# Patient Record
Sex: FEMALE | Race: WHITE | NOT HISPANIC OR LATINO | Employment: OTHER | RURAL
[De-identification: names, ages, dates, MRNs, and addresses within clinical notes are randomized per-mention and may not be internally consistent; named-entity substitution may affect disease eponyms.]

---

## 2017-10-17 ENCOUNTER — OFFICE VISIT (OUTPATIENT)
Dept: CARDIOLOGY | Facility: CLINIC | Age: 82
End: 2017-10-17

## 2017-10-17 VITALS
BODY MASS INDEX: 29.57 KG/M2 | HEIGHT: 66 IN | SYSTOLIC BLOOD PRESSURE: 135 MMHG | DIASTOLIC BLOOD PRESSURE: 55 MMHG | HEART RATE: 50 BPM | WEIGHT: 184 LBS

## 2017-10-17 DIAGNOSIS — I44.7 LEFT BUNDLE BRANCH BLOCK: ICD-10-CM

## 2017-10-17 DIAGNOSIS — E78.5 HYPERLIPIDEMIA LDL GOAL <70: ICD-10-CM

## 2017-10-17 DIAGNOSIS — I48.0 PAROXYSMAL ATRIAL FIBRILLATION (HCC): Primary | ICD-10-CM

## 2017-10-17 DIAGNOSIS — I25.10 CORONARY ARTERY DISEASE INVOLVING NATIVE CORONARY ARTERY OF NATIVE HEART WITHOUT ANGINA PECTORIS: ICD-10-CM

## 2017-10-17 DIAGNOSIS — I10 ESSENTIAL HYPERTENSION: ICD-10-CM

## 2017-10-17 PROCEDURE — 99214 OFFICE O/P EST MOD 30 MIN: CPT | Performed by: PHYSICIAN ASSISTANT

## 2017-10-17 PROCEDURE — 93000 ELECTROCARDIOGRAM COMPLETE: CPT | Performed by: PHYSICIAN ASSISTANT

## 2017-10-17 RX ORDER — HYDRALAZINE HYDROCHLORIDE 25 MG/1
25 TABLET, FILM COATED ORAL 2 TIMES DAILY
COMMUNITY
End: 2018-01-19 | Stop reason: HOSPADM

## 2017-10-17 RX ORDER — FERROUS SULFATE 325(65) MG
325 TABLET ORAL 2 TIMES DAILY
COMMUNITY
End: 2019-06-05

## 2017-10-17 NOTE — PROGRESS NOTES
Encounter Date:10/17/2017      Patient ID: Abbi Mendez is a 82 y.o. female.    Chief Complaint: Atrial Fibrillation    PROBLEM LIST:  1. Atrial fibrillation, resolved, 03/30/2007.  a. Atenolol therapy.  b. Questionable recurrence of atrial fibrillation, 03/31/2015 via EMS, data deficit:  No confirmation via EMS strips or EKG.  Event recorder 06/09/2015-07/09/2015:  No atrial fibrillation or arrhythmias appreciated.   c. Event recorder (06/09/2015 - 07/09/2015): No atrial fibrillation; occasional isolated PACs; no ventricular ectopy.  d. Event recorder, 04/01/2016:  No atrial fibrillation; 4 runs of repetitive PACs, the longest consisting of 6 beats at 156 BPM; dyspnea, tachypalpitations and lightheadedness were not associated with rhythm disturbance.  2. Coronary artery disease.  a. Cardiac catheterization, 03/30/2007:  With 30% to 50% mid/distal LAD in “worst views” within region of extreme tortuosity, left ventricular ejection fraction (70%) post PVC without segmental wall motion abnormality; trace mitral regurgitation associated with ectopy; no mitral valve prolapse.  b. Nuclear stress test, 02/04/2010:  Negative for ischemia/scar; left ventricular ejection fraction (87%).  c. Nuclear stress test negative for ischemia and scar; normal LVEF, 04/10/2015.  3. History of left bundle branch block.   4. Obstructive sleep apnea:   a. Diagnosed by sleep study.   b. Currently on CPAP.    5. Hypertension.  6. Dyslipidemia.  7. History of left lower extremity DVT x2 treated with Coumadin; Coumadin discontinued in 2002.  8. History of gastric ulcer.  9. Iron deficiency anemia.  10. Status post tubal ligation.    History of Present Illness  Patient returns a scheduled cardiology follow-up.  She has no current complaint of exertional chest pain dyspnea orthopnea no PND no claudication.  She has no awareness of tachycardia arrhythmias no dizziness or syncope.  She does report some lower extremity edema which  generally resolves overnight and is self-limiting.  She does not check her blood pressure regularly at home.  Her cholesterol is monitored by primary care physician reported to be favorable.  She is compliant with all medications reports no significant side effects.  She is asking for cardiac clearance prior to knee surgery next week.  Her activities mostly include housework which she completes without difficulty.  At present she reports her limitation is knee pain.    Allergies   Allergen Reactions   • Amlodipine Dizziness   • Darvon [Propoxyphene] Parasthesia   • Penicillins Rash         Current Outpatient Prescriptions:   •  amLODIPine (NORVASC) 10 MG tablet, Take 10 mg by mouth Daily., Disp: , Rfl:   •  aspirin 81 MG EC tablet, Take 81 mg by mouth Daily., Disp: , Rfl:   •  atorvastatin (LIPITOR) 40 MG tablet, Take 40 mg by mouth Daily., Disp: , Rfl:   •  busPIRone (BUSPAR) 10 MG tablet, Take 10 mg by mouth 2 (Two) Times a Day., Disp: , Rfl:   •  carvedilol (COREG) 12.5 MG tablet, Take 12.5 mg by mouth 2 (Two) Times a Day With Meals., Disp: , Rfl:   •  citalopram (CeleXA) 20 MG tablet, TAKE 1 TABLET EVERY DAY, Disp: 90 tablet, Rfl: 1  •  CloNIDine (CATAPRES) 0.1 MG tablet, Take 0.1 mg by mouth 2 (Two) Times a Day., Disp: , Rfl:   •  esomeprazole (nexIUM) 40 MG capsule, Take 40 mg by mouth Every Morning Before Breakfast., Disp: , Rfl:   •  Loratadine (CLARITIN) 10 MG capsule, Take 10 mg by mouth Daily., Disp: , Rfl:   •  losartan-hydrochlorothiazide (HYZAAR) 100-12.5 MG per tablet, Take 1 tablet by mouth Daily., Disp: , Rfl:   •  nabumetone (RELAFEN) 500 MG tablet, Take 500 mg by mouth 2 (Two) Times a Day As Needed for mild pain (1-3)., Disp: , Rfl:     The following portions of the patient's history were reviewed and updated as appropriate: allergies, current medications, past family history, past medical history, past social history, past surgical history and problem list.    Review of Systems   Constitution:  "Negative for diaphoresis, weakness, malaise/fatigue and weight gain.   Cardiovascular: Negative for chest pain, claudication, dyspnea on exertion, irregular heartbeat, leg swelling, orthopnea, palpitations, paroxysmal nocturnal dyspnea and syncope.   Respiratory: Negative for cough, shortness of breath, sleep disturbances due to breathing and wheezing.    Hematologic/Lymphatic: Negative for bleeding problem.   Musculoskeletal: Negative for muscle cramps, muscle weakness and myalgias.   Gastrointestinal: Negative for heartburn.         Objective:     Blood pressure 135/55, pulse 50, height 66\" (167.6 cm), weight 184 lb (83.5 kg).        Physical Exam   Constitutional: She is oriented to person, place, and time. She appears well-developed and well-nourished.   Cardiovascular: Normal rate, regular rhythm, normal heart sounds and intact distal pulses.  Exam reveals no gallop and no friction rub.    No murmur heard.  Pulmonary/Chest: Effort normal and breath sounds normal. No respiratory distress. She has no wheezes. She has no rales. She exhibits no tenderness.   Bases clear   Genitourinary:   Genitourinary Comments: Trace pitting edema bilateral lower extremities   Musculoskeletal: Normal range of motion. She exhibits edema.   Neurological: She is alert and oriented to person, place, and time.         Lab Review:     ECG 12 Lead  Date/Time: 10/17/2017 11:28 AM  Performed by: ASHLEY NATHAN  Authorized by: ASHLEY NATHAN   Rhythm: sinus bradycardia  Rate: bradycardic  Conduction: left bundle branch block  QRS axis: normal  Clinical impression: abnormal ECG               Assessment:      Diagnosis Plan   1. Paroxysmal atrial fibrillation  Currently maintaining sinus rhythm    2. Coronary artery disease involving native coronary artery of native heart without angina pectoris  No current anginal symptoms.  Continue current medical regimen    3. Left bundle branch block  No current heart failure symptoms    4. Essential " hypertension  Well controlled current medical regimen    5. Hyperlipidemia LDL goal <70  On statin therapy followed by primary care      Plan:   She is cleared for orthopedic surgery from cardiac standpoint.  Stable cardiac status.  Continue current medications.   FU in 6 MO, sooner as needed.  Thank you for allowing us to participate in the care of your patient.       FRANCO Mujica  10/17/2017  11:14 AM    I, Soraya Darling MD, personally performed the services described in this documentation as scribed by the above named individual in my presence, and it is both accurate and complete.  10/17/2017  11:50 AM      Please note that portions of this note may have been completed with a voice recognition program. Efforts were made to edit the dictations, but occasionally words are mistranscribed.

## 2018-01-17 ENCOUNTER — APPOINTMENT (OUTPATIENT)
Dept: GENERAL RADIOLOGY | Facility: HOSPITAL | Age: 83
End: 2018-01-17

## 2018-01-17 ENCOUNTER — HOSPITAL ENCOUNTER (INPATIENT)
Facility: HOSPITAL | Age: 83
LOS: 2 days | Discharge: HOME HEALTH | End: 2018-01-19
Attending: EMERGENCY MEDICINE | Admitting: INTERNAL MEDICINE

## 2018-01-17 DIAGNOSIS — M54.50 ACUTE MIDLINE LOW BACK PAIN WITHOUT SCIATICA: ICD-10-CM

## 2018-01-17 DIAGNOSIS — R00.1 SEVERE SINUS BRADYCARDIA: ICD-10-CM

## 2018-01-17 DIAGNOSIS — M43.9 COMPRESSION DEFORMITY OF VERTEBRA: ICD-10-CM

## 2018-01-17 DIAGNOSIS — Z74.09 IMPAIRED FUNCTIONAL MOBILITY, BALANCE, GAIT, AND ENDURANCE: ICD-10-CM

## 2018-01-17 DIAGNOSIS — R55 SYNCOPE AND COLLAPSE: Primary | ICD-10-CM

## 2018-01-17 DIAGNOSIS — R55 SYNCOPE, UNSPECIFIED SYNCOPE TYPE: ICD-10-CM

## 2018-01-17 DIAGNOSIS — E83.52 HYPERCALCEMIA: ICD-10-CM

## 2018-01-17 LAB
ALBUMIN SERPL-MCNC: 4.2 G/DL (ref 3.2–4.8)
ALBUMIN/GLOB SERPL: 1.2 G/DL (ref 1.5–2.5)
ALP SERPL-CCNC: 97 U/L (ref 25–100)
ALT SERPL W P-5'-P-CCNC: 17 U/L (ref 7–40)
ANION GAP SERPL CALCULATED.3IONS-SCNC: 10 MMOL/L (ref 3–11)
AST SERPL-CCNC: 19 U/L (ref 0–33)
BASOPHILS # BLD AUTO: 0.02 10*3/MM3 (ref 0–0.2)
BASOPHILS NFR BLD AUTO: 0.1 % (ref 0–1)
BILIRUB SERPL-MCNC: 0.3 MG/DL (ref 0.3–1.2)
BUN BLD-MCNC: 16 MG/DL (ref 9–23)
BUN/CREAT SERPL: 16 (ref 7–25)
CA-I SERPL ISE-MCNC: 1.62 MMOL/L (ref 1.12–1.32)
CALCIUM SPEC-SCNC: 11.9 MG/DL (ref 8.7–10.4)
CHLORIDE SERPL-SCNC: 97 MMOL/L (ref 99–109)
CO2 SERPL-SCNC: 27 MMOL/L (ref 20–31)
CREAT BLD-MCNC: 1 MG/DL (ref 0.6–1.3)
DEPRECATED RDW RBC AUTO: 42.5 FL (ref 37–54)
EOSINOPHIL # BLD AUTO: 0.03 10*3/MM3 (ref 0–0.3)
EOSINOPHIL NFR BLD AUTO: 0.2 % (ref 0–3)
ERYTHROCYTE [DISTWIDTH] IN BLOOD BY AUTOMATED COUNT: 12.9 % (ref 11.3–14.5)
GFR SERPL CREATININE-BSD FRML MDRD: 53 ML/MIN/1.73
GLOBULIN UR ELPH-MCNC: 3.5 GM/DL
GLUCOSE BLD-MCNC: 143 MG/DL (ref 70–100)
HCT VFR BLD AUTO: 39.5 % (ref 34.5–44)
HGB BLD-MCNC: 13.1 G/DL (ref 11.5–15.5)
HOLD SPECIMEN: NORMAL
HOLD SPECIMEN: NORMAL
IMM GRANULOCYTES # BLD: 0.17 10*3/MM3 (ref 0–0.03)
IMM GRANULOCYTES NFR BLD: 0.9 % (ref 0–0.6)
LYMPHOCYTES # BLD AUTO: 1.12 10*3/MM3 (ref 0.6–4.8)
LYMPHOCYTES NFR BLD AUTO: 5.9 % (ref 24–44)
MAGNESIUM SERPL-MCNC: 1.7 MG/DL (ref 1.3–2.7)
MCH RBC QN AUTO: 29.9 PG (ref 27–31)
MCHC RBC AUTO-ENTMCNC: 33.2 G/DL (ref 32–36)
MCV RBC AUTO: 90.2 FL (ref 80–99)
MONOCYTES # BLD AUTO: 1.1 10*3/MM3 (ref 0–1)
MONOCYTES NFR BLD AUTO: 5.8 % (ref 0–12)
NEUTROPHILS # BLD AUTO: 16.6 10*3/MM3 (ref 1.5–8.3)
NEUTROPHILS NFR BLD AUTO: 87.1 % (ref 41–71)
PLATELET # BLD AUTO: 358 10*3/MM3 (ref 150–450)
PMV BLD AUTO: 10.5 FL (ref 6–12)
POTASSIUM BLD-SCNC: 3.3 MMOL/L (ref 3.5–5.5)
PROT SERPL-MCNC: 7.7 G/DL (ref 5.7–8.2)
RBC # BLD AUTO: 4.38 10*6/MM3 (ref 3.89–5.14)
SODIUM BLD-SCNC: 134 MMOL/L (ref 132–146)
TROPONIN I SERPL-MCNC: 0.02 NG/ML (ref 0–0.07)
WBC NRBC COR # BLD: 19.04 10*3/MM3 (ref 3.5–10.8)
WHOLE BLOOD HOLD SPECIMEN: NORMAL
WHOLE BLOOD HOLD SPECIMEN: NORMAL

## 2018-01-17 PROCEDURE — 99024 POSTOP FOLLOW-UP VISIT: CPT | Performed by: INTERNAL MEDICINE

## 2018-01-17 PROCEDURE — 93005 ELECTROCARDIOGRAM TRACING: CPT | Performed by: EMERGENCY MEDICINE

## 2018-01-17 PROCEDURE — 99285 EMERGENCY DEPT VISIT HI MDM: CPT

## 2018-01-17 PROCEDURE — 82330 ASSAY OF CALCIUM: CPT | Performed by: EMERGENCY MEDICINE

## 2018-01-17 PROCEDURE — 85025 COMPLETE CBC W/AUTO DIFF WBC: CPT | Performed by: EMERGENCY MEDICINE

## 2018-01-17 PROCEDURE — 83735 ASSAY OF MAGNESIUM: CPT | Performed by: EMERGENCY MEDICINE

## 2018-01-17 PROCEDURE — 25010000002 ENOXAPARIN PER 10 MG: Performed by: PHYSICIAN ASSISTANT

## 2018-01-17 PROCEDURE — 84484 ASSAY OF TROPONIN QUANT: CPT

## 2018-01-17 PROCEDURE — 72100 X-RAY EXAM L-S SPINE 2/3 VWS: CPT

## 2018-01-17 PROCEDURE — G0378 HOSPITAL OBSERVATION PER HR: HCPCS

## 2018-01-17 PROCEDURE — 80053 COMPREHEN METABOLIC PANEL: CPT | Performed by: EMERGENCY MEDICINE

## 2018-01-17 RX ORDER — SODIUM CHLORIDE 9 MG/ML
125 INJECTION, SOLUTION INTRAVENOUS CONTINUOUS
Status: DISCONTINUED | OUTPATIENT
Start: 2018-01-17 | End: 2018-01-19 | Stop reason: HOSPADM

## 2018-01-17 RX ORDER — SODIUM CHLORIDE 0.9 % (FLUSH) 0.9 %
10 SYRINGE (ML) INJECTION AS NEEDED
Status: DISCONTINUED | OUTPATIENT
Start: 2018-01-17 | End: 2018-01-19 | Stop reason: HOSPADM

## 2018-01-17 RX ORDER — AMLODIPINE BESYLATE 10 MG/1
10 TABLET ORAL DAILY
Status: DISCONTINUED | OUTPATIENT
Start: 2018-01-18 | End: 2018-01-18

## 2018-01-17 RX ORDER — LOSARTAN POTASSIUM 50 MG/1
100 TABLET ORAL
Status: DISCONTINUED | OUTPATIENT
Start: 2018-01-18 | End: 2018-01-18

## 2018-01-17 RX ORDER — ONDANSETRON 4 MG/1
4 TABLET, FILM COATED ORAL EVERY 6 HOURS PRN
Status: DISCONTINUED | OUTPATIENT
Start: 2018-01-17 | End: 2018-01-19 | Stop reason: HOSPADM

## 2018-01-17 RX ORDER — ATORVASTATIN CALCIUM 40 MG/1
40 TABLET, FILM COATED ORAL DAILY
Status: DISCONTINUED | OUTPATIENT
Start: 2018-01-18 | End: 2018-01-19 | Stop reason: HOSPADM

## 2018-01-17 RX ORDER — ONDANSETRON 2 MG/ML
4 INJECTION INTRAMUSCULAR; INTRAVENOUS EVERY 6 HOURS PRN
Status: DISCONTINUED | OUTPATIENT
Start: 2018-01-17 | End: 2018-01-19 | Stop reason: HOSPADM

## 2018-01-17 RX ORDER — BUSPIRONE HYDROCHLORIDE 10 MG/1
10 TABLET ORAL EVERY 12 HOURS SCHEDULED
Status: DISCONTINUED | OUTPATIENT
Start: 2018-01-17 | End: 2018-01-19 | Stop reason: HOSPADM

## 2018-01-17 RX ORDER — CITALOPRAM 20 MG/1
20 TABLET ORAL DAILY
Status: DISCONTINUED | OUTPATIENT
Start: 2018-01-18 | End: 2018-01-19 | Stop reason: HOSPADM

## 2018-01-17 RX ORDER — SODIUM CHLORIDE 0.9 % (FLUSH) 0.9 %
1-10 SYRINGE (ML) INJECTION AS NEEDED
Status: DISCONTINUED | OUTPATIENT
Start: 2018-01-17 | End: 2018-01-19 | Stop reason: HOSPADM

## 2018-01-17 RX ORDER — HYDROCHLOROTHIAZIDE 12.5 MG/1
12.5 TABLET ORAL
Status: DISCONTINUED | OUTPATIENT
Start: 2018-01-18 | End: 2018-01-18

## 2018-01-17 RX ORDER — FERROUS SULFATE 325(65) MG
325 TABLET ORAL
Status: DISCONTINUED | OUTPATIENT
Start: 2018-01-18 | End: 2018-01-19 | Stop reason: HOSPADM

## 2018-01-17 RX ORDER — HYDROCODONE BITARTRATE AND ACETAMINOPHEN 5; 325 MG/1; MG/1
1 TABLET ORAL ONCE
Status: COMPLETED | OUTPATIENT
Start: 2018-01-17 | End: 2018-01-17

## 2018-01-17 RX ORDER — LOSARTAN POTASSIUM AND HYDROCHLOROTHIAZIDE 12.5; 1 MG/1; MG/1
1 TABLET ORAL DAILY
Status: DISCONTINUED | OUTPATIENT
Start: 2018-01-18 | End: 2018-01-17

## 2018-01-17 RX ORDER — PANTOPRAZOLE SODIUM 40 MG/1
40 TABLET, DELAYED RELEASE ORAL EVERY MORNING
Status: DISCONTINUED | OUTPATIENT
Start: 2018-01-18 | End: 2018-01-19 | Stop reason: HOSPADM

## 2018-01-17 RX ORDER — ASPIRIN 81 MG/1
81 TABLET ORAL DAILY
Status: DISCONTINUED | OUTPATIENT
Start: 2018-01-17 | End: 2018-01-19 | Stop reason: HOSPADM

## 2018-01-17 RX ORDER — ACETAMINOPHEN 325 MG/1
650 TABLET ORAL EVERY 4 HOURS PRN
Status: DISCONTINUED | OUTPATIENT
Start: 2018-01-17 | End: 2018-01-19 | Stop reason: HOSPADM

## 2018-01-17 RX ORDER — HYDRALAZINE HYDROCHLORIDE 25 MG/1
25 TABLET, FILM COATED ORAL EVERY 12 HOURS SCHEDULED
Status: DISCONTINUED | OUTPATIENT
Start: 2018-01-17 | End: 2018-01-18

## 2018-01-17 RX ORDER — CLONIDINE HYDROCHLORIDE 0.1 MG/1
0.1 TABLET ORAL EVERY 12 HOURS SCHEDULED
Status: DISCONTINUED | OUTPATIENT
Start: 2018-01-17 | End: 2018-01-18

## 2018-01-17 RX ORDER — ERGOCALCIFEROL 1.25 MG/1
50000 CAPSULE ORAL
COMMUNITY
End: 2018-04-20 | Stop reason: HOSPADM

## 2018-01-17 RX ADMIN — HYDROCODONE BITARTRATE AND ACETAMINOPHEN 1 TABLET: 5; 325 TABLET ORAL at 19:45

## 2018-01-17 RX ADMIN — ENOXAPARIN SODIUM 40 MG: 40 INJECTION SUBCUTANEOUS at 17:55

## 2018-01-17 RX ADMIN — ACETAMINOPHEN 650 MG: 325 TABLET, FILM COATED ORAL at 17:55

## 2018-01-17 RX ADMIN — ASPIRIN 81 MG: 81 TABLET, COATED ORAL at 19:26

## 2018-01-17 RX ADMIN — CLONIDINE HYDROCHLORIDE 0.1 MG: 0.1 TABLET ORAL at 21:46

## 2018-01-17 RX ADMIN — HYDRALAZINE HYDROCHLORIDE 25 MG: 25 TABLET ORAL at 21:44

## 2018-01-17 RX ADMIN — BUSPIRONE HYDROCHLORIDE 10 MG: 10 TABLET ORAL at 21:46

## 2018-01-17 NOTE — ED PROVIDER NOTES
Subjective   HPI Comments: Abbi Mendez is a 82 y.o.female who presents to the emergency department with c/o syncope. Her cardiologist is Dr. Darling.     The patient has had multiple near-syncopal episodes related to bradycardia (heart rate in the 20's) this month. She had not lost consciousness completely until last Saturday. The patient was seen in [facility] last Saturday after the syncopal event and was admitted to [facility], [place] on Wednesday after having a four second pause on telemetry. She was taken off Clonidine and Coreg and started on Atropine. The patient had an external pacemaker placed as well. They apparently considered placing a permanent pacemaker but the patient had no episodes of bradycardia during admission.    The patient had another syncopal episode this morning and believes that she lost consciousness for at least a minute. She was brought here for further evaluation.    The patient had a total knee replacement in October and has been ambulatory with assistance from a walker and a cane. She has been in physical therapy as well and has regained some mobility.     There are no other acute complaints at this time.    Patient is a 82 y.o. female presenting with syncope.   History provided by:  Patient  Syncope   Episode history:  Multiple  Most recent episode:  Today  Duration:  1 minute  Timing:  Constant  Progression:  Resolved  Chronicity:  Recurrent  Relieved by:  Nothing  Worsened by:  Nothing  Ineffective treatments:  None tried  Associated symptoms: dizziness and weakness    Associated symptoms: no chest pain, no fever, no headaches, no nausea, no shortness of breath and no vomiting        Review of Systems   Constitutional: Negative for chills and fever.   HENT: Negative for congestion, rhinorrhea, sore throat and trouble swallowing.    Respiratory: Negative for cough and shortness of breath.    Cardiovascular: Positive for syncope. Negative for chest pain.    Gastrointestinal: Negative for abdominal pain, diarrhea, nausea and vomiting.   Genitourinary: Negative for difficulty urinating, dysuria, frequency, hematuria and urgency.   Musculoskeletal: Positive for gait problem (after total knee replacement in October). Negative for back pain and neck pain.   Neurological: Positive for dizziness, syncope and weakness. Negative for headaches.   All other systems reviewed and are negative.      Past Medical History:   Diagnosis Date   • Atrial fibrillation 11/14/2016   • CAD (coronary artery disease) 11/14/2016   • Deep vein thrombosis (DVT) of left lower extremity    • Dyslipidemia 11/14/2016   • Gastric ulcer    • Hypertension 11/14/2016   • Iron deficiency anemia    • LBBB (left bundle branch block)    • RADHA (obstructive sleep apnea) 11/14/2016     Note by Hung Chavez from 10/17/2017:    PROBLEM LIST:  1. Atrial fibrillation, resolved, 03/30/2007.  a. Atenolol therapy.  b. Questionable recurrence of atrial fibrillation, 03/31/2015 via EMS, data deficit:  No confirmation via EMS strips or EKG.  Event recorder 06/09/2015-07/09/2015:  No atrial fibrillation or arrhythmias appreciated.   c. Event recorder (06/09/2015 - 07/09/2015): No atrial fibrillation; occasional isolated PACs; no ventricular ectopy.  d. Event recorder, 04/01/2016:  No atrial fibrillation; 4 runs of repetitive PACs, the longest consisting of 6 beats at 156 BPM; dyspnea, tachypalpitations and lightheadedness were not associated with rhythm disturbance.  2. Coronary artery disease.  a. Cardiac catheterization, 03/30/2007:  With 30% to 50% mid/distal LAD in “worst views” within region of extreme tortuosity, left ventricular ejection fraction (70%) post PVC without segmental wall motion abnormality; trace mitral regurgitation associated with ectopy; no mitral valve prolapse.  b. Nuclear stress test, 02/04/2010:  Negative for ischemia/scar; left ventricular ejection fraction (87%).  c. Nuclear stress test  negative for ischemia and scar; normal LVEF, 04/10/2015.  3. History of left bundle branch block.   4. Obstructive sleep apnea:   a. Diagnosed by sleep study.   b. Currently on CPAP.    5. Hypertension.  6. Dyslipidemia.  7. History of left lower extremity DVT x2 treated with Coumadin; Coumadin discontinued in 2002.  8. History of gastric ulcer.  9. Iron deficiency anemia.  10. Status post tubal ligation.     History of Present Illness  Patient returns a scheduled cardiology follow-up.  She has no current complaint of exertional chest pain dyspnea orthopnea no PND no claudication.  She has no awareness of tachycardia arrhythmias no dizziness or syncope.  She does report some lower extremity edema which generally resolves overnight and is self-limiting.  She does not check her blood pressure regularly at home.  Her cholesterol is monitored by primary care physician reported to be favorable.  She is compliant with all medications reports no significant side effects.  She is asking for cardiac clearance prior to knee surgery next week.  Her activities mostly include housework which she completes without difficulty.  At present she reports her limitation is knee pain.    Assessment:        Diagnosis Plan   1. Paroxysmal atrial fibrillation  Currently maintaining sinus rhythm    2. Coronary artery disease involving native coronary artery of native heart without angina pectoris  No current anginal symptoms.  Continue current medical regimen    3. Left bundle branch block  No current heart failure symptoms    4. Essential hypertension  Well controlled current medical regimen    5. Hyperlipidemia LDL goal <70  On statin therapy followed by primary care       Plan:   She is cleared for orthopedic surgery from cardiac standpoint.  Stable cardiac status.  Continue current medications.   FU in 6 MO, sooner as needed.  Thank you for allowing us to participate in the care of your patient.         Hung Chavez,  PA  10/17/2017  11:14 AM      Allergies   Allergen Reactions   • Darvon [Propoxyphene] Parasthesia   • Penicillins Rash       Past Surgical History:   Procedure Laterality Date   • TUBAL ABDOMINAL LIGATION         Family History   Problem Relation Age of Onset   • Heart attack Mother 69   • Heart disease Sister    • Heart attack Brother 60   • Heart attack Brother 45       Social History     Social History   • Marital status: Single     Spouse name: N/A   • Number of children: N/A   • Years of education: N/A     Social History Main Topics   • Smoking status: Never Smoker   • Smokeless tobacco: Never Used   • Alcohol use No   • Drug use: No   • Sexual activity: Defer     Other Topics Concern   • None     Social History Narrative         Objective   Physical Exam   Constitutional: She is oriented to person, place, and time. She appears well-developed and well-nourished. No distress.   Alert, oriented, and in no acute distress.   HENT:   Head: Normocephalic and atraumatic.   Nose: Nose normal.   Mouth/Throat: Oropharynx is clear and moist.   Eyes: Conjunctivae are normal. No scleral icterus.   Neck: Normal range of motion. Neck supple. No JVD present. No thyromegaly present.   Cardiovascular: Normal rate and regular rhythm.  Exam reveals gallop. Exam reveals no friction rub.    No murmur heard.  Regular with a little gallop.   Pulmonary/Chest: Effort normal and breath sounds normal. No respiratory distress. She has no wheezes. She has no rales.   Abdominal: Soft. Bowel sounds are normal. She exhibits no mass. There is no tenderness. There is no rebound and no guarding.   Mildly obese. Abdomen is soft and non-tender diffusely. Bowel sounds are normal in all four quadrants. No guarding or rebound.    Musculoskeletal: Normal range of motion. She exhibits tenderness. She exhibits no edema.   There is possibly slight tenderness in the mid lumbar spine. CT spine is non-tender. Right knee has a well-healed scar.    Lymphadenopathy:     She has no cervical adenopathy.   Neurological: She is alert and oriented to person, place, and time.   Mild generalized weakness.   Skin: Skin is warm and dry. No erythema.   Psychiatric: She has a normal mood and affect. Her behavior is normal.   Nursing note and vitals reviewed.      Procedures         ED Course  ED Course     Recent Results (from the past 24 hour(s))   Comprehensive Metabolic Panel    Collection Time: 01/17/18  2:07 PM   Result Value Ref Range    Glucose 143 (H) 70 - 100 mg/dL    BUN 16 9 - 23 mg/dL    Creatinine 1.00 0.60 - 1.30 mg/dL    Sodium 134 132 - 146 mmol/L    Potassium 3.3 (L) 3.5 - 5.5 mmol/L    Chloride 97 (L) 99 - 109 mmol/L    CO2 27.0 20.0 - 31.0 mmol/L    Calcium 11.9 (H) 8.7 - 10.4 mg/dL    Total Protein 7.7 5.7 - 8.2 g/dL    Albumin 4.20 3.20 - 4.80 g/dL    ALT (SGPT) 17 7 - 40 U/L    AST (SGOT) 19 0 - 33 U/L    Alkaline Phosphatase 97 25 - 100 U/L    Total Bilirubin 0.3 0.3 - 1.2 mg/dL    eGFR Non African Amer 53 (L) >60 mL/min/1.73    Globulin 3.5 gm/dL    A/G Ratio 1.2 (L) 1.5 - 2.5 g/dL    BUN/Creatinine Ratio 16.0 7.0 - 25.0    Anion Gap 10.0 3.0 - 11.0 mmol/L   Magnesium    Collection Time: 01/17/18  2:07 PM   Result Value Ref Range    Magnesium 1.7 1.3 - 2.7 mg/dL   Light Blue Top    Collection Time: 01/17/18  2:07 PM   Result Value Ref Range    Extra Tube hold for add-on    Green Top (Gel)    Collection Time: 01/17/18  2:07 PM   Result Value Ref Range    Extra Tube Hold for add-ons.    Lavender Top    Collection Time: 01/17/18  2:07 PM   Result Value Ref Range    Extra Tube hold for add-on    Gold Top - SST    Collection Time: 01/17/18  2:07 PM   Result Value Ref Range    Extra Tube Hold for add-ons.    CBC Auto Differential    Collection Time: 01/17/18  2:07 PM   Result Value Ref Range    WBC 19.04 (H) 3.50 - 10.80 10*3/mm3    RBC 4.38 3.89 - 5.14 10*6/mm3    Hemoglobin 13.1 11.5 - 15.5 g/dL    Hematocrit 39.5 34.5 - 44.0 %    MCV 90.2 80.0 -  99.0 fL    MCH 29.9 27.0 - 31.0 pg    MCHC 33.2 32.0 - 36.0 g/dL    RDW 12.9 11.3 - 14.5 %    RDW-SD 42.5 37.0 - 54.0 fl    MPV 10.5 6.0 - 12.0 fL    Platelets 358 150 - 450 10*3/mm3    Neutrophil % 87.1 (H) 41.0 - 71.0 %    Lymphocyte % 5.9 (L) 24.0 - 44.0 %    Monocyte % 5.8 0.0 - 12.0 %    Eosinophil % 0.2 0.0 - 3.0 %    Basophil % 0.1 0.0 - 1.0 %    Immature Grans % 0.9 (H) 0.0 - 0.6 %    Neutrophils, Absolute 16.60 (H) 1.50 - 8.30 10*3/mm3    Lymphocytes, Absolute 1.12 0.60 - 4.80 10*3/mm3    Monocytes, Absolute 1.10 (H) 0.00 - 1.00 10*3/mm3    Eosinophils, Absolute 0.03 0.00 - 0.30 10*3/mm3    Basophils, Absolute 0.02 0.00 - 0.20 10*3/mm3    Immature Grans, Absolute 0.17 (H) 0.00 - 0.03 10*3/mm3   POC Troponin, Rapid    Collection Time: 01/17/18  2:07 PM   Result Value Ref Range    Troponin I 0.02 0.00 - 0.07 ng/mL   Calcium, Ionized    Collection Time: 01/17/18  2:07 PM   Result Value Ref Range    Ionized Calcium 1.62 (C) 1.12 - 1.32 mmol/L   Comprehensive Metabolic Panel    Collection Time: 01/18/18 12:37 AM   Result Value Ref Range    Glucose 114 (H) 70 - 100 mg/dL    BUN 16 9 - 23 mg/dL    Creatinine 0.80 0.60 - 1.30 mg/dL    Sodium 135 132 - 146 mmol/L    Potassium 3.7 3.5 - 5.5 mmol/L    Chloride 101 99 - 109 mmol/L    CO2 29.0 20.0 - 31.0 mmol/L    Calcium 10.8 (H) 8.7 - 10.4 mg/dL    Total Protein 6.1 5.7 - 8.2 g/dL    Albumin 3.50 3.20 - 4.80 g/dL    ALT (SGPT) 14 7 - 40 U/L    AST (SGOT) 20 0 - 33 U/L    Alkaline Phosphatase 84 25 - 100 U/L    Total Bilirubin 0.4 0.3 - 1.2 mg/dL    eGFR Non African Amer 69 >60 mL/min/1.73    Globulin 2.6 gm/dL    A/G Ratio 1.3 (L) 1.5 - 2.5 g/dL    BUN/Creatinine Ratio 20.0 7.0 - 25.0    Anion Gap 5.0 3.0 - 11.0 mmol/L     Note: In addition to lab results from this visit, the labs listed above may include labs taken at another facility or during a different encounter within the last 24 hours. Please correlate lab times with ED admission and discharge times for  further clarification of the services performed during this visit.    XR Spine Lumbar 2 or 3 View   Preliminary Result   1. Transitional lumbosacral anatomy. This exam assumes a rudimentary   L5-S1 disc space and partial sacralization of L5.   2. 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 Please correlate with   level of patient's symptoms.       DICTATED:     01/17/2018   EDITED    :     01/17/2018             Vitals:    01/18/18 0020 01/18/18 0410 01/18/18 0724 01/18/18 1006   BP: 157/70 162/82 162/75 (!) 182/81   BP Location: Left arm Left arm Left arm    Patient Position: Lying Lying Lying    Pulse: 80 62 74 75   Resp: 18 18 16 16   Temp: 98.8 °F (37.1 °C) 97.8 °F (36.6 °C) 98.3 °F (36.8 °C)    TempSrc: Oral Oral Oral    SpO2: 96% 96%     Weight:       Height:         Medications   sodium chloride 0.9 % flush 10 mL (not administered)   sodium chloride 0.9 % infusion (125 mL/hr Intravenous Currently Infusing 1/18/18 0550)   CloNIDine (CATAPRES) tablet 0.1 mg (0.1 mg Oral Given 1/18/18 0903)   amLODIPine (NORVASC) tablet 10 mg (10 mg Oral Given 1/18/18 0903)   aspirin EC tablet 81 mg (81 mg Oral Given 1/18/18 0903)   atorvastatin (LIPITOR) tablet 40 mg (40 mg Oral Given 1/18/18 0904)   busPIRone (BUSPAR) tablet 10 mg (10 mg Oral Given 1/18/18 0903)   citalopram (CeleXA) tablet 20 mg (20 mg Oral Given 1/18/18 0904)   pantoprazole (PROTONIX) EC tablet 40 mg ( Oral Canceled Entry 1/18/18 0700)   ferrous sulfate tablet 325 mg (325 mg Oral Given 1/18/18 0903)   hydrALAZINE (APRESOLINE) tablet 25 mg (25 mg Oral Given 1/18/18 0904)   sodium chloride 0.9 % flush 1-10 mL ( Intravenous MAR Hold 1/18/18 1003)   acetaminophen (TYLENOL) tablet 650 mg ( Oral MAR Hold 1/18/18 1003)   ondansetron (ZOFRAN) tablet 4 mg ( Oral MAR Hold 1/18/18 1003)     Or   ondansetron (ZOFRAN) injection 4 mg ( Intravenous MAR Hold 1/18/18 1003)   losartan (COZAAR) tablet 100 mg ( Oral Dose Auto Held 1/26/18 0900)     And   hydrochlorothiazide  (HYDRODIURIL) tablet 12.5 mg ( Oral Dose Auto Held 1/26/18 0900)   enoxaparin (LOVENOX) syringe 40 mg (40 mg Subcutaneous Given 1/17/18 1755)   HYDROcodone-acetaminophen (NORCO) 5-325 MG per tablet 1 tablet (1 tablet Oral Given 1/17/18 1945)   vancomycin 1500 mg/500 mL 0.9% NS IVPB ([facility]) (1,500 mg Intravenous New Bag 1/18/18 1005)     ECG/EMG Results (last 24 hours)     Procedure Component Value Units Date/Time    ECG 12 Lead [[id number]] Collected:  01/17/18 1403     Updated:  01/17/18 1413                        MDM  Number of Diagnoses or Management Options  Acute midline low back pain without sciatica:   Hypercalcemia:   Syncope, unspecified syncope type:   Diagnosis management comments:       I reviewed all available studies at the bedside the patient  this is a lady who has had multiple syncopal episodes and by report sinus cause and perhaps ventricular tachycardia at other hospitals.  She had another single episode today and presents here for further evaluation.  Currently she is hemodynamically stable.  We did x-rays of lumbosacral spine to evaluate for possible back injury she has some mild back pain.  Her calcium was also elevated and she said she was told this in an outside institution as well given her on normal saline for this but will need further evaluation.    I spoke with Juan Luis Chavez from cardiology and he is down to see the patient anticipates admission.  Please see cardiology note.      Final diagnoses:   Syncope, unspecified syncope type   Hypercalcemia   Acute midline low back pain without sciatica       Documentation assistance provided by sarah Lion.  Information recorded by the sarah was done at my direction and has been verified and validated by me.     Thuy Lion  01/17/18 1437       Thuy Lion  01/17/18 1449       Thuy Lion  01/17/18 1640       Malick Dorantes MD  01/18/18 1020

## 2018-01-17 NOTE — H&P
[facility]        Date of Hospital Visit: 18      Place of Service: [facility]    Patient Name: Abbi Mendez  :1935    Referral Provider: ED  Primary Care Provider: Eric Driver MD    Chief complaint/Reason for Consultation:  syncope         Problem List:  Problem   Syncope and Collapse   Severe Sinus Bradycardia   Atrial Fibrillation      · Questionable recurrence of atrial fibrillation, 2015 via EMS, data deficit: No confirmation via EMS strips or EKG.   · Event recorder 2015-2015: No atrial fibrillation or arrhythmias appreciated.   · Event recorder (2015 - 2015): No atrial fibrillation; occasional isolated PACs; no ventricular ectopy.  · Event recorder, 2016: No atrial fibrillation; 4 runs of repetitive PACs, the longest consisting of 6 beats at 156 BPM.     Coronary Artery Disease Involving Native Coronary Artery of Native Heart Without Angina Pectoris    · Cardiac catheterization, (2007): 30% to 50% mid/distal LAD. LVEF 70%.   · Nuclear stress test, (2010): Negative for ischemia/scar.  LVEF 87%.  · Nuclear stress test (04/10/2015): Negative for ischemia and scar. Normal LVEF.         History of Present Illness:  This is an 82 year old hypertensive dyslipidemic female with known stable coronary artery disease and a history of paroxysmal atrial fibrillation.  Her last 3 event recorders by our service showed no significant arrhythmias.  The last was in .  She was seen by our service in 2017 for preoperative clearance prior to knee replacement surgery.  At that time she was doing well had no complaints of tachycardia arrhythmias no dizziness syncope or chest pain.  She was cleared for orthopedic surgery from cardiac standpoint.  Her knee replacement was uneventful.    She presented to [facility] a few weeks ago after an episode of kennedy syncope.  There she was recorded to  have multiple sinus pauses of at least 4 seconds or more.  Her daughter who is retired nurse says there may have been some ventricular tachycardia.  She was also reported to have had severe bradycardia with a rate as low as 20.  She was treated with at least one dose of atropine.  We had no beds in the hospital that week and she was transferred to [facility] for higher level of care.  There she was taken off of carvedilol and had no further episodes of bradycardia or sinus pauses.  She was discharged to home this past Saturday.  She did fine until this morning when she had another episode of kennedy syncope.  She was brought to the [facility] emergency department for further evaluation.  Here her EKG shows sinus rhythm with a ventricular rate of 96 and a left bundle branch block which has widened in comparison with October 2017.  She is also found to have hypercalcemia of unclear significance.  She reports having had multiple falls associated with syncope without significant injury.  These were witnessed on at least one occasion by her daughter who reports no tonic-clonic activity.  No loss of bowel control but she did have loss of bladder.  Her daughter reports that she awoke within 2-3 minutes and was fully oriented.          Past Medical History:   Diagnosis Date   • Atrial fibrillation 11/14/2016   • CAD (coronary artery disease) 11/14/2016   • Deep vein thrombosis (DVT) of left lower extremity    • Dyslipidemia 11/14/2016   • Gastric ulcer    • Hypertension 11/14/2016   • Iron deficiency anemia    • LBBB (left bundle branch block)    • RADHA (obstructive sleep apnea) 11/14/2016       Past Surgical History:   Procedure Laterality Date   • TUBAL ABDOMINAL LIGATION         Allergies   Allergen Reactions   • Darvon [Propoxyphene] Parasthesia   • Penicillins Rash       (Not in a hospital admission)    Current Facility-Administered Medications:   •  sodium chloride 0.9 % flush 10 mL, 10 mL, Intravenous, PRN, Malick I  MD Jose E  •  sodium chloride 0.9 % infusion, 125 mL/hr, Intravenous, Continuous, Vel Dykes MD    Current Outpatient Prescriptions:   •  amLODIPine (NORVASC) 10 MG tablet, Take 10 mg by mouth Daily., Disp: , Rfl:   •  aspirin 81 MG EC tablet, Take 81 mg by mouth Daily., Disp: , Rfl:   •  atorvastatin (LIPITOR) 40 MG tablet, Take 40 mg by mouth Daily., Disp: , Rfl:   •  busPIRone (BUSPAR) 10 MG tablet, Take 10 mg by mouth 2 (Two) Times a Day., Disp: , Rfl:   •  citalopram (CeleXA) 20 MG tablet, TAKE 1 TABLET EVERY DAY, Disp: 90 tablet, Rfl: 1  •  esomeprazole (nexIUM) 40 MG capsule, Take 40 mg by mouth Every Morning Before Breakfast., Disp: , Rfl:   •  ferrous sulfate 325 (65 FE) MG tablet, Take 325 mg by mouth 2 (Two) Times a Day., Disp: , Rfl:   •  hydrALAZINE (APRESOLINE) 25 MG tablet, Take 25 mg by mouth 2 (Two) Times a Day., Disp: , Rfl:   •  losartan-hydrochlorothiazide (HYZAAR) 100-12.5 MG per tablet, Take 1 tablet by mouth Daily., Disp: , Rfl:   •  vitamin D (ERGOCALCIFEROL) 29078 units capsule capsule, Take 50,000 Units by mouth 1 (One) Time Per Week., Disp: , Rfl:   •  CloNIDine (CATAPRES) 0.1 MG tablet, Take 0.1 mg by mouth 2 (Two) Times a Day., Disp: , Rfl:       Social History     Social History   • Marital status: Single     Spouse name: N/A   • Number of children: N/A   • Years of education: N/A     Occupational History   • Not on file.     Social History Main Topics   • Smoking status: Never Smoker   • Smokeless tobacco: Never Used   • Alcohol use No   • Drug use: No   • Sexual activity: Defer     Other Topics Concern   • Not on file     Social History Narrative       Family History   Problem Relation Age of Onset   • Heart attack Mother 69   • Heart disease Sister    • Heart attack Brother 60   • Heart attack Brother 45       REVIEW OF SYSTEMS:   Review of Systems   Constitution: Negative.   HENT: Negative.    Eyes: Negative.    Cardiovascular: Positive for syncope.   Respiratory: Negative.   "  Endocrine: Negative.    Hematologic/Lymphatic: Negative.    Skin: Negative.    Musculoskeletal: Negative.    Gastrointestinal: Negative.    Genitourinary: Negative.    Psychiatric/Behavioral: Negative.    Allergic/Immunologic: Negative.    All other systems reviewed and are negative.           Objective:  Vitals:    01/17/18 1430 01/17/18 1445 01/17/18 1500 01/17/18 1515   BP: 150/73 155/68 149/69 154/72   Patient Position:       Pulse: 89  89 88   Resp:       Temp:       TempSrc:       SpO2: 93%  90% 90%   Weight:       Height:         Body mass index is 29.18 kg/(m^2).  Flowsheet Rows         First Filed Value    Admission Height  162.6 cm (64\") Documented at 01/17/2018 1403    Admission Weight  77.1 kg (170 lb) Documented at 01/17/2018 1403        No intake or output data in the 24 hours ending 01/17/18 1526    Physical Exam   General: No acute distress, well-developed and well-nourished.    Skin: Skin is warm and dry. No obvious cyanosis, erythema or pallor.   HEENT: Atraumatic, normocephalic, no conjunctival pallor, no scleral icterus.   Neck: Supple, no JVD. Normal carotid upstrokes, no bruits.    Chest:No respiratory distres No chest wall tenderness. Breath sounds are normal. No wheezes,  rhonchi or rales.  Cardiovascular: Normal S1 and S2, no murmer, gallop or rub. PMI is not displaced.    Pulses:Radial and pedal pulses are 2+ and symmetric.    Abdomen: Soft, non tender, normal bowel sounds.   Musculoskeletal/Extremities:  No clubbing, cyanosis or edema. No gross deformity.   Neurological: Alert and oriented to person, place, and time, no gross focal deficits.   Psychiatric: Normal mood and affect.Speech and behavior is normal.        Lab Review:                  Results from last 7 days  Lab Units 01/17/18  1407   SODIUM mmol/L 134   POTASSIUM mmol/L 3.3*   CHLORIDE mmol/L 97*   CO2 mmol/L 27.0   BUN mg/dL 16   CREATININE mg/dL 1.00   GLUCOSE mg/dL 143*   CALCIUM mg/dL 11.9*           Results from last 7 " days  Lab Units 01/17/18  1407   WBC 10*3/mm3 19.04*   HEMOGLOBIN g/dL 13.1   HEMATOCRIT % 39.5   PLATELETS 10*3/mm3 358           Results from last 7 days  Lab Units 01/17/18  1407   MAGNESIUM mg/dL 1.7               EKG: Sinus rhythm with first-degree AV block/left bundle branch block consistent with trifascicular block.          Assessment:   · Syncope with collapse  · Advanced conduction disease noted on ECG, reported severe bradycardia with multiple sinus pauses (records pending). 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  · Hypokalemic   · Stable coronary artery disease  · Hypertension    Plan:   · Monitor on telemetry.  · Tele[facility] records from [facility]  · Plan permanent pacemaker implant in the morning  · The procedure was explained to the patient/family extensively. Indications, benefits, risks and alternatives were discussed. The patient understands well, and wishes to proceed.     Scribed for Soraya Darling MD by Hung Chavez PA-C. 1/17/2018  3:26 PM     ISoraya MD, personally performed the services described in this documentation as scribed by the above named individual in my presence, and it is both accurate and complete.  1/17/2018  5:20 PM

## 2018-01-17 NOTE — PROGRESS NOTES
Discharge Planning Assessment  [facility]     Patient Name: Abbi Mendez  MRN: [id number]  Today's Date: 1/17/2018    Admit Date: 1/17/2018          Discharge Needs Assessment       01/17/18 1636    Living Environment    Lives With alone   pt resides in [facility]    Living Arrangements house    Type of Financial/Environmental Concern none    Living Environment    Provides Primary Care For no one    Quality Of Family Relationships supportive;helpful;involved    Able to Return to Prior Living Arrangements yes    Discharge Needs Assessment    Concerns To Be Addressed no discharge needs identified;denies needs/concerns at this time    Readmission Within The Last 30 Days other (see comments)   admitted to OSH, same issues    Anticipated Changes Related to Illness none    Equipment Currently Used at Home cane, straight;walker, rolling;shower chair;oxygen   pt has home O2 for HS use through [employer], does not have portable tanks    Equipment Needed After Discharge none    Discharge Contact Information if Applicable [phone]            Discharge Plan       01/17/18 1639    Case Management/Social Work Plan    Plan home    Patient/Family In Agreement With Plan yes    Additional Comments CM spoke with pt and daughters Sherine and Shruthi at bedside. Pt plans to return home with assistance from family as needed. Pt's daughters live nearby and willing to assist. Pt is interested in Life Alert information prior to discharge as she feels she would benefit from this program. CM will contact [employer] tomorrow for assistance. CM will continue to follow.         Discharge Placement     No information found                Demographic Summary       01/17/18 1632    Referral Information    Referral Source admission list    Contact Information    Permission Granted to Share Information With     Primary Care Physician Information    Name Janet Driver            Functional Status       01/17/18 1633    Functional  Status Current    Current Functional Level Comment unable to assess    Functional Status Prior    Ambulation 1-->assistive equipment    Transferring 1-->assistive equipment    Toileting 0-->independent    Bathing 3-->assistive equipment and person    Dressing 0-->independent    Eating 0-->independent    Communication 0-->understands/communicates without difficulty    Swallowing 0-->swallows foods/liquids without difficulty    IADL    Medications independent   pt confirms she has prescription drug coverage and denies issues obtaining or affording current medications    Meal Preparation assistive person    Housekeeping assistive person    Laundry assistive person    Shopping assistive person    Oral Care independent    Activity Tolerance    Current Activity Limitations none    Usual Activity Tolerance fair    Current Activity Tolerance fair    Employment/Financial    Financial Concerns none   pt confirms she has [employer] Medicare and denies issues obtaining or affording current medications            Psychosocial     None            Abuse/Neglect     None            Legal     None            Substance Abuse     None            Patient Forms     None          Janna Ferguson

## 2018-01-18 ENCOUNTER — APPOINTMENT (OUTPATIENT)
Dept: CARDIOLOGY | Facility: HOSPITAL | Age: 83
End: 2018-01-18
Attending: INTERNAL MEDICINE

## 2018-01-18 PROBLEM — I44.2 THIRD DEGREE AV BLOCK (HCC): Status: ACTIVE | Noted: 2018-01-18

## 2018-01-18 PROBLEM — M54.10 RADICULOPATHY: Status: ACTIVE | Noted: 2018-01-18

## 2018-01-18 PROBLEM — M43.9 COMPRESSION DEFORMITY OF VERTEBRA: Status: ACTIVE | Noted: 2018-01-18

## 2018-01-18 PROBLEM — R55 SYNCOPE: Status: ACTIVE | Noted: 2018-01-18

## 2018-01-18 LAB
ALBUMIN SERPL-MCNC: 3.5 G/DL (ref 3.2–4.8)
ALBUMIN/GLOB SERPL: 1.3 G/DL (ref 1.5–2.5)
ALP SERPL-CCNC: 84 U/L (ref 25–100)
ALT SERPL W P-5'-P-CCNC: 14 U/L (ref 7–40)
ANION GAP SERPL CALCULATED.3IONS-SCNC: 5 MMOL/L (ref 3–11)
AST SERPL-CCNC: 20 U/L (ref 0–33)
BH CV ECHO MEAS - AO MAX PG (FULL): 6.4 MMHG
BH CV ECHO MEAS - AO MAX PG: 12 MMHG
BH CV ECHO MEAS - AO MEAN PG (FULL): 3.6 MMHG
BH CV ECHO MEAS - AO MEAN PG: 5.7 MMHG
BH CV ECHO MEAS - AO ROOT AREA (BSA CORRECTED): 1.5
BH CV ECHO MEAS - AO ROOT AREA: 6 CM^2
BH CV ECHO MEAS - AO ROOT DIAM: 2.8 CM
BH CV ECHO MEAS - AO V2 MAX: 174.8 CM/SEC
BH CV ECHO MEAS - AO V2 MEAN: 105.8 CM/SEC
BH CV ECHO MEAS - AO V2 VTI: 29.3 CM
BH CV ECHO MEAS - AVA(I,A): 1.7 CM^2
BH CV ECHO MEAS - AVA(I,D): 1.7 CM^2
BH CV ECHO MEAS - AVA(V,A): 1.7 CM^2
BH CV ECHO MEAS - AVA(V,D): 1.7 CM^2
BH CV ECHO MEAS - BSA(HAYCOCK): 1.9 M^2
BH CV ECHO MEAS - BSA: 1.8 M^2
BH CV ECHO MEAS - BZI_BMI: 28.8 KILOGRAMS/M^2
BH CV ECHO MEAS - BZI_METRIC_HEIGHT: 162.6 CM
BH CV ECHO MEAS - BZI_METRIC_WEIGHT: 76.2 KG
BH CV ECHO MEAS - CONTRAST EF (2CH): 29 ML/M^2
BH CV ECHO MEAS - CONTRAST EF 4CH: 37.3 ML/M^2
BH CV ECHO MEAS - EDV(CUBED): 27.7 ML
BH CV ECHO MEAS - EDV(MOD-SP2): 31 ML
BH CV ECHO MEAS - EDV(MOD-SP4): 59 ML
BH CV ECHO MEAS - EDV(TEICH): 35.8 ML
BH CV ECHO MEAS - EF(CUBED): 42.8 %
BH CV ECHO MEAS - EF(MOD-SP2): 29 %
BH CV ECHO MEAS - EF(TEICH): 36.8 %
BH CV ECHO MEAS - ESV(CUBED): 15.9 ML
BH CV ECHO MEAS - ESV(MOD-SP2): 22 ML
BH CV ECHO MEAS - ESV(MOD-SP4): 37 ML
BH CV ECHO MEAS - ESV(TEICH): 22.6 ML
BH CV ECHO MEAS - FS: 17 %
BH CV ECHO MEAS - IVS/LVPW: 1.6
BH CV ECHO MEAS - IVSD: 1.6 CM
BH CV ECHO MEAS - LA DIMENSION: 3.1 CM
BH CV ECHO MEAS - LA/AO: 1.1
BH CV ECHO MEAS - LAT PEAK E' VEL: 4.3 CM/SEC
BH CV ECHO MEAS - LV DIASTOLIC VOL/BSA (35-75): 32.5 ML/M^2
BH CV ECHO MEAS - LV MASS(C)D: 128.4 GRAMS
BH CV ECHO MEAS - LV MASS(C)DI: 70.7 GRAMS/M^2
BH CV ECHO MEAS - LV MAX PG: 5.6 MMHG
BH CV ECHO MEAS - LV MEAN PG: 2.1 MMHG
BH CV ECHO MEAS - LV SYSTOLIC VOL/BSA (12-30): 20.4 ML/M^2
BH CV ECHO MEAS - LV V1 MAX: 118 CM/SEC
BH CV ECHO MEAS - LV V1 MEAN: 63.6 CM/SEC
BH CV ECHO MEAS - LV V1 VTI: 20 CM
BH CV ECHO MEAS - LVIDD: 3 CM
BH CV ECHO MEAS - LVIDS: 2.5 CM
BH CV ECHO MEAS - LVLD AP2: 7 CM
BH CV ECHO MEAS - LVLD AP4: 7.6 CM
BH CV ECHO MEAS - LVLS AP2: 6.3 CM
BH CV ECHO MEAS - LVLS AP4: 6.7 CM
BH CV ECHO MEAS - LVOT AREA (M): 2.5 CM^2
BH CV ECHO MEAS - LVOT AREA: 2.6 CM^2
BH CV ECHO MEAS - LVOT DIAM: 1.8 CM
BH CV ECHO MEAS - LVPWD: 1 CM
BH CV ECHO MEAS - MED PEAK E' VEL: 4.97 CM/SEC
BH CV ECHO MEAS - MV A MAX VEL: 45.9 CM/SEC
BH CV ECHO MEAS - MV E MAX VEL: 57.8 CM/SEC
BH CV ECHO MEAS - MV E/A: 1.3
BH CV ECHO MEAS - PA ACC SLOPE: 805.1 CM/SEC^2
BH CV ECHO MEAS - PA ACC TIME: 0.14 SEC
BH CV ECHO MEAS - PA PR(ACCEL): 17.2 MMHG
BH CV ECHO MEAS - RAP SYSTOLE: 8 MMHG
BH CV ECHO MEAS - RVDD: 2.5 CM
BH CV ECHO MEAS - RVSP: 25 MMHG
BH CV ECHO MEAS - SI(AO): 97.2 ML/M^2
BH CV ECHO MEAS - SI(CUBED): 6.5 ML/M^2
BH CV ECHO MEAS - SI(LVOT): 28.2 ML/M^2
BH CV ECHO MEAS - SI(MOD-SP2): 5 ML/M^2
BH CV ECHO MEAS - SI(MOD-SP4): 12.1 ML/M^2
BH CV ECHO MEAS - SI(TEICH): 7.3 ML/M^2
BH CV ECHO MEAS - SV(AO): 176.5 ML
BH CV ECHO MEAS - SV(CUBED): 11.9 ML
BH CV ECHO MEAS - SV(LVOT): 51.2 ML
BH CV ECHO MEAS - SV(MOD-SP2): 9 ML
BH CV ECHO MEAS - SV(MOD-SP4): 22 ML
BH CV ECHO MEAS - SV(TEICH): 13.2 ML
BH CV ECHO MEAS - TAPSE (>1.6): 1.1 CM2
BH CV ECHO MEAS - TR MAX VEL: 204.5 CM/SEC
BH CV VAS BP RIGHT ARM: NORMAL MMHG
BH CV XLRA - RV BASE: 3.1 CM
BH CV XLRA - RV LENGTH: 5.1 CM
BH CV XLRA - RV MID: 2.6 CM
BH CV XLRA - TDI S': 9.45 CM/SEC
BILIRUB SERPL-MCNC: 0.4 MG/DL (ref 0.3–1.2)
BUN BLD-MCNC: 16 MG/DL (ref 9–23)
BUN/CREAT SERPL: 20 (ref 7–25)
CALCIUM SPEC-SCNC: 10.8 MG/DL (ref 8.7–10.4)
CHLORIDE SERPL-SCNC: 101 MMOL/L (ref 99–109)
CO2 SERPL-SCNC: 29 MMOL/L (ref 20–31)
CREAT BLD-MCNC: 0.8 MG/DL (ref 0.6–1.3)
E/E' RATIO: 12
GFR SERPL CREATININE-BSD FRML MDRD: 69 ML/MIN/1.73
GLOBULIN UR ELPH-MCNC: 2.6 GM/DL
GLUCOSE BLD-MCNC: 114 MG/DL (ref 70–100)
LEFT ATRIUM VOLUME INDEX: 33.5 ML/M2
LV EF 2D ECHO EST: 55 %
MAXIMAL PREDICTED HEART RATE: 138 BPM
POTASSIUM BLD-SCNC: 3.7 MMOL/L (ref 3.5–5.5)
PROT SERPL-MCNC: 6.1 G/DL (ref 5.7–8.2)
SODIUM BLD-SCNC: 135 MMOL/L (ref 132–146)
STRESS TARGET HR: 117 BPM

## 2018-01-18 PROCEDURE — 25010000002 MIDAZOLAM PER 1 MG: Performed by: INTERNAL MEDICINE

## 2018-01-18 PROCEDURE — 80053 COMPREHEN METABOLIC PANEL: CPT | Performed by: INTERNAL MEDICINE

## 2018-01-18 PROCEDURE — 33208 INSRT HEART PM ATRIAL & VENT: CPT | Performed by: INTERNAL MEDICINE

## 2018-01-18 PROCEDURE — 25010000002 SULFUR HEXAFLUORIDE MICROSPH 60.7-25 MG RECONSTITUTED SUSPENSION: Performed by: INTERNAL MEDICINE

## 2018-01-18 PROCEDURE — C1898 LEAD, PMKR, OTHER THAN TRANS: HCPCS

## 2018-01-18 PROCEDURE — 25010000002 ONDANSETRON PER 1 MG: Performed by: PHYSICIAN ASSISTANT

## 2018-01-18 PROCEDURE — 25010000002 FENTANYL CITRATE (PF) 100 MCG/2ML SOLUTION: Performed by: INTERNAL MEDICINE

## 2018-01-18 PROCEDURE — C1785 PMKR, DUAL, RATE-RESP: HCPCS

## 2018-01-18 PROCEDURE — 99221 1ST HOSP IP/OBS SF/LOW 40: CPT | Performed by: NURSE PRACTITIONER

## 2018-01-18 PROCEDURE — 93306 TTE W/DOPPLER COMPLETE: CPT | Performed by: INTERNAL MEDICINE

## 2018-01-18 PROCEDURE — 0JH606Z INSERTION OF PACEMAKER, DUAL CHAMBER INTO CHEST SUBCUTANEOUS TISSUE AND FASCIA, OPEN APPROACH: ICD-10-PCS | Performed by: INTERNAL MEDICINE

## 2018-01-18 PROCEDURE — 93306 TTE W/DOPPLER COMPLETE: CPT

## 2018-01-18 PROCEDURE — 02HK3JZ INSERTION OF PACEMAKER LEAD INTO RIGHT VENTRICLE, PERCUTANEOUS APPROACH: ICD-10-PCS | Performed by: INTERNAL MEDICINE

## 2018-01-18 PROCEDURE — C1892 INTRO/SHEATH,FIXED,PEEL-AWAY: HCPCS | Performed by: INTERNAL MEDICINE

## 2018-01-18 PROCEDURE — 99024 POSTOP FOLLOW-UP VISIT: CPT | Performed by: INTERNAL MEDICINE

## 2018-01-18 PROCEDURE — 25010000002 VANCOMYCIN: Performed by: INTERNAL MEDICINE

## 2018-01-18 PROCEDURE — 02H63JZ INSERTION OF PACEMAKER LEAD INTO RIGHT ATRIUM, PERCUTANEOUS APPROACH: ICD-10-PCS | Performed by: INTERNAL MEDICINE

## 2018-01-18 RX ORDER — MIDAZOLAM HYDROCHLORIDE 1 MG/ML
INJECTION INTRAMUSCULAR; INTRAVENOUS AS NEEDED
Status: DISCONTINUED | OUTPATIENT
Start: 2018-01-18 | End: 2018-01-18 | Stop reason: HOSPADM

## 2018-01-18 RX ORDER — GABAPENTIN 300 MG/1
300 CAPSULE ORAL DAILY
Status: DISCONTINUED | OUTPATIENT
Start: 2018-01-18 | End: 2018-01-19

## 2018-01-18 RX ORDER — LIDOCAINE HYDROCHLORIDE 10 MG/ML
INJECTION, SOLUTION EPIDURAL; INFILTRATION; INTRACAUDAL; PERINEURAL AS NEEDED
Status: DISCONTINUED | OUTPATIENT
Start: 2018-01-18 | End: 2018-01-18 | Stop reason: HOSPADM

## 2018-01-18 RX ORDER — FENTANYL CITRATE 50 UG/ML
INJECTION, SOLUTION INTRAMUSCULAR; INTRAVENOUS AS NEEDED
Status: DISCONTINUED | OUTPATIENT
Start: 2018-01-18 | End: 2018-01-18 | Stop reason: HOSPADM

## 2018-01-18 RX ORDER — SODIUM CHLORIDE 9 MG/ML
100 INJECTION, SOLUTION INTRAVENOUS CONTINUOUS
Status: DISCONTINUED | OUTPATIENT
Start: 2018-01-18 | End: 2018-01-19 | Stop reason: HOSPADM

## 2018-01-18 RX ORDER — SODIUM CHLORIDE 0.9 % (FLUSH) 0.9 %
1-10 SYRINGE (ML) INJECTION AS NEEDED
Status: DISCONTINUED | OUTPATIENT
Start: 2018-01-18 | End: 2018-01-19 | Stop reason: HOSPADM

## 2018-01-18 RX ORDER — CARVEDILOL 3.12 MG/1
3.12 TABLET ORAL EVERY 12 HOURS SCHEDULED
Status: DISCONTINUED | OUTPATIENT
Start: 2018-01-18 | End: 2018-01-18

## 2018-01-18 RX ADMIN — SULFUR HEXAFLUORIDE 3 ML: KIT at 16:00

## 2018-01-18 RX ADMIN — LOSARTAN POTASSIUM 100 MG: 50 TABLET ORAL at 09:07

## 2018-01-18 RX ADMIN — CLONIDINE HYDROCHLORIDE 0.1 MG: 0.1 TABLET ORAL at 09:03

## 2018-01-18 RX ADMIN — ONDANSETRON 4 MG: 2 INJECTION INTRAMUSCULAR; INTRAVENOUS at 13:27

## 2018-01-18 RX ADMIN — ATORVASTATIN CALCIUM 40 MG: 40 TABLET, FILM COATED ORAL at 09:04

## 2018-01-18 RX ADMIN — GABAPENTIN 300 MG: 300 CAPSULE ORAL at 21:25

## 2018-01-18 RX ADMIN — BUSPIRONE HYDROCHLORIDE 10 MG: 10 TABLET ORAL at 21:25

## 2018-01-18 RX ADMIN — SODIUM CHLORIDE 125 ML/HR: 9 INJECTION, SOLUTION INTRAVENOUS at 02:51

## 2018-01-18 RX ADMIN — HYDROCHLOROTHIAZIDE 12.5 MG: 12.5 TABLET ORAL at 09:03

## 2018-01-18 RX ADMIN — HYDRALAZINE HYDROCHLORIDE 25 MG: 25 TABLET ORAL at 09:04

## 2018-01-18 RX ADMIN — BUSPIRONE HYDROCHLORIDE 10 MG: 10 TABLET ORAL at 09:03

## 2018-01-18 RX ADMIN — CITALOPRAM HYDROBROMIDE 20 MG: 20 TABLET ORAL at 09:04

## 2018-01-18 RX ADMIN — VANCOMYCIN HYDROCHLORIDE 1500 MG: 10 INJECTION, POWDER, LYOPHILIZED, FOR SOLUTION INTRAVENOUS at 10:05

## 2018-01-18 RX ADMIN — VANCOMYCIN HYDROCHLORIDE 1250 MG: 10 INJECTION, POWDER, LYOPHILIZED, FOR SOLUTION INTRAVENOUS at 21:25

## 2018-01-18 RX ADMIN — ACETAMINOPHEN 650 MG: 325 TABLET, FILM COATED ORAL at 17:36

## 2018-01-18 RX ADMIN — PANTOPRAZOLE SODIUM 40 MG: 40 TABLET, DELAYED RELEASE ORAL at 05:49

## 2018-01-18 RX ADMIN — AMLODIPINE BESYLATE 10 MG: 10 TABLET ORAL at 09:03

## 2018-01-18 RX ADMIN — Medication 325 MG: at 09:03

## 2018-01-18 RX ADMIN — ASPIRIN 81 MG: 81 TABLET, COATED ORAL at 09:03

## 2018-01-18 RX ADMIN — SODIUM CHLORIDE 500 ML: 9 INJECTION, SOLUTION INTRAVENOUS at 14:44

## 2018-01-18 NOTE — PLAN OF CARE
Problem: Patient Care Overview (Adult)  Goal: Plan of Care Review  Outcome: Ongoing (interventions implemented as appropriate)    Goal: Adult Individualization and Mutuality  Outcome: Ongoing (interventions implemented as appropriate)    Goal: Discharge Needs Assessment  Outcome: Ongoing (interventions implemented as appropriate)      Problem: Activity Intolerance (Adult)  Goal: Identify Related Risk Factors and Signs and Symptoms  Outcome: Ongoing (interventions implemented as appropriate)    Goal: Activity Tolerance  Outcome: Ongoing (interventions implemented as appropriate)      Problem: Fall Risk (Adult)  Goal: Identify Related Risk Factors and Signs and Symptoms  Outcome: Ongoing (interventions implemented as appropriate)    Goal: Absence of Falls  Outcome: Ongoing (interventions implemented as appropriate)

## 2018-01-18 NOTE — PLAN OF CARE
Problem: Patient Care Overview (Adult)  Goal: Plan of Care Review  Outcome: Ongoing (interventions implemented as appropriate)   01/17/18 2341   Coping/Psychosocial Response Interventions   Plan Of Care Reviewed With patient;daughter   Patient Care Overview   Progress progress toward functional goals as expected     Goal: Adult Individualization and Mutuality  Outcome: Ongoing (interventions implemented as appropriate)    Goal: Discharge Needs Assessment  Outcome: Ongoing (interventions implemented as appropriate)      Problem: Activity Intolerance (Adult)  Goal: Identify Related Risk Factors and Signs and Symptoms  Outcome: Ongoing (interventions implemented as appropriate)   01/17/18 2341   Activity Intolerance   Activity Intolerance: Related Risk Factors generalized weakness;functional decline;pain   Signs and Symptoms (Activity Intolerance) dizziness/faintness     Goal: Activity Tolerance  Outcome: Ongoing (interventions implemented as appropriate)   01/17/18 2341   Activity Intolerance (Adult)   Activity Tolerance making progress toward outcome     Goal: Effective Energy Conservation Techniques  Outcome: Ongoing (interventions implemented as appropriate)   01/17/18 2341   Activity Intolerance (Adult)   Effective Energy Conservation Techniques making progress toward outcome       Problem: Fall Risk (Adult)  Goal: Identify Related Risk Factors and Signs and Symptoms  Outcome: Ongoing (interventions implemented as appropriate)   01/17/18 2341   Fall Risk   Fall Risk: Related Risk Factors gait/mobility problems;fear of falling;fatigue/slow reaction;history of falls;inadequate lighting;objects hard to reach;slipper/uneven surfaces;environment unfamiliar   Fall Risk: Signs and Symptoms presence of risk factors     Goal: Absence of Falls  Outcome: Ongoing (interventions implemented as appropriate)   01/17/18 2341   Fall Risk (Adult)   Absence of Falls making progress toward outcome

## 2018-01-18 NOTE — PROGRESS NOTES
"[facility] at [facility]  IP Progress Note   LOS: 0 days   Patient Care Team:  Eric Driver MD as PCP - General  Soraya Darling MD as PCP - Internal Medicine (Cardiology)  Josesito Danielson MD as Consulting Physician (Orthopedic Surgery)    Chief Complaint: Follow up for syncope, bradycardia, 3rd degree AVB    Subjective    Did well overnight, no chest pain or shortness of breath.    Problem   Third Degree Av Block     Tele: Sinus RythymWith left bundle-branch block.    Vitals:  Blood pressure 162/75, pulse 74, temperature 98.3 °F (36.8 °C), temperature source Oral, resp. rate 16, height 162.6 cm (64\"), weight 76.6 kg (168 lb 12.8 oz), SpO2 96 %.     Intake/Output Summary (Last 24 hours) at 01/18/18 1006  Last data filed at 01/18/18 0931   Gross per 24 hour   Intake             1125 ml   Output              700 ml   Net              425 ml       Physical Exam:  General: No apparent distress.  Neck: no JVD.  Chest:No respiratory distress, breath sounds are normal. No wheezes,  rhonchi or rales.  Cardiovascular: Normal S1 and S2, no murmer, gallop or rub.    Extremities: No edema.    Results Review:     I reviewed the patient's new clinical results.      Results from last 7 days  Lab Units 01/17/18  1407   WBC 10*3/mm3 19.04*   HEMOGLOBIN g/dL 13.1   HEMATOCRIT % 39.5   PLATELETS 10*3/mm3 358       Results from last 7 days  Lab Units 01/18/18  0037   SODIUM mmol/L 135   POTASSIUM mmol/L 3.7   CHLORIDE mmol/L 101   CO2 mmol/L 29.0   BUN mg/dL 16   CREATININE mg/dL 0.80   CALCIUM mg/dL 10.8*   BILIRUBIN mg/dL 0.4   ALK PHOS U/L 84   ALT (SGPT) U/L 14   AST (SGOT) U/L 20   GLUCOSE mg/dL 114*       Scheduled Meds:  amLODIPine 10 mg Oral Daily   aspirin 81 mg Oral Daily   atorvastatin 40 mg Oral Daily   busPIRone 10 mg Oral Q12H   citalopram 20 mg Oral Daily   CloNIDine 0.1 mg Oral Q12H   ferrous sulfate 325 mg Oral Daily With Breakfast   hydrALAZINE 25 mg Oral Q12H   losartan 100 mg Oral " Q24H   And      hydrochlorothiazide 12.5 mg Oral Q24H   pantoprazole 40 mg Oral QAM   vancomycin 20 mg/kg Intravenous Once       Continuous Infusions:    sodium chloride 125 mL/hr Last Rate: 125 mL/hr (01/18/18 0550)         Assessment:   1. Syncope with collapse, Recurrent episodes despite discontinuation of beta blockers.  2. Advanced conduction disease noted on ECG, severe bradycardia with multiple asystolic pauses up to 6.5 seconds. Sick sinus syndrome and high-grade AV/third degree AV block.   3. 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    4. Hypokalemia, corrected   5. Stable coronary artery disease  6. Hypertension    Plan:  1. Permanent pacemaker today   2. The procedure was explained to the patient/family extensively. Indications, benefits, risks and alternatives were discussed. The patient understands well, and wishes to proceed.       FRANCO Mujica  01/18/18  10:06 AM    I have seen and examined the patient, case was discussed with the physician extender, reviewed the above note, necessary changes were made and I agree with the final note.   Soraya Darling MD, FACC, [facility]

## 2018-01-18 NOTE — PROGRESS NOTES
The patient reported nausea and vomiting and was noted to be hypotensive following the procedure.  It was felt that this was due to effect of sedation, her blood pressure responded nicely to fluids and nausea has completely resolved after Zofran.  She is currently hemodynamically stable with blood pressure around 110.  There was a echocardiogram obtained which has shown small pericardial effusion.  It is not clear whether it was pre-existing are postprocedural as there is a risk of microperforation.  Her final parameters on pacemaker interrogation revealed normal function.  At this time we will continue to monitor on telemetry with supportive care including normal saline, I will hold off all antihypertensives for now as she had been on a lot of medications.  We will reassess with a repeat echocardiogram tomorrow.  The patient had fall related to syncope and has been complaining of back pain, we will consult hospitalist service to manage her medical issues.

## 2018-01-19 ENCOUNTER — APPOINTMENT (OUTPATIENT)
Dept: GENERAL RADIOLOGY | Facility: HOSPITAL | Age: 83
End: 2018-01-19

## 2018-01-19 ENCOUNTER — APPOINTMENT (OUTPATIENT)
Dept: CARDIOLOGY | Facility: HOSPITAL | Age: 83
End: 2018-01-19
Attending: INTERNAL MEDICINE

## 2018-01-19 VITALS
HEART RATE: 59 BPM | OXYGEN SATURATION: 94 % | SYSTOLIC BLOOD PRESSURE: 108 MMHG | BODY MASS INDEX: 28.68 KG/M2 | TEMPERATURE: 99.7 F | DIASTOLIC BLOOD PRESSURE: 51 MMHG | HEIGHT: 64 IN | WEIGHT: 168 LBS | RESPIRATION RATE: 16 BRPM

## 2018-01-19 LAB
BACTERIA UR QL AUTO: ABNORMAL /HPF
BH CV ECHO MEAS - BSA(HAYCOCK): 1.9 M^2
BH CV ECHO MEAS - BSA: 1.8 M^2
BH CV ECHO MEAS - BZI_BMI: 28.8 KILOGRAMS/M^2
BH CV ECHO MEAS - BZI_METRIC_HEIGHT: 162.6 CM
BH CV ECHO MEAS - BZI_METRIC_WEIGHT: 76.2 KG
BH CV VAS BP RIGHT ARM: NORMAL MMHG
BILIRUB UR QL STRIP: ABNORMAL
CLARITY UR: ABNORMAL
COLOR UR: YELLOW
DEPRECATED RDW RBC AUTO: 45.9 FL (ref 37–54)
ERYTHROCYTE [DISTWIDTH] IN BLOOD BY AUTOMATED COUNT: 13.4 % (ref 11.3–14.5)
GLUCOSE UR STRIP-MCNC: NEGATIVE MG/DL
HCT VFR BLD AUTO: 31.1 % (ref 34.5–44)
HGB BLD-MCNC: 9.9 G/DL (ref 11.5–15.5)
HGB UR QL STRIP.AUTO: NEGATIVE
HYALINE CASTS UR QL AUTO: ABNORMAL /LPF
KETONES UR QL STRIP: NEGATIVE
LEUKOCYTE ESTERASE UR QL STRIP.AUTO: ABNORMAL
MCH RBC QN AUTO: 29.6 PG (ref 27–31)
MCHC RBC AUTO-ENTMCNC: 31.8 G/DL (ref 32–36)
MCV RBC AUTO: 93.1 FL (ref 80–99)
NITRITE UR QL STRIP: NEGATIVE
PH UR STRIP.AUTO: <=5 [PH] (ref 5–8)
PLATELET # BLD AUTO: 247 10*3/MM3 (ref 150–450)
PMV BLD AUTO: 10.9 FL (ref 6–12)
PROT UR QL STRIP: NEGATIVE
RBC # BLD AUTO: 3.34 10*6/MM3 (ref 3.89–5.14)
RBC # UR: ABNORMAL /HPF
REF LAB TEST METHOD: ABNORMAL
RENAL EPI CELLS #/AREA URNS HPF: ABNORMAL /HPF
SP GR UR STRIP: 1.03 (ref 1–1.03)
SQUAMOUS #/AREA URNS HPF: ABNORMAL /HPF
UROBILINOGEN UR QL STRIP: ABNORMAL
WBC NRBC COR # BLD: 10.28 10*3/MM3 (ref 3.5–10.8)
WBC UR QL AUTO: ABNORMAL /HPF

## 2018-01-19 PROCEDURE — 93308 TTE F-UP OR LMTD: CPT

## 2018-01-19 PROCEDURE — 85027 COMPLETE CBC AUTOMATED: CPT | Performed by: INTERNAL MEDICINE

## 2018-01-19 PROCEDURE — 25010000002 VANCOMYCIN: Performed by: INTERNAL MEDICINE

## 2018-01-19 PROCEDURE — 93010 ELECTROCARDIOGRAM REPORT: CPT | Performed by: INTERNAL MEDICINE

## 2018-01-19 PROCEDURE — 97162 PT EVAL MOD COMPLEX 30 MIN: CPT

## 2018-01-19 PROCEDURE — 93308 TTE F-UP OR LMTD: CPT | Performed by: INTERNAL MEDICINE

## 2018-01-19 PROCEDURE — 99221 1ST HOSP IP/OBS SF/LOW 40: CPT | Performed by: PHYSICIAN ASSISTANT

## 2018-01-19 PROCEDURE — 99232 SBSQ HOSP IP/OBS MODERATE 35: CPT | Performed by: NURSE PRACTITIONER

## 2018-01-19 PROCEDURE — 93005 ELECTROCARDIOGRAM TRACING: CPT | Performed by: INTERNAL MEDICINE

## 2018-01-19 PROCEDURE — 99024 POSTOP FOLLOW-UP VISIT: CPT | Performed by: INTERNAL MEDICINE

## 2018-01-19 PROCEDURE — 99024 POSTOP FOLLOW-UP VISIT: CPT | Performed by: PHYSICIAN ASSISTANT

## 2018-01-19 PROCEDURE — 87086 URINE CULTURE/COLONY COUNT: CPT | Performed by: NURSE PRACTITIONER

## 2018-01-19 PROCEDURE — 81001 URINALYSIS AUTO W/SCOPE: CPT | Performed by: NURSE PRACTITIONER

## 2018-01-19 PROCEDURE — 71046 X-RAY EXAM CHEST 2 VIEWS: CPT

## 2018-01-19 RX ORDER — CARVEDILOL 3.12 MG/1
3.12 TABLET ORAL 2 TIMES DAILY WITH MEALS
Qty: 60 TABLET | Refills: 3 | Status: SHIPPED | OUTPATIENT
Start: 2018-01-19 | End: 2018-05-14 | Stop reason: SDUPTHER

## 2018-01-19 RX ORDER — CARVEDILOL 3.12 MG/1
3.12 TABLET ORAL 2 TIMES DAILY WITH MEALS
Status: DISCONTINUED | OUTPATIENT
Start: 2018-01-19 | End: 2018-01-19 | Stop reason: HOSPADM

## 2018-01-19 RX ADMIN — GABAPENTIN 300 MG: 300 CAPSULE ORAL at 10:03

## 2018-01-19 RX ADMIN — Medication 325 MG: at 10:04

## 2018-01-19 RX ADMIN — CARVEDILOL 3.12 MG: 3.12 TABLET, FILM COATED ORAL at 10:04

## 2018-01-19 RX ADMIN — ATORVASTATIN CALCIUM 40 MG: 40 TABLET, FILM COATED ORAL at 10:05

## 2018-01-19 RX ADMIN — ASPIRIN 81 MG: 81 TABLET, COATED ORAL at 10:03

## 2018-01-19 RX ADMIN — PANTOPRAZOLE SODIUM 40 MG: 40 TABLET, DELAYED RELEASE ORAL at 05:45

## 2018-01-19 RX ADMIN — ACETAMINOPHEN 650 MG: 325 TABLET, FILM COATED ORAL at 14:20

## 2018-01-19 RX ADMIN — BUSPIRONE HYDROCHLORIDE 10 MG: 10 TABLET ORAL at 10:04

## 2018-01-19 RX ADMIN — VANCOMYCIN HYDROCHLORIDE 1250 MG: 10 INJECTION, POWDER, LYOPHILIZED, FOR SOLUTION INTRAVENOUS at 10:52

## 2018-01-19 RX ADMIN — CITALOPRAM HYDROBROMIDE 20 MG: 20 TABLET ORAL at 10:05

## 2018-01-19 NOTE — THERAPY EVALUATION
Acute Care - Physical Therapy Initial Evaluation  [facility]     Patient Name: Abbi Mendez  : 1935  MRN: [id number]  Today's Date: 2018   Onset of Illness/Injury or Date of Surgery Date: 18  Date of Referral to PT: 18  Referring Physician: ANGELO Mao      Admit Date: 2018     Visit Dx:    ICD-10-CM ICD-9-CM   1. Syncope and collapse R55 780.2   2. Severe sinus bradycardia R00.1 427.81   3. Syncope, unspecified syncope type R55 780.2   4. Hypercalcemia E83.52 275.42   5. Acute midline low back pain without sciatica M54.5 724.2   6. Compression deformity of vertebra M43.9 738.5   7. Impaired functional mobility, balance, gait, and endurance Z74.09 V49.89     Patient Active Problem List   Diagnosis   • Atrial fibrillation   • Coronary artery disease involving native coronary artery of native heart without angina pectoris   • RADHA (obstructive sleep apnea)   • Essential hypertension   • Hyperlipidemia LDL goal <70   • Iron deficiency anemia   • Left bundle branch block   • Syncope and collapse   • Severe sinus bradycardia   • Third degree AV block   • Syncope   • Compression deformity of vertebra   • Radiculopathy     Past Medical History:   Diagnosis Date   • Atrial fibrillation 2016   • CAD (coronary artery disease) 2016   • Deep vein thrombosis (DVT) of left lower extremity    • Dyslipidemia 2016   • Gastric ulcer    • Hypertension 2016   • Iron deficiency anemia    • LBBB (left bundle branch block)    • RADHA (obstructive sleep apnea) 2016     Past Surgical History:   Procedure Laterality Date   • CARDIAC ELECTROPHYSIOLOGY PROCEDURE N/A 2018    Procedure: Pacemaker DC new;  Surgeon: Soraya Darling MD;  Location: [facility] INVASIVE LOCATION;  Service:    • TUBAL ABDOMINAL LIGATION            PT ASSESSMENT (last 72 hours)      PT Evaluation       18 1419 18 0952    Rehab Evaluation    Document Type evaluation  -MJ     Subjective  Information agree to therapy;no complaints  -MJ     Evaluation Not Performed  other (see comments)   awaiting neurosx recommendations.  Will check back as time permits.  -MJ    Patient Effort, Rehab Treatment good  -MJ     Symptoms Noted During/After Treatment none  -MJ     General Information    Patient Profile Review yes  -MJ     Onset of Illness/Injury or Date of Surgery Date 01/17/18  -     Referring Physician ANGELO Mao  -     General Observations pt. resting supine in bed.  LUE in sling, IV intact and on tele.  Daughters present in room.  -MJ     Pertinent History Of Current Problem Pt. admitted to [facility] after syncopal episode.  Pt. found to have syncope, bradycardia, and 3rd bundle branch block.  Pacemaker placed 1/18/18. X-ray shows deformity at T12. pt. with just recent d/c from OSH for similar episode.  Pt. had TKA 10/17.    -MJ     Equipment Currently Used at Home cane, quad;commode;oxygen;shower chair;walker, rolling;wheelchair  -     Plans/Goals Discussed With patient and family;agreed upon  -     Risks Reviewed patient and family:;LOB;dizziness;change in vital signs;lines disloged  -     Benefits Reviewed patient:;improve function;increase independence;increase strength;increase balance;increase knowledge  -     Barriers to Rehab none identified  -MJ     Living Environment    Lives With alone  -     Living Arrangements house  -     Home Accessibility tub/shower is not walk in  -MJ     Clinical Impression    Date of Referral to PT 01/18/18  -MJ     PT Diagnosis Decreased functional mobility, decreaed strength and balance  -MJ     Patient/Family Goals Statement to return home  -     Criteria for Skilled Therapeutic Interventions Met yes;treatment indicated  -     Rehab Potential good, to achieve stated therapy goals  -MJ     Vital Signs    Pre Systolic BP Rehab 131  -MJ     Pre Treatment Diastolic BP 66  -MJ     Post Systolic BP Rehab 165  -MJ     Post Treatment Diastolic BP 81  -MJ      Pre SpO2 (%) 92  -MJ     O2 Delivery Pre Treatment supplemental O2  -MJ     Pre Patient Position Supine  -MJ     Intra Patient Position Standing  -MJ     Post Patient Position Sitting  -MJ     Pain Assessment    Pain Assessment Donato-Rodriguez FACES  -     Donato-Rodriguez FACES Pain Rating 2  -     Pain Type Chronic pain  -     Pain Location Back  -MJ     Pain Orientation Lower  -MJ     Pain Intervention(s) Repositioned;Ambulation/increased activity  -     Cognitive Assessment/Intervention    Current Cognitive/Communication Assessment functional  -     Orientation Status oriented x 4;required verbal cueing (specifiy in comments)   for year  -MJ     Follows Commands/Answers Questions able to follow single-step instructions;100% of the time  -     Personal Safety mild impairment;decreased awareness, need for assist;decreased awareness, need for safety  -     Personal Safety Interventions fall prevention program maintained;gait belt;nonskid shoes/slippers when out of bed  -     ROM (Range of Motion)    General ROM no range of motion deficits identified   LUE in sling and not assessed due to pacemaker placement  -     General ROM Detail WFL BLE and RUE  -MJ     MMT (Manual Muscle Testing)    General MMT Assessment lower extremity strength deficits identified  -     General MMT Assessment Detail demonstrates functional yet decreased BLE strength; at least 3+/5  -MJ     Bed Mobility, Assessment/Treatment    Bed Mob, Supine to Sit, [place] moderate assist (50% patient effort);verbal cues required  -MJ     Bed Mob, Sit to Supine, [place] not tested  -     Bed Mobility, Safety Issues decreased use of arms for pushing/pulling;decreased use of legs for bridging/pushing  -     Bed Mobility, Impairments strength decreased  -     Bed Mobility, Comment sling donned LUE throughout; VCs throughout for sequencing to bring BLE out of bed and use of RUE  -     Transfer Assessment/Treatment    Transfers,  Bed-Chair [place] minimum assist (75% patient effort);verbal cues required  -MJ     Transfers, Sit-Stand [place] minimum assist (75% patient effort);verbal cues required  -MJ     Transfers, Stand-Sit [place] minimum assist (75% patient effort);verbal cues required  -MJ     Transfers, Sit-Stand-Sit, Assist Device other (see comments)   RUE support  -MJ     Transfer, Comment LUE in sling throughout; cues to push up from bed with RUE and to reach back for chair surface prior to lowering to seated position.  -     Gait Assessment/Treatment    Gait, [place] Level minimum assist (75% patient effort)  -     Gait, Assistive Device other (see comments)   RUE support  -     Gait, Distance (Feet) 25  -     Gait, Gait Pattern Analysis swing-through gait  -     Gait, Gait Deviations bilateral:;marjan decreased;decreased heel strike;step length decreased  -     Gait, Safety Issues balance decreased during turns;step length decreased  -     Gait, Impairments strength decreased;impaired balance  -     Gait, Comment VC's throughout; daughter managing IV pole.  No LOB throughout.  PT provided support through RUE and LUE supported in sling  -     Motor Skills/Interventions    Additional Documentation Balance Skills Training (Group)  -     Balance Skills Training    Sitting-Level of Assistance Close supervision  -     Sitting-Balance Support Feet supported;Right upper extremity supported  -     Standing-Level of Assistance Minimum assistance  -     Static Standing Balance Support Right upper extremity supported  -     Standing-Balance Activities Weight Shift A-P;Weight Shift R-L  -MJ     Gait Balance-Level of Assistance Minimum assistance  -     Gait Balance Support Right upper extremity supported  -     Gait Balance Activities scanning environment R/L  -     Positioning and Restraints    Pre-Treatment Position in bed  -MJ     Post Treatment Position chair  -     In Chair  notified nsg;sitting;call light within reach;encouraged to call for assist;exit alarm on;with family/caregiver;waffle cushion;on mechanical lift sling;legs elevated  -       01/17/18 2122 01/17/18 1636    General Information    Equipment Currently Used at Home cane, quad  -AS cane, straight;walker, rolling;shower chair;oxygen   pt has home O2 for HS use through [employer], does not have portable tanks  -CHE    Living Environment    Lives With  alone   pt resides in [facility]  -    Living Arrangements  house  -    Type of Financial/Environmental Concern  none  -CHE      User Key  (r) = Recorded By, (t) = Taken By, (c) = Cosigned By    Initials Name Provider Type    CHE Janna Ferguson     AS Sandy Avalos, RN Registered Nurse    JEMMA Augustin, PT Physical Therapist          Physical Therapy Education     Title: PT OT SLP Therapies (Active)     Topic: Physical Therapy (Active)     Point: Mobility training (Done)    Learning Progress Summary    Learner Readiness Method Response Comment Documented by Status   Patient Acceptance E VU,NR   01/19/18 1505 Done               Point: Body mechanics (Done)    Learning Progress Summary    Learner Readiness Method Response Comment Documented by Status   Patient Acceptance E VU,NR   01/19/18 1505 Done               Point: Precautions (Done)    Learning Progress Summary    Learner Readiness Method Response Comment Documented by Status   Patient Acceptance E VU,NR   01/19/18 1505 Done                      User Key     Initials Effective Dates Name Provider Type Discipline     10/30/17 -  Emily Augustin, PT Physical Therapist PT                PT Recommendation and Plan  Anticipated Discharge Disposition: home with home health, home with assist  PT Frequency: daily  Plan of Care Review  Plan Of Care Reviewed With: patient, daughter  Outcome Summary/Follow up Plan: PT. mobility evaluation completed.  Pt. demonstrates decreased strength, balance  and gait mechanics yazmin alfaro signs and symptoms warrenting need for skilled PT.  Pt. required mod A for supine->sit, min A for sit<>stand and ambulation 25' wiht RUE support.  LUE sling donned throughout evaluation.  Recommending home with HHPT and family assist at time of discharge.          IP PT Goals       01/19/18 1506          Bed Mobility PT LTG    Bed Mobility PT LTG, Date Established 01/19/18  -MJ      Bed Mobility PT LTG, Time to Achieve 2 wks  -MJ      Bed Mobility PT LTG, Activity Type supine to sit/sit to supine  -MJ      Bed Mobility PT LTG, [place] Level supervision required  -MJ      Transfer Training PT LTG    Transfer Training PT LTG, Date Established 01/19/18  -MJ      Transfer Training PT LTG, Activity Type bed to chair /chair to bed  -MJ      Transfer Training PT LTG, [place] Level minimum assist (75% patient effort)  -MJ      Transfer Training PT LTG, Assist Device cane, quad  -MJ      Gait Training PT LTG    Gait Training Goal PT LTG, Date Established 01/19/18  -MJ      Gait Training Goal PT LTG, Time to Achieve 2 wks  -MJ      Gait Training Goal PT LTG, [place] Level minimum assist (75% patient effort)  -MJ      Gait Training Goal PT LTG, Assist Device cane, quad  -MJ      Gait Training Goal PT LTG, Distance to Achieve 150'  -MJ        User Key  (r) = Recorded By, (t) = Taken By, (c) = Cosigned By    Initials Name Provider Type    JEMMA Augustin, PT Physical Therapist                Outcome Measures       01/19/18 1419          How much help from another person do you currently need...    Turning from your back to your side while in flat bed without using bedrails? 2  -MJ      Moving from lying on back to sitting on the side of a flat bed without bedrails? 2  -MJ      Moving to and from a bed to a chair (including a wheelchair)? 3  -MJ      Standing up from a chair using your arms (e.g., wheelchair, bedside chair)? 3  -MJ      Climbing 3-5 steps with a railing? 2  -MJ       To walk in hospital room? 3  -MJ      AM-PAC 6 Clicks Score 15  -MJ      Functional Assessment    Outcome Measure Options AM-PAC 6 Clicks Basic Mobility (PT)  -MJ        User Key  (r) = Recorded By, (t) = Taken By, (c) = Cosigned By    Initials Name Provider Type    JEMMA Augustin, PT Physical Therapist           Time Calculation:         PT Charges       01/19/18 1510          Time Calculation    Start Time 1419  -MJ      PT Received On 01/19/18  -JEMMA      PT Goal Re-Cert Due Date 01/29/18  -JEMMA        User Key  (r) = Recorded By, (t) = Taken By, (c) = Cosigned By    Initials Name Provider Type    JEMMA Augustin, PT Physical Therapist          Therapy Charges for Today     Code Description Service Date Service Provider Modifiers Qty    [id number]  PT EVAL MOD COMPLEXITY 4 1/19/2018 Emily Augustin, PT GP 1          PT G-Codes  Outcome Measure Options: AM-PAC 6 Clicks Basic Mobility (PT)      Emily Augustin, PT  1/19/2018

## 2018-01-19 NOTE — PLAN OF CARE
Problem: Patient Care Overview (Adult)  Goal: Plan of Care Review  Outcome: Ongoing (interventions implemented as appropriate)   01/19/18 1506   Coping/Psychosocial Response Interventions   Plan Of Care Reviewed With patient;daughter   Outcome Evaluation   Outcome Summary/Follow up Plan PT. mobility evaluation completed. Pt. demonstrates decreased strength, balance and gait mechanics wiht evovling signs and symptoms warrenting need for skilled PT. Pt. required mod A for supine->sit, min A for sit<>stand and ambulation 25' wiht RUE support. LUE sling donned throughout evaluation. Recommending home with HHPT and family assist at time of discharge.       Problem: Inpatient Physical Therapy  Goal: Bed Mobility Goal LTG- PT  Outcome: Ongoing (interventions implemented as appropriate)   01/19/18 1506   Bed Mobility PT LTG   Bed Mobility PT LTG, Date Established 01/19/18   Bed Mobility PT LTG, Time to Achieve 2 wks   Bed Mobility PT LTG, Activity Type supine to sit/sit to supine   Bed Mobility PT LTG, [place] Level supervision required     Goal: Transfer Training Goal 1 LTG- PT  Outcome: Ongoing (interventions implemented as appropriate)   01/19/18 1506   Transfer Training PT LTG   Transfer Training PT LTG, Date Established 01/19/18   Transfer Training PT LTG, Activity Type bed to chair /chair to bed   Transfer Training PT LTG, [place] Level minimum assist (75% patient effort)   Transfer Training PT LTG, Assist Device cane, quad     Goal: Gait Training Goal LTG- PT  Outcome: Ongoing (interventions implemented as appropriate)   01/19/18 1506   Gait Training PT LTG   Gait Training Goal PT LTG, Date Established 01/19/18   Gait Training Goal PT LTG, Time to Achieve 2 wks   Gait Training Goal PT LTG, [place] Level minimum assist (75% patient effort)   Gait Training Goal PT LTG, Assist Device cane, quad   Gait Training Goal PT LTG, Distance to Achieve 150'

## 2018-01-19 NOTE — DISCHARGE PLACEMENT REQUEST
"MIKAEL Fletcher, RN  Case Management  [phone]    Abbi Mendez (82 y.o. Female)     Date of Birth Social Security Number Address Home Phone MRN    1935  [address] [place] [address]  [place] [address] [phone] [id number]    [employer] Marital Status          None Single       Admission Date Admission Type Admitting Provider Attending Provider Department, Room/Bed    18 Emergency Soraya Darling MD Aslam, Azhar, MD [facility] 6B, N631/1    Discharge Date Discharge Disposition Discharge Destination         Home or Self Care             Attending Provider: Soraya Darling MD     Allergies:  Darvon [Propoxyphene], Penicillins    Isolation:  None   Infection:  None   Code Status:  FULL    Ht:  163 cm (64.17\")   Wt:  76.2 kg (168 lb)    Admission Cmt:  None   Principal Problem:  None                Active Insurance as of 2018     Primary Coverage     Payor Plan Insurance Group Employer/Plan Group    HUMANA MEDICARE REPLACEMENT HUMANA MEDICARE REPL [id number]     Payor Plan Address Payor Plan Phone Number Effective From Effective To    [address] [address] [phone] 2018     [place] [address]       Subscriber Name Subscriber Birth Date Member ID       ABBI MENDEZ 1935 [id number]                 Emergency Contacts      (Rel.) Home Phone Work Phone Mobile Phone    Sherine Lyle (Daughter) [phone] -- --    Shruthi Gama (Daughter) [phone] -- --            Insurance Information                HUMANA MEDICARE REPLACEMENT/HUMANA MEDICARE REPL Phone: [phone]    Subscriber: Abbi Mendez Subscriber#: [id number]    Group#: [id number] Precert#:              History & Physical      Soraya Darling MD at 2018  2:59 PM          [facility]        Date of Hospital Visit: 18      Place of Service: [facility]    Patient Name: Abbi Mendez  :1935    Referral Provider: ED  Primary Care Provider: " Eric Driver MD    Chief complaint/Reason for Consultation:  syncope         Problem List:  Problem   Syncope and Collapse   Severe Sinus Bradycardia   Atrial Fibrillation      · Questionable recurrence of atrial fibrillation, 03/31/2015 via EMS, data deficit: No confirmation via EMS strips or EKG.   · Event recorder 06/09/2015-07/09/2015: No atrial fibrillation or arrhythmias appreciated.   · Event recorder (06/09/2015 - 07/09/2015): No atrial fibrillation; occasional isolated PACs; no ventricular ectopy.  · Event recorder, 04/01/2016: No atrial fibrillation; 4 runs of repetitive PACs, the longest consisting of 6 beats at 156 BPM.     Coronary Artery Disease Involving Native Coronary Artery of Native Heart Without Angina Pectoris    · Cardiac catheterization, (03/30/2007): 30% to 50% mid/distal LAD. LVEF 70%.   · Nuclear stress test, (02/04/2010): Negative for ischemia/scar.  LVEF 87%.  · Nuclear stress test (04/10/2015): Negative for ischemia and scar. Normal LVEF.         History of Present Illness:  This is an 82 year old hypertensive dyslipidemic female with known stable coronary artery disease and a history of paroxysmal atrial fibrillation.  Her last 3 event recorders by our service showed no significant arrhythmias.  The last was in 2016.  She was seen by our service in October 2017 for preoperative clearance prior to knee replacement surgery.  At that time she was doing well had no complaints of tachycardia arrhythmias no dizziness syncope or chest pain.  She was cleared for orthopedic surgery from cardiac standpoint.  Her knee replacement was uneventful.    She presented to [facility] a few weeks ago after an episode of kennedy syncope.  There she was recorded to have multiple sinus pauses of at least 4 seconds or more.  Her daughter who is retired nurse says there may have been some ventricular tachycardia.  She was also reported to have had severe bradycardia with a rate as low as 20.  She was  treated with at least one dose of atropine.  We had no beds in the hospital that week and she was transferred to [facility] for higher level of care.  There she was taken off of carvedilol and had no further episodes of bradycardia or sinus pauses.  She was discharged to home this past Saturday.  She did fine until this morning when she had another episode of kennedy syncope.  She was brought to the [facility] emergency department for further evaluation.  Here her EKG shows sinus rhythm with a ventricular rate of 96 and a left bundle branch block which has widened in comparison with October 2017.  She is also found to have hypercalcemia of unclear significance.  She reports having had multiple falls associated with syncope without significant injury.  These were witnessed on at least one occasion by her daughter who reports no tonic-clonic activity.  No loss of bowel control but she did have loss of bladder.  Her daughter reports that she awoke within 2-3 minutes and was fully oriented.          Past Medical History:   Diagnosis Date   • Atrial fibrillation 11/14/2016   • CAD (coronary artery disease) 11/14/2016   • Deep vein thrombosis (DVT) of left lower extremity    • Dyslipidemia 11/14/2016   • Gastric ulcer    • Hypertension 11/14/2016   • Iron deficiency anemia    • LBBB (left bundle branch block)    • RADHA (obstructive sleep apnea) 11/14/2016       Past Surgical History:   Procedure Laterality Date   • TUBAL ABDOMINAL LIGATION         Allergies   Allergen Reactions   • Darvon [Propoxyphene] Parasthesia   • Penicillins Rash       (Not in a hospital admission)    Current Facility-Administered Medications:   •  sodium chloride 0.9 % flush 10 mL, 10 mL, Intravenous, PRN, Malick Dorantes MD  •  sodium chloride 0.9 % infusion, 125 mL/hr, Intravenous, Continuous, Vel Dykes MD    Current Outpatient Prescriptions:   •  amLODIPine (NORVASC) 10 MG tablet, Take 10 mg by mouth Daily., Disp: , Rfl:   •  aspirin 81  MG EC tablet, Take 81 mg by mouth Daily., Disp: , Rfl:   •  atorvastatin (LIPITOR) 40 MG tablet, Take 40 mg by mouth Daily., Disp: , Rfl:   •  busPIRone (BUSPAR) 10 MG tablet, Take 10 mg by mouth 2 (Two) Times a Day., Disp: , Rfl:   •  citalopram (CeleXA) 20 MG tablet, TAKE 1 TABLET EVERY DAY, Disp: 90 tablet, Rfl: 1  •  esomeprazole (nexIUM) 40 MG capsule, Take 40 mg by mouth Every Morning Before Breakfast., Disp: , Rfl:   •  ferrous sulfate 325 (65 FE) MG tablet, Take 325 mg by mouth 2 (Two) Times a Day., Disp: , Rfl:   •  hydrALAZINE (APRESOLINE) 25 MG tablet, Take 25 mg by mouth 2 (Two) Times a Day., Disp: , Rfl:   •  losartan-hydrochlorothiazide (HYZAAR) 100-12.5 MG per tablet, Take 1 tablet by mouth Daily., Disp: , Rfl:   •  vitamin D (ERGOCALCIFEROL) 44345 units capsule capsule, Take 50,000 Units by mouth 1 (One) Time Per Week., Disp: , Rfl:   •  CloNIDine (CATAPRES) 0.1 MG tablet, Take 0.1 mg by mouth 2 (Two) Times a Day., Disp: , Rfl:       Social History     Social History   • Marital status: Single     Spouse name: N/A   • Number of children: N/A   • Years of education: N/A     Occupational History   • Not on file.     Social History Main Topics   • Smoking status: Never Smoker   • Smokeless tobacco: Never Used   • Alcohol use No   • Drug use: No   • Sexual activity: Defer     Other Topics Concern   • Not on file     Social History Narrative       Family History   Problem Relation Age of Onset   • Heart attack Mother 69   • Heart disease Sister    • Heart attack Brother 60   • Heart attack Brother 45       REVIEW OF SYSTEMS:   Review of Systems   Constitution: Negative.   HENT: Negative.    Eyes: Negative.    Cardiovascular: Positive for syncope.   Respiratory: Negative.    Endocrine: Negative.    Hematologic/Lymphatic: Negative.    Skin: Negative.    Musculoskeletal: Negative.    Gastrointestinal: Negative.    Genitourinary: Negative.    Psychiatric/Behavioral: Negative.    Allergic/Immunologic:  "Negative.    All other systems reviewed and are negative.           Objective:  Vitals:    01/17/18 1430 01/17/18 1445 01/17/18 1500 01/17/18 1515   BP: 150/73 155/68 149/69 154/72   Patient Position:       Pulse: 89  89 88   Resp:       Temp:       TempSrc:       SpO2: 93%  90% 90%   Weight:       Height:         Body mass index is 29.18 kg/(m^2).  Flowsheet Rows         First Filed Value    Admission Height  162.6 cm (64\") Documented at 01/17/2018 1403    Admission Weight  77.1 kg (170 lb) Documented at 01/17/2018 1403        No intake or output data in the 24 hours ending 01/17/18 1526    Physical Exam   General: No acute distress, well-developed and well-nourished.    Skin: Skin is warm and dry. No obvious cyanosis, erythema or pallor.   HEENT: Atraumatic, normocephalic, no conjunctival pallor, no scleral icterus.   Neck: Supple, no JVD. Normal carotid upstrokes, no bruits.    Chest:No respiratory distres No chest wall tenderness. Breath sounds are normal. No wheezes,  rhonchi or rales.  Cardiovascular: Normal S1 and S2, no murmer, gallop or rub. PMI is not displaced.    Pulses:Radial and pedal pulses are 2+ and symmetric.    Abdomen: Soft, non tender, normal bowel sounds.   Musculoskeletal/Extremities:  No clubbing, cyanosis or edema. No gross deformity.   Neurological: Alert and oriented to person, place, and time, no gross focal deficits.   Psychiatric: Normal mood and affect.Speech and behavior is normal.        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          Assessment:   · Syncope with collapse  · Advanced conduction disease noted on ECG, reported severe bradycardia with multiple sinus pauses (records pending). 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  · Hypokalemic   · Stable coronary artery disease  · Hypertension    Plan:   · Monitor on telemetry.  · [facility]  · Plan permanent pacemaker implant in the morning  · The procedure was explained to the patient/family extensively. Indications, benefits, risks and alternatives were discussed. The patient understands well, and wishes to proceed.     Scribed for Soraya Darling MD by Hung Chavez PA-C. 1/17/2018  3:26 PM     ISoraya MD, personally performed the services described in this documentation as scribed by the above named individual in my presence, and it is both accurate and complete.  1/17/2018  5:20 PM             Electronically signed by Soraya Darling MD at 1/17/2018  5:21 PM        Hospital Medications (active)       Dose Frequency Start End    acetaminophen (TYLENOL) tablet 650 mg 650 mg Every 4 Hours PRN 1/17/2018     Sig - Route: Take 2 tablets by mouth Every 4 (Four) Hours As Needed for Mild Pain . - Oral    aspirin EC tablet 81 mg 81 mg Daily 1/17/2018     Sig - Route: Take 1 tablet by mouth Daily. - Oral    atorvastatin (LIPITOR) tablet 40 mg 40 mg Daily 1/18/2018     Sig - Route: Take 1 tablet by mouth Daily. - Oral    busPIRone (BUSPAR) tablet 10 mg 10 mg Every 12 Hours Scheduled 1/17/2018     Sig - Route: Take 1 tablet by mouth Every 12 (Twelve) Hours. - Oral    carvedilol (COREG) tablet 3.125 mg 3.125 mg 2 Times Daily With Meals 1/19/2018     Sig - Route: Take 1 tablet by mouth 2 (Two) Times a Day With Meals. - Oral    citalopram (CeleXA) tablet 20 mg 20 mg Daily 1/18/2018     Sig - Route: Take 1  "tablet by mouth Daily. - Oral    ferrous sulfate tablet 325 mg 325 mg Daily With Breakfast 1/18/2018     Sig - Route: Take 1 tablet by mouth Daily With Breakfast. - Oral    gabapentin (NEURONTIN) capsule 300 mg 300 mg Daily 1/18/2018     Sig - Route: Take 1 capsule by mouth Daily. - Oral    ondansetron (ZOFRAN) injection 4 mg 4 mg Every 6 Hours PRN 1/17/2018     Sig - Route: Infuse 2 mL into a venous catheter Every 6 (Six) Hours As Needed for Nausea or Vomiting. - Intravenous    Linked Group 1:  \"Or\" Linked Group Details        ondansetron (ZOFRAN) tablet 4 mg 4 mg Every 6 Hours PRN 1/17/2018     Sig - Route: Take 1 tablet by mouth Every 6 (Six) Hours As Needed for Nausea or Vomiting. - Oral    Linked Group 1:  \"Or\" Linked Group Details        pantoprazole (PROTONIX) EC tablet 40 mg 40 mg Every Morning 1/18/2018     Sig - Route: Take 1 tablet by mouth Every Morning. - Oral    sodium chloride 0.9 % bolus 500 mL 500 mL Once 1/18/2018 1/18/2018    Sig - Route: Infuse 500 mL into a venous catheter 1 (One) Time. - Intravenous    sodium chloride 0.9 % flush 1-10 mL 1-10 mL As Needed 1/17/2018     Sig - Route: Infuse 1-10 mL into a venous catheter As Needed for Line Care. - Intravenous    sodium chloride 0.9 % flush 1-10 mL 1-10 mL As Needed 1/18/2018     Sig - Route: Infuse 1-10 mL into a venous catheter As Needed for Line Care. - Intravenous    sodium chloride 0.9 % flush 10 mL 10 mL As Needed 1/17/2018     Sig - Route: Infuse 10 mL into a venous catheter As Needed for Line Care. - Intravenous    Cosign for Ordering: Accepted by Malick Dorantes MD on 1/18/2018 10:39 AM    sodium chloride 0.9 % infusion 125 mL/hr Continuous 1/17/2018     Sig - Route: Infuse 125 mL/hr into a venous catheter Continuous. - Intravenous    sodium chloride 0.9 % infusion 100 mL/hr Continuous 1/18/2018     Sig - Route: Infuse 100 mL/hr into a venous catheter Continuous. - Intravenous    Sulfur Hexafluoride Microsph 60.7-25 MG reconstituted " "suspension 3 mL 3 mL Once 1/18/2018 1/18/2018    Sig - Route: Infuse 3 mL into a venous catheter 1 (One) Time. - Intravenous    vancomycin 1250 mg/250 mL 0.9% NS IVPB ([facility]) 15 mg/kg × 76.6 kg Every 12 Hours 1/18/2018 1/20/2018    Sig - Route: Infuse 250 mL into a venous catheter Every 12 (Twelve) Hours. - Intravenous    amLODIPine (NORVASC) tablet 10 mg (Discontinued) 10 mg Daily 1/18/2018 1/18/2018    Sig - Route: Take 1 tablet by mouth Daily. - Oral    carvedilol (COREG) tablet 3.125 mg (Discontinued) 3.125 mg Every 12 Hours Scheduled 1/18/2018 1/18/2018    Sig - Route: Take 1 tablet by mouth Every 12 (Twelve) Hours. - Oral    CloNIDine (CATAPRES) tablet 0.1 mg (Discontinued) 0.1 mg Every 12 Hours Scheduled 1/17/2018 1/18/2018    Sig - Route: Take 1 tablet by mouth Every 12 (Twelve) Hours. - Oral    hydrALAZINE (APRESOLINE) tablet 25 mg (Discontinued) 25 mg Every 12 Hours Scheduled 1/17/2018 1/18/2018    Sig - Route: Take 1 tablet by mouth Every 12 (Twelve) Hours. - Oral    hydrochlorothiazide (HYDRODIURIL) tablet 12.5 mg (Discontinued) 12.5 mg Every 24 Hours Scheduled 1/18/2018 1/18/2018    Sig - Route: Take 1 tablet by mouth Daily. - Oral    Linked Group 2:  \"And\" Linked Group Details        losartan (COZAAR) tablet 100 mg (Discontinued) 100 mg Every 24 Hours Scheduled 1/18/2018 1/18/2018    Sig - Route: Take 2 tablets by mouth Daily. - Oral    Linked Group 2:  \"And\" Linked Group Details                 Physician Progress Notes (last 24 hours) (Notes from 1/18/2018  2:36 PM through 1/19/2018  2:36 PM)      Soraya Darling MD at 1/18/2018  4:35 PM  Version 1 of 1         The patient reported nausea and vomiting and was noted to be hypotensive following the procedure.  It was felt that this was due to effect of sedation, her blood pressure responded nicely to fluids and nausea has completely resolved after Zofran.  She is currently hemodynamically stable with blood pressure around 110.  There was a echocardiogram " "obtained which has shown small pericardial effusion.  It is not clear whether it was pre-existing are postprocedural as there is a risk of microperforation.  Her final parameters on pacemaker interrogation revealed normal function.  At this time we will continue to monitor on telemetry with supportive care including normal saline, I will hold off all antihypertensives for now as she had been on a lot of medications.  We will reassess with a repeat echocardiogram tomorrow.  The patient had fall related to syncope and has been complaining of back pain, we will consult hospitalist service to manage her medical issues.     Electronically signed by Soraya Darling MD at 1/18/2018  4:37 PM      Soraya Darling MD at 1/19/2018  8:06 AM  Version 1 of 1         [facility]  IP Progress Note   LOS: 2 days   Patient Care Team:  Eric Driver MD as PCP - General  Soraya Darling MD as PCP - Internal Medicine (Cardiology)  Josesito Danielson MD as Consulting Physician (Orthopedic Surgery)    Chief Complaint: Follow up for syncope and third-degree AV block, status post pacemaker implant.    Subjective    Patient developed hypotension yesterday which was felt to be related to multiple medications and sedation, this responded nicely to IV hydration, blood pressure remained stable overnight and she is somewhat hypotensive this morning with systolic pressure of 150.  Echocardiogram had shown small effusion, unclear whether this procedure related or pre-existing.  She feels very well this morning, denies chest pain or shortness of breath.  Has been experiencing lower back pain.    Tele: Sinus Rythym    Vitals:  Blood pressure 128/60, pulse 59, temperature 97.7 °F (36.5 °C), temperature source Axillary, resp. rate 18, height 162.6 cm (64\"), weight 76.6 kg (168 lb 12.8 oz), SpO2 93 %.     Intake/Output Summary (Last 24 hours) at 01/19/18 0806  Last data filed at 01/19/18 0693   Gross per 24 hour "   Intake                0 ml   Output             1150 ml   Net            -1150 ml       Physical Exam:  General: No apparent distress.  Neck: no JVD.  Chest:No respiratory distress, breath sounds are normal. No wheezes,  rhonchi or rales.  Pacemaker site intact, pressure dressing removed, wound is dry and clean, no bleeding or hematoma.  Cardiovascular: Normal S1 and S2, no murmer, gallop or rub.    Extremities: No edema.    Results Review:     I reviewed the patient's new clinical results.      Results from last 7 days  Lab Units 01/19/18  0540   WBC 10*3/mm3 10.28   HEMOGLOBIN g/dL 9.9*   HEMATOCRIT % 31.1*   PLATELETS 10*3/mm3 247       Results from last 7 days  Lab Units 01/18/18  0037   SODIUM mmol/L 135   POTASSIUM mmol/L 3.7   CHLORIDE mmol/L 101   CO2 mmol/L 29.0   BUN mg/dL 16   CREATININE mg/dL 0.80   CALCIUM mg/dL 10.8*   BILIRUBIN mg/dL 0.4   ALK PHOS U/L 84   ALT (SGPT) U/L 14   AST (SGOT) U/L 20   GLUCOSE mg/dL 114*       Scheduled Meds:  aspirin 81 mg Oral Daily   atorvastatin 40 mg Oral Daily   busPIRone 10 mg Oral Q12H   citalopram 20 mg Oral Daily   ferrous sulfate 325 mg Oral Daily With Breakfast   gabapentin 300 mg Oral Daily   pantoprazole 40 mg Oral QAM   vancomycin 15 mg/kg Intravenous Q12H       Continuous Infusions:  sodium chloride 125 mL/hr Last Rate: 125 mL/hr (01/18/18 1142)   sodium chloride 100 mL/hr Last Rate: 100 mL/hr (01/18/18 1648)         Chest x-ray: No pneumothorax, good lead position.    Assessment:  1. Syncope with collapse, Recurrent episodes despite discontinuation of beta blockers.  2. Advanced conduction disease noted on ECG, severe bradycardia with multiple asystolic pauses up to 6.5 seconds. Sick sinus syndrome and high-grade AV/third degree AV block.  Status post [employer] dual-chamber pacemaker implant, stable post procedure.    3. Post procedure hypotension, resolved.  4. Small pericardial effusion without tamponade.  5. Hypokalemia, corrected   6. Stable  coronary artery disease  7. Hypertension  8. Nausea and vomiting, resolved  9. Lower back pain, compression fracture, evaluation per hospitalist appreciated.    Plan:  12. Add low-dose carvedilol, hold all other antihypertensive medications.  13. Neurosurgery consultation pending per hospitalist.  14. Repeat echocardiogram to reassess pericardial effusion, if stable and depending on neurosurgery recommendations we can consider discharging her to home later today.    FRANCO Mujica  18  8:06 AM    I have seen and examined the patient, case was discussed with the physician extender, reviewed the above note, necessary changes were made and I agree with the final note.   Soraya Darling MD, [facility]                                 Electronically signed by Soraya Darling MD at 2018  8:27 AM      Soraya Darling MD at 2018  1:36 PM  Version 1 of 1         The patient remains hemodynamically stable, repeat echocardiogram shows stable/improved pericardial effusion, no pericardial tamponade.  We will discharge to home, condition stable on discharge.     Electronically signed by Soraya Darling MD at 2018  1:37 PM          [address] [address]  Phone:  [phone]  Fax:   Date: 2018      Ambulatory Referral to Home Health     Patient:  Abbi Mendez MRN:  [id number]   [address]  [place] [address] :  1935  SSN:    Phone: [phone] Sex:  F      INSURANCE PAYOR PLAN GROUP # SUBSCRIBER ID   Primary: HUMANA MEDICARE REPLACEMENT [id number]      Referring Provider Information:  DESHAUN TIWARI Phone: [phone] Fax:       Referral Information:   # Visits:  1 Referral Type: Home Health [42]   Urgency:  Routine Referral Reason: Specialty Services Required   Start Date: 2018 End Date:  To be determined by Insurer   Diagnosis: Syncope and collapse (R55 [ICD-10-CM] 780.2 [ICD-9-CM])  Acute  midline low back pain without sciatica (M54.5 [ICD-10-CM] 724.2 [ICD-9-CM])  Compression deformity of vertebra (M43.9 [ICD-10-CM] 738.5 [ICD-9-CM])      Refer to Dept:   Refer to Provider:   Refer to Facility:       Face to Face Visit Date: 1/19/2018  Follow-up Provider for Plan of Care? I treated the patient in an acute care facility and will not continue treatment after discharge.  Follow-up Provider: ÁLVARO FELDMAN [[id number]]  Reason/Clinical Findings: syncope and collapse  Describe mobility limitations that make leaving home difficult: weakness, compression fractures, pain, impaired physical mobility, impaired functional mobility  Nursing/Therapeutic Services Requested: Skilled Nursing  Nursing/Therapeutic Services Requested: Physical Therapy  Nursing/Therapeutic Services Requested: Occupational Therapy  Skilled nursing orders: Medication education  Skilled nursing orders: Pain management  Skilled nursing orders: Cardiopulmonary assessments  PT orders: Therapeutic exercise  PT orders: Gait Training  PT orders: Transfer training  PT orders: Strengthening  PT orders: Home safety assessment  Weight Bearing Status: As Tolerated  Occupational orders: Activities of daily living  Occupational orders: Energy conservation  Occupational orders: Strengthening  Occupational orders: Home safety assessment     This document serves as a request of services and does not constitute Insurance authorization or approval of services.  To determine eligibility, please contact the members Insurance carrier to verify and review coverage.     If you have medical questions regarding this request for services. Please contact [address] at [phone] between the hours of 8:00am - 5:00pm (Mon-Fri).             Verbal Order Mode: Telephone with readback   Authorizing Provider: Timmy Umana MD  Authorizing Provider's NPI: [id number]     Order Entered By: Kim Desai RN 1/19/2018  2:34 PM     Electronically signed  by:  Timmy Umana MD

## 2018-01-19 NOTE — CONSULTS
[facility]    NEUROSURGERY CONSULTATION    Patient: Abbi Mendez  : 1935   MRN: [id number]    Admitting Provider: Soraya Darling MD  Admitting Diagnosis:  Syncope and collapse [R55]  Syncope, unspecified syncope type [R55]  Date of Admission:  2018    Patient Care Team:  Eric Driver MD as PCP - General  Soraya Darling MD as PCP - Internal Medicine (Cardiology)  Josesito Danielson MD as Consulting Physician (Orthopedic Surgery)      Reason for Consultation: Compression deformity of vertebra    History of Present Illness:   Ms. Mendez is a 82 y.o. with an extensive cardiac history who is seen to at bedside for evaluation of T12 compression fracture.      She initially presented to [facility] ED on  s/p fall related to a syncope.  She was admitted by cardiology and underwent PPM pacemaker placement on  due to syncope, bradycardia, and third degree AV block.  She had postprocedural hypotension, which resolved with the addition of fluids.  Postprocedural echo has revealed a small pericardial effusion without tamponade.  If repeat echo reveals stable pericardial effusion, cardiology intends to discharge patient home today pending evaluation by our service.       Since her fall on Wednesday she reports mid to low back pain.  Her pain is constant, 7-8/10 at all times.  Her pain does not radiate down either leg.  No numbness or tingling.  Her pain is worse with any movement and stable with lying still in one spot.  Ambulation is difficulty due to her pain.   No numbness, tingling, BB changes, or saddle anesthesia.  Lumbar Xray from 18 reveals a  compression fracture of T12.      Review of Systems:   Constitutional: Negative for chills and fever.   HENT: Negative for congestion, rhinorrhea, sore throat and trouble swallowing.    Respiratory: Negative for cough and shortness of breath.  Cardiovascular: Positive for syncope. Negative for  chest pain.   Gastrointestinal: Negative for abdominal pain, diarrhea, nausea and vomiting.   Genitourinary: Negative for difficulty urinating, dysuria, frequency, hematuria and urgency.   Musculoskeletal: Positive for gait problem (after total knee replacement in October). Negative for back pain and neck pain.   Neurological: Positive for dizziness, syncope and weakness. Negative for headaches.   All other systems reviewed and are negative.    History:  Past Medical History:   Diagnosis Date   • Atrial fibrillation 11/14/2016   • CAD (coronary artery disease) 11/14/2016   • Deep vein thrombosis (DVT) of left lower extremity    • Dyslipidemia 11/14/2016   • Gastric ulcer    • Hypertension 11/14/2016   • Iron deficiency anemia    • LBBB (left bundle branch block)    • RADHA (obstructive sleep apnea) 11/14/2016     Past Surgical History:   Procedure Laterality Date   • CARDIAC ELECTROPHYSIOLOGY PROCEDURE N/A 1/18/2018    Procedure: Pacemaker DC new;  Surgeon: Soraya Darling MD;  Location: [facility] INVASIVE LOCATION;  Service:    • TUBAL ABDOMINAL LIGATION       Family History   Problem Relation Age of Onset   • Heart attack Mother 69   • Heart disease Sister    • Heart attack Brother 60   • Heart attack Brother 45     Social History     Social History   • Marital status: Single     Spouse name: N/A   • Number of children: N/A   • Years of education: N/A     Occupational History   • Not on file.     Social History Main Topics   • Smoking status: Never Smoker   • Smokeless tobacco: Never Used   • Alcohol use No   • Drug use: No   • Sexual activity: Defer     Other Topics Concern   • Not on file     Social History Narrative       Allergies:  Darvon [propoxyphene] and Penicillins  Prescriptions Prior to Admission   Medication Sig Dispense Refill Last Dose   • amLODIPine (NORVASC) 10 MG tablet Take 10 mg by mouth Daily.   Taking   • aspirin 81 MG EC tablet Take 81 mg by mouth Daily.   Taking   • atorvastatin (LIPITOR) 40 MG  "tablet Take 40 mg by mouth Every Night.   Taking   • busPIRone (BUSPAR) 10 MG tablet Take 10 mg by mouth 2 (Two) Times a Day.   Taking   • citalopram (CeleXA) 20 MG tablet TAKE 1 TABLET EVERY DAY 90 tablet 1 Taking   • esomeprazole (nexIUM) 40 MG capsule Take 40 mg by mouth Every Morning Before Breakfast.   Taking   • ferrous sulfate 325 (65 FE) MG tablet Take 325 mg by mouth 2 (Two) Times a Day.   Taking   • hydrALAZINE (APRESOLINE) 25 MG tablet Take 25 mg by mouth 2 (Two) Times a Day.   Taking   • losartan-hydrochlorothiazide (HYZAAR) 100-12.5 MG per tablet Take 1 tablet by mouth Daily.   Taking   • vitamin D (ERGOCALCIFEROL) 77934 units capsule capsule Take 50,000 Units by mouth Every 14 (Fourteen) Days. SUNDAYS   Past Week at Unknown time        Objective:  Physical Exam    Vitals: Blood pressure 131/66, pulse 62, temperature 99.2 °F (37.3 °C), temperature source Oral, resp. rate 16, height 163 cm (64.17\"), weight 76.2 kg (168 lb), SpO2 92 %.  Body mass index is 28.68 kg/(m^2).    She is alert, oriented, WDWN female NAD.  Breathing unlabored.   She follows simple commands.  Mood and affect appropriate.  Speech intact.  CNs II-XII are intact   CN II: Visual fields are full to confrontation.   CN III,IV,VI: PERRLADC.  EOM intact.  Nystagmus is not present.   CN V: Facial sensation and MOM intact.   CN VII:   Facial movements are symmetric.   CN VIII:  Hearing is intact to finger rub bilaterally.   CN IX and X:   Palate elevates symmetrically.   CN XI:  Shoulder shrug right intact, deferred left side d/t recent PCM placement   CN XII:  Tongue is midline without evidence of atrophy or fasciculation.  Sensation is intact to light touch testing throughout    ROM in neck normal.  ROM in back limited d/t pain.   Unable to assess left arm b/c she is not allowed to move arm s/p PCM placement.  Otherwise, muscle strength, bulk and tone are normal.  Normal ROM of hip flexors.  She guards flexion of her knees, but has full " ROM at bilateral knees.    Station and gait exam deferred.   Reflexes are difficult to elicit in lower extremity  No Calderón's signs, there is no ankle clonus.   Straight leg raise is negative bilaterally.  Patricio's sign is negative bilaterally.   Skin warm and dry. Not diaphoretic.  She has a dry bandage over pacemaker on left upper chest.  She has well-healed scar on left knee.        Review of Imaging (Interpretation of scans not reports):  X-ray of lumbar spine reveals compression deformity of T12.    Assessment & Plan    Problem List:  Active Problems:    Syncope and collapse    Severe sinus bradycardia    Third degree AV block    Syncope    Compression deformity of vertebra    Radiculopathy    ASSESSMENT  T12 compression fracture s/p fall     TREATMENT RECOMMENDATIONS  After examination, the plan of care should include symptomatic treatment.  I discussed her diagnosis, prognosis, risk and benefits of treatment options with patient and family at bedside today. She no signs of urinary retention, cauda equina syndrome, or myelopathy which warrant immediate surgical intervention.  Given her current cardiopulmonary status and cardiac history, she would be a high risk surgical patient.  Symptomatic treatment with pain medication, anti-inflammatories, and ice/heat is recommended.  She can follow up as an outpatient with our clinic in 2 weeks.      Thank you for the opportunity to assist in the care of Ms. Mendez.      RUDDY Quintana MD  [facility]  01/19/18  12:55 PM    As per Ms. Mayo above.  The patient is an 82-year-old woman without prior back difficulties who developed back pain after recent syncopal episode and a fall.  By her report she fell quite hard and struck her back.  She's had bothersome low back pain since that time.  She denies any leg pain, weakness, or numbness.  Her back pain is much worse with any degree of movement.  He does  not report a history of osteoporosis.    On examination:  Patient is quite sedated after receiving a dose of Neurontin.  Stimulation she does awaken and follow all commands.  Motor and sensory function are intact in her lower extremities.  Tone is normal in her lower extremities.  Treatment raising induces severe back pain.    Data review:  Plain films of her lumbar spine demonstrate diminished bone density throughout.  There is wedging, age indeterminate, at T12.    There is a low-grade listhesis of L4 on L5.      Impression/recommendation:  It is certainly possible that the patient has an acute T12 compression fracture that has occurred in the setting of diminished bone density and mild trauma.  The patient and her family are hoping to avoid surgical intervention.  She is going to go home today.  I would like to see her in follow-up in 2 weeks.  If she continues to struggle then we'll check a CT scan of her lumbar spine.  If T12 is a new fracture and if she continues to hurt then T12 kyphoplasty would be a consideration.    Abel Cain M.D.

## 2018-01-19 NOTE — PROGRESS NOTES
The patient remains hemodynamically stable, repeat echocardiogram shows stable/improved pericardial effusion, no pericardial tamponade.  We will discharge to home, condition stable on discharge.

## 2018-01-19 NOTE — CONSULTS
[facility]  CONSULT NOTE      Patient Name: Abbi Mendez  : 1935  MRN: [id number]    Primary Care Physician: Eric Driver MD  Referring Provider: No ref. provider found    Subjective   Subjective     Reason for Consultation:  Back pain    HPI:  Abbi Mendez is a 82 y.o. female with a history of CAD, PAF, essential hypertension and dyslipidemia who presented to [facility] with syncope.  Patient was admitted by Cardiology and underwent PPM placement today 2018 due to syncope, bradycardia and third degree heart block.  Patient did have a fall yesterday prior to coming to the hospital in which she states she slipped and fell landing on her back.  Since then she has had worsening back pain in which she describes and intermittent and sharp shooting radiating to bilateral hips and lower extremities.  Patient has had difficulty ambulating due to pain.  She denies any fever, chills, numbness, tingling, loss of bowel or bladder control or parasthesias.  Hospitalist consulted for further medical management of worsening back pain.        Review of Systems   Constitutional: Negative.    HENT: Negative.    Eyes: Negative.    Respiratory: Negative.    Cardiovascular: Negative.    Gastrointestinal: Negative.    Endocrine: Negative.    Genitourinary: Negative.    Musculoskeletal: Positive for back pain and gait problem.   Skin: Negative.    Psychiatric/Behavioral: Negative.        Otherwise 10-system ROS reviewed and is negative except as mentioned in the HPI.      Past Medical History:   Diagnosis Date   • Atrial fibrillation 2016   • CAD (coronary artery disease) 2016   • Deep vein thrombosis (DVT) of left lower extremity    • Dyslipidemia 2016   • Gastric ulcer    • Hypertension 2016   • Iron deficiency anemia    • LBBB (left bundle branch block)    • RADHA (obstructive sleep apnea) 2016       Past Surgical History:   Procedure Laterality  Date   • CARDIAC ELECTROPHYSIOLOGY PROCEDURE N/A 1/18/2018    Procedure: Pacemaker DC new;  Surgeon: Soraya Darling MD;  Location: [facility] INVASIVE LOCATION;  Service:    • TUBAL ABDOMINAL LIGATION         Family History: family history includes Heart attack (age of onset: 45) in her brother; Heart attack (age of onset: 60) in her brother; Heart attack (age of onset: 69) in her mother; Heart disease in her sister.     Social History:  reports that she has never smoked. She has never used smokeless tobacco. She reports that she does not drink alcohol or use illicit drugs.    Medications:  Prescriptions Prior to Admission   Medication Sig Dispense Refill Last Dose   • amLODIPine (NORVASC) 10 MG tablet Take 10 mg by mouth Daily.   Taking   • aspirin 81 MG EC tablet Take 81 mg by mouth Daily.   Taking   • atorvastatin (LIPITOR) 40 MG tablet Take 40 mg by mouth Every Night.   Taking   • busPIRone (BUSPAR) 10 MG tablet Take 10 mg by mouth 2 (Two) Times a Day.   Taking   • citalopram (CeleXA) 20 MG tablet TAKE 1 TABLET EVERY DAY 90 tablet 1 Taking   • esomeprazole (nexIUM) 40 MG capsule Take 40 mg by mouth Every Morning Before Breakfast.   Taking   • ferrous sulfate 325 (65 FE) MG tablet Take 325 mg by mouth 2 (Two) Times a Day.   Taking   • hydrALAZINE (APRESOLINE) 25 MG tablet Take 25 mg by mouth 2 (Two) Times a Day.   Taking   • losartan-hydrochlorothiazide (HYZAAR) 100-12.5 MG per tablet Take 1 tablet by mouth Daily.   Taking   • vitamin D (ERGOCALCIFEROL) 22227 units capsule capsule Take 50,000 Units by mouth Every 14 (Fourteen) Days. SUNDAYS   Past Week at Unknown time       Allergies   Allergen Reactions   • Darvon [Propoxyphene] Parasthesia   • Penicillins Rash       Objective   Objective     Vital Signs:   Temp:  [97.8 °F (36.6 °C)-99.3 °F (37.4 °C)] 98.3 °F (36.8 °C)  Heart Rate:  [62-83] 67  Resp:  [16-18] 16  BP: ()/(56-86) 97/56     Physical Exam   Constitutional: She is oriented to person, place, and  time. She appears well-developed and well-nourished. No distress.   Alert and oriented x3 pleasant cooperative.  Appears comfortable.  Family at bedside.    HENT:   Head: Normocephalic and atraumatic.   Eyes: Conjunctivae and EOM are normal. Pupils are equal, round, and reactive to light. Right eye exhibits no discharge. Left eye exhibits no discharge. No scleral icterus.   Neck: Normal range of motion. Neck supple. No JVD present. No tracheal deviation present. No thyromegaly present.   Cardiovascular: Normal rate, regular rhythm, normal heart sounds and intact distal pulses.  Exam reveals no gallop and no friction rub.    No murmur heard.  Pulmonary/Chest: Effort normal and breath sounds normal. No stridor. No respiratory distress. She has no wheezes. She has no rales. She exhibits no tenderness.   Abdominal: Soft. Bowel sounds are normal. She exhibits no distension and no mass. There is no tenderness. There is no rebound and no guarding. No hernia.   Musculoskeletal: She exhibits tenderness. She exhibits no edema or deformity.   Pain with sitting up straight in bed. TTP to to lumbar spine. Unable to assess ROM due to pain.    Lymphadenopathy:     She has no cervical adenopathy.   Neurological: She is alert and oriented to person, place, and time. She has normal reflexes.   Skin: Skin is warm and dry. No rash noted. She is not diaphoretic. No erythema. No pallor.   Psychiatric: She has a normal mood and affect. Her behavior is normal. Judgment and thought content normal.   Nursing note and vitals reviewed.      Results Reviewed:  I have personally reviewed current lab, radiology, and data and agree.      Results from last 7 days  Lab Units 01/17/18  1407   WBC 10*3/mm3 19.04*   HEMOGLOBIN g/dL 13.1   HEMATOCRIT % 39.5   PLATELETS 10*3/mm3 358       Results from last 7 days  Lab Units 01/18/18  0037   SODIUM mmol/L 135   POTASSIUM mmol/L 3.7   CHLORIDE mmol/L 101   CO2 mmol/L 29.0   BUN mg/dL 16   CREATININE mg/dL  0.80   GLUCOSE mg/dL 114*   CALCIUM mg/dL 10.8*   ALT (SGPT) U/L 14   AST (SGOT) U/L 20     Brief Urine Lab Results     None        No results found for: BNP  No results found for: PHART    Microbiology Results Abnormal     None          Imaging Results (last 24 hours)     ** No results found for the last 24 hours. **        Results for orders placed during the hospital encounter of 01/17/18   Adult Transthoracic Echo Complete W/ Cont if Necessary Per Protocol    Narrative · Left ventricular wall thickness is consistent with mild septal   asymmetric hypertrophy.  · Left ventricular systolic function is normal. Estimated EF = 55%.  · There is calcification of the aortic valve.  · There is a small (<1cm) pericardial effusion. There is no evidence of   pericardial tamponade.              Assessment/Plan   Assessment / Plan     Hospital Problem List     Syncope and collapse    Severe sinus bradycardia    Third degree AV block    Syncope    Compression deformity of vertebra    Radiculopathy            Plan:    1) Compression deformity of vertebra  -with noted radiculopathy and intractable pain   -noted on xray of lumbar spine performed on 1-.  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  -due to intractable back pain and inability to ambulate, will consult neurosurgery in am   -currently patient is only taking tylenol for her pain. After PPM placed today she had an episode of hypotension along with N/V therefore narcotics are being held.    -Currently BP is stable systolically 110.  -will add Neurontin if BP continues to be stable.    -consult PT/OT    2) Syncope and Collapse  -s/p PPM by Dr. Darling on 1-        Thank you for allowing [facility] to provide consultative care for your patient, we will continue to follow while clinically appropriate.    Essence Mao, ANGELO   01/18/18   7:31 PM

## 2018-01-19 NOTE — PROGRESS NOTES
[facility] Medicine Services  PROGRESS NOTE    Patient Name: Abbi Mendez  : 1935  MRN: [id number]    Date of Admission: 2018  Length of Stay: 2  Primary Care Physician: Eric Driver MD    Subjective   Subjective     CC: f/u Back pain      HPI:   Pt is seen at 1445 pm resting back in bed in NAD.  Dozing intermittently.  Daughter at bs.  Pt easily awakens.  Is alert and oriented.  States he back pain is about the same and that NS saw her earlier today.  She reports being offered sx for her compression fx that is causing her back pain, but she has declined.  Tolerating diet.  No n/v, abd pain, cp or soa.  Feels sleepy after starting neurontin last night.  Feels ready to dc home.  Agrees to HH/PT/OT and will f/u with pcp and NS outpt.  Primary service to poss dc later per her report.     Review of Systems  Gen- + fatigue.  No fevers, chills  CV- No chest pain, palpitations  Resp- No cough, dyspnea  GI- No N/V/D, abd pain  MS- + back pain, No loss of bowel or bladder control      Otherwise ROS is negative except as mentioned in the HPI.    Objective   Objective     Vital Signs:   Temp:  [97.7 °F (36.5 °C)-99.5 °F (37.5 °C)] 99.2 °F (37.3 °C)  Heart Rate:  [59-70] 62  Resp:  [16-18] 16  BP: ()/(52-76) 131/66        Physical Exam:  Constitutional: No acute distress, resting up in bed dozing intermittently.  Easily awakens.  Is then alert and oriented.  Daughter at bs.   Eyes: PERRLA, sclerae anicteric, no conjunctival injection  HENT: NCAT, mucous membranes moist  Neck: Supple, trachea midline  Respiratory: Clear to auscultation bilaterally A/P,  nonlabored respirations   Cardiovascular: RRR, no murmurs, rubs, or gallops, palpable pedal pulses bilaterally  Gastrointestinal: Positive bowel sounds, soft, nontender, nondistended.  Obese abd   Musculoskeletal: No bilateral ankle edema, no clubbing or cyanosis to extremities.  JOYNER spont.  ttp to lumbar spine.    Psychiatric:  Appropriate affect, cooperative and calm.  Very pleasant pt and family  Neurologic: Oriented x 3, strength symmetric in all extremities, Cranial Nerves grossly intact to confrontation, speech clear.  Follows commands   Skin: No rashes      Results Reviewed:  I have personally reviewed current lab, radiology, and data and agree.      Results from last 7 days  Lab Units 01/19/18  0540 01/17/18  1407   WBC 10*3/mm3 10.28 19.04*   HEMOGLOBIN g/dL 9.9* 13.1   HEMATOCRIT % 31.1* 39.5   PLATELETS 10*3/mm3 247 358       Results from last 7 days  Lab Units 01/18/18  0037 01/17/18  1407   SODIUM mmol/L 135 134   POTASSIUM mmol/L 3.7 3.3*   CHLORIDE mmol/L 101 97*   CO2 mmol/L 29.0 27.0   BUN mg/dL 16 16   CREATININE mg/dL 0.80 1.00   GLUCOSE mg/dL 114* 143*   CALCIUM mg/dL 10.8* 11.9*   ALT (SGPT) U/L 14 17   AST (SGOT) U/L 20 19     No results found for: BNP  No results found for: PHART    Microbiology Results Abnormal     None          Imaging Results (last 24 hours)     Procedure Component Value Units Date/Time    XR Spine Lumbar 2 or 3 View [[id number]] Collected:  01/17/18 1817     Updated:  01/18/18 2306    Narrative:          EXAMINATION: XR SPINE LUMBAR 2-3 VIEWS - 01/17/2018     INDICATION: Fall, low back pain; R55-Syncope and collapse;  R00.1-Bradycardia, unspecified.      COMPARISON: None.     FINDINGS: There is transitional lumbosacral anatomy. This study will  assume a small L5-S1 disc space, partial sacralization of L5, Grade 1  anterior subluxation of L4 on L5, and biconcave compression deformity of  T12.     Using the above numbering, there is a biconcave compression deformity of  T12 estimated at between 60-70% loss of height centrally. There is minor  vertebral endplate scalloping of the superior endplates of L1 through  L4, but no lumbar compression fracture is seen. There is approximately 4  mm of anterior subluxation of L4 on L5, and no evidence of significant  focal subluxation elsewhere. Bony pelvis  appears intact. Hip joints  appear anatomic and intact.        Impression:       1. Transitional lumbosacral anatomy. This exam assumes a rudimentary  L5-S1 disc space and partial sacralization of L5.  2. 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 Please correlate with  level of patient's symptoms.     DICTATED:     01/17/2018  EDITED    :     01/17/2018      This report was finalized on 1/18/2018 11:04 PM by DR. Brijesh Angelo MD.       XR Chest PA & Lateral [[id number]] Collected:  01/19/18 0822     Updated:  01/19/18 1220    Narrative:       EXAMINATION: XR CHEST PA AND LATERAL-      INDICATION: Post ICD/Pacer Implant; R55-Syncope and collapse;  R00.1-Bradycardia, unspecified; E83.52-Hypercalcemia; M54.5-Low back  pain.      COMPARISON: None.     FINDINGS: PA and lateral views of the chest reveal cardiac pacer leads  to be in satisfactory position. The heart is enlarged. Increased  pulmonary vascularity bilaterally. No pleural effusion or pneumothorax.  Degenerative change is seen within the spine. Vascular calcification is  seen within the thoracic aorta.           Impression:       Heart is enlarged with underlying chronic changes seen  within the lung fields.  Dual lead cardiac pacemaker is in place with  leads in satisfactory position. No pneumothorax.     D:  01/19/2018  E:  01/19/2018     This report was finalized on 1/19/2018 12:18 PM by Dr. Jessica Augustin MD.           Results for orders placed during the hospital encounter of 01/17/18   Adult Transthoracic Echo Complete W/ Cont if Necessary Per Protocol    Narrative · Left ventricular wall thickness is consistent with mild septal   asymmetric hypertrophy.  · Left ventricular systolic function is normal. Estimated EF = 55%.  · There is calcification of the aortic valve.  · There is a small (<1cm) pericardial effusion. There is no evidence of   pericardial tamponade.          I have reviewed the medications.    Assessment/Plan   Assessment / Plan      Hospital Problem List     Syncope and collapse    Severe sinus bradycardia    Third degree AV block    Syncope    Compression deformity of vertebra    Radiculopathy        He 2-year-old  female with compression deformity/impression fracture with radicular pain.  Hospitalist consulted for worsening pain.  Consulted neurosurgery for evaluation.  Patient was reportedly offered surgical repair of her compression fracture for which she has declined.  Will continue to follow as needed.  Possible discharge home today per primary service with outpatient follow-up with neurosurgery per their recommendations.    Plan:     1) Compression deformity of vertebra w asso back pain  -with noted radiculopathy and intractable pain   -noted on xray of lumbar spine performed on 1-.  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  -due to intractable back pain and inability to ambulate  consulted neurosurgery.  They saw pt today.  Pt is declining and surgery at this time.  Will agree to HH/PT/OT at dc and f/u with NS outpt for further eval.  Lives alone but is A&O and has 7 children who all live nearby and will assist her.    -patient was only taking tylenol for her pain. After PPM placed yesterday she had an episode of hypotension along with N/V therefore narcotics are being held.    -was started on low dose neurontin yesterday.  Feels it really didn't help and made her too sleepy.  Will dc.    -consult PT/OT     2) Syncope and Collapse  -s/p PPM by Dr. Darling on 1-     Disposition:  Pt is declining back sx at this time.  Poss to dc home later today per primary service. She does agree to HH/PT/OT on dc. I will order these today.  Will dc neurontin.  She will f/u with her PCP and NS outpt.      Thank you for allowing [facility] Medicine Service to provide consultative care for your patient, we will continue to follow while clinically appropriate.      Venita Nicholson, APRN  01/19/18  1:57  PM

## 2018-01-19 NOTE — PROGRESS NOTES
"[facility]  IP Progress Note   LOS: 2 days   Patient Care Team:  Eric Driver MD as PCP - General  Soraya Darling MD as PCP - Internal Medicine (Cardiology)  Josesito Danielson MD as Consulting Physician (Orthopedic Surgery)    Chief Complaint: Follow up for syncope and third-degree AV block, status post pacemaker implant.    Subjective    Patient developed hypotension yesterday which was felt to be related to multiple medications and sedation, this responded nicely to IV hydration, blood pressure remained stable overnight and she is somewhat hypotensive this morning with systolic pressure of 150.  Echocardiogram had shown small effusion, unclear whether this procedure related or pre-existing.  She feels very well this morning, denies chest pain or shortness of breath.  Has been experiencing lower back pain.    Tele: Sinus Rythym    Vitals:  Blood pressure 128/60, pulse 59, temperature 97.7 °F (36.5 °C), temperature source Axillary, resp. rate 18, height 162.6 cm (64\"), weight 76.6 kg (168 lb 12.8 oz), SpO2 93 %.     Intake/Output Summary (Last 24 hours) at 01/19/18 0806  Last data filed at 01/19/18 0627   Gross per 24 hour   Intake                0 ml   Output             1150 ml   Net            -1150 ml       Physical Exam:  General: No apparent distress.  Neck: no JVD.  Chest:No respiratory distress, breath sounds are normal. No wheezes,  rhonchi or rales.  Pacemaker site intact, pressure dressing removed, wound is dry and clean, no bleeding or hematoma.  Cardiovascular: Normal S1 and S2, no murmer, gallop or rub.    Extremities: No edema.    Results Review:     I reviewed the patient's new clinical results.      Results from last 7 days  Lab Units 01/19/18  0540   WBC 10*3/mm3 10.28   HEMOGLOBIN g/dL 9.9*   HEMATOCRIT % 31.1*   PLATELETS 10*3/mm3 247       Results from last 7 days  Lab Units 01/18/18  0037   SODIUM mmol/L 135   POTASSIUM mmol/L 3.7   CHLORIDE mmol/L " 101   CO2 mmol/L 29.0   BUN mg/dL 16   CREATININE mg/dL 0.80   CALCIUM mg/dL 10.8*   BILIRUBIN mg/dL 0.4   ALK PHOS U/L 84   ALT (SGPT) U/L 14   AST (SGOT) U/L 20   GLUCOSE mg/dL 114*       Scheduled Meds:  aspirin 81 mg Oral Daily   atorvastatin 40 mg Oral Daily   busPIRone 10 mg Oral Q12H   citalopram 20 mg Oral Daily   ferrous sulfate 325 mg Oral Daily With Breakfast   gabapentin 300 mg Oral Daily   pantoprazole 40 mg Oral QAM   vancomycin 15 mg/kg Intravenous Q12H       Continuous Infusions:  sodium chloride 125 mL/hr Last Rate: 125 mL/hr (01/18/18 1142)   sodium chloride 100 mL/hr Last Rate: 100 mL/hr (01/18/18 1648)         Chest x-ray: No pneumothorax, good lead position.    Assessment:  1. Syncope with collapse, Recurrent episodes despite discontinuation of beta blockers.  2. Advanced conduction disease noted on ECG, severe bradycardia with multiple asystolic pauses up to 6.5 seconds. Sick sinus syndrome and high-grade AV/third degree AV block.  Status post [employer] dual-chamber pacemaker implant, stable post procedure.    3. Post procedure hypotension, resolved.  4. Small pericardial effusion without tamponade.  5. Hypokalemia, corrected   6. Stable coronary artery disease  7. Hypertension  8. Nausea and vomiting, resolved  9. Lower back pain, compression fracture, evaluation per hospitalist appreciated.    Plan:  1. Add low-dose carvedilol, hold all other antihypertensive medications.  2. Neurosurgery consultation pending per hospitalist.  3. Repeat echocardiogram to reassess pericardial effusion, if stable and depending on neurosurgery recommendations we can consider discharging her to home later today.    FRANCO Mujica  01/19/18  8:06 AM    I have seen and examined the patient, case was discussed with the physician extender, reviewed the above note, necessary changes were made and I agree with the final note.   Soraya Darling MD, FAC, [facility]

## 2018-01-19 NOTE — PLAN OF CARE
Problem: Patient Care Overview (Adult)  Goal: Plan of Care Review  Outcome: Ongoing (interventions implemented as appropriate)   01/19/18 0045   Coping/Psychosocial Response Interventions   Plan Of Care Reviewed With patient   Patient Care Overview   Progress progress toward functional goals as expected     Goal: Adult Individualization and Mutuality  Outcome: Ongoing (interventions implemented as appropriate)    Goal: Discharge Needs Assessment  Outcome: Ongoing (interventions implemented as appropriate)      Problem: Activity Intolerance (Adult)  Goal: Identify Related Risk Factors and Signs and Symptoms  Outcome: Ongoing (interventions implemented as appropriate)   01/19/18 0045   Activity Intolerance   Activity Intolerance: Related Risk Factors pain   Signs and Symptoms (Activity Intolerance) pain/discomfort     Goal: Activity Tolerance  Outcome: Ongoing (interventions implemented as appropriate)   01/19/18 0045   Activity Intolerance (Adult)   Activity Tolerance making progress toward outcome     Goal: Effective Energy Conservation Techniques  Outcome: Ongoing (interventions implemented as appropriate)   01/19/18 0045   Activity Intolerance (Adult)   Effective Energy Conservation Techniques making progress toward outcome       Problem: Fall Risk (Adult)  Goal: Identify Related Risk Factors and Signs and Symptoms  Outcome: Ongoing (interventions implemented as appropriate)   01/19/18 0045   Fall Risk   Fall Risk: Related Risk Factors history of falls   Fall Risk: Signs and Symptoms presence of risk factors     Goal: Absence of Falls  Outcome: Ongoing (interventions implemented as appropriate)   01/19/18 0045   Fall Risk (Adult)   Absence of Falls making progress toward outcome

## 2018-01-19 NOTE — PROGRESS NOTES
Continued Stay Note  [facility]     Patient Name: Abbi Mendez  MRN: [id number]  Today's Date: 1/19/2018    Admit Date: 1/17/2018          Discharge Plan       01/19/18 1440    Case Management/Social Work Plan    Plan update    Patient/Family In Agreement With Plan yes    Additional Comments Per APRN spoke with patient at bedside regaridng preferences for Home Health.  Patient has used [facility] in the past and would like ot use them again.  Called [facility] and spoke with Kanwal [phone] who accepted the referral.  Faxed orders to [phone]..                Discharge Codes       01/19/18 1350    Discharge Codes    Discharge Codes 01  Discharge to home        Expected Discharge Date and Time     Expected Discharge Date Expected Discharge Time    Jan 19, 2018             Kim Desai RN

## 2018-01-19 NOTE — PROGRESS NOTES
Continued Stay Note  [facility]     Patient Name: Abbi Mendez  MRN: [id number]  Today's Date: 1/19/2018    Admit Date: 1/17/2018          Discharge Plan       01/19/18 1140    Case Management/Social Work Plan    Plan update    Patient/Family In Agreement With Plan yes    Additional Comments Spoke with patient and daughter at bedside regarding discharge plan.  Patient indicates that she intends to go home at discharge and she has plenty of family to assist her.  Daughter in agreement.  Called [facility] and spoke with the liasion who will come to the bedside and speak with the patient.  CM following for discharge needs.  Patient plan is to discharge home via car with family to transport.                Discharge Codes     None        Expected Discharge Date and Time     Expected Discharge Date Expected Discharge Time    Jan 23, 2018             Kim Desai RN

## 2018-01-20 NOTE — PLAN OF CARE
Problem: Patient Care Overview (Adult)  Goal: Plan of Care Review  Outcome: Outcome(s) achieved Date Met: 01/19/18 01/19/18 1506 01/19/18 1956   Coping/Psychosocial Response Interventions   Plan Of Care Reviewed With patient;daughter --    Patient Care Overview   Progress --  improving     Goal: Adult Individualization and Mutuality  Outcome: Outcome(s) achieved Date Met: 01/19/18    Goal: Discharge Needs Assessment  Outcome: Outcome(s) achieved Date Met: 01/19/18      Problem: Activity Intolerance (Adult)  Goal: Identify Related Risk Factors and Signs and Symptoms  Outcome: Outcome(s) achieved Date Met: 01/19/18    Goal: Activity Tolerance  Outcome: Outcome(s) achieved Date Met: 01/19/18    Goal: Effective Energy Conservation Techniques  Outcome: Outcome(s) achieved Date Met: 01/19/18      Problem: Fall Risk (Adult)  Goal: Identify Related Risk Factors and Signs and Symptoms  Outcome: Outcome(s) achieved Date Met: 01/19/18    Goal: Absence of Falls  Outcome: Outcome(s) achieved Date Met: 01/19/18      Problem: Pressure Ulcer Risk (James Scale) (Adult,Obstetrics,Pediatric)  Goal: Identify Related Risk Factors and Signs and Symptoms  Outcome: Outcome(s) achieved Date Met: 01/19/18    Goal: Skin Integrity  Outcome: Outcome(s) achieved Date Met: 01/19/18

## 2018-01-21 LAB — BACTERIA SPEC AEROBE CULT: NORMAL

## 2018-01-22 PROBLEM — I31.39 PERICARDIAL EFFUSION WITHOUT CARDIAC TAMPONADE: Status: ACTIVE | Noted: 2018-01-22

## 2018-01-22 NOTE — DISCHARGE SUMMARY
Discharge Summary  Abbi Mendez  [id number]  1935    Date of Admission: 1/17/2018  Date of Discharge:  1/22/2018    PCP: Eric Driver MD  ATTENDING: Soraya Darling MD      Consults:   Consults     Date and Time Order Name Status Description    1/18/2018 1950 Inpatient Consult to Neurosurgery Completed       1-18-18  and patient consult with neurology    Discharge Diagnosis:   Patient Active Problem List    Diagnosis Date Noted   • Third degree AV block 01/18/2018     Status post permanent pacemaker implantation    • Syncope and collapse 01/17/2018        • Severe sinus bradycardia 01/17/2018        • Atrial fibrillation 11/14/2016          Note Last Updated: 1/17/2018       · Questionable recurrence of atrial fibrillation, 03/31/2015 via EMS, data deficit: No confirmation via EMS strips or EKG.   · Event recorder 06/09/2015-07/09/2015: No atrial fibrillation or arrhythmias appreciated.   · Event recorder (06/09/2015 - 07/09/2015): No atrial fibrillation; occasional isolated PACs; no ventricular ectopy.  · Event recorder, 04/01/2016: No atrial fibrillation; 4 runs of repetitive PACs, the longest consisting of 6 beats at 156 BPM.     • Coronary artery disease involving native coronary artery of native heart without angina pectoris 11/14/2016               · Cardiac catheterization, (03/30/2007): 30% to 50% mid/distal LAD. LVEF 70%.   · Nuclear stress test, (02/04/2010): Negative for ischemia/scar.  LVEF 87%.  · Nuclear stress test (04/10/2015): Negative for ischemia and scar. Normal LVEF.     • Essential hypertension 11/14/2016        • Hyperlipidemia LDL goal <70 11/14/2016        • Left bundle branch block 11/22/2016        • Syncope 01/18/2018   • Compression deformity of vertebra 01/18/2018   • Radiculopathy 01/18/2018   • RADHA (obstructive sleep apnea) 11/14/2016     Note Last Updated: 11/22/2016     · Diagnosed by sleep study.  Uses CPAP     • Iron deficiency anemia 11/14/2016       Presenting  Problem/History of Present Illness  Syncope and collapse [R55]  Syncope, unspecified syncope type [R55]    Brief History:  This is an 82 year old hypertensive dyslipidemic female with known stable coronary artery disease and a history of paroxysmal atrial fibrillation.  Her last 3 event recorders by our service showed no significant arrhythmias.  The last was in 2016.  She was seen by our service in October 2017 for preoperative clearance prior to knee replacement surgery.  At that time she was doing well had no complaints of tachycardia arrhythmias no dizziness syncope or chest pain.  She was cleared for orthopedic surgery from cardiac standpoint.  Her knee replacement was uneventful.     She presented to [facility] a few weeks ago after an episode of kennedy syncope.  There she was recorded to have multiple sinus pauses of at least 4 seconds or more.  Her daughter who is retired nurse says there may have been some ventricular tachycardia.  She was also reported to have had severe bradycardia with a rate as low as 20.  She was treated with at least one dose of atropine.  We had no beds in the hospital that week and she was transferred to [facility] for higher level of care.  There she was taken off of carvedilol and had no further episodes of bradycardia or sinus pauses.  She was discharged to home this past Saturday.  She did fine until this morning when she had another episode of kennedy syncope.  She was brought to the [facility] emergency department for further evaluation.  Here her EKG shows sinus rhythm with a ventricular rate of 96 and a left bundle branch block which has widened in comparison with October 2017.  She is also found to have hypercalcemia of unclear significance.  She reports having had multiple falls associated with syncope without significant injury.  These were witnessed on at least one occasion by her daughter who reports no tonic-clonic activity.  No loss of bowel control but she  did have loss of bladder.  Her daughter reports that she awoke within 2-3 minutes and was fully oriented.  A copy of her EKG from [facility] was obtained which shows complete AV block      Hospital Course:   She was monitored overnight on the telemetry floor without reoccurrence of AV block.  The following morning were able to obtain a copy of her EKG from [facility] which showed complete AV block.  She was taken to the cardiac catheter lab where prominent pacemaker implantation was completed.  During the procedure she became hypotensive likely due to sedation.  This treated with IV bolus.  An echocardiogram was obtained which showed a small pericardial effusion without tamponade.  A repeat echocardiogram was obtained the following day showing a less than 1 cm persistent pericardial effusion without tamponade.  After her pacemaker she is evaluated by the neurology service for back pain after syncope and collapse.  This is further evaluated by the neurosurgery service who recommended conservative therapy.  The remainder of her hospital course is unremarkable and she is appropriate for discharge to home.      Procedures Performed  Procedure(s):  Pacemaker DC new             Pertinent Test Results:     Results from last 7 days  Lab Units 01/18/18  0037   SODIUM mmol/L 135   POTASSIUM mmol/L 3.7   CHLORIDE mmol/L 101   CO2 mmol/L 29.0   BUN mg/dL 16   CREATININE mg/dL 0.80   GLUCOSE mg/dL 114*   CALCIUM mg/dL 10.8*           Results from last 7 days  Lab Units 01/19/18  0540   WBC 10*3/mm3 10.28   HEMOGLOBIN g/dL 9.9*   HEMATOCRIT % 31.1*   PLATELETS 10*3/mm3 247           Results from last 7 days  Lab Units 01/17/18  1407   MAGNESIUM mg/dL 1.7               Results for orders placed during the hospital encounter of 01/17/18   Adult Transthoracic Echo Limited W/ Cont if Necessary Per Protocol    Narrative · Limited follow-up echocardiogram shows small, less than 1 cm   circumferential pericardial effusion.  ·  There is no evidence of pericardial tamponade.  · On comparison with the study of January 18, 2018, no significant change   in the size of effusion is noted.            Discharge Disposition  Home or Self Care    Discharge Medications   Abbi Mendez   Home Medication Instructions [id number]    Printed on:01/22/18 1200   Medication Information                      aspirin 81 MG EC tablet  Take 81 mg by mouth Daily.             atorvastatin (LIPITOR) 40 MG tablet  Take 40 mg by mouth Every Night.             busPIRone (BUSPAR) 10 MG tablet  Take 10 mg by mouth 2 (Two) Times a Day.             carvedilol (COREG) 3.125 MG tablet  Take 1 tablet by mouth 2 (Two) Times a Day With Meals.             citalopram (CeleXA) 20 MG tablet  TAKE 1 TABLET EVERY DAY             esomeprazole (nexIUM) 40 MG capsule  Take 40 mg by mouth Every Morning Before Breakfast.             ferrous sulfate 325 (65 FE) MG tablet  Take 325 mg by mouth 2 (Two) Times a Day.             vitamin D (ERGOCALCIFEROL) 87073 units capsule capsule  Take 50,000 Units by mouth Every 14 (Fourteen) Days. SUNDAYS                 Discharge Diet:     Activity at Discharge:     Follow-up Appointments  Future Appointments  Date Time Provider Department Center   1/30/2018 11:45 AM Soraya Darling MD [facility] None   2/20/2018 10:30 AM Soraya Darling MD [facility] None     Additional Instructions for the Follow-ups that You Need to Schedule     Ambulatory Referral to Home Health    As directed    Face to Face Visit Date:  1/19/2018    Follow-up Provider for Plan of Care?:  I treated the patient in an acute care facility and will not continue treatment after discharge.    Follow-up Provider:  ÁLVARO FELDMAN [[id number]]    Reason/Clinical Findings:  syncope and collapse    Describe mobility limitations that make leaving home difficult:  weakness, compression fractures, pain, impaired physical mobility, impaired functional mobility    Nursing/Therapeutic Services  Requested:  Skilled Nursing Physical Therapy Occupational Therapy    Skilled nursing orders:  Medication education Pain management Cardiopulmonary assessments    PT orders:  Therapeutic exercise Gait Training Transfer training Strengthening Home safety assessment    Weight Bearing Status:  As Tolerated    Occupational orders:  Activities of daily living Energy conservation Strengthening Home safety assessment           Discharge Follow-up with Specialty: Dr. Darling    As directed    Specialty:  Dr. Darling    Follow Up Details:  Follow-up at [place] office in 7-10 days for wound check and in one month for cardiology follow-up.                 She'll also follow-up with Dr. Abel Cain in 2 weeks after discharge         FRANCO Mujica  01/22/18  12:02 PM

## 2018-01-30 ENCOUNTER — OFFICE VISIT (OUTPATIENT)
Dept: CARDIOLOGY | Facility: CLINIC | Age: 83
End: 2018-01-30

## 2018-01-30 ENCOUNTER — TELEPHONE (OUTPATIENT)
Dept: CARDIOLOGY | Facility: CLINIC | Age: 83
End: 2018-01-30

## 2018-01-30 DIAGNOSIS — I48.0 PAROXYSMAL ATRIAL FIBRILLATION (HCC): Primary | ICD-10-CM

## 2018-01-30 PROCEDURE — 99024 POSTOP FOLLOW-UP VISIT: CPT | Performed by: INTERNAL MEDICINE

## 2018-01-30 NOTE — PROGRESS NOTES
WOUND CHECK    2018    Abbi Mendez, : 1935    Soraya Darling MD    B/P: 207 /70 (Sitting)  (Standing)     Pulse: 60    Patient has fever: [] Temperature if indicated:     Wound Location: left infraclavicular    Dressing Removed [x]        Old Dressing Appearance:  Clean, dry [x]                 Old, bloody drainage []                            Moist, serous drainage []                Moist, thick yellow/green drainage []       Wound Appearance: Redness []                  Drainage []                  Culture obtained []        Color: N/A     Consistency: none     Amount: none         Gloves used, wound cleansed with sterile 4x4 and peroxide [x]       MD notified [] MD orders:   Antibiotic started []  If checked, type   Other: restart Amlodipine 10mg daily per Dr. Darling, blood pressure medicines were held during hospitalization due to hypotension at that time but now her blood pressure is elevated.    Appointment for follow-up scheduled for 3 months post procedure []    Future Appointments  Date Time Provider Department Center   2018 11:45 AM MD AXEL Anderson [facility] None   2018 1:00 PM Abel Cain MD [facility] None   2018 10:30 AM MD AXEL Anderson [facility] None           Liliam Marshall MA, 18      MD Signature:Soraya Darling MD, [facility], [facility]  Completed By/Date:

## 2018-01-30 NOTE — PROGRESS NOTES
Encounter Date:01/30/2018      Patient ID: Abbi Mendez is a 82 y.o. female.    Chief Complaint: No chief complaint on file.      PROBLEM LIST:  1. Atrial fibrillation, resolved, 03/30/2007.  a. Atenolol therapy.  b. Questionable recurrence of atrial fibrillation, 03/31/2015 via EMS, data deficit:  No confirmation via EMS strips or EKG.  Event recorder 06/09/2015-07/09/2015:  No atrial fibrillation or arrhythmias appreciated.   c. Event recorder (06/09/2015 - 07/09/2015): No atrial fibrillation; occasional isolated PACs; no ventricular ectopy.  d. Event recorder, 04/01/2016:  No atrial fibrillation; 4 runs of repetitive PACs, the longest consisting of 6 beats at 156 BPM; dyspnea, tachypalpitations and lightheadedness were not associated with rhythm disturbance.  2. Coronary artery disease.  a. Cardiac catheterization, 03/30/2007:  With 30% to 50% mid/distal LAD in “worst views” within region of extreme tortuosity, left ventricular ejection fraction (70%) post PVC without segmental wall motion abnormality; trace mitral regurgitation associated with ectopy; no mitral valve prolapse.  b. Nuclear stress test, 02/04/2010:  Negative for ischemia/scar; left ventricular ejection fraction (87%).  c. Nuclear stress test negative for ischemia and scar; normal LVEF, 04/10/2015.  d. Echo 1/18/2018: Left ventricular wall thickness is consistent with mild septal asymmetric hypertrophy. Left ventricular systolic function is normal. Estimated EF = 55%. There is calcification of the aortic valve.  e. There is a small (<1cm) pericardial effusion. There is no evidence of pericardial tamponade.  f. Echo 1/19/2018: Limited follow-up echocardiogram shows small, less than 1 cm circumferential pericardial effusion. There is no evidence of pericardial tamponade. On comparison with the study of January 18, 2018, no significant change in the size of effusion is noted.  3. History of left bundle branch block.   4. Obstructive sleep  apnea:   a. Diagnosed by sleep study.   b. Currently on CPAP.    5. Hypertension.  6. Dyslipidemia.  7. History of left lower extremity DVT x2 treated with Coumadin; Coumadin discontinued in 2002.  8. History of gastric ulcer.  9. Iron deficiency anemia.  10. Status post tubal ligation.    History of Present Illness  Patient presents today for follow-up with a history of atrial fibrillation, CAD, left bundle branch block, and cardiac risk factors. ***. Denies chest pain, shortness of breath, leg swelling, palpitations, and syncope. Remains busy and active with ***.    Allergies   Allergen Reactions   • Darvon [Propoxyphene] Parasthesia   • Penicillins Rash         Current Outpatient Prescriptions:   •  aspirin 81 MG EC tablet, Take 81 mg by mouth Daily., Disp: , Rfl:   •  atorvastatin (LIPITOR) 40 MG tablet, Take 40 mg by mouth Every Night., Disp: , Rfl:   •  busPIRone (BUSPAR) 10 MG tablet, Take 10 mg by mouth 2 (Two) Times a Day., Disp: , Rfl:   •  carvedilol (COREG) 3.125 MG tablet, Take 1 tablet by mouth 2 (Two) Times a Day With Meals., Disp: 60 tablet, Rfl: 3  •  citalopram (CeleXA) 20 MG tablet, TAKE 1 TABLET EVERY DAY, Disp: 90 tablet, Rfl: 1  •  esomeprazole (nexIUM) 40 MG capsule, Take 40 mg by mouth Every Morning Before Breakfast., Disp: , Rfl:   •  ferrous sulfate 325 (65 FE) MG tablet, Take 325 mg by mouth 2 (Two) Times a Day., Disp: , Rfl:   •  vitamin D (ERGOCALCIFEROL) 34045 units capsule capsule, Take 50,000 Units by mouth Every 14 (Fourteen) Days. SUNDAYS, Disp: , Rfl:     The following portions of the patient's history were reviewed and updated as appropriate: allergies, current medications, past family history, past medical history, past social history, past surgical history and problem list.    ROS  Review of Systems   Constitution: Negative for chills, fever, weight gain and weight loss.   Cardiovascular: Negative for chest pain, claudication, dyspnea on exertion, leg swelling, orthopnea,  palpitations, paroxysmal nocturnal dyspnea and syncope.        No dizziness   Gastrointestinal: Negative for abdominal pain, constipation, diarrhea, nausea and vomiting.   Genitourinary:        No urinary symptoms   Neurological:        No symptoms of stroke.   All other systems reviewed and are negative.    Objective:     There were no vitals taken for this visit.  Repeat blood pressure measurement by, Soraya Darling MD, at ***      Physical Exam  Constitutional: She appears well-developed and well-nourished.   HENT:   HEENT exam unremarkable.   Neck: Neck supple. No JVD present.   No carotid bruits.   Cardiovascular: Normal rate, regular rhythm and normal heart sounds.    No murmur heard.  2 plus symmetric pulses.   Pulmonary/Chest: Breath sounds normal. Does not exhibit tenderness.   Abdominal:   Abdomen benign.   Musculoskeletal: Does not exhibit edema.   Neurological:   Neurological exam unremarkable.   Vitals reviewed.    Lab Review:   Procedures       Assessment:   No diagnosis found.  Plan:   ***  Stable cardiac status.  Continue current medications.   FU in 6 MO, sooner as needed.  Thank you for allowing us to participate in the care of your patient.     ***    Please note that portions of this note may have been completed with a voice recognition program. Efforts were made to edit the dictations, but occasionally words are mistranscribed.

## 2018-01-31 NOTE — TELEPHONE ENCOUNTER
Family states BP elevated at home.  Patient was seen at [facility] today for wound check and BP was 207/70.  The patient restarted amlodipine 10mg daily per Dr. Darling.      The patient took the medication up arriving home and states one hour later BP is 215/98.  It has come down a little to 208/91 now.    Informed the patient she could take her evening dose of carvedilol with the next hour.  If systolic BP remains over 200 she needs to be seen.  Patient verbalized understanding.

## 2018-02-02 ENCOUNTER — OFFICE VISIT (OUTPATIENT)
Dept: NEUROSURGERY | Facility: CLINIC | Age: 83
End: 2018-02-02

## 2018-02-02 VITALS — TEMPERATURE: 98.8 F | WEIGHT: 168 LBS | HEIGHT: 64 IN | BODY MASS INDEX: 28.68 KG/M2

## 2018-02-02 DIAGNOSIS — S22.080A T12 COMPRESSION FRACTURE (HCC): Primary | ICD-10-CM

## 2018-02-02 PROCEDURE — 99212 OFFICE O/P EST SF 10 MIN: CPT | Performed by: NEUROLOGICAL SURGERY

## 2018-02-02 RX ORDER — AMLODIPINE BESYLATE 10 MG/1
10 TABLET ORAL DAILY
COMMUNITY
End: 2018-02-20 | Stop reason: SINTOL

## 2018-02-02 NOTE — PROGRESS NOTES
Patient: Abbi Mendez  : 1935    Primary Care Provider: Eric Driver MD    Requesting Provider: As above      History    Chief Complaint: Low back pain.    History of Present Illness: Ms. Mendez is an 82-year-old woman who was seen as an inpatient consultation on 17.  She had suffered a syncopal episode and a fall and had some back pain.  Studies demonstrated some diminished bone density.  She does have a pacemaker in place and other studies were not obtained.  The plain films raised the specter of the T12 compression fracture, age indeterminate.  She has minimal back pain at this point.  She is ambulating around her home without difficulty.    Review of Systems   Constitutional: Negative for activity change, appetite change, chills, diaphoresis, fatigue, fever and unexpected weight change.   HENT: Negative for congestion, dental problem, drooling, ear discharge, ear pain, facial swelling, hearing loss, mouth sores, nosebleeds, postnasal drip, rhinorrhea, sinus pressure, sneezing, sore throat, tinnitus, trouble swallowing and voice change.    Eyes: Negative for photophobia, pain, discharge, redness, itching and visual disturbance.   Respiratory: Positive for apnea. Negative for cough, choking, chest tightness, shortness of breath, wheezing and stridor.    Cardiovascular: Positive for leg swelling. Negative for chest pain and palpitations.   Gastrointestinal: Negative for abdominal distention, abdominal pain, anal bleeding, blood in stool, constipation, diarrhea, nausea, rectal pain and vomiting.   Endocrine: Negative for cold intolerance, heat intolerance, polydipsia, polyphagia and polyuria.   Genitourinary: Negative for decreased urine volume, difficulty urinating, dysuria, enuresis, flank pain, frequency, genital sores, hematuria and urgency.   Musculoskeletal: Positive for arthralgias, back pain, gait problem and joint swelling. Negative for myalgias, neck pain and neck stiffness.   Skin:  "Negative for color change, pallor, rash and wound.   Allergic/Immunologic: Negative for environmental allergies, food allergies and immunocompromised state.   Neurological: Negative for dizziness, tremors, seizures, syncope, facial asymmetry, speech difficulty, weakness, light-headedness, numbness and headaches.   Hematological: Negative for adenopathy. Does not bruise/bleed easily.   Psychiatric/Behavioral: Positive for confusion. Negative for agitation, behavioral problems, decreased concentration, dysphoric mood, hallucinations, self-injury, sleep disturbance and suicidal ideas. The patient is not nervous/anxious and is not hyperactive.        The patient's past medical history, past surgical history, family history, and social history have been reviewed at length in the electronic medical record.    Physical Exam:   Ht 163 cm (64.17\")  MUSCULOSKELETAL:  Straight leg raising is negative.  Patricio's Sign is negative.  ROM in back is normal.  Tenderness in the back to palpation is not observed.  NEUROLOGICAL:  Strength is intact in the lower extremities to direct testing.  Muscle tone is normal throughout.  Station and gait are normal.  Sensation is intact to light touch testing throughout.      Medical Decision Making    Diagnosis:   It's possible that the patient has a new T12 fracture but at this point her symptoms are minimal.    Treatment Options:   If the patient develops progressive back difficulties then she will follow up with me and I will obtain some additional studies.      No diagnosis found.    Scribed for bAel Cain MD by Kathy Haney CMA on 02/02/2018 at 12:53 PM    I, Dr. Cain, personally performed the services described in the documentation, as scribed in my presence, and it is both accurate and complete.  "

## 2018-02-20 ENCOUNTER — OFFICE VISIT (OUTPATIENT)
Dept: CARDIOLOGY | Facility: CLINIC | Age: 83
End: 2018-02-20

## 2018-02-20 VITALS
SYSTOLIC BLOOD PRESSURE: 158 MMHG | HEART RATE: 76 BPM | BODY MASS INDEX: 26.68 KG/M2 | WEIGHT: 166 LBS | HEIGHT: 66 IN | DIASTOLIC BLOOD PRESSURE: 80 MMHG

## 2018-02-20 DIAGNOSIS — E78.5 HYPERLIPIDEMIA LDL GOAL <70: ICD-10-CM

## 2018-02-20 DIAGNOSIS — Z95.0 PRESENCE OF PERMANENT CARDIAC PACEMAKER: Primary | ICD-10-CM

## 2018-02-20 DIAGNOSIS — I10 ESSENTIAL HYPERTENSION: ICD-10-CM

## 2018-02-20 DIAGNOSIS — I25.10 CORONARY ARTERY DISEASE INVOLVING NATIVE CORONARY ARTERY OF NATIVE HEART WITHOUT ANGINA PECTORIS: ICD-10-CM

## 2018-02-20 DIAGNOSIS — I48.0 PAROXYSMAL ATRIAL FIBRILLATION (HCC): ICD-10-CM

## 2018-02-20 PROCEDURE — 99214 OFFICE O/P EST MOD 30 MIN: CPT | Performed by: INTERNAL MEDICINE

## 2018-02-20 RX ORDER — LOSARTAN POTASSIUM AND HYDROCHLOROTHIAZIDE 25; 100 MG/1; MG/1
1 TABLET ORAL DAILY
Qty: 30 TABLET | Refills: 6 | Status: SHIPPED | OUTPATIENT
Start: 2018-02-20 | End: 2018-04-20 | Stop reason: HOSPADM

## 2018-02-20 NOTE — PROGRESS NOTES
Encounter Date:02/20/2018      Patient ID: Abbi Mendez is a 82 y.o. female.    Chief Complaint: Atrial Fibrillation      PROBLEM LIST:  1. Syncope and collapse with evidence of advanced conduction system disease, high-grade AV block and multiple sinus pauses.  a. [employer] pacemaker implant January 18, 2018   2. Paroxysmal Atrial fibrillation, resolved, 03/30/2007.  a. Atenolol therapy.  b. Questionable recurrence of atrial fibrillation, 03/31/2015 via EMS, data deficit:  No confirmation via EMS strips or EKG.  Event recorder 06/09/2015-07/09/2015:  No atrial fibrillation or arrhythmias appreciated.   c. Event recorder (06/09/2015 - 07/09/2015): No atrial fibrillation; occasional isolated PACs; no ventricular ectopy.  d. Event recorder, 04/01/2016:  No atrial fibrillation; 4 runs of repetitive PACs, the longest consisting of 6 beats at 156 BPM; dyspnea, tachypalpitations and lightheadedness were not associated with rhythm disturbance.  3. Coronary artery disease.  a. Cardiac catheterization, 03/30/2007:  With 30% to 50% mid/distal LAD in “worst views” within region of extreme tortuosity, left ventricular ejection fraction (70%) post PVC without segmental wall motion abnormality; trace mitral regurgitation associated with ectopy; no mitral valve prolapse.  b. Nuclear stress test, 02/04/2010:  Negative for ischemia/scar; left ventricular ejection fraction (87%).  c. Nuclear stress test negative for ischemia and scar; normal LVEF, 04/10/2015.  4. History of left bundle branch block.   5. Obstructive sleep apnea:   a. Diagnosed by sleep study.   b. Currently on CPAP.    6. Hypertension.  7. Dyslipidemia.  8. History of left lower extremity DVT x2 treated with Coumadin; Coumadin discontinued in 2002.  9. History of gastric ulcer.  10. Iron deficiency anemia.  11. Status post tubal ligation.    History of Present Illness  Patient presents today for follow-up after recent hospitalization for syncope,  status post pacemaker implant.  On the wound check appointment she was noted to be hypertensive and was restarted on amlodipine .since then she has started to noted significant swelling of ankles.  From cardiac standpoint he feels well.  She has been working with physical therapy and has gained significant strength.  Walking around without problems.  Has no current chest pain shortness of breath dizziness lightheadedness or any recurrence of syncope.  Feels much better overall.     Allergies   Allergen Reactions   • Darvon [Propoxyphene] Parasthesia   • Penicillins Rash         Current Outpatient Prescriptions:   •  aspirin 81 MG EC tablet, Take 81 mg by mouth Daily., Disp: , Rfl:   •  atorvastatin (LIPITOR) 40 MG tablet, Take 40 mg by mouth Every Night., Disp: , Rfl:   •  busPIRone (BUSPAR) 10 MG tablet, Take 10 mg by mouth 2 (Two) Times a Day., Disp: , Rfl:   •  carvedilol (COREG) 3.125 MG tablet, Take 1 tablet by mouth 2 (Two) Times a Day With Meals., Disp: 60 tablet, Rfl: 3  •  citalopram (CeleXA) 20 MG tablet, TAKE 1 TABLET EVERY DAY, Disp: 90 tablet, Rfl: 1  •  esomeprazole (nexIUM) 40 MG capsule, Take 40 mg by mouth Every Morning Before Breakfast., Disp: , Rfl:   •  ferrous sulfate 325 (65 FE) MG tablet, Take 325 mg by mouth 2 (Two) Times a Day., Disp: , Rfl:   •  vitamin D (ERGOCALCIFEROL) 15972 units capsule capsule, Take 50,000 Units by mouth Every 14 (Fourteen) Days. SUNDAYS, Disp: , Rfl:   •  losartan-hydrochlorothiazide (HYZAAR) 100-25 MG per tablet, Take 1 tablet by mouth Daily., Disp: 30 tablet, Rfl: 6    The following portions of the patient's history were reviewed and updated as appropriate: allergies, current medications, past family history, past medical history, past social history, past surgical history and problem list.    ROS  Review of Systems   Constitution: Negative for chills, fever, weight gain and weight loss.   Cardiovascular: Negative for chest pain, claudication, dyspnea on exertion,  "orthopnea, palpitations, paroxysmal nocturnal dyspnea and syncope. 1+ edema in lower extremities         No dizziness   Gastrointestinal: Negative for abdominal pain, constipation, diarrhea, nausea and vomiting.   Genitourinary:        No urinary symptoms   Neurological:        No symptoms of stroke.   All other systems reviewed and are negative.    Objective:     Blood pressure 158/80, pulse 76, height 167.6 cm (66\"), weight 75.3 kg (166 lb).      Physical Exam  Constitutional: She appears well-developed and well-nourished.   HENT:   HEENT exam unremarkable.   Neck: Neck supple. No JVD present.   No carotid bruits.   Cardiovascular: Normal rate, regular rhythm and normal heart sounds.    No murmur heard.  2 plus symmetric pulses.   Pulmonary/Chest: Breath sounds normal. Does not exhibit tenderness.  pacemaker wound well healed  Abdominal:   Abdomen benign.   Musculoskeletal: One plus edema.   Neurological:   Neurological exam unremarkable.   Vitals reviewed.    Lab Review:   Procedures   Pacemaker interrogation showed normal dual-chamber pacemaker function, no significant atrial fibrillation burden.  Assessment:      Diagnosis Plan   1. Presence of permanent cardiac pacemaker/paced rhythm.     2. Coronary artery disease involving native coronary artery of native heart without angina pectoris     3. Paroxysmal atrial fibrillation Maintaining sinus rhythm     4. Essential hypertension     5. Hyperlipidemia LDL goal <70 , Continue statin therapy.        Plan:   Due to edema we will discontinue amlodipine and start her on losartan HCTZ 100/25 mg daily.  Continue rest of the current medications.   FU in 6 MO with device check, sooner as needed.  Thank you for allowing us to participate in the care of your patient.     Scribed for Soraya Darling MD by Kareem Mccann. 2/20/2018  11:09 AM    ISoraya MD, personally performed the services described in this documentation as scribed by the above named individual in my " presence, and it is both accurate and complete.  2/20/2018  11:10 AM        Please note that portions of this note may have been completed with a voice recognition program. Efforts were made to edit the dictations, but occasionally words are mistranscribed.

## 2018-04-18 ENCOUNTER — HOSPITAL ENCOUNTER (OUTPATIENT)
Facility: HOSPITAL | Age: 83
Setting detail: OBSERVATION
Discharge: HOME | End: 2018-04-20
Attending: INTERNAL MEDICINE | Admitting: INTERNAL MEDICINE

## 2018-04-18 DIAGNOSIS — E83.52 HYPERCALCEMIA: Primary | ICD-10-CM

## 2018-04-18 PROCEDURE — 83735 ASSAY OF MAGNESIUM: CPT | Performed by: FAMILY MEDICINE

## 2018-04-18 PROCEDURE — 80053 COMPREHEN METABOLIC PANEL: CPT | Performed by: FAMILY MEDICINE

## 2018-04-18 PROCEDURE — 82330 ASSAY OF CALCIUM: CPT | Performed by: FAMILY MEDICINE

## 2018-04-18 PROCEDURE — 93005 ELECTROCARDIOGRAM TRACING: CPT | Performed by: FAMILY MEDICINE

## 2018-04-18 PROCEDURE — 84165 PROTEIN E-PHORESIS SERUM: CPT | Performed by: FAMILY MEDICINE

## 2018-04-18 PROCEDURE — 85025 COMPLETE CBC W/AUTO DIFF WBC: CPT | Performed by: FAMILY MEDICINE

## 2018-04-18 PROCEDURE — 82728 ASSAY OF FERRITIN: CPT | Performed by: FAMILY MEDICINE

## 2018-04-18 PROCEDURE — 85730 THROMBOPLASTIN TIME PARTIAL: CPT | Performed by: FAMILY MEDICINE

## 2018-04-18 PROCEDURE — 83540 ASSAY OF IRON: CPT | Performed by: FAMILY MEDICINE

## 2018-04-18 PROCEDURE — 83970 ASSAY OF PARATHORMONE: CPT | Performed by: FAMILY MEDICINE

## 2018-04-18 PROCEDURE — 99219 PR INITIAL OBSERVATION CARE/DAY 50 MINUTES: CPT | Performed by: FAMILY MEDICINE

## 2018-04-18 PROCEDURE — 83550 IRON BINDING TEST: CPT | Performed by: FAMILY MEDICINE

## 2018-04-18 PROCEDURE — 84100 ASSAY OF PHOSPHORUS: CPT | Performed by: FAMILY MEDICINE

## 2018-04-18 PROCEDURE — 82306 VITAMIN D 25 HYDROXY: CPT | Performed by: FAMILY MEDICINE

## 2018-04-18 PROCEDURE — 85610 PROTHROMBIN TIME: CPT | Performed by: FAMILY MEDICINE

## 2018-04-18 RX ORDER — LOSARTAN POTASSIUM 50 MG/1
100 TABLET ORAL DAILY
COMMUNITY
End: 2018-04-20

## 2018-04-18 NOTE — NURSING NOTE
ACC REVIEW REPORT: [facility]        PATIENT NAME: Abbi Mendez    PATIENT ID: [id number]    BED: S 449    BED TYPE: Telemetry    BED GIVEN TO: Tracy Moreira    NANCY BED GIVEN: 1530    YOB: 1935    AGE: 82    GENDER: F    PREVIOUS ADMIT TO [facility]: yes    PREVIOUS ADMISSION DATE: Jan, 2017    PATIENT CLASS:     TODAY'S DATE: 4/18/2018    TRANSFER DATE: 4-18-18    ETA: after 1800    TRANSFERRING FACILITY: [facility]    TRANSFERRING FACILITY PHONE # : [phone]    TRANSFERRING MD: Neisha    DATE/TIME REQUEST RECEIVED: 4-18-18@2241    [facility] RN: Oralia Barraza    REPORT FROM: Tracy CRUZ REPORT TAKEN: 1530    DIAGNOSIS: hypercalcemia, M spike    REASON FOR TRANSFER TO [facility]: higher level of care    TRANSPORTATION: EMS    CLINICAL REASON FOR TRANSFER TO [facility]: pt had some labs done by per PC & was told to seek ER evaluation due to having an elevated calcium - 24 hour urine results for electrophoresis is pending      CLINICAL INFORMATION    HEIGHT:     WEIGHT:     ALLERGIES: [place], CORY    [place]: no    INFECTIOUS DISEASE: no    ISOLATION: no    LAST VITAL SIGNS:  TIME: 12:30  TEMP: 98.2  PULSE: 60  B/P: 139/56  RESP: 20    BP elevates when pt gets anxious/nervous (she takes buspar & celexa) - BP was 195/96 this morning - she was given hydralazine 25mg - order changed to bid  12 noon - 139/56      LAB INFORMATION: Ca++ on admission was 11.8---->today it is 11.2                                        24 hour urine for electrophoresis is pending                                        Glucose 145    CULTURE INFORMATION:     MEDS/IV FLUIDS: #22 left forearm - Ns@125                                      CARDIAC SYSTEM:    CHEST PAIN: no    RHYTHM: paced    Is patient taking or has patient been given any drugs that could increase bleeding? yes  (Plavix, Brilinta, Effient, Eliquis, Xarelto, Warfarin, Integrilin, Angiomax)    DRUG: lovenox     DOSE/FREQUENCY: 40mg daily - given at 2240 on  4/17/18    CARDIAC NOTES: pt had some dizziness this a.m. When her BP was elevated; no reported palpitations      RESPIRATORY SYSTEM:    OXYGEN: no    O2 SAT: 96% on RA    RESPIRATORY STATUS: hx RADHA; CXR results pending      CNS/MUSCULOSKELETAL    ALERT AND ORIENTED: yes but gets slightly forgetful at times    CNS/MUSCULOSKELETAL NOTES: ambulatory; pt has a hx of L-1 compression fx ; recent fall in the past month; Rt. TKR in the past six months      GI//GY    ABDOMINAL PAIN: no    VOMITING: no    NAUSEA: no    Pt had BM today    PAST MEDICAL HISTORY: Rt TKR, parox afib, pacemaker, T12 compression fx, CAD, RADHA, HTN, DVT, gastric ulcer, occ GB issues    ADDITIONAL NOTES: skeletal survey - L-1 compression fx, large hiatal hernia, multifocal degenerative changes          Carmela Barraza RN  4/18/2018  3:48 PM

## 2018-04-19 PROBLEM — R77.8 ABNORMAL SERUM PROTEIN ELECTROPHORESIS: Status: ACTIVE | Noted: 2018-04-19

## 2018-04-19 PROBLEM — R63.4 WEIGHT LOSS, UNINTENTIONAL: Status: ACTIVE | Noted: 2018-04-19

## 2018-04-19 LAB
25(OH)D3 SERPL-MCNC: 60 NG/ML
ALBUMIN SERPL-MCNC: 3.3 G/DL (ref 3.2–4.8)
ALBUMIN SERPL-MCNC: 3.4 G/DL (ref 3.2–4.8)
ALBUMIN/GLOB SERPL: 1.3 G/DL (ref 1.5–2.5)
ALBUMIN/GLOB SERPL: 1.4 G/DL (ref 1.5–2.5)
ALP SERPL-CCNC: 73 U/L (ref 25–100)
ALP SERPL-CCNC: 74 U/L (ref 25–100)
ALT SERPL W P-5'-P-CCNC: 11 U/L (ref 7–40)
ALT SERPL W P-5'-P-CCNC: 11 U/L (ref 7–40)
ANION GAP SERPL CALCULATED.3IONS-SCNC: 3 MMOL/L (ref 3–11)
ANION GAP SERPL CALCULATED.3IONS-SCNC: 4 MMOL/L (ref 3–11)
APTT PPP: 25.7 SECONDS (ref 24–31)
AST SERPL-CCNC: 16 U/L (ref 0–33)
AST SERPL-CCNC: 18 U/L (ref 0–33)
BACTERIA UR QL AUTO: NORMAL /HPF
BASOPHILS # BLD AUTO: 0.02 10*3/MM3 (ref 0–0.2)
BASOPHILS # BLD AUTO: 0.06 10*3/MM3 (ref 0–0.2)
BASOPHILS NFR BLD AUTO: 0.3 % (ref 0–1)
BASOPHILS NFR BLD AUTO: 0.9 % (ref 0–1)
BILIRUB SERPL-MCNC: 0.2 MG/DL (ref 0.3–1.2)
BILIRUB SERPL-MCNC: 0.2 MG/DL (ref 0.3–1.2)
BILIRUB UR QL STRIP: NEGATIVE
BUN BLD-MCNC: 14 MG/DL (ref 9–23)
BUN BLD-MCNC: 16 MG/DL (ref 9–23)
BUN/CREAT SERPL: 17.5 (ref 7–25)
BUN/CREAT SERPL: 17.8 (ref 7–25)
CA-I SERPL ISE-MCNC: 1.57 MMOL/L (ref 1.12–1.32)
CA-I SERPL ISE-MCNC: 1.61 MMOL/L (ref 1.12–1.32)
CALCIUM SPEC-SCNC: 10.4 MG/DL (ref 8.7–10.4)
CALCIUM SPEC-SCNC: 10.6 MG/DL (ref 8.7–10.4)
CHLORIDE SERPL-SCNC: 107 MMOL/L (ref 99–109)
CHLORIDE SERPL-SCNC: 107 MMOL/L (ref 99–109)
CLARITY UR: CLEAR
CO2 SERPL-SCNC: 26 MMOL/L (ref 20–31)
CO2 SERPL-SCNC: 27 MMOL/L (ref 20–31)
COLOR UR: YELLOW
CREAT BLD-MCNC: 0.8 MG/DL (ref 0.6–1.3)
CREAT BLD-MCNC: 0.9 MG/DL (ref 0.6–1.3)
DEPRECATED RDW RBC AUTO: 41 FL (ref 37–54)
DEPRECATED RDW RBC AUTO: 42 FL (ref 37–54)
EOSINOPHIL # BLD AUTO: 0.19 10*3/MM3 (ref 0–0.3)
EOSINOPHIL # BLD AUTO: 0.26 10*3/MM3 (ref 0–0.3)
EOSINOPHIL NFR BLD AUTO: 2.6 % (ref 0–3)
EOSINOPHIL NFR BLD AUTO: 3.8 % (ref 0–3)
ERYTHROCYTE [DISTWIDTH] IN BLOOD BY AUTOMATED COUNT: 12.4 % (ref 11.3–14.5)
ERYTHROCYTE [DISTWIDTH] IN BLOOD BY AUTOMATED COUNT: 12.7 % (ref 11.3–14.5)
FERRITIN SERPL-MCNC: 143 NG/ML (ref 10–291)
GFR SERPL CREATININE-BSD FRML MDRD: 60 ML/MIN/1.73
GFR SERPL CREATININE-BSD FRML MDRD: 69 ML/MIN/1.73
GLOBULIN UR ELPH-MCNC: 2.4 GM/DL
GLOBULIN UR ELPH-MCNC: 2.7 GM/DL
GLUCOSE BLD-MCNC: 105 MG/DL (ref 70–100)
GLUCOSE BLD-MCNC: 97 MG/DL (ref 70–100)
GLUCOSE UR STRIP-MCNC: NEGATIVE MG/DL
HCT VFR BLD AUTO: 35.5 % (ref 34.5–44)
HCT VFR BLD AUTO: 35.6 % (ref 34.5–44)
HGB BLD-MCNC: 11.6 G/DL (ref 11.5–15.5)
HGB BLD-MCNC: 11.7 G/DL (ref 11.5–15.5)
HGB UR QL STRIP.AUTO: NEGATIVE
HYALINE CASTS UR QL AUTO: NORMAL /LPF
IMM GRANULOCYTES # BLD: 0.01 10*3/MM3 (ref 0–0.03)
IMM GRANULOCYTES # BLD: 0.02 10*3/MM3 (ref 0–0.03)
IMM GRANULOCYTES NFR BLD: 0.1 % (ref 0–0.6)
IMM GRANULOCYTES NFR BLD: 0.3 % (ref 0–0.6)
INR PPP: 1.06 (ref 0.91–1.09)
IRON 24H UR-MRATE: 68 MCG/DL (ref 50–175)
IRON SATN MFR SERPL: 27 % (ref 15–50)
KETONES UR QL STRIP: NEGATIVE
LEUKOCYTE ESTERASE UR QL STRIP.AUTO: ABNORMAL
LYMPHOCYTES # BLD AUTO: 1.96 10*3/MM3 (ref 0.6–4.8)
LYMPHOCYTES # BLD AUTO: 2 10*3/MM3 (ref 0.6–4.8)
LYMPHOCYTES NFR BLD AUTO: 26.6 % (ref 24–44)
LYMPHOCYTES NFR BLD AUTO: 28.9 % (ref 24–44)
MAGNESIUM SERPL-MCNC: 1.5 MG/DL (ref 1.3–2.7)
MCH RBC QN AUTO: 29.6 PG (ref 27–31)
MCH RBC QN AUTO: 29.7 PG (ref 27–31)
MCHC RBC AUTO-ENTMCNC: 32.7 G/DL (ref 32–36)
MCHC RBC AUTO-ENTMCNC: 32.9 G/DL (ref 32–36)
MCV RBC AUTO: 90.1 FL (ref 80–99)
MCV RBC AUTO: 91 FL (ref 80–99)
MONOCYTES # BLD AUTO: 0.47 10*3/MM3 (ref 0–1)
MONOCYTES # BLD AUTO: 0.48 10*3/MM3 (ref 0–1)
MONOCYTES NFR BLD AUTO: 6.5 % (ref 0–12)
MONOCYTES NFR BLD AUTO: 6.8 % (ref 0–12)
NEUTROPHILS # BLD AUTO: 4.12 10*3/MM3 (ref 1.5–8.3)
NEUTROPHILS # BLD AUTO: 4.71 10*3/MM3 (ref 1.5–8.3)
NEUTROPHILS NFR BLD AUTO: 59.6 % (ref 41–71)
NEUTROPHILS NFR BLD AUTO: 64 % (ref 41–71)
NITRITE UR QL STRIP: NEGATIVE
PH UR STRIP.AUTO: <=5 [PH] (ref 5–8)
PHOSPHATE SERPL-MCNC: 2.5 MG/DL (ref 2.4–5.1)
PLATELET # BLD AUTO: 264 10*3/MM3 (ref 150–450)
PLATELET # BLD AUTO: 275 10*3/MM3 (ref 150–450)
PMV BLD AUTO: 10.8 FL (ref 6–12)
PMV BLD AUTO: 11.1 FL (ref 6–12)
POTASSIUM BLD-SCNC: 3.8 MMOL/L (ref 3.5–5.5)
POTASSIUM BLD-SCNC: 3.9 MMOL/L (ref 3.5–5.5)
PROT SERPL-MCNC: 5.7 G/DL (ref 5.7–8.2)
PROT SERPL-MCNC: 6.1 G/DL (ref 5.7–8.2)
PROT UR QL STRIP: NEGATIVE
PROTHROMBIN TIME: 11.1 SECONDS (ref 9.6–11.5)
PTH-INTACT SERPL-MCNC: 107.3 PG/ML (ref 11–80)
RBC # BLD AUTO: 3.9 10*6/MM3 (ref 3.89–5.14)
RBC # BLD AUTO: 3.95 10*6/MM3 (ref 3.89–5.14)
RBC # UR: NORMAL /HPF
REF LAB TEST METHOD: NORMAL
SODIUM BLD-SCNC: 137 MMOL/L (ref 132–146)
SODIUM BLD-SCNC: 137 MMOL/L (ref 132–146)
SP GR UR STRIP: 1.01 (ref 1–1.03)
SQUAMOUS #/AREA URNS HPF: NORMAL /HPF
TIBC SERPL-MCNC: 251 MCG/DL (ref 250–450)
UROBILINOGEN UR QL STRIP: ABNORMAL
WBC NRBC COR # BLD: 6.91 10*3/MM3 (ref 3.5–10.8)
WBC NRBC COR # BLD: 7.36 10*3/MM3 (ref 3.5–10.8)
WBC UR QL AUTO: NORMAL /HPF

## 2018-04-19 PROCEDURE — A9270 NON-COVERED ITEM OR SERVICE: HCPCS | Performed by: FAMILY MEDICINE

## 2018-04-19 PROCEDURE — 88305 TISSUE EXAM BY PATHOLOGIST: CPT | Performed by: INTERNAL MEDICINE

## 2018-04-19 PROCEDURE — 88275 CYTOGENETICS 100-300: CPT

## 2018-04-19 PROCEDURE — 93010 ELECTROCARDIOGRAM REPORT: CPT | Performed by: INTERNAL MEDICINE

## 2018-04-19 PROCEDURE — 85097 BONE MARROW INTERPRETATION: CPT | Performed by: INTERNAL MEDICINE

## 2018-04-19 PROCEDURE — 96361 HYDRATE IV INFUSION ADD-ON: CPT

## 2018-04-19 PROCEDURE — 88313 SPECIAL STAINS GROUP 2: CPT | Performed by: INTERNAL MEDICINE

## 2018-04-19 PROCEDURE — 80053 COMPREHEN METABOLIC PANEL: CPT | Performed by: FAMILY MEDICINE

## 2018-04-19 PROCEDURE — 88237 TISSUE CULTURE BONE MARROW: CPT

## 2018-04-19 PROCEDURE — 96360 HYDRATION IV INFUSION INIT: CPT

## 2018-04-19 PROCEDURE — 99226 PR SBSQ OBSERVATION CARE/DAY 35 MINUTES: CPT | Performed by: INTERNAL MEDICINE

## 2018-04-19 PROCEDURE — 88311 DECALCIFY TISSUE: CPT | Performed by: INTERNAL MEDICINE

## 2018-04-19 PROCEDURE — 25010000002 HEPARIN (PORCINE) PER 1000 UNITS: Performed by: FAMILY MEDICINE

## 2018-04-19 PROCEDURE — 88271 CYTOGENETICS DNA PROBE: CPT

## 2018-04-19 PROCEDURE — 63710000001 BUSPIRONE 10 MG TABLET: Performed by: FAMILY MEDICINE

## 2018-04-19 PROCEDURE — 82330 ASSAY OF CALCIUM: CPT | Performed by: FAMILY MEDICINE

## 2018-04-19 PROCEDURE — 63710000001 ATORVASTATIN 40 MG TABLET: Performed by: FAMILY MEDICINE

## 2018-04-19 PROCEDURE — G0378 HOSPITAL OBSERVATION PER HR: HCPCS

## 2018-04-19 PROCEDURE — 88185 FLOWCYTOMETRY/TC ADD-ON: CPT

## 2018-04-19 PROCEDURE — 63710000001 CARVEDILOL 3.125 MG TABLET: Performed by: FAMILY MEDICINE

## 2018-04-19 PROCEDURE — 99205 OFFICE O/P NEW HI 60 MIN: CPT | Performed by: INTERNAL MEDICINE

## 2018-04-19 PROCEDURE — 81001 URINALYSIS AUTO W/SCOPE: CPT | Performed by: FAMILY MEDICINE

## 2018-04-19 PROCEDURE — 88184 FLOWCYTOMETRY/ TC 1 MARKER: CPT

## 2018-04-19 PROCEDURE — 85025 COMPLETE CBC W/AUTO DIFF WBC: CPT | Performed by: FAMILY MEDICINE

## 2018-04-19 PROCEDURE — 96372 THER/PROPH/DIAG INJ SC/IM: CPT

## 2018-04-19 PROCEDURE — 63710000001 FERROUS SULFATE 325 (65 FE) MG TABLET: Performed by: FAMILY MEDICINE

## 2018-04-19 PROCEDURE — 88264 CHROMOSOME ANALYSIS 20-25: CPT

## 2018-04-19 RX ORDER — ATORVASTATIN CALCIUM 40 MG/1
40 TABLET, FILM COATED ORAL NIGHTLY
Status: DISCONTINUED | OUTPATIENT
Start: 2018-04-19 | End: 2018-04-19

## 2018-04-19 RX ORDER — LOSARTAN POTASSIUM 50 MG/1
100 TABLET ORAL DAILY
Status: DISCONTINUED | OUTPATIENT
Start: 2018-04-19 | End: 2018-04-19

## 2018-04-19 RX ORDER — HEPARIN SODIUM 5000 [USP'U]/ML
5000 INJECTION, SOLUTION INTRAVENOUS; SUBCUTANEOUS EVERY 12 HOURS SCHEDULED
Status: DISCONTINUED | OUTPATIENT
Start: 2018-04-19 | End: 2018-04-20 | Stop reason: HOSPADM

## 2018-04-19 RX ORDER — BUSPIRONE HYDROCHLORIDE 10 MG/1
10 TABLET ORAL 2 TIMES DAILY WITH MEALS
Status: DISCONTINUED | OUTPATIENT
Start: 2018-04-19 | End: 2018-04-20 | Stop reason: HOSPADM

## 2018-04-19 RX ORDER — CARVEDILOL 3.12 MG/1
3.12 TABLET ORAL 2 TIMES DAILY WITH MEALS
Status: DISCONTINUED | OUTPATIENT
Start: 2018-04-19 | End: 2018-04-19

## 2018-04-19 RX ORDER — PANTOPRAZOLE SODIUM 40 MG/1
40 TABLET, DELAYED RELEASE ORAL
Status: DISCONTINUED | OUTPATIENT
Start: 2018-04-19 | End: 2018-04-20 | Stop reason: HOSPADM

## 2018-04-19 RX ORDER — LOSARTAN POTASSIUM 50 MG/1
50 TABLET ORAL DAILY
Status: DISCONTINUED | OUTPATIENT
Start: 2018-04-19 | End: 2018-04-20

## 2018-04-19 RX ORDER — SODIUM CHLORIDE 9 MG/ML
75 INJECTION, SOLUTION INTRAVENOUS CONTINUOUS
Status: DISCONTINUED | OUTPATIENT
Start: 2018-04-19 | End: 2018-04-20 | Stop reason: HOSPADM

## 2018-04-19 RX ORDER — SODIUM CHLORIDE 0.9 % (FLUSH) 0.9 %
1-10 SYRINGE (ML) INJECTION AS NEEDED
Status: DISCONTINUED | OUTPATIENT
Start: 2018-04-19 | End: 2018-04-20 | Stop reason: HOSPADM

## 2018-04-19 RX ORDER — BUSPIRONE HYDROCHLORIDE 10 MG/1
10 TABLET ORAL 2 TIMES DAILY WITH MEALS
Status: DISCONTINUED | OUTPATIENT
Start: 2018-04-19 | End: 2018-04-19

## 2018-04-19 RX ORDER — ATORVASTATIN CALCIUM 40 MG/1
40 TABLET, FILM COATED ORAL NIGHTLY
Status: DISCONTINUED | OUTPATIENT
Start: 2018-04-19 | End: 2018-04-20 | Stop reason: HOSPADM

## 2018-04-19 RX ORDER — FERROUS SULFATE 325(65) MG
325 TABLET ORAL 2 TIMES DAILY WITH MEALS
Status: DISCONTINUED | OUTPATIENT
Start: 2018-04-19 | End: 2018-04-19

## 2018-04-19 RX ORDER — FERROUS SULFATE 325(65) MG
325 TABLET ORAL 2 TIMES DAILY WITH MEALS
Status: DISCONTINUED | OUTPATIENT
Start: 2018-04-19 | End: 2018-04-20 | Stop reason: HOSPADM

## 2018-04-19 RX ORDER — CARVEDILOL 3.12 MG/1
3.12 TABLET ORAL 2 TIMES DAILY WITH MEALS
Status: DISCONTINUED | OUTPATIENT
Start: 2018-04-19 | End: 2018-04-20 | Stop reason: HOSPADM

## 2018-04-19 RX ORDER — CITALOPRAM 20 MG/1
20 TABLET ORAL DAILY
Status: DISCONTINUED | OUTPATIENT
Start: 2018-04-19 | End: 2018-04-20 | Stop reason: HOSPADM

## 2018-04-19 RX ORDER — ASPIRIN 81 MG/1
81 TABLET ORAL DAILY
Status: DISCONTINUED | OUTPATIENT
Start: 2018-04-19 | End: 2018-04-20 | Stop reason: HOSPADM

## 2018-04-19 RX ADMIN — HEPARIN SODIUM 5000 UNITS: 5000 INJECTION, SOLUTION INTRAVENOUS; SUBCUTANEOUS at 20:35

## 2018-04-19 RX ADMIN — HEPARIN SODIUM 5000 UNITS: 5000 INJECTION, SOLUTION INTRAVENOUS; SUBCUTANEOUS at 05:49

## 2018-04-19 RX ADMIN — CARVEDILOL 3.12 MG: 3.12 TABLET, FILM COATED ORAL at 17:08

## 2018-04-19 RX ADMIN — ASPIRIN 81 MG: 81 TABLET, COATED ORAL at 08:42

## 2018-04-19 RX ADMIN — ATORVASTATIN CALCIUM 40 MG: 40 TABLET, FILM COATED ORAL at 20:36

## 2018-04-19 RX ADMIN — FERROUS SULFATE TAB 325 MG (65 MG ELEMENTAL FE) 325 MG: 325 (65 FE) TAB at 01:05

## 2018-04-19 RX ADMIN — LOSARTAN POTASSIUM 50 MG: 50 TABLET ORAL at 08:42

## 2018-04-19 RX ADMIN — BUSPIRONE HYDROCHLORIDE 10 MG: 10 TABLET ORAL at 17:08

## 2018-04-19 RX ADMIN — BUSPIRONE HYDROCHLORIDE 10 MG: 10 TABLET ORAL at 01:05

## 2018-04-19 RX ADMIN — PANTOPRAZOLE SODIUM 40 MG: 40 TABLET, DELAYED RELEASE ORAL at 05:49

## 2018-04-19 RX ADMIN — ATORVASTATIN CALCIUM 40 MG: 40 TABLET, FILM COATED ORAL at 01:05

## 2018-04-19 RX ADMIN — CARVEDILOL 3.12 MG: 3.12 TABLET, FILM COATED ORAL at 01:05

## 2018-04-19 RX ADMIN — FERROUS SULFATE TAB 325 MG (65 MG ELEMENTAL FE) 325 MG: 325 (65 FE) TAB at 17:08

## 2018-04-19 RX ADMIN — CITALOPRAM HYDROBROMIDE 20 MG: 20 TABLET ORAL at 08:42

## 2018-04-19 RX ADMIN — SODIUM CHLORIDE 75 ML/HR: 9 INJECTION, SOLUTION INTRAVENOUS at 02:23

## 2018-04-19 NOTE — CONSULTS
HEMATOLOGY/ONCOLOGY INPATIENT CONSULT    REASON FOR CONSULT: Hypercalcemia with monoclonal gammopathy    Subjective   HISTORY OF PRESENT ILLNESS; I am asked to see this 82 y.o.  female, referred for monoclonal gammopathy with hypercalcemia found at the time of workup for cholecystitis with poorly functioning gallbladder.  Apparently has monoclonal gammopathy but the results and the data from outside M spike are not available to me at time of dictation.  She does have hypercalcemia with elevated ionized calcium but not a significant elevation of her total calcium.  No renal dysfunction of significance.  Has a history of iron deficiency but presently counts are acceptable.  Her total protein and ratio of total protein to albumin are normal.      Past Medical History:   Diagnosis Date   • Anxiety    • Arthritis    • Atrial fibrillation 11/14/2016   • CAD (coronary artery disease) 11/14/2016   • Deep vein thrombosis (DVT) of left lower extremity    • Dyslipidemia 11/14/2016   • Gastric ulcer    • Heart disease    • Hiatal hernia    • Hyperlipidemia    • Hypertension 11/14/2016   • Iron deficiency anemia    • LBBB (left bundle branch block)    • RADHA (obstructive sleep apnea) 11/14/2016     Past Surgical History:   Procedure Laterality Date   • CARDIAC ELECTROPHYSIOLOGY PROCEDURE N/A 1/18/2018    Procedure: Pacemaker DC new;  Surgeon: Soraya Darling MD;  Location: [facility] INVASIVE LOCATION;  Service:    • TOTAL KNEE ARTHROPLASTY Right    • TUBAL ABDOMINAL LIGATION         No current facility-administered medications on file prior to encounter.      Current Outpatient Prescriptions on File Prior to Encounter   Medication Sig Dispense Refill   • aspirin 81 MG EC tablet Take 81 mg by mouth Daily.     • atorvastatin (LIPITOR) 40 MG tablet Take 40 mg by mouth Every Night.     • busPIRone (BUSPAR) 10 MG tablet Take 10 mg by mouth 2 (Two) Times a Day.     • carvedilol (COREG) 3.125 MG tablet Take 1 tablet by mouth 2 (Two)  "Times a Day With Meals. 60 tablet 3   • citalopram (CeleXA) 20 MG tablet TAKE 1 TABLET EVERY DAY 90 tablet 1   • esomeprazole (nexIUM) 40 MG capsule Take 40 mg by mouth Every Morning Before Breakfast.     • ferrous sulfate 325 (65 FE) MG tablet Take 325 mg by mouth 2 (Two) Times a Day.     • losartan-hydrochlorothiazide (HYZAAR) 100-25 MG per tablet Take 1 tablet by mouth Daily. 30 tablet 6   • vitamin D (ERGOCALCIFEROL) 75150 units capsule capsule Take 50,000 Units by mouth Every 14 (Fourteen) Days. SUNDAYS         Allergies   Allergen Reactions   • Darvon [Propoxyphene] Paresthesia   • Penicillins Rash       Social History     Social History   • Marital status: Single     Social History Main Topics   • Smoking status: Never Smoker   • Smokeless tobacco: Never Used   • Alcohol use No   • Drug use: No   • Sexual activity: Not Currently     Partners: Male     Other Topics Concern   • Not on file     Social History Narrative         seven children        Family History   Problem Relation Age of Onset   • Heart attack Mother 69   • Heart disease Mother    • Heart disease Sister    • Heart attack Brother 60   • Heart attack Brother 45   • Lymphoma Brother    • Heart disease Daughter    • Diabetes Son    • Heart disease Daughter    • Heart disease Daughter    • Diabetes Daughter          REVIEW OF SYSTEMS:  A 14 point review of systems was performed and is negative except as noted above.    Objective   PHYSICAL EXAM:    BP (!) 183/86   Pulse 79   Temp 98.3 °F (36.8 °C)   Resp 16   Ht 167.6 cm (66\")   Wt 75.1 kg (165 lb 9.6 oz)   SpO2 97%   BMI 26.73 kg/m²     ECOG: (0) Fully active, able to carry on all predisease performance without restriction  General: well appearing female in no acute distress  HEENT: sclera anicteric, oropharynx clear  Lymphatics: no cervical, supraclavicular, inguinal, or axillary adenopathy  Neck: Supple. No thyromegaly.  Cardiovascular: regular rate and rhythm, no murmurs  Lungs: " clear to auscultation bilaterally. No respiratory distress  Abdomen: soft, nontender, nondistended.  No palpable organomegaly  Extremities: no lower extremity edema, cyanosis, or clubbing  Skin: no rashes, lesions, bruising, or petechiae  Neuro: Alert and oriented x3. Moves all extremities.    Results:      Results from last 7 days  Lab Units 04/19/18  0447 04/18/18  2355   WBC 10*3/mm3 6.91 7.36   HEMOGLOBIN g/dL 11.6 11.7   PLATELETS 10*3/mm3 264 275       Results from last 7 days  Lab Units 04/19/18  0624 04/18/18  2355   SODIUM mmol/L 137 137   POTASSIUM mmol/L 3.9 3.8   CO2 mmol/L 26.0 27.0   BUN mg/dL 14 16   CREATININE mg/dL 0.80 0.90   GLUCOSE mg/dL 97 105*       Results from last 7 days  Lab Units 04/19/18  0624 04/18/18  2355   AST (SGOT) U/L 18 16   ALT (SGPT) U/L 11 11   BILIRUBIN mg/dL 0.2* 0.2*   ALK PHOS U/L 73 74         No radiology results for the last 7 days    Assessment    ASSESSMENT & PLAN:    1. Monoclonal gammopathy  2. Hypercalcemia    Discussion: I suspect the monoclonal gammopathy is an inflammatory reactive process but we shall see if it's repeatable and that has been ordered.  In the meantime I have ordered a bone marrow biopsy and we will get a total body bone survey though she does have osteoporosis and compression fractures that may hide lytic bone disease.  I will see her back in my office as an outpatient unless she is still here in 4 days as an inpatient where, by that junction, I should have some answers from her bone marrow biopsy hopefully.  Discussed with patient and family regarding the complexity of this decision tree for over an hour with greater than 50% counseling and more than that spent trying to sort out her outside records.      Kyler Jamil MD    4/19/2018

## 2018-04-19 NOTE — PROGRESS NOTES
Clinical Nutrition   Reason For Visit: Identified at risk by screening criteria, MST score 2+, Reduced oral intake    Patient Name: Abbi Mendez  YOB: 1935  MRN: [id number]  Date of Encounter: 04/19/18 12:54 PM  Admission date: 4/18/2018      Nutrition Assessment     Hospital Problem List  Principal Problem:    Hypercalcemia  Active Problems:    RADHA (obstructive sleep apnea)    Essential hypertension    Abnormal serum protein electrophoresis    Weight loss, unintentional        PMH: She  has a past medical history of Anxiety; Arthritis; Atrial fibrillation (11/14/2016); CAD (coronary artery disease) (11/14/2016); Deep vein thrombosis (DVT) of left lower extremity; Dyslipidemia (11/14/2016); Gastric ulcer; Heart disease; Hiatal hernia; Hyperlipidemia; Hypertension (11/14/2016); Iron deficiency anemia; LBBB (left bundle branch block); and RADHA (obstructive sleep apnea) (11/14/2016).   PSxH: She  has a past surgical history that includes Tubal ligation; Cardiac electrophysiology procedure (N/A, 1/18/2018); and Total knee arthroplasty (Right).     Other Applicable Diagnosis:  Recent gallbladder dyskinesia      Reported/Observed/Food/Nutrition Related History   Patient and family present at time of visit. Patient reports appetite/PO intake have been fluctuating between good and poor since Oct 2017 when she had knee surgery. Aware of resulting unintentional weight loss but not sure of exact amount. Would like to receive strawberry/vanilla Boost Plus supplements twice daily at breakfast and lunch.      Anthropometrics   Height: 66 in  Weight: 165 lbs (standing weight 4/18 per nsg documentation)  BMI: 26.7  BMI classification: Overweight: 25.0-29.9kg/m2    UBW: 184 lbs (weight at MD office visit 10/17/2017 per EMR; patient verbally verified)   Weight change: unintentional wt loss of 19 lbs (10.3%) within the last 6 months      Labs reviewed   Labs reviewed: Yes      Medications reviewed   Medications  reviewed: Yes      Current Nutrition Prescription   PO: Diet Regular; Cardiac      Evaluation of Received Nutrient/Fluid Intake: 75% / 1 meal      Nutrition Diagnosis     Problem Malnutrition (moderate, chronic)   Etiology Poor appetite on and off   Signs/Symptoms Report of </= 75% of her usual PO intake for the last 6 months; unintentional weight loss 19 lbs (10.3%) within the last 6 months       Intervention     Goal:   General: Nutrition support treatment  PO: Establish PO    Intervention: Follow treatment progress, Care plan reviewed, Encourage intake    Ordered Boost Plus (SB/van) twice daily (breakfast + lunch)    Monitoring/Evaluation:     Monitoring/Evaluation: Per protocol, PO intake, Supplement intake, Weight  Will Continue to follow per protocol  Cony Aquino, ROX  Time Spent: 20 min

## 2018-04-19 NOTE — PROGRESS NOTES
[facility] Medicine Services  PROGRESS NOTE    Patient Name: Abbi Mendez  : 1935  MRN: [id number]    Date of Admission: 2018  Length of Stay: 0  Primary Care Physician: Eric Driver MD    Subjective   Subjective     CC:  F/u hypercalcemia    HPI:  Pt doing well this am, no complaints, slept well overnight.      Review of Systems  Gen- No fevers, chills  CV- No chest pain, palpitations  Resp- No cough, dyspnea  GI- No N/V/D, abd pain    Otherwise ROS is negative except as mentioned in the HPI.    Objective   Objective     Vital Signs:   Temp:  [98.3 °F (36.8 °C)-98.7 °F (37.1 °C)] 98.3 °F (36.8 °C)  Heart Rate:  [66-79] 79  Resp:  [16] 16  BP: (143-183)/(70-86) 183/86        Physical Exam:  Constitutional: No acute distress, awake, alert  HENT: NCAT, mucous membranes moist  Respiratory: Clear to auscultation bilaterally, respiratory effort normal   Cardiovascular: RRR, no murmurs, rubs, or gallops, palpable pedal pulses bilaterally  Gastrointestinal: Positive bowel sounds, soft, nontender, nondistended  Musculoskeletal: No bilateral ankle edema  Psychiatric: Appropriate affect, cooperative  Neurologic: Oriented x 3, strength symmetric in all extremities, Cranial Nerves grossly intact to confrontation, speech clear  Skin: No rashes    Results Reviewed:  I have personally reviewed current lab, radiology, and data and agree.      Results from last 7 days  Lab Units 18  0447 18  2355   WBC 10*3/mm3 6.91 7.36   HEMOGLOBIN g/dL 11.6 11.7   HEMATOCRIT % 35.5 35.6   PLATELETS 10*3/mm3 264 275   INR   --  1.06       Results from last 7 days  Lab Units 18  0624 18  2355   SODIUM mmol/L 137 137   POTASSIUM mmol/L 3.9 3.8   CHLORIDE mmol/L 107 107   CO2 mmol/L 26.0 27.0   BUN mg/dL 14 16   CREATININE mg/dL 0.80 0.90   GLUCOSE mg/dL 97 105*   CALCIUM mg/dL 10.6* 10.4   ALT (SGPT) U/L 11 11   AST (SGOT) U/L 18 16     Estimated Creatinine Clearance: 56.1 mL/min  (by C-G formula based on SCr of 0.8 mg/dL).  No results found for: BNP  No results found for: PHART    Microbiology Results Abnormal     None          Imaging Results (last 24 hours)     ** No results found for the last 24 hours. **        Results for orders placed during the hospital encounter of 01/17/18   Adult Transthoracic Echo Limited W/ Cont if Necessary Per Protocol    Narrative · Limited follow-up echocardiogram shows small, less than 1 cm   circumferential pericardial effusion.  · There is no evidence of pericardial tamponade.  · On comparison with the study of January 18, 2018, no significant change   in the size of effusion is noted.          I have reviewed the medications.    Assessment/Plan   Assessment / Plan     Hospital Problem List     * (Principal)Hypercalcemia    RADHA (obstructive sleep apnea)    Overview Signed 11/22/2016 12:30 PM by ANGELO Dobson     · Diagnosed by sleep study.  Uses CPAP         Essential hypertension    Abnormal serum protein electrophoresis    Weight loss, unintentional             Brief Hospital Course to date:  Abbi Mendez is a 82 y.o. female with PMH of CAD, Afib, AVB s/p PPM placement, HTN, HLD, remote h/o DVT, GERD and RADHA who presented from [facility] with hypercalcemia and concerns for multiple myeloma.      Plan:  --continue IVFs for hypercalcemia, recheck iCa2+ in am.  D/w Dr. Jamil who will see the patient later today.  Will go ahead and order bone marrow biopsy today  --continue Losartan  --continue beta blocker, asa, statin. Pt follows with Dr. Darling with Cards.  --Afib- rate controlled, solely on ASA (no full dose anticoagulation)      DVT Prophylaxis:  JOANN    CODE STATUS: Full Code    Disposition: I expect the patient to be discharged home TBD      Electronically signed by Karina Sylvester MD, 04/19/18, 1:05 PM.

## 2018-04-19 NOTE — PROGRESS NOTES
Malnutrition Severity Assessment    Patient Name:  Abbi Mendez  YOB: 1935  MRN: [id number]  Admit Date:  4/18/2018    Patient meets criteria for : Moderate malnutrition (Patient currently meets criteria for moderate, chronic malnutrition based on reported intake hx and documented weight hx.)      Malnutrition Type: Chronic Illness Malnutrition     Malnutrition Type (last 8 hours)      Malnutrition Severity Assessment     Row Name 04/19/18 1302       Malnutrition Severity Assessment    Malnutrition Type Chronic Illness Malnutrition    Row Name 04/19/18 1302       Weight Status (Chronic)    BMI --   BMI = 26.7    %IBW --   127% IBW    %UBW Mild (86-90%)   90% UBW    Weight Loss Severe (>10% / 6 mo)   unintentional weight loss of 19 lbs (10.3%) within the last 6 months based on documented UBW and current admission standing weight    Row Name 04/19/18 1302       Energy Intake Status (Chronic)    Energy Intake Mod (<75% / > or equal to 1 mo)   </= 75% of usual PO intake within last 6 months per patient/family    Row Name 04/19/18 1302       Criteria Met (Must meet criteria for severity in at least 2 of these categories: M Wasting, Fat Loss, Fluid, Secondary Signs, Wt. Status, Intake)    Patient meets criteria for  Moderate malnutrition   Patient currently meets criteria for moderate, chronic malnutrition based on reported intake hx and documented weight hx.          Electronically signed by:  Cony Aquino RD  04/19/18 1:05 PM

## 2018-04-19 NOTE — PLAN OF CARE
Problem: Patient Care Overview  Goal: Plan of Care Review  Outcome: Ongoing (interventions implemented as appropriate)   04/19/18 0661   Coping/Psychosocial   Plan of Care Reviewed With patient   Plan of Care Review   Progress no change   OTHER   Outcome Summary Pt. bone marrow biopsy completed. No complaints of pain or discomfort. Patient is resting

## 2018-04-19 NOTE — H&P
[facility] Medicine Services  HISTORY AND PHYSICAL    Patient Name: Abbi Mendez  : 1935  MRN: [id number]  Primary Care Physician: Eric Driver MD    Subjective   Subjective     Chief Complaint:  Weakness, hypercalcemia with M spike    HPI:  Abbi Mendez is a 82 y.o. female with h/o HTN, A fib, CAD, HLD, previous DVT, GERD, and RADHA transferred from [facility]. Pt primary cardiologist is Dr Darling. Pt was admitted to [facility] due to hypercalcemia. labs at OSH noted Calcium of 11.2 with M spike and concerns for Multiple Myeloma. Pt requested transfer to [facility]. Skeletal survey from OSH noted moderate to severe disc space narrowing from C3-C7. Hiatal hernia, moderate right Glenohumeral and AC joint OA. Compression deformity at L1. However according impression xray noted no agressive lesions, and reported lytic or blastic foci. Prior to this admission pt was admitted 3 weeks ago with chest pain and abd pain. Work up revealed gall bladder dyskinesia.  Hypercalcemia with noted on pt pre-op labs. Pt is otherwise asymptomatic except for fatigue and weight loss.     Pt has been taking vit d 50,000, took last dose . Has been taking Vit D 50,000 units every 2 weeks. Change losartan with hctz to losartan alone yesterday.     Transferred for higher level of care.     Review of Systems   Constitutional: Positive for appetite change, fatigue (some better since PMK) and unexpected weight change (15 lbs in 3 months ). Negative for chills and fever.   HENT: Positive for congestion. Negative for ear pain, rhinorrhea and sore throat.    Respiratory: Positive for shortness of breath (mild with a lot of exertion ). Negative for cough and wheezing.    Cardiovascular: Positive for chest pain (no CP now but had pain 18). Negative for palpitations and leg swelling.   Gastrointestinal: Negative for abdominal pain, blood in stool, constipation, diarrhea, nausea and vomiting.   Genitourinary:  Negative for dysuria and hematuria.   Musculoskeletal: Positive for arthralgias (left knee pain and left shoulder ) and back pain (lower back, chonic).   Skin: Negative.  Negative for rash.   Neurological: Positive for dizziness (in morning ). Negative for headaches.   Psychiatric/Behavioral: Positive for dysphoric mood. Negative for sleep disturbance. The patient is nervous/anxious (very anxious, panic attack at hospital today ).           Otherwise 10-system ROS reviewed and is negative except as mentioned in the HPI.    Personal History     Past Medical History:   Diagnosis Date   • Arthritis    • Atrial fibrillation 11/14/2016   • CAD (coronary artery disease) 11/14/2016   • Deep vein thrombosis (DVT) of left lower extremity    • Dyslipidemia 11/14/2016   • Gastric ulcer    • Heart disease    • Hiatal hernia    • Hyperlipidemia    • Hypertension 11/14/2016   • Iron deficiency anemia    • LBBB (left bundle branch block)    • RADHA (obstructive sleep apnea) 11/14/2016       Past Surgical History:   Procedure Laterality Date   • CARDIAC ELECTROPHYSIOLOGY PROCEDURE N/A 1/18/2018    Procedure: Pacemaker DC new;  Surgeon: Soraya Darling MD;  Location: [facility] INVASIVE LOCATION;  Service:    • TOTAL KNEE ARTHROPLASTY Right    • TUBAL ABDOMINAL LIGATION         Family History: family history includes Diabetes in her daughter and son; Heart attack (age of onset: 45) in her brother; Heart attack (age of onset: 60) in her brother; Heart attack (age of onset: 69) in her mother; Heart disease in her daughter, daughter, daughter, mother, and sister; Lymphoma in her brother.     Social History:  reports that she has never smoked. She has never used smokeless tobacco. She reports that she does not drink alcohol or use drugs.  Social History     Social History Narrative         seven children        Medications:  Prescriptions Prior to Admission   Medication Sig Dispense Refill Last Dose   • losartan (COZAAR) 50 MG tablet  Take 100 mg by mouth Daily.      • aspirin 81 MG EC tablet Take 81 mg by mouth Daily.   Taking   • atorvastatin (LIPITOR) 40 MG tablet Take 40 mg by mouth Every Night.   Taking   • busPIRone (BUSPAR) 10 MG tablet Take 10 mg by mouth 2 (Two) Times a Day.   Taking   • carvedilol (COREG) 3.125 MG tablet Take 1 tablet by mouth 2 (Two) Times a Day With Meals. 60 tablet 3 Taking   • citalopram (CeleXA) 20 MG tablet TAKE 1 TABLET EVERY DAY 90 tablet 1 Taking   • esomeprazole (nexIUM) 40 MG capsule Take 40 mg by mouth Every Morning Before Breakfast.   Taking   • ferrous sulfate 325 (65 FE) MG tablet Take 325 mg by mouth 2 (Two) Times a Day.   Taking   • losartan-hydrochlorothiazide (HYZAAR) 100-25 MG per tablet Take 1 tablet by mouth Daily. 30 tablet 6    • vitamin D (ERGOCALCIFEROL) 04670 units capsule capsule Take 50,000 Units by mouth Every 14 (Fourteen) Days. SUNDAYS   Taking       Allergies   Allergen Reactions   • Darvon [Propoxyphene] Paresthesia   • Penicillins Rash       Objective   Objective     Vital Signs:   Temp:  [98.5 °F (36.9 °C)] 98.5 °F (36.9 °C)  Heart Rate:  [66] 66  Resp:  [16] 16  BP: (169)/(79) 169/79        Physical Exam   Constitutional: She is oriented to person, place, and time. She appears well-nourished. No distress.   HENT:   Head: Atraumatic.   Right Ear: External ear normal.   Left Ear: External ear normal.   Eyes: Conjunctivae are normal. Right eye exhibits no discharge. Left eye exhibits no discharge.   Neck: Normal range of motion. Neck supple.   Cardiovascular: Normal rate and regular rhythm.    Murmur heard.  Pulmonary/Chest: Effort normal and breath sounds normal. She has no wheezes. She has no rales.   Abdominal: Soft. Bowel sounds are normal. She exhibits no distension. There is no tenderness.   Neurological: She is alert and oriented to person, place, and time.   Skin: No rash noted. She is not diaphoretic.   Psychiatric: She has a normal mood and affect.   Nursing note and vitals  reviewed.         Results Reviewed:  I have personally reviewed current lab, radiology report, and data from OSH.              Invalid input(s):  ALKPHOS, TROPONININT  CrCl cannot be calculated (Patient's most recent lab result is older than the maximum 30 days allowed.).  Brief Urine Lab Results  (Last result in the past 365 days)      Color   Clarity   Blood   Leuk Est   Nitrite   Protein   CREAT   Urine HCG        01/19/18 0645 Yellow Cloudy(A) Negative Small (1+)(A) Negative Negative             No results found for: BNP  No results found for: PHART  Imaging Results (last 24 hours)     ** No results found for the last 24 hours. **        Results for orders placed during the hospital encounter of 01/17/18   Adult Transthoracic Echo Limited W/ Cont if Necessary Per Protocol    Narrative · Limited follow-up echocardiogram shows small, less than 1 cm   circumferential pericardial effusion.  · There is no evidence of pericardial tamponade.  · On comparison with the study of January 18, 2018, no significant change   in the size of effusion is noted.            Assessment / Plan      Principal Problem:    Hypercalcemia  Active Problems:    Abnormal serum protein electrophoresis    Essential hypertension    RADHA (obstructive sleep apnea)    Weight loss, unintentional    PLAN:  -Admit to telemetry   -get STAT labs. Check PTH, phosphorus, TSH, repeat serum electrophoresis and request a    copy of labs pt had as outpt prior to admission.   -HCTZ d/simone in the past few days and has been on Vit 50,000 units every 14 days.     Pt took last vit d dose of vitamin on Sunday.   -continue Losartan without HCTZ.   -Consult Heme/Onc     DVT prophylaxis: SubQ heparin and TEDs and SCDs     CODE STATUS:  Full Code    Admission Status:  I believe this patient meets INPATIENT status due to the need for care which can only be reasonably provided in an hospital setting such as aggressive/expedited ancillary services and/or consultation  services, the necessity for IV medications, close physician monitoring and/or the possible need for procedures.  In such, I feel patient’s risk for adverse outcomes and need for care warrant INPATIENT evaluation and predict the patient’s care encounter to likely last beyond 2 midnights.      Electronically signed by Kristin Zelaya DO, 04/18/18, 10:56 PM.

## 2018-04-19 NOTE — PLAN OF CARE
Problem: Patient Care Overview  Goal: Plan of Care Review  Outcome: Ongoing (interventions implemented as appropriate)   04/19/18 0145   Coping/Psychosocial   Plan of Care Reviewed With patient   Plan of Care Review   Progress no change   OTHER   Outcome Summary Labs ordered for review. VSS, patient has no complaints       Problem: Hospitalized Acutely Ill Older (Adult)  Goal: Signs and Symptoms of Listed Potential Problems Will be Absent, Minimized or Managed (Hospitalized Acutely Ill Older)  Outcome: Ongoing (interventions implemented as appropriate)

## 2018-04-20 VITALS
HEIGHT: 66 IN | RESPIRATION RATE: 18 BRPM | DIASTOLIC BLOOD PRESSURE: 86 MMHG | SYSTOLIC BLOOD PRESSURE: 168 MMHG | BODY MASS INDEX: 26.13 KG/M2 | OXYGEN SATURATION: 97 % | WEIGHT: 162.6 LBS | HEART RATE: 66 BPM | TEMPERATURE: 98.7 F

## 2018-04-20 PROBLEM — E21.0 PRIMARY HYPERPARATHYROIDISM (HCC): Status: ACTIVE | Noted: 2018-04-20

## 2018-04-20 LAB
ALBUMIN SERPL-MCNC: 3 G/DL (ref 2.9–4.4)
ALBUMIN/GLOB SERPL: 1.1 {RATIO} (ref 0.7–1.7)
ALPHA1 GLOB FLD ELPH-MCNC: 0.2 G/DL (ref 0–0.4)
ALPHA2 GLOB SERPL ELPH-MCNC: 0.7 G/DL (ref 0.4–1)
ANION GAP SERPL CALCULATED.3IONS-SCNC: 5 MMOL/L (ref 3–11)
B-GLOBULIN SERPL ELPH-MCNC: 0.8 G/DL (ref 0.7–1.3)
BUN BLD-MCNC: 11 MG/DL (ref 9–23)
BUN/CREAT SERPL: 12.2 (ref 7–25)
CA-I SERPL ISE-MCNC: 1.62 MMOL/L (ref 1.12–1.32)
CALCIUM SPEC-SCNC: 10.4 MG/DL (ref 8.7–10.4)
CHLORIDE SERPL-SCNC: 105 MMOL/L (ref 99–109)
CO2 SERPL-SCNC: 26 MMOL/L (ref 20–31)
CREAT BLD-MCNC: 0.9 MG/DL (ref 0.6–1.3)
DEPRECATED RDW RBC AUTO: 42.8 FL (ref 37–54)
ERYTHROCYTE [DISTWIDTH] IN BLOOD BY AUTOMATED COUNT: 12.7 % (ref 11.3–14.5)
GAMMA GLOB SERPL ELPH-MCNC: 1 G/DL (ref 0.4–1.8)
GFR SERPL CREATININE-BSD FRML MDRD: 60 ML/MIN/1.73
GLOBULIN SER CALC-MCNC: 2.8 G/DL (ref 2.2–3.9)
GLUCOSE BLD-MCNC: 95 MG/DL (ref 70–100)
HCT VFR BLD AUTO: 37 % (ref 34.5–44)
HGB BLD-MCNC: 12 G/DL (ref 11.5–15.5)
Lab: ABNORMAL
M-SPIKE: 0.3 G/DL
MCH RBC QN AUTO: 29.8 PG (ref 27–31)
MCHC RBC AUTO-ENTMCNC: 32.4 G/DL (ref 32–36)
MCV RBC AUTO: 91.8 FL (ref 80–99)
PLATELET # BLD AUTO: 263 10*3/MM3 (ref 150–450)
PMV BLD AUTO: 11.3 FL (ref 6–12)
POTASSIUM BLD-SCNC: 3.9 MMOL/L (ref 3.5–5.5)
PROT PATTERN SERPL ELPH-IMP: ABNORMAL
PROT SERPL-MCNC: 5.8 G/DL (ref 6–8.5)
RBC # BLD AUTO: 4.03 10*6/MM3 (ref 3.89–5.14)
SODIUM BLD-SCNC: 136 MMOL/L (ref 132–146)
WBC NRBC COR # BLD: 6.74 10*3/MM3 (ref 3.5–10.8)

## 2018-04-20 PROCEDURE — 99217 PR OBSERVATION CARE DISCHARGE MANAGEMENT: CPT | Performed by: INTERNAL MEDICINE

## 2018-04-20 PROCEDURE — 85027 COMPLETE CBC AUTOMATED: CPT | Performed by: INTERNAL MEDICINE

## 2018-04-20 PROCEDURE — G0378 HOSPITAL OBSERVATION PER HR: HCPCS

## 2018-04-20 PROCEDURE — 82330 ASSAY OF CALCIUM: CPT | Performed by: INTERNAL MEDICINE

## 2018-04-20 PROCEDURE — 99213 OFFICE O/P EST LOW 20 MIN: CPT | Performed by: INTERNAL MEDICINE

## 2018-04-20 PROCEDURE — 80048 BASIC METABOLIC PNL TOTAL CA: CPT | Performed by: INTERNAL MEDICINE

## 2018-04-20 PROCEDURE — 96372 THER/PROPH/DIAG INJ SC/IM: CPT

## 2018-04-20 PROCEDURE — 96361 HYDRATE IV INFUSION ADD-ON: CPT

## 2018-04-20 PROCEDURE — 25010000002 HEPARIN (PORCINE) PER 1000 UNITS: Performed by: FAMILY MEDICINE

## 2018-04-20 RX ORDER — LOSARTAN POTASSIUM 50 MG/1
100 TABLET ORAL DAILY
Qty: 60 TABLET | Refills: 0 | Status: SHIPPED | OUTPATIENT
Start: 2018-04-20 | End: 2018-05-14 | Stop reason: SDUPTHER

## 2018-04-20 RX ORDER — LOSARTAN POTASSIUM 50 MG/1
100 TABLET ORAL DAILY
Status: DISCONTINUED | OUTPATIENT
Start: 2018-04-21 | End: 2018-04-20 | Stop reason: HOSPADM

## 2018-04-20 RX ORDER — LOSARTAN POTASSIUM 50 MG/1
50 TABLET ORAL ONCE
Status: COMPLETED | OUTPATIENT
Start: 2018-04-20 | End: 2018-04-20

## 2018-04-20 RX ADMIN — BUSPIRONE HYDROCHLORIDE 10 MG: 10 TABLET ORAL at 08:35

## 2018-04-20 RX ADMIN — SODIUM CHLORIDE 75 ML/HR: 9 INJECTION, SOLUTION INTRAVENOUS at 06:18

## 2018-04-20 RX ADMIN — PANTOPRAZOLE SODIUM 40 MG: 40 TABLET, DELAYED RELEASE ORAL at 06:18

## 2018-04-20 RX ADMIN — CARVEDILOL 3.12 MG: 3.12 TABLET, FILM COATED ORAL at 08:35

## 2018-04-20 RX ADMIN — LOSARTAN POTASSIUM 50 MG: 50 TABLET ORAL at 11:29

## 2018-04-20 RX ADMIN — FERROUS SULFATE TAB 325 MG (65 MG ELEMENTAL FE) 325 MG: 325 (65 FE) TAB at 08:35

## 2018-04-20 RX ADMIN — HEPARIN SODIUM 5000 UNITS: 5000 INJECTION, SOLUTION INTRAVENOUS; SUBCUTANEOUS at 08:35

## 2018-04-20 RX ADMIN — ASPIRIN 81 MG: 81 TABLET, COATED ORAL at 08:35

## 2018-04-20 RX ADMIN — LOSARTAN POTASSIUM 50 MG: 50 TABLET ORAL at 08:35

## 2018-04-20 RX ADMIN — CITALOPRAM HYDROBROMIDE 20 MG: 20 TABLET ORAL at 08:35

## 2018-04-20 NOTE — DISCHARGE SUMMARY
[facility] Medicine Services  DISCHARGE SUMMARY    Patient Name: Abbi Mendez  : 1935  MRN: [id number]    Date of Admission: 2018  Date of Discharge:  2018  Primary Care Physician: Eric Driver MD    Consults     Date and Time Order Name Status Description    2018 0109 Hematology & Oncology Inpatient Consult Completed         Hospital Course     Presenting Problem:   Hypercalcemia [E83.52]    Active Hospital Problems (** Indicates Principal Problem)    Diagnosis Date Noted   • **Hypercalcemia [E83.52] 2018   • Primary hyperparathyroidism [E21.0] 2018     Priority: High   • Abnormal serum protein electrophoresis [R77.8] 2018     Priority: Medium   • Weight loss, unintentional [R63.4] 2018   • Essential hypertension [I10] 2016   • RADHA (obstructive sleep apnea) [G47.33] 2016   • Atrial fibrillation [I48.91] 2016      Resolved Hospital Problems    Diagnosis Date Noted Date Resolved   No resolved problems to display.          Hospital Course:  Abbi Mendez is a 82 y.o. female with PMH of CAD, Afib, AVB s/p PPM placement, HTN, HLD, remote h/o DVT, GERD and RADHA who presented from [facility] with hypercalcemia and concerns for multiple myeloma due to abnormal labs.    Dr. Jamil of hematology was consulted and saw the patient on 18. He felt patient's monoclonal gammopathy was most likely due to an inflammatory reactive process and patient underwent bone marrow biopsy on 18. He wishes to see patient back in his officed as an outaptient to review findings. In the meantime, patient's intact pth was elevated at 107.3, favoring some degree of primary hyperparathyroidism. As such patient's hctz and vitamin d were held and patient was hydrated w/ normal saline. ON day of discharge, patient's total calcium was 10.4 (within our normal range) and asymptomatic. I counselled family on importance of stopping  thiazide diuretic, stopping vitamin d supplementation, encouraging mobilization, and adhering to a less than 1000mg/day calcium diet. They voice understanding. Currently patient mentating normally, tolerating diet and eager to go home.  Have recommended follow up with pcp 1 week, dr. Jamil 1-2 weeks to review bone marrow biopsy results and monocloncal gammopathy workup and they are appreciative and agreeable to this plan. Patient encouraged to go to nearest ER or return if develops confusion or increasing abdominal complaints.           Day of Discharge     HPI:   Feeling well, toleratilng diet, no n/v/d, no abd pain. Thinking clearly, wishes to go home as soon as possible    Review of Systems  No f/c, no headache, no chest pain    Otherwise ROS is negative except as mentioned in the HPI.    Vital Signs:   Temp:  [98.2 °F (36.8 °C)-99 °F (37.2 °C)] 98.7 °F (37.1 °C)  Heart Rate:  [61-81] 75  Resp:  [16-20] 18  BP: (154-183)/(74-93) 174/91     Physical Exam:  Alert, oriented x 3  Ncat, oroph clear  rrr  ctab  abd soft, nontender  No cce  No extremity rash    Pertinent  and/or Most Recent Results       Results from last 7 days  Lab Units 04/20/18  0604 04/19/18  0624 04/19/18  0447 04/18/18  2355   WBC 10*3/mm3 6.74  --  6.91 7.36   HEMOGLOBIN g/dL 12.0  --  11.6 11.7   HEMATOCRIT % 37.0  --  35.5 35.6   PLATELETS 10*3/mm3 263  --  264 275   SODIUM mmol/L 136 137  --  137   POTASSIUM mmol/L 3.9 3.9  --  3.8   CHLORIDE mmol/L 105 107  --  107   CO2 mmol/L 26.0 26.0  --  27.0   BUN mg/dL 11 14  --  16   CREATININE mg/dL 0.90 0.80  --  0.90   GLUCOSE mg/dL 95 97  --  105*   CALCIUM mg/dL 10.4 10.6*  --  10.4       Results from last 7 days  Lab Units 04/19/18  0624 04/18/18  2355   BILIRUBIN mg/dL 0.2* 0.2*   ALK PHOS U/L 73 74   ALT (SGPT) U/L 11 11   AST (SGOT) U/L 18 16   PROTIME Seconds  --  11.1   INR   --  1.06   APTT seconds  --  25.7           Invalid input(s): TG, LDLCALC, LDLREALC      Brief Urine Lab Results   (Last result in the past 365 days)      Color   Clarity   Blood   Leuk Est   Nitrite   Protein   CREAT   Urine HCG        04/19/18 0631 Yellow Clear Negative Trace(A) Negative Negative               Microbiology Results Abnormal     None          Imaging Results (all)     None                    Results for orders placed during the hospital encounter of 01/17/18   Adult Transthoracic Echo Limited W/ Cont if Necessary Per Protocol    Narrative · Limited follow-up echocardiogram shows small, less than 1 cm   circumferential pericardial effusion.  · There is no evidence of pericardial tamponade.  · On comparison with the study of January 18, 2018, no significant change   in the size of effusion is noted.           Order Current Status    Bone Marrow Exam In process    Bone Marrow Exam In process    Protein Elec + Interp, Serum In process        Discharge Details      Abbi Mendez   Home Medication Instructions [id number]    Printed on:04/20/18 1044   Medication Information                      aspirin 81 MG EC tablet  Take 81 mg by mouth Daily.             atorvastatin (LIPITOR) 40 MG tablet  Take 40 mg by mouth Every Night.             busPIRone (BUSPAR) 10 MG tablet  Take 10 mg by mouth 2 (Two) Times a Day.             carvedilol (COREG) 3.125 MG tablet  Take 1 tablet by mouth 2 (Two) Times a Day With Meals.             citalopram (CeleXA) 20 MG tablet  TAKE 1 TABLET EVERY DAY             esomeprazole (nexIUM) 40 MG capsule  Take 40 mg by mouth Every Morning Before Breakfast.             ferrous sulfate 325 (65 FE) MG tablet  Take 325 mg by mouth 2 (Two) Times a Day.             losartan (COZAAR) 50 MG tablet  Take 100 mg by mouth Daily.                   Discharge Disposition:  Home or Self Care    Discharge Diet:  limit calcium intake to 1000mg po daily    Discharge Activity:   ad eduardo    Special Instructions:  -Follow up pcp 1 week  -Follow up dr. Jamil 1-2 weeks (follow up bone marrow biopsy  results)    Future Appointments  Date Time Provider Department Center   4/30/2018 3:00 PM Kyler Saravia MD [facility]   8/21/2018 11:30 AM Soraya Darling MD [facility] None       Additional Instructions for the Follow-ups that You Need to Schedule     Bone Marrow Exam     Apr 19, 2018 (Approximate)      R/O Multiple Myeloma.  Send for Flow, Cytogenetics, and Myeloma FISH panel. Thanks    Please call the Pathology Department to schedule a Bone Marrow Exam.    Order Comments:  R/O Multiple Myeloma.  Send for Flow, Cytogenetics, and Myeloma FISH panel. Thanks          Discharge Follow-up with PCP    As directed      Follow Up Details:  dr. burnett 5 weeks         Discharge Follow-up with Specialty: DR. SARAVIA (HEME ONC) 1-2 WEEKS    As directed      Specialty:  DR. SARAVIA (HEME ONC) 1-2 WEEKS               Time Spent on Discharge:  40 min spent on discharge    Electronically signed by Jeffrey Frazier MD, 04/20/18, 10:44 AM.

## 2018-04-20 NOTE — CONSULTS
Clinical Nutrition   Reason For Visit: Physician consult, Need for education    Patient Name: Abbi Mendez  YOB: 1935  MRN: [id number]  Date of Encounter: 04/20/18 11:54 AM  Admission date: 4/18/2018      RD provided education and written materials to patient/family regarding calcium content of foods and aim for <1000 mg calcium daily per MD consult (4/20)      Nutrition Assessment     Hospital Problem List  Principal Problem:    Hypercalcemia  Active Problems:    Atrial fibrillation    RADHA (obstructive sleep apnea)    Essential hypertension    Abnormal serum protein electrophoresis    Weight loss, unintentional    Primary hyperparathyroidism      PMH: She  has a past medical history of Anxiety; Arthritis; Atrial fibrillation (11/14/2016); CAD (coronary artery disease) (11/14/2016); Deep vein thrombosis (DVT) of left lower extremity; Dyslipidemia (11/14/2016); Gastric ulcer; Heart disease; Hiatal hernia; Hyperlipidemia; Hypertension (11/14/2016); Iron deficiency anemia; LBBB (left bundle branch block); and RADHA (obstructive sleep apnea) (11/14/2016).   PSxH: She  has a past surgical history that includes Tubal ligation; Cardiac electrophysiology procedure (N/A, 1/18/2018); and Total knee arthroplasty (Right).       Labs reviewed   Labs reviewed: Yes      Medications reviewed   Medications reviewed: Yes      Current Nutrition Prescription   PO: Diet Regular; Cardiac      Nutrition Diagnosis     Problem Food and nutrition knowledge deficit   Etiology hypercalcemia   Signs/Symptoms Reported knowledge deficit, MD consult for diet education r/t order for <1000 mg calcium daily       Intervention     Goal:   General: Provide information regarding MNT therapy    Intervention: Follow treatment progress, Care plan reviewed, Education provided     Education: RD provided both verbal education and written materials regarding calcium content of foods. Encouraged patient to consume more of low-moderate calcium  content foods and small/fewer portions of high calcium foods. Patient verbalized understanding. Advised to ask QS as they arise prior to discharge today.    Monitoring/Evaluation:       Monitoring/Evaluation: Per protocol  Will Continue to follow per protocol  Cony Aquino RD  Time Spent: 20 min

## 2018-04-20 NOTE — PLAN OF CARE
Problem: Patient Care Overview  Goal: Plan of Care Review  Outcome: Ongoing (interventions implemented as appropriate)   04/20/18 0304   Coping/Psychosocial   Plan of Care Reviewed With patient;daughter   Plan of Care Review   Progress no change   OTHER   Outcome Summary Bone marrow biopsy done yesterday; results pending. Pt to be discharged home and f/u outpt with results. No complaints. Slept well.        Problem: Hospitalized Acutely Ill Older (Adult)  Goal: Signs and Symptoms of Listed Potential Problems Will be Absent, Minimized or Managed (Hospitalized Acutely Ill Older)  Outcome: Ongoing (interventions implemented as appropriate)

## 2018-04-20 NOTE — PLAN OF CARE
Problem: Patient Care Overview  Goal: Plan of Care Review  Outcome: Ongoing (interventions implemented as appropriate)   04/20/18 1000 04/20/18 1057   Coping/Psychosocial   Plan of Care Reviewed With patient;daughter --    Plan of Care Review   Progress --  improving   OTHER   Outcome Summary --  patient to be discharged today, nutrition consulted for low calcium diet teaching. patient to follow up with PCP and dr. Jamil. no current complaints, vitals are stable.        Problem: Hospitalized Acutely Ill Older (Adult)  Goal: Signs and Symptoms of Listed Potential Problems Will be Absent, Minimized or Managed (Hospitalized Acutely Ill Older)  Outcome: Ongoing (interventions implemented as appropriate)

## 2018-04-20 NOTE — PROGRESS NOTES
HEMATOLOGY/ONCOLOGY PROGRESS NOTE    Subjective      CC: Hypercalcemia    SUBJECTIVE: She is eating and drinking fairly well and mentating fine.  No muscle cramps.        Past Medical History, Past Surgical History, Social History, Family History have been reviewed and are without significant changes except as mentioned.      Medications:  The current medication list was reviewed in the EMR    ALLERGIES:   Allergies   Allergen Reactions   • Darvon [Propoxyphene] Paresthesia   • Penicillins Rash       ROS:  A comprehensive 14 point review of systems was performed and was negative except as mentioned.      Objective      Vitals:    04/20/18 0000 04/20/18 0400 04/20/18 0709 04/20/18 0834   BP: 154/89 167/92 171/93 174/91   BP Location: Right arm Right arm Left arm    Patient Position: Lying Lying Lying    Pulse: 72 71  75   Resp: 18 16 18    Temp: 98.5 °F (36.9 °C) 98.2 °F (36.8 °C) 98.7 °F (37.1 °C)    TempSrc: Oral Oral Oral    SpO2: 95% 97%     Weight:  73.8 kg (162 lb 9.6 oz)     Height:            ECOG: (1) Restricted in physically strenuous activity, ambulatory and able to do work of light nature    General: well appearing, in no acute distress  HEENT: sclera anicteric, oropharynx clear  Lymphatics: no cervical, supraclavicular, inguinal, or axillary adenopathy  Cardiovascular: regular rate and rhythm, no murmurs  Lungs: clear to auscultation bilaterally  Abdomen: soft, nontender, nondistended.  No palpable organomegaly  ExtremIties: no lower extremity edema  Skin: no rashes, lesions, bruising, or petechiae  Neuro: Alert and oriented x 3. Moves all extremities.    RECENT LABS:      Results from last 7 days  Lab Units 04/20/18  0604 04/19/18  0447 04/18/18  2355   WBC 10*3/mm3 6.74 6.91 7.36   HEMOGLOBIN g/dL 12.0 11.6 11.7   PLATELETS 10*3/mm3 263 264 275       Results from last 7 days  Lab Units 04/20/18  0604 04/19/18  0624 04/18/18  2355   SODIUM mmol/L 136 137 137   POTASSIUM mmol/L 3.9 3.9 3.8   CO2 mmol/L  26.0 26.0 27.0   BUN mg/dL 11 14 16   CREATININE mg/dL 0.90 0.80 0.90   GLUCOSE mg/dL 95 97 105*       Results from last 7 days  Lab Units 04/19/18  0624 04/18/18  2355   AST (SGOT) U/L 18 16   ALT (SGPT) U/L 11 11   BILIRUBIN mg/dL 0.2* 0.2*   ALK PHOS U/L 73 74         No radiology results for the last 7 days            Assessment   ASSESSMENT & PLAN:    1. Hypercalcemia with normal intact parathyroid hormone level    Discussion: I discussed the fact that her total calcium is only slightly elevated while he ionized calcium is higher and that's likely due to decreased total protein levels and free calcium being higher relatively.  Her bone marrow was performed yesterday without event and will follow up on that.  Have not done the bone survey yet but if her bone marrow is negative then I think there is relatively little need for such.  She will go home and has follow-up with me set for the 30th.              Visit time was 15 minutes, 15 minutes spent in counseling    Kyler Jamil MD    4/20/2018

## 2018-04-30 ENCOUNTER — LAB (OUTPATIENT)
Dept: LAB | Facility: HOSPITAL | Age: 83
End: 2018-04-30

## 2018-04-30 ENCOUNTER — OFFICE VISIT (OUTPATIENT)
Dept: ONCOLOGY | Facility: CLINIC | Age: 83
End: 2018-04-30

## 2018-04-30 VITALS
BODY MASS INDEX: 26.03 KG/M2 | HEIGHT: 66 IN | TEMPERATURE: 97.4 F | SYSTOLIC BLOOD PRESSURE: 160 MMHG | RESPIRATION RATE: 18 BRPM | HEART RATE: 90 BPM | DIASTOLIC BLOOD PRESSURE: 85 MMHG | WEIGHT: 162 LBS

## 2018-04-30 DIAGNOSIS — E83.52 HYPERCALCEMIA: Chronic | ICD-10-CM

## 2018-04-30 DIAGNOSIS — E83.52 HYPERCALCEMIA: Primary | Chronic | ICD-10-CM

## 2018-04-30 PROBLEM — D47.2 MONOCLONAL GAMMOPATHY: Chronic | Status: ACTIVE | Noted: 2018-04-30

## 2018-04-30 PROBLEM — D47.2 MONOCLONAL GAMMOPATHY: Status: ACTIVE | Noted: 2018-04-30

## 2018-04-30 LAB
ALBUMIN SERPL-MCNC: 4.2 G/DL (ref 3.2–4.8)
ALBUMIN/GLOB SERPL: 1.4 G/DL (ref 1.5–2.5)
ALP SERPL-CCNC: 93 U/L (ref 25–100)
ALT SERPL W P-5'-P-CCNC: 16 U/L (ref 7–40)
ANION GAP SERPL CALCULATED.3IONS-SCNC: 6 MMOL/L (ref 3–11)
AST SERPL-CCNC: 17 U/L (ref 0–33)
BILIRUB SERPL-MCNC: 0.6 MG/DL (ref 0.3–1.2)
BUN BLD-MCNC: 15 MG/DL (ref 9–23)
BUN/CREAT SERPL: 15 (ref 7–25)
CA-I SERPL ISE-MCNC: 1.68 MMOL/L (ref 1.12–1.32)
CALCIUM SPEC-SCNC: 11.6 MG/DL (ref 8.7–10.4)
CHLORIDE SERPL-SCNC: 102 MMOL/L (ref 99–109)
CO2 SERPL-SCNC: 29 MMOL/L (ref 20–31)
CREAT BLD-MCNC: 1 MG/DL (ref 0.6–1.3)
GFR SERPL CREATININE-BSD FRML MDRD: 53 ML/MIN/1.73
GLOBULIN UR ELPH-MCNC: 3 GM/DL
GLUCOSE BLD-MCNC: 98 MG/DL (ref 70–100)
LAB AP ASPIRATE SMEAR: NORMAL
LAB AP CASE REPORT: NORMAL
LAB AP CBC AND DIFFERENTIAL: NORMAL
LAB AP CLINICAL INFORMATION: NORMAL
LAB AP CLOT SECTION: NORMAL
LAB AP CORE BIOPSY: NORMAL
LAB AP CYTOGENETICS REPORT,ADDENDUM: NORMAL
LAB AP FISH ANALYSIS REPORT,ADDENDUM: NORMAL
LAB AP FLOW CYTOMETRY SUMMARY: NORMAL
Lab: NORMAL
PATH REPORT.FINAL DX SPEC: NORMAL
PATH REPORT.GROSS SPEC: NORMAL
POTASSIUM BLD-SCNC: 4.8 MMOL/L (ref 3.5–5.5)
PROT SERPL-MCNC: 7.2 G/DL (ref 5.7–8.2)
SODIUM BLD-SCNC: 137 MMOL/L (ref 132–146)

## 2018-04-30 PROCEDURE — 82397 CHEMILUMINESCENT ASSAY: CPT

## 2018-04-30 PROCEDURE — 82330 ASSAY OF CALCIUM: CPT

## 2018-04-30 PROCEDURE — 80053 COMPREHEN METABOLIC PANEL: CPT

## 2018-04-30 PROCEDURE — 36415 COLL VENOUS BLD VENIPUNCTURE: CPT

## 2018-04-30 PROCEDURE — 99213 OFFICE O/P EST LOW 20 MIN: CPT | Performed by: INTERNAL MEDICINE

## 2018-04-30 RX ORDER — LOSARTAN POTASSIUM AND HYDROCHLOROTHIAZIDE 12.5; 1 MG/1; MG/1
TABLET ORAL
COMMUNITY
Start: 2018-03-31 | End: 2018-05-14 | Stop reason: ALTCHOICE

## 2018-04-30 RX ORDER — NITROGLYCERIN 0.3 MG/1
TABLET SUBLINGUAL
COMMUNITY
Start: 2018-04-05 | End: 2019-06-05

## 2018-04-30 NOTE — PROGRESS NOTES
CHIEF COMPLAINT: Follow-up hypercalcemia    Problem List:  Oncology/Hematology History    1.)Monoclonal gammopathy  2.)  Hypercalcemia with normal intact parathyroid hormone level:  Monoclonal gammopathy with hypercalcemia found at the time of workup for cholecystitis with poorly functioning gallbladder.  Apparently has monoclonal gammopathy but the results and the data from outside [facility] are not available to me at time of dictation.  She does have hypercalcemia with elevated ionized calcium but not a significant elevation of her total calcium.  No renal dysfunction of significance.  Has a history of iron deficiency but presently counts are acceptable.  Her total protein and ratio of total protein to albumin are normal.She has a history of coronary disease with permanent pacing and paroxysmal atrial fibrillation.  History of syncope and T12 compression fracture apparently.  -4/20/18 Ionized calcium 1.62 with normal total calcium of 10.4 and GFR of 60 with creatinine 0.9 and normal CBC.  Bone marrow was 40% cellular with normal hematopoiesis and no increase in plasma cells.  There was no clonal B or T-cell population on flow cytometry and no increase in plasma cells on flow cytometry.  Normal cytogenetics.  No tumor or granuloma.  Stainable iron present.          Monoclonal gammopathy    4/30/2018 Initial Diagnosis     Monoclonal gammopathy          HISTORY OF PRESENT ILLNESS:  The patient is a 82 y.o. female, here for follow up on management of Her ionized calcium is elevated with a total calcium at the upper limit of normal.  No monoclonal gammopathy.      Past Medical History:   Diagnosis Date   • Anxiety    • Arthritis    • Atrial fibrillation 11/14/2016   • CAD (coronary artery disease) 11/14/2016   • Deep vein thrombosis (DVT) of left lower extremity    • Dyslipidemia 11/14/2016   • Gastric ulcer    • Heart disease    • Hiatal hernia    • Hyperlipidemia    • Hypertension 11/14/2016   • Iron deficiency anemia   "  • LBBB (left bundle branch block)    • RADHA (obstructive sleep apnea) 11/14/2016     Past Surgical History:   Procedure Laterality Date   • CARDIAC ELECTROPHYSIOLOGY PROCEDURE N/A 1/18/2018    Procedure: Pacemaker DC new;  Surgeon: Soraya Darling MD;  Location: [facility] INVASIVE LOCATION;  Service:    • TOTAL KNEE ARTHROPLASTY Right    • TUBAL ABDOMINAL LIGATION         Allergies   Allergen Reactions   • Darvon [Propoxyphene] Paresthesia   • Penicillins Rash       Family History and Social History reviewed and changed as necessary      REVIEW OF SYSTEM:   Review of Systems   Constitutional: Negative for appetite change, chills, diaphoresis, fatigue, fever and unexpected weight change.   HENT:   Negative for mouth sores, sore throat and trouble swallowing.    Eyes: Negative for icterus.   Respiratory: Negative for cough, hemoptysis and shortness of breath.    Cardiovascular: Negative for chest pain, leg swelling and palpitations.   Gastrointestinal: Negative for abdominal distention, abdominal pain, blood in stool, constipation, diarrhea, nausea and vomiting.   Endocrine: Negative for hot flashes.   Genitourinary: Negative for bladder incontinence, difficulty urinating, dysuria, frequency and hematuria.    Musculoskeletal: Negative for gait problem, neck pain and neck stiffness.   Skin: Negative for rash.   Neurological: Negative for dizziness, gait problem, headaches, light-headedness and numbness.   Hematological: Negative for adenopathy. Does not bruise/bleed easily.   Psychiatric/Behavioral: Negative for depression. The patient is not nervous/anxious.    All other systems reviewed and are negative.       PHYSICAL EXAM    Vitals:    04/30/18 1421   BP: 160/85   Pulse: 90   Resp: 18   Temp: 97.4 °F (36.3 °C)   Weight: 73.5 kg (162 lb)   Height: 167.6 cm (66\")     Constitutional: Appears well-developed and well-nourished. No distress.   ECOG: (0) Fully active, able to carry on all predisease performance without " restriction  HENT:   Head: Normocephalic.   Mouth/Throat: Oropharynx is clear and moist.   Eyes: Conjunctivae are normal. Pupils are equal, round, and reactive to light. No scleral icterus.   Neck: Neck supple. No JVD present. No thyromegaly present.   Cardiovascular: Normal rate, regular rhythm and normal heart sounds.    Pulmonary/Chest: Breath sounds normal. No respiratory distress.   Abdominal: Soft. Exhibits no distension and no mass. There is no hepatosplenomegaly. There is no tenderness. There is no rebound and no guarding.   Musculoskeletal:Exhibits no edema, tenderness or deformity.   Neurological: Alert and oriented to person, place, and time. Exhibits normal muscle tone.   Skin: No ecchymosis, no petechiae and no rash noted. Not diaphoretic. No cyanosis. Nails show no clubbing.   Psychiatric: Normal mood and affect.   Vitals reviewed.      No radiology results for the last 7 days          ASSESSMENT & PLAN:    1.  Elevated ionized calcium with total calcium upper limit of normal    Discussion: she has no anemia, significant renal dysfunction, nor monoclonal gammopathy.  Bone marrow was essentially normal.  Suspect increased ionized calcium due to decreased protein binding with normal total calcium.  I will check a parathyroid related protein.  Chest x-ray negative for a hint of sarcoid.  No granuloma on bone marrow biopsy.  We'll see her back in a few weeks to go over this.      Kyler Jamil MD    04/30/2018

## 2018-05-06 LAB — PTH RELATED PROT SERPL-SCNC: <2 PMOL/L

## 2018-05-14 RX ORDER — LOSARTAN POTASSIUM 100 MG/1
100 TABLET ORAL DAILY
Qty: 90 TABLET | Refills: 1 | Status: SHIPPED | OUTPATIENT
Start: 2018-05-14 | End: 2018-08-21 | Stop reason: SDUPTHER

## 2018-05-14 RX ORDER — CARVEDILOL 3.12 MG/1
3.12 TABLET ORAL 2 TIMES DAILY WITH MEALS
Qty: 180 TABLET | Refills: 1 | Status: SHIPPED | OUTPATIENT
Start: 2018-05-14 | End: 2019-01-25 | Stop reason: SDUPTHER

## 2018-05-21 ENCOUNTER — OFFICE VISIT (OUTPATIENT)
Dept: ONCOLOGY | Facility: CLINIC | Age: 83
End: 2018-05-21

## 2018-05-21 ENCOUNTER — HOSPITAL ENCOUNTER (OUTPATIENT)
Dept: GENERAL RADIOLOGY | Facility: HOSPITAL | Age: 83
Discharge: HOME | End: 2018-05-21
Attending: INTERNAL MEDICINE

## 2018-05-21 ENCOUNTER — HOSPITAL ENCOUNTER (OUTPATIENT)
Dept: CT IMAGING | Facility: HOSPITAL | Age: 83
Discharge: HOME | End: 2018-05-21
Attending: INTERNAL MEDICINE | Admitting: INTERNAL MEDICINE

## 2018-05-21 VITALS
RESPIRATION RATE: 13 BRPM | HEIGHT: 66 IN | SYSTOLIC BLOOD PRESSURE: 144 MMHG | TEMPERATURE: 97.8 F | BODY MASS INDEX: 25.88 KG/M2 | HEART RATE: 73 BPM | DIASTOLIC BLOOD PRESSURE: 72 MMHG | WEIGHT: 161 LBS

## 2018-05-21 DIAGNOSIS — E83.52 HYPERCALCEMIA: Chronic | ICD-10-CM

## 2018-05-21 DIAGNOSIS — E83.52 HYPERCALCEMIA: Primary | Chronic | ICD-10-CM

## 2018-05-21 PROCEDURE — 71250 CT THORAX DX C-: CPT

## 2018-05-21 PROCEDURE — 74176 CT ABD & PELVIS W/O CONTRAST: CPT

## 2018-05-21 PROCEDURE — 77075 RADEX OSSEOUS SURVEY COMPL: CPT

## 2018-05-21 PROCEDURE — 99214 OFFICE O/P EST MOD 30 MIN: CPT | Performed by: INTERNAL MEDICINE

## 2018-05-21 NOTE — PROGRESS NOTES
CHIEF COMPLAINT: Hypercalcemia cryptogenic with monoclonal gammopathy    Problem List:  Oncology/Hematology History    1.)Hypercalcemia with normal intact parathyroid hormone level:  Monoclonal gammopathy with hypercalcemia found at the time of workup for cholecystitis with poorly functioning gallbladder.  Apparently has monoclonal gammopathy but the results and the data from outside [facility] are not available to me at time of dictation.  She does have hypercalcemia with elevated ionized calcium but not a significant elevation of her total calcium.  No renal dysfunction of significance.  Has a history of iron deficiency but presently counts are acceptable.  Her total protein and ratio of total protein to albumin are normal.She has a history of coronary disease with permanent pacing and paroxysmal atrial fibrillation.  History of syncope and T12 compression fracture apparently.  -4/20/18 Ionized calcium 1.62 with normal total calcium of 10.4 and GFR of 60 with creatinine 0.9 and normal CBC.  Bone marrow was 40% cellular with normal hematopoiesis and no increase in plasma cells.  There was no clonal B or T-cell population on flow cytometry and no increase in plasma cells on flow cytometry.  Normal cytogenetics.  No tumor or granuloma.  Stainable iron present.  Parathyroid related protein was less than 2.  Ionized calcium was up to 1.68 with calcium of 11.6 and a normal albumin.  Repeat serum immunoelectrophoresis showed 200 mg/dL monoclonal protein.          Hypercalcemia    4/30/2018 Initial Diagnosis     Monoclonal gammopathy          HISTORY OF PRESENT ILLNESS:  The patient is a 82 y.o. female, here for follow up on management of Cryptogenic hypercalcemia.  With monoclonal gammopathy thus far no evidence of myeloma.  Overall feeling fairly fit despite hypercalcemia      Past Medical History:   Diagnosis Date   • Anxiety    • Arthritis    • Atrial fibrillation 11/14/2016   • CAD (coronary artery disease) 11/14/2016    • Deep vein thrombosis (DVT) of left lower extremity    • Dyslipidemia 11/14/2016   • Gastric ulcer    • Heart disease    • Hiatal hernia    • Hyperlipidemia    • Hypertension 11/14/2016   • Iron deficiency anemia    • LBBB (left bundle branch block)    • RADHA (obstructive sleep apnea) 11/14/2016     Past Surgical History:   Procedure Laterality Date   • CARDIAC ELECTROPHYSIOLOGY PROCEDURE N/A 1/18/2018    Procedure: Pacemaker DC new;  Surgeon: Soraya Darling MD;  Location: [facility] INVASIVE LOCATION;  Service:    • TOTAL KNEE ARTHROPLASTY Right    • TUBAL ABDOMINAL LIGATION         Allergies   Allergen Reactions   • Darvon [Propoxyphene] Paresthesia   • Penicillins Rash       Family History and Social History reviewed and changed as necessary      REVIEW OF SYSTEM:   Review of Systems   Constitutional: Negative for appetite change, chills, diaphoresis, fatigue, fever and unexpected weight change.   HENT:   Negative for mouth sores, sore throat and trouble swallowing.    Eyes: Negative for icterus.   Respiratory: Negative for cough, hemoptysis and shortness of breath.    Cardiovascular: Negative for chest pain, leg swelling and palpitations.   Gastrointestinal: Negative for abdominal distention, abdominal pain, blood in stool, constipation, diarrhea, nausea and vomiting.   Endocrine: Negative for hot flashes.   Genitourinary: Negative for bladder incontinence, difficulty urinating, dysuria, frequency and hematuria.    Musculoskeletal: Negative for gait problem, neck pain and neck stiffness.   Skin: Negative for rash.   Neurological: Negative for dizziness, gait problem, headaches, light-headedness and numbness.   Hematological: Negative for adenopathy. Does not bruise/bleed easily.   Psychiatric/Behavioral: Negative for depression. The patient is not nervous/anxious.    All other systems reviewed and are negative.       PHYSICAL EXAM    Vitals:    05/21/18 1310   BP: 144/72   Pulse: 73   Resp: 13   Temp: 97.8 °F  "(36.6 °C)   Weight: 73 kg (161 lb)   Height: 167.6 cm (66\")     Constitutional: Appears well-developed and well-nourished. No distress.   ECOG: (1) Restricted in physically strenuous activity, ambulatory and able to do work of light nature  HENT:   Head: Normocephalic.   Mouth/Throat: Oropharynx is clear and moist.   Eyes: Conjunctivae are normal. Pupils are equal, round, and reactive to light. No scleral icterus.   Neck: Neck supple. No JVD present. No thyromegaly present.   Cardiovascular: Normal rate, regular rhythm and normal heart sounds.    Pulmonary/Chest: Breath sounds normal. No respiratory distress.   Abdominal: Soft. Exhibits no distension and no mass. There is no hepatosplenomegaly. There is no tenderness. There is no rebound and no guarding.   Musculoskeletal:Exhibits no edema, tenderness or deformity.   Neurological: Alert and oriented to person, place, and time. Exhibits normal muscle tone.   Skin: No ecchymosis, no petechiae and no rash noted. Not diaphoretic. No cyanosis. Nails show no clubbing.   Psychiatric: Normal mood and affect.   Vitals reviewed.      No radiology results for the last 7 days          ASSESSMENT & PLAN:    1. Hypercalcemia  2. Monoclonal gammopathy    Discussion: I will get CTs for completeness sake for possible lymphoma-induced hypercalcemia.  I will check her intact parathyroid hormone level again and repeat her myeloma panel but she had no increased percentage of plasma cells on bone marrow biopsy.   If these are negative then I would check a PET scan for occult malignancy or isolated plasmacytoma not found on bone survey.  Discussed with patient 25 minutes greater than 50% counseling.  Kyler Jamil MD    05/21/2018      "

## 2018-06-07 ENCOUNTER — CLINICAL SUPPORT NO REQUIREMENTS (OUTPATIENT)
Dept: CARDIOLOGY | Facility: CLINIC | Age: 83
End: 2018-06-07

## 2018-06-07 DIAGNOSIS — I44.2 THIRD DEGREE AV BLOCK (HCC): ICD-10-CM

## 2018-06-07 DIAGNOSIS — R55 SYNCOPE, UNSPECIFIED SYNCOPE TYPE: ICD-10-CM

## 2018-06-07 PROCEDURE — 93296 REM INTERROG EVL PM/IDS: CPT | Performed by: INTERNAL MEDICINE

## 2018-06-07 PROCEDURE — 93294 REM INTERROG EVL PM/LDLS PM: CPT | Performed by: INTERNAL MEDICINE

## 2018-06-21 ENCOUNTER — TELEPHONE (OUTPATIENT)
Dept: ONCOLOGY | Facility: CLINIC | Age: 83
End: 2018-06-21

## 2018-06-21 ENCOUNTER — OFFICE VISIT (OUTPATIENT)
Dept: ONCOLOGY | Facility: CLINIC | Age: 83
End: 2018-06-21

## 2018-06-21 ENCOUNTER — LAB (OUTPATIENT)
Dept: LAB | Facility: HOSPITAL | Age: 83
End: 2018-06-21

## 2018-06-21 VITALS
DIASTOLIC BLOOD PRESSURE: 77 MMHG | HEART RATE: 73 BPM | TEMPERATURE: 98 F | RESPIRATION RATE: 16 BRPM | HEIGHT: 66 IN | BODY MASS INDEX: 25.88 KG/M2 | WEIGHT: 161 LBS | SYSTOLIC BLOOD PRESSURE: 153 MMHG

## 2018-06-21 DIAGNOSIS — E83.52 HYPERCALCEMIA: Chronic | ICD-10-CM

## 2018-06-21 DIAGNOSIS — R77.8 ABNORMAL SERUM PROTEIN ELECTROPHORESIS: ICD-10-CM

## 2018-06-21 DIAGNOSIS — R77.8 ABNORMAL SERUM PROTEIN ELECTROPHORESIS: Primary | ICD-10-CM

## 2018-06-21 LAB
ALBUMIN SERPL-MCNC: 4.24 G/DL (ref 3.2–4.8)
ALBUMIN/GLOB SERPL: 1.4 G/DL (ref 1.5–2.5)
ALP SERPL-CCNC: 96 U/L (ref 25–100)
ALT SERPL W P-5'-P-CCNC: 16 U/L (ref 7–40)
ANION GAP SERPL CALCULATED.3IONS-SCNC: 6 MMOL/L (ref 3–11)
AST SERPL-CCNC: 20 U/L (ref 0–33)
BILIRUB SERPL-MCNC: 0.7 MG/DL (ref 0.3–1.2)
BUN BLD-MCNC: 17 MG/DL (ref 9–23)
BUN/CREAT SERPL: 16.7 (ref 7–25)
CA-I SERPL ISE-MCNC: 1.63 MMOL/L (ref 1.12–1.32)
CALCIUM SPEC-SCNC: 11.3 MG/DL (ref 8.7–10.4)
CHLORIDE SERPL-SCNC: 100 MMOL/L (ref 99–109)
CO2 SERPL-SCNC: 31 MMOL/L (ref 20–31)
CREAT BLD-MCNC: 1.02 MG/DL (ref 0.6–1.3)
GFR SERPL CREATININE-BSD FRML MDRD: 52 ML/MIN/1.73
GLOBULIN UR ELPH-MCNC: 3 GM/DL
GLUCOSE BLD-MCNC: 96 MG/DL (ref 70–100)
POTASSIUM BLD-SCNC: 4.4 MMOL/L (ref 3.5–5.5)
PROT SERPL-MCNC: 7.2 G/DL (ref 5.7–8.2)
SODIUM BLD-SCNC: 137 MMOL/L (ref 132–146)

## 2018-06-21 PROCEDURE — 36415 COLL VENOUS BLD VENIPUNCTURE: CPT

## 2018-06-21 PROCEDURE — 82330 ASSAY OF CALCIUM: CPT

## 2018-06-21 PROCEDURE — 83883 ASSAY NEPHELOMETRY NOT SPEC: CPT

## 2018-06-21 PROCEDURE — 80053 COMPREHEN METABOLIC PANEL: CPT

## 2018-06-21 PROCEDURE — 99213 OFFICE O/P EST LOW 20 MIN: CPT | Performed by: INTERNAL MEDICINE

## 2018-06-21 PROCEDURE — 83970 ASSAY OF PARATHORMONE: CPT

## 2018-06-21 RX ORDER — DOXYCYCLINE HYCLATE 100 MG/1
100 CAPSULE ORAL 2 TIMES DAILY
Refills: 0 | COMMUNITY
Start: 2018-05-31 | End: 2018-07-23 | Stop reason: HOSPADM

## 2018-06-21 NOTE — TELEPHONE ENCOUNTER
----- Message from Bettye Rodriguez RN sent at 6/21/2018  3:12 PM EDT -----      Critical Test Results      MD: Omero     Date: 6/21/18     Critical test result: ionized calcium 1.63    Time results received: 15:10

## 2018-06-21 NOTE — PROGRESS NOTES
CHIEF COMPLAINT: Evaluation of hypercalcemia in the face of elevated intact parathyroid hormone level and monoclonal gammopathy Problem List:  Oncology/Hematology History    1.)Hypercalcemia with elevated intact parathyroid hormone level of 107 upper limit of normal A on 4/18/18:  Monoclonal gammopathy with hypercalcemia found at the time of workup for cholecystitis with poorly functioning gallbladder.  Apparently has monoclonal gammopathy but the results and the data from outside [facility] are not available to me at time of dictation.  She does have hypercalcemia with elevated ionized calcium but not a significant elevation of her total calcium.  No renal dysfunction of significance.  Has a history of iron deficiency but presently counts are acceptable.  Her total protein and ratio of total protein to albumin are normal.She has a history of coronary disease with permanent pacing and paroxysmal atrial fibrillation.  History of syncope and T12 compression fracture apparently.  -4/20/18 Ionized calcium 1.62 with normal total calcium of 10.4 and GFR of 60 with creatinine 0.9 and normal CBC.  Bone marrow was 40% cellular with normal hematopoiesis and no increase in plasma cells.  There was no clonal B or T-cell population on flow cytometry and no increase in plasma cells on flow cytometry.  Normal cytogenetics.  No tumor or granuloma.  Stainable iron present.  Parathyroid related protein was less than 2.  Ionized calcium was up to 1.68 with calcium of 11.6 and a normal albumin.  Repeat serum immunoelectrophoresis showed 200 mg/dL monoclonal protein.  Bone survey done in [place] during her initial admission there in April 18 showed no lytic bone disease according to the hospital record.  -5/21/18: CT chest abdomen and pelvis negative.          Hypercalcemia    4/30/2018 Initial Diagnosis     Monoclonal gammopathy          HISTORY OF PRESENT ILLNESS:  The patient is a 82 y.o. female, here for follow up on management of  Hypercalcemia in the face of elevated intact parathyroid hormone and monoclonal gammopathy.  I reviewed her CAT scans and images thereof from 5/21/18 and she had no lytic disease on the bone windows nor adenopathy to suggest lymphoma.  Her calcium of 11.6 has been in that range for the better part of 3 years now.  She did in fact have an elevated parathyroid hormone on 418 18/1/07 upper limit of normal at 80.      Past Medical History:   Diagnosis Date   • Anxiety    • Arthritis    • Atrial fibrillation 11/14/2016   • CAD (coronary artery disease) 11/14/2016   • Deep vein thrombosis (DVT) of left lower extremity    • Dyslipidemia 11/14/2016   • Gastric ulcer    • Heart disease    • Hiatal hernia    • Hyperlipidemia    • Hypertension 11/14/2016   • Iron deficiency anemia    • LBBB (left bundle branch block)    • RADHA (obstructive sleep apnea) 11/14/2016     Past Surgical History:   Procedure Laterality Date   • CARDIAC ELECTROPHYSIOLOGY PROCEDURE N/A 1/18/2018    Procedure: Pacemaker DC new;  Surgeon: Soraya Darling MD;  Location: [facility] INVASIVE LOCATION;  Service:    • TOTAL KNEE ARTHROPLASTY Right    • TUBAL ABDOMINAL LIGATION         Allergies   Allergen Reactions   • Darvon [Propoxyphene] Paresthesia   • Penicillins Rash       Family History and Social History reviewed and changed as necessary      REVIEW OF SYSTEM:   Review of Systems   Constitutional: Negative for appetite change, chills, diaphoresis, fatigue, fever and unexpected weight change.   HENT:   Negative for mouth sores, sore throat and trouble swallowing.    Eyes: Negative for icterus.   Respiratory: Negative for cough, hemoptysis and shortness of breath.    Cardiovascular: Negative for chest pain, leg swelling and palpitations.   Gastrointestinal: Negative for abdominal distention, abdominal pain, blood in stool, constipation, diarrhea, nausea and vomiting.   Endocrine: Negative for hot flashes.   Genitourinary: Negative for bladder incontinence,  "difficulty urinating, dysuria, frequency and hematuria.    Musculoskeletal: Negative for gait problem, neck pain and neck stiffness.   Skin: Negative for rash.   Neurological: Negative for dizziness, gait problem, headaches, light-headedness and numbness.   Hematological: Negative for adenopathy. Does not bruise/bleed easily.   Psychiatric/Behavioral: Negative for depression. The patient is not nervous/anxious.    All other systems reviewed and are negative.       PHYSICAL EXAM    Vitals:    06/21/18 1351   BP: 153/77   Pulse: 73   Resp: 16   Temp: 98 °F (36.7 °C)   Weight: 73 kg (161 lb)   Height: 167.6 cm (66\")     Constitutional: Appears well-developed and well-nourished. No distress.   ECOG: (1) Restricted in physically strenuous activity, ambulatory and able to do work of light nature  HENT:   Head: Normocephalic.   Mouth/Throat: Oropharynx is clear and moist.   Eyes: Conjunctivae are normal. Pupils are equal, round, and reactive to light. No scleral icterus.   Neck: Neck supple. No JVD present. No thyromegaly present.   Cardiovascular: Normal rate, regular rhythm and normal heart sounds.    Pulmonary/Chest: Breath sounds normal. No respiratory distress.   Abdominal: Soft. Exhibits no distension and no mass. There is no hepatosplenomegaly. There is no tenderness. There is no rebound and no guarding.   Musculoskeletal:Exhibits no edema, tenderness or deformity.   Neurological: Alert and oriented to person, place, and time. Exhibits normal muscle tone.   Skin: No ecchymosis, no petechiae and no rash noted. Not diaphoretic. No cyanosis. Nails show no clubbing.   Psychiatric: Normal mood and affect.   Vitals reviewed.      No radiology results for the last 7 days          ASSESSMENT & PLAN:    1.  Monoclonal gammopathy  2. Hypercalcemia  3. Hyperparathyroidism    Discussion: I will check her 24-hour urine immunoelectrophoresis along with her serum kappa and lambda light chains as well as a repeat of her ionized " calcium and CMP and intact parathyroid hormone level.  If she has no significant monoclonal gammopathy in the serum or urine, she has already had a bone marrow biopsy and bone survey that did not suggest this to be a plasma cell dyscrasia and I suspect her hypercalcemia is almost assuredly due to hyperparathyroidism.      Kyler Jamil MD    06/21/2018

## 2018-06-21 NOTE — TELEPHONE ENCOUNTER
Name of Physician notified: Kyler Jamil MD    Time Physician Notified: 15:30      []  Orders received    []  Protocol/Standing orders followed    [x]  No new orders

## 2018-06-22 ENCOUNTER — TELEPHONE (OUTPATIENT)
Dept: ONCOLOGY | Facility: CLINIC | Age: 83
End: 2018-06-22

## 2018-06-22 LAB
CALCIUM SERPL-MCNC: 11.8 MG/DL (ref 8.7–10.3)
INTACT PTH: ABNORMAL
KAPPA LC SERPL-MCNC: 35.1 MG/L (ref 3.3–19.4)
KAPPA LC/LAMBDA SER: 0.87 {RATIO} (ref 0.26–1.65)
LAMBDA LC FREE SERPL-MCNC: 40.5 MG/L (ref 5.7–26.3)
PTH-INTACT SERPL-MCNC: 51 PG/ML (ref 15–65)

## 2018-06-22 NOTE — TELEPHONE ENCOUNTER
Called to let them know that the jug and instructions are sitting at the . I left this on a voicemail because the phone number listed did not answer.

## 2018-06-22 NOTE — TELEPHONE ENCOUNTER
----- Message from Brittani Bartlett Rep sent at 6/22/2018 10:48 AM EDT -----  Regarding: Jamil-PhoneTell  Contact: [phone]  Pt.'s daughter Sherine called and said that they didn't get a jug for the urine.

## 2018-06-22 NOTE — TELEPHONE ENCOUNTER
Patients daughter called back and states they live 2 hours away. Order was sent to [facility] for 24 hour urine. I faxed these today and called patients daughter back and let her know they can go to that lab and pick it up. She v/u

## 2018-06-29 ENCOUNTER — LAB (OUTPATIENT)
Dept: LAB | Facility: HOSPITAL | Age: 83
End: 2018-06-29

## 2018-06-29 DIAGNOSIS — R77.8 ABNORMAL SERUM PROTEIN ELECTROPHORESIS: ICD-10-CM

## 2018-06-29 DIAGNOSIS — E83.52 HYPERCALCEMIA: Chronic | ICD-10-CM

## 2018-06-29 PROCEDURE — 84156 ASSAY OF PROTEIN URINE: CPT

## 2018-06-29 PROCEDURE — 84166 PROTEIN E-PHORESIS/URINE/CSF: CPT

## 2018-06-29 PROCEDURE — 81050 URINALYSIS VOLUME MEASURE: CPT

## 2018-06-29 PROCEDURE — 86335 IMMUNFIX E-PHORSIS/URINE/CSF: CPT

## 2018-07-03 LAB
ALBUMIN 24H MFR UR ELPH: 54.6 %
ALPHA1 GLOB 24H MFR UR ELPH: 7.1 %
ALPHA2 GLOB 24H MFR UR ELPH: 5.1 %
B-GLOBULIN MFR UR ELPH: 21.2 %
GAMMA GLOB 24H MFR UR ELPH: 12 %
HIV 1 & 2 AB SER-IMP: ABNORMAL
INTERPRETATION UR IFE-IMP: ABNORMAL
M PROTEIN 24H MFR UR ELPH: ABNORMAL %
PROT 24H UR-MRATE: 368 MG/24 HR (ref 30–150)
PROT UR-MCNC: 14.7 MG/DL

## 2018-07-23 ENCOUNTER — OFFICE VISIT (OUTPATIENT)
Dept: ONCOLOGY | Facility: CLINIC | Age: 83
End: 2018-07-23

## 2018-07-23 VITALS
WEIGHT: 162 LBS | HEIGHT: 66 IN | BODY MASS INDEX: 26.03 KG/M2 | RESPIRATION RATE: 14 BRPM | SYSTOLIC BLOOD PRESSURE: 152 MMHG | DIASTOLIC BLOOD PRESSURE: 69 MMHG | HEART RATE: 66 BPM | TEMPERATURE: 97 F

## 2018-07-23 DIAGNOSIS — E83.52 HYPERCALCEMIA: Primary | Chronic | ICD-10-CM

## 2018-07-23 PROCEDURE — 99213 OFFICE O/P EST LOW 20 MIN: CPT | Performed by: INTERNAL MEDICINE

## 2018-07-23 NOTE — PROGRESS NOTES
CHIEF COMPLAINT: Cryptogenic hypercalcemia    Problem List:  Oncology/Hematology History    1.)Hypercalcemia with elevated intact parathyroid hormone level of 107 upper limit of normal A on 4/18/18:  Monoclonal gammopathy with hypercalcemia found at the time of workup for cholecystitis with poorly functioning gallbladder.  Apparently has monoclonal gammopathy but the results and the data from outside [facility] are not available to me at time of dictation.  She does have hypercalcemia with elevated ionized calcium but not a significant elevation of her total calcium.  No renal dysfunction of significance.  Has a history of iron deficiency but presently counts are acceptable.  Her total protein and ratio of total protein to albumin are normal.She has a history of coronary disease with permanent pacing and paroxysmal atrial fibrillation.  History of syncope and T12 compression fracture apparently.  -4/20/18 Ionized calcium 1.62 with normal total calcium of 10.4 and GFR of 60 with creatinine 0.9 and normal CBC.  Bone marrow was 40% cellular with normal hematopoiesis and no increase in plasma cells.  There was no clonal B or T-cell population on flow cytometry and no increase in plasma cells on flow cytometry.  Normal cytogenetics.  No tumor or granuloma.  Stainable iron present.  Parathyroid related protein was less than 2.  Ionized calcium was up to 1.68 with calcium of 11.6 and a normal albumin.  Repeat serum immunoelectrophoresis showed 200 mg/dL monoclonal protein.  Bone survey done in [place] during her initial admission there in April 18 showed no lytic bone disease according to the hospital record.  -5/21/18: CT chest abdomen and pelvis negative.  -6/21/18:  24-hour urine showed no monoclonal protein in June 2018.  Calcium was 11.8 with a creatinine of 1.  Intact parathyroid hormone level was normal at 51 at that time with a stable ionized calcium 1.63.  There was a balanced increase in kappa and lambda light  chains of 35 and 40 respectively.          Hypercalcemia    4/30/2018 Initial Diagnosis     Monoclonal gammopathy          HISTORY OF PRESENT ILLNESS:  The patient is a 82 y.o. female, here for follow up on management of cryptogenic hypercalcemia.  Feeling fairly fit with no new complaints.      Past Medical History:   Diagnosis Date   • Anxiety    • Arthritis    • Atrial fibrillation (CMS/HCC) 11/14/2016   • CAD (coronary artery disease) 11/14/2016   • Deep vein thrombosis (DVT) of left lower extremity (CMS/HCC)    • Dyslipidemia 11/14/2016   • Gastric ulcer    • Heart disease    • Hiatal hernia    • Hyperlipidemia    • Hypertension 11/14/2016   • Iron deficiency anemia    • LBBB (left bundle branch block)    • RADHA (obstructive sleep apnea) 11/14/2016     Past Surgical History:   Procedure Laterality Date   • CARDIAC ELECTROPHYSIOLOGY PROCEDURE N/A 1/18/2018    Procedure: Pacemaker DC new;  Surgeon: Soraya Darling MD;  Location: [facility] INVASIVE LOCATION;  Service:    • TOTAL KNEE ARTHROPLASTY Right    • TUBAL ABDOMINAL LIGATION         Allergies   Allergen Reactions   • Darvon [Propoxyphene] Paresthesia   • Penicillins Rash       Family History and Social History reviewed and changed as necessary      REVIEW OF SYSTEM:   Review of Systems   Constitutional: Negative for appetite change, chills, diaphoresis, fatigue, fever and unexpected weight change.   HENT:   Negative for mouth sores, sore throat and trouble swallowing.    Eyes: Negative for icterus.   Respiratory: Negative for cough, hemoptysis and shortness of breath.    Cardiovascular: Negative for chest pain, leg swelling and palpitations.   Gastrointestinal: Negative for abdominal distention, abdominal pain, blood in stool, constipation, diarrhea, nausea and vomiting.   Endocrine: Negative for hot flashes.   Genitourinary: Negative for bladder incontinence, difficulty urinating, dysuria, frequency and hematuria.    Musculoskeletal: Negative for gait  "problem, neck pain and neck stiffness.   Skin: Negative for rash.   Neurological: Negative for dizziness, gait problem, headaches, light-headedness and numbness.   Hematological: Negative for adenopathy. Does not bruise/bleed easily.   Psychiatric/Behavioral: Negative for depression. The patient is not nervous/anxious.    All other systems reviewed and are negative.       PHYSICAL EXAM    Vitals:    07/23/18 1351   BP: 152/69   Pulse: 66   Resp: 14   Temp: 97 °F (36.1 °C)   TempSrc: Temporal Artery    Weight: 73.5 kg (162 lb)   Height: 167.6 cm (66\")     Constitutional: Appears well-developed and well-nourished. No distress.   ECOG: (1) Restricted in physically strenuous activity, ambulatory and able to do work of light nature  HENT:   Head: Normocephalic.   Mouth/Throat: Oropharynx is clear and moist.   Eyes: Conjunctivae are normal. Pupils are equal, round, and reactive to light. No scleral icterus.   Neck: Neck supple. No JVD present. No thyromegaly present.   Cardiovascular: Normal rate, regular rhythm and normal heart sounds.    Pulmonary/Chest: Breath sounds normal. No respiratory distress.   Abdominal: Soft. Exhibits no distension and no mass. There is no hepatosplenomegaly. There is no tenderness. There is no rebound and no guarding.   Musculoskeletal:Exhibits no edema, tenderness or deformity.   Neurological: Alert and oriented to person, place, and time. Exhibits normal muscle tone.   Skin: No ecchymosis, no petechiae and no rash noted. Not diaphoretic. No cyanosis. Nails show no clubbing.   Psychiatric: Normal mood and affect.   Vitals reviewed.      No radiology results for the last 7 days          ASSESSMENT & PLAN:    1.  Hypercalcemia with normal intact parathyroid hormone level and parathyroid related protein  2. MGUS: Balanced elevation of kappa and lambda light chains likely reactive with no urine monoclonal gammopathy and 200 mg/dL of monoclonal protein on the serum immunoelectrophoresis.  " Negative bone survey and bone marrow biopsy for monoclonal gammopathy    Discussion: I do not think we can get a PET scan covered in the absence of myeloma and CAT scan suggested no lytic lesions on bone windows nor lymphadenopathy to suggest lymphoproliferative disorder.  Bone marrow biopsy was negative and bone survey was negative.  I suspect the tiny elevation of monoclonal protein in the serum and the balanced kappa and lambda light chains are likely reactive in nature.  I will see her back in 3 months with repeat myeloma panel showed no evidence of myeloma or lymphoproliferative disorder or other malignant explanations for the hypercalcemia that I could find.  No obvious medicinal culprits to cause this either.  I will get her to our endocrinology colleagues further thoughts.  Nothing on scanning to show adenopathy or lung nodules to suggest sarcoid.  We'll get her back to my nurse practitioner in 3 months to follow-up on monoclonal proteins and if that is stable then we will probably just check her twice a year.  Discussed with patient 15 minutes greater than 50% spent counseling.      Kyler Jamil MD    07/23/2018

## 2018-08-21 ENCOUNTER — OFFICE VISIT (OUTPATIENT)
Dept: CARDIOLOGY | Facility: CLINIC | Age: 83
End: 2018-08-21

## 2018-08-21 VITALS
HEIGHT: 66 IN | WEIGHT: 163 LBS | DIASTOLIC BLOOD PRESSURE: 68 MMHG | SYSTOLIC BLOOD PRESSURE: 104 MMHG | HEART RATE: 70 BPM | BODY MASS INDEX: 26.2 KG/M2

## 2018-08-21 DIAGNOSIS — I10 ESSENTIAL HYPERTENSION: ICD-10-CM

## 2018-08-21 DIAGNOSIS — I48.0 PAROXYSMAL ATRIAL FIBRILLATION (HCC): ICD-10-CM

## 2018-08-21 DIAGNOSIS — Z95.0 PACEMAKER: ICD-10-CM

## 2018-08-21 DIAGNOSIS — E78.5 HYPERLIPIDEMIA LDL GOAL <70: ICD-10-CM

## 2018-08-21 DIAGNOSIS — I44.2 THIRD DEGREE AV BLOCK (HCC): Primary | ICD-10-CM

## 2018-08-21 DIAGNOSIS — I25.10 CORONARY ARTERY DISEASE INVOLVING NATIVE CORONARY ARTERY OF NATIVE HEART WITHOUT ANGINA PECTORIS: ICD-10-CM

## 2018-08-21 PROCEDURE — 99213 OFFICE O/P EST LOW 20 MIN: CPT | Performed by: INTERNAL MEDICINE

## 2018-08-21 PROCEDURE — 93280 PM DEVICE PROGR EVAL DUAL: CPT | Performed by: INTERNAL MEDICINE

## 2018-08-21 RX ORDER — LOSARTAN POTASSIUM 50 MG/1
50 TABLET ORAL DAILY
Qty: 90 TABLET | Refills: 3 | Status: SHIPPED | OUTPATIENT
Start: 2018-08-21 | End: 2019-01-25

## 2018-08-21 NOTE — PROGRESS NOTES
Encounter Date:08/21/2018      Patient ID: Abbi Mendez is a 83 y.o. female.    Chief Complaint: Atrial Fibrillation      PROBLEM LIST:  1. Syncope and collapse with evidence of advanced conduction system disease, high-grade AV block and multiple sinus pauses.  a. [employer] pacemaker implant January 18, 2018   b. Echo 1/18/2018: EF 55%. Left ventricular wall thickness is consistent with mild septal asymmetric hypertrophy. There is calcification of the aortic valve. There is a small (<1cm) pericardial effusion. There is no evidence of pericardial tamponade.  c. Echo 1/19/2018: Limited follow-up echocardiogram shows small, less than 1 cm circumferential pericardial effusion. There is no evidence of pericardial tamponade. On comparison with the study of January 18, 2018, no significant change in the size of effusion is noted.  2. Paroxysmal Atrial fibrillation, resolved, 03/30/2007.  a. Atenolol therapy.  b. Questionable recurrence of atrial fibrillation, 03/31/2015 via EMS, data deficit:  No confirmation via EMS strips or EKG.  Event recorder 06/09/2015-07/09/2015:  No atrial fibrillation or arrhythmias appreciated.   c. Event recorder (06/09/2015 - 07/09/2015): No atrial fibrillation; occasional isolated PACs; no ventricular ectopy.  d. Event recorder, 04/01/2016:  No atrial fibrillation; 4 runs of repetitive PACs, the longest consisting of 6 beats at 156 BPM; dyspnea, tachypalpitations and lightheadedness were not associated with rhythm disturbance.  3. Coronary artery disease.  a. Cardiac catheterization, 03/30/2007:  With 30% to 50% mid/distal LAD in “worst views” within region of extreme tortuosity, left ventricular ejection fraction (70%) post PVC without segmental wall motion abnormality; trace mitral regurgitation associated with ectopy; no mitral valve prolapse.  b. Nuclear stress test, 02/04/2010:  Negative for ischemia/scar; left ventricular ejection fraction (87%).  c. Nuclear stress  test negative for ischemia and scar; normal LVEF, 04/10/2015.  4. History of left bundle branch block.   5. Obstructive sleep apnea:   a. Diagnosed by sleep study.   b. Currently on CPAP.    6. Hypertension.  7. Dyslipidemia.  8. History of left lower extremity DVT x2 treated with Coumadin; Coumadin discontinued in 2002.  9. History of gastric ulcer.  10. Iron deficiency anemia.  11. Status post tubal ligation.    History of Present Illness  Patient presents today for follow-up with a history of CAD, PAF,AV block, and cardiac risk factors. Since last visit has been doing well from a cardiac standpoint. She states that since her pacemaker placement, her condition has significantly improved. She had one episode of atrial fibrillation, and states she is aware when she is about to have an episode. She monitors her blood pressure in the morning, with readings usually around 120/70. Denies chest pain, shortness of breath, leg swelling, palpitations, and syncope. Remains busy and active with walking with no limtiations.    Allergies   Allergen Reactions   • Darvon [Propoxyphene] Paresthesia   • Penicillins Rash         Current Outpatient Prescriptions:   •  aspirin 81 MG EC tablet, Take 81 mg by mouth Daily., Disp: , Rfl:   •  atorvastatin (LIPITOR) 40 MG tablet, Take 40 mg by mouth Every Night., Disp: , Rfl:   •  busPIRone (BUSPAR) 10 MG tablet, Take 10 mg by mouth 2 (Two) Times a Day., Disp: , Rfl:   •  carvedilol (COREG) 3.125 MG tablet, Take 1 tablet by mouth 2 (Two) Times a Day With Meals., Disp: 180 tablet, Rfl: 1  •  citalopram (CeleXA) 20 MG tablet, TAKE 1 TABLET EVERY DAY, Disp: 90 tablet, Rfl: 1  •  esomeprazole (nexIUM) 40 MG capsule, Take 40 mg by mouth Every Morning Before Breakfast., Disp: , Rfl:   •  ferrous sulfate 325 (65 FE) MG tablet, Take 325 mg by mouth 2 (Two) Times a Day., Disp: , Rfl:   •  losartan (COZAAR) 100 MG tablet, Take 1 tablet by mouth Daily., Disp: 90 tablet, Rfl: 1  •  nitroglycerin  "(NITROSTAT) 0.3 MG SL tablet, , Disp: , Rfl:     The following portions of the patient's history were reviewed and updated as appropriate: allergies, current medications, past family history, past medical history, past social history, past surgical history and problem list.    ROS  Review of Systems   Constitution: Negative for chills, fever, weight gain and weight loss.   Cardiovascular: Negative for chest pain, claudication, dyspnea on exertion, leg swelling, orthopnea, palpitations, paroxysmal nocturnal dyspnea and syncope.        No dizziness   Gastrointestinal: Negative for abdominal pain, constipation, diarrhea, nausea and vomiting.   Genitourinary:        No urinary symptoms   Neurological:        No symptoms of stroke.   All other systems reviewed and are negative.    Objective:     Blood pressure 104/68, pulse 70, height 167.6 cm (66\"), weight 73.9 kg (163 lb).        Physical Exam  Constitutional: She appears well-developed and well-nourished.   HENT:   HEENT exam unremarkable.   Neck: Neck supple. No JVD present.   No carotid bruits.   Cardiovascular: Normal rate, regular rhythm and normal heart sounds.    No murmur heard.  2 plus symmetric pulses.   Pulmonary/Chest: Breath sounds normal. Does not exhibit tenderness.   Abdominal:   Abdomen benign.   Musculoskeletal: Does not exhibit edema.   Neurological:   Neurological exam unremarkable.   Vitals reviewed.    Lab Review:   Procedures     Less than 1% mode switch. Longest episode was 2.5 hours. RA output decraesed 2.0 volts @ 2.5 ms, RV output decreased to 2.4 volts @ 0.5 ms      Assessment:      Diagnosis Plan   1. Third degree AV block (CMS/HCC)  status post pacemaker implant.     2. Pacemaker  9 years of battery life.   3. Coronary artery disease involving native coronary artery of native heart without angina pectoris  Stable with no angina.   4. Paroxysmal atrial fibrillation (CMS/HCC)  Stable with no significant reoccurrence.    5. Essential " hypertension  Decrease Losartan from 100 mg to 50 mg daily due to Significant lowering of blood pressure.  .   6. Hyperlipidemia LDL goal <70  Continue Lipitor 40 mg daily.     Plan:   Decrease Losartan from 100 mg to 50 mg daily.  Continue other current medications.   FU in 6 MO with device check, sooner as needed.  Thank you for allowing us to participate in the care of your patient.     Scribed for Soraya Darling MD by Kareem Mccann. 8/21/2018  11:23 AM    I, Soraya Darling MD, personally performed the services described in this documentation as scribed by the above named individual in my presence, and it is both accurate and complete.  8/21/2018  12:13 PM        Please note that portions of this note may have been completed with a voice recognition program. Efforts were made to edit the dictations, but occasionally words are mistranscribed.

## 2018-10-25 ENCOUNTER — CLINICAL SUPPORT NO REQUIREMENTS (OUTPATIENT)
Dept: CARDIOLOGY | Facility: CLINIC | Age: 83
End: 2018-10-25

## 2018-10-25 DIAGNOSIS — R55 SYNCOPE AND COLLAPSE: ICD-10-CM

## 2018-10-25 PROCEDURE — 93296 REM INTERROG EVL PM/IDS: CPT | Performed by: INTERNAL MEDICINE

## 2018-10-25 PROCEDURE — 93294 REM INTERROG EVL PM/LDLS PM: CPT | Performed by: INTERNAL MEDICINE

## 2019-01-17 ENCOUNTER — TELEPHONE (OUTPATIENT)
Dept: CARDIOLOGY | Facility: CLINIC | Age: 84
End: 2019-01-17

## 2019-01-17 NOTE — TELEPHONE ENCOUNTER
Patient needs cardiac clearance for cyst removal from parathyroid.  She was last seen in our office on 8/21/2018.  They would like her to hold aspirin before the procedure.    Family states she was seen in [place] ER last week.  Will request records for review.

## 2019-01-24 ENCOUNTER — CLINICAL SUPPORT NO REQUIREMENTS (OUTPATIENT)
Dept: CARDIOLOGY | Facility: CLINIC | Age: 84
End: 2019-01-24

## 2019-01-24 DIAGNOSIS — I44.2 THIRD DEGREE AV BLOCK (HCC): ICD-10-CM

## 2019-01-24 PROBLEM — K44.9 HIATAL HERNIA: Status: ACTIVE | Noted: 2019-01-24

## 2019-01-24 PROCEDURE — 93294 REM INTERROG EVL PM/LDLS PM: CPT | Performed by: PHYSICIAN ASSISTANT

## 2019-01-24 PROCEDURE — 93296 REM INTERROG EVL PM/IDS: CPT | Performed by: PHYSICIAN ASSISTANT

## 2019-01-25 ENCOUNTER — HOSPITAL ENCOUNTER (OUTPATIENT)
Dept: CARDIOLOGY | Facility: HOSPITAL | Age: 84
Discharge: HOME | End: 2019-01-25
Attending: INTERNAL MEDICINE | Admitting: INTERNAL MEDICINE

## 2019-01-25 ENCOUNTER — OFFICE VISIT (OUTPATIENT)
Dept: CARDIOLOGY | Facility: CLINIC | Age: 84
End: 2019-01-25

## 2019-01-25 VITALS
SYSTOLIC BLOOD PRESSURE: 142 MMHG | HEART RATE: 61 BPM | HEIGHT: 66 IN | BODY MASS INDEX: 26.93 KG/M2 | DIASTOLIC BLOOD PRESSURE: 66 MMHG | OXYGEN SATURATION: 97 % | WEIGHT: 167.6 LBS

## 2019-01-25 VITALS — WEIGHT: 167 LBS | BODY MASS INDEX: 26.84 KG/M2 | HEIGHT: 66 IN

## 2019-01-25 DIAGNOSIS — I31.39 PERICARDIAL EFFUSION: ICD-10-CM

## 2019-01-25 DIAGNOSIS — I25.10 CORONARY ARTERY DISEASE INVOLVING NATIVE CORONARY ARTERY OF NATIVE HEART WITHOUT ANGINA PECTORIS: ICD-10-CM

## 2019-01-25 DIAGNOSIS — I10 ESSENTIAL HYPERTENSION: ICD-10-CM

## 2019-01-25 DIAGNOSIS — E78.5 HYPERLIPIDEMIA LDL GOAL <70: ICD-10-CM

## 2019-01-25 DIAGNOSIS — R55 SYNCOPE AND COLLAPSE: ICD-10-CM

## 2019-01-25 DIAGNOSIS — I48.0 PAROXYSMAL ATRIAL FIBRILLATION (HCC): Primary | ICD-10-CM

## 2019-01-25 LAB
BH CV ECHO MEAS - AI DEC SLOPE: 265.7 CM/SEC^2
BH CV ECHO MEAS - AI MAX PG: 91.4 MMHG
BH CV ECHO MEAS - AI MAX VEL: 478.1 CM/SEC
BH CV ECHO MEAS - AI P1/2T: 527 MSEC
BH CV ECHO MEAS - AO MAX PG (FULL): 17.3 MMHG
BH CV ECHO MEAS - AO MAX PG: 21.7 MMHG
BH CV ECHO MEAS - AO MEAN PG (FULL): 8.5 MMHG
BH CV ECHO MEAS - AO MEAN PG: 10.9 MMHG
BH CV ECHO MEAS - AO ROOT AREA (BSA CORRECTED): 1.5
BH CV ECHO MEAS - AO ROOT AREA: 5.9 CM^2
BH CV ECHO MEAS - AO ROOT DIAM: 2.7 CM
BH CV ECHO MEAS - AO V2 MAX: 232.7 CM/SEC
BH CV ECHO MEAS - AO V2 MEAN: 151.5 CM/SEC
BH CV ECHO MEAS - AO V2 VTI: 50.7 CM
BH CV ECHO MEAS - AVA(I,A): 2.1 CM^2
BH CV ECHO MEAS - AVA(I,D): 2.1 CM^2
BH CV ECHO MEAS - AVA(V,A): 2 CM^2
BH CV ECHO MEAS - AVA(V,D): 2 CM^2
BH CV ECHO MEAS - BSA(HAYCOCK): 1.9 M^2
BH CV ECHO MEAS - BSA: 1.9 M^2
BH CV ECHO MEAS - BZI_BMI: 27 KILOGRAMS/M^2
BH CV ECHO MEAS - BZI_METRIC_HEIGHT: 167.6 CM
BH CV ECHO MEAS - BZI_METRIC_WEIGHT: 75.8 KG
BH CV ECHO MEAS - EDV(CUBED): 68.1 ML
BH CV ECHO MEAS - EDV(MOD-SP2): 53 ML
BH CV ECHO MEAS - EDV(MOD-SP4): 75 ML
BH CV ECHO MEAS - EDV(TEICH): 73.5 ML
BH CV ECHO MEAS - EF(CUBED): 74.9 %
BH CV ECHO MEAS - EF(MOD-BP): 69 %
BH CV ECHO MEAS - EF(MOD-SP2): 75.5 %
BH CV ECHO MEAS - EF(MOD-SP4): 58.7 %
BH CV ECHO MEAS - EF(TEICH): 67.3 %
BH CV ECHO MEAS - ESV(CUBED): 17.1 ML
BH CV ECHO MEAS - ESV(MOD-SP2): 13 ML
BH CV ECHO MEAS - ESV(MOD-SP4): 31 ML
BH CV ECHO MEAS - ESV(TEICH): 24.1 ML
BH CV ECHO MEAS - FS: 36.9 %
BH CV ECHO MEAS - IVS/LVPW: 0.94
BH CV ECHO MEAS - IVSD: 1 CM
BH CV ECHO MEAS - LAD MAJOR: 6.6 CM
BH CV ECHO MEAS - LAT PEAK E' VEL: 4.3 CM/SEC
BH CV ECHO MEAS - LATERAL E/E' RATIO: 11.5
BH CV ECHO MEAS - LV DIASTOLIC VOL/BSA (35-75): 40.5 ML/M^2
BH CV ECHO MEAS - LV MASS(C)D: 144 GRAMS
BH CV ECHO MEAS - LV MASS(C)DI: 77.7 GRAMS/M^2
BH CV ECHO MEAS - LV MAX PG: 4.4 MMHG
BH CV ECHO MEAS - LV MEAN PG: 2.4 MMHG
BH CV ECHO MEAS - LV SYSTOLIC VOL/BSA (12-30): 16.7 ML/M^2
BH CV ECHO MEAS - LV V1 MAX: 104.8 CM/SEC
BH CV ECHO MEAS - LV V1 MEAN: 70.4 CM/SEC
BH CV ECHO MEAS - LV V1 VTI: 24 CM
BH CV ECHO MEAS - LVIDD: 4.1 CM
BH CV ECHO MEAS - LVIDS: 2.6 CM
BH CV ECHO MEAS - LVLD AP2: 6.7 CM
BH CV ECHO MEAS - LVLD AP4: 7 CM
BH CV ECHO MEAS - LVLS AP2: 6 CM
BH CV ECHO MEAS - LVLS AP4: 6.2 CM
BH CV ECHO MEAS - LVOT AREA (M): 4.5 CM^2
BH CV ECHO MEAS - LVOT AREA: 4.4 CM^2
BH CV ECHO MEAS - LVOT DIAM: 2.4 CM
BH CV ECHO MEAS - LVPWD: 1.1 CM
BH CV ECHO MEAS - MED PEAK E' VEL: 5.3 CM/SEC
BH CV ECHO MEAS - MEDIAL E/E' RATIO: 9.4
BH CV ECHO MEAS - MV A MAX VEL: 65.7 CM/SEC
BH CV ECHO MEAS - MV DEC TIME: 0.32 SEC
BH CV ECHO MEAS - MV E MAX VEL: 50.9 CM/SEC
BH CV ECHO MEAS - MV E/A: 0.77
BH CV ECHO MEAS - RAP SYSTOLE: 3 MMHG
BH CV ECHO MEAS - RVDD: 3 CM
BH CV ECHO MEAS - RVSP: 29 MMHG
BH CV ECHO MEAS - SI(AO): 161.4 ML/M^2
BH CV ECHO MEAS - SI(CUBED): 27.5 ML/M^2
BH CV ECHO MEAS - SI(LVOT): 57.3 ML/M^2
BH CV ECHO MEAS - SI(MOD-SP2): 21.6 ML/M^2
BH CV ECHO MEAS - SI(MOD-SP4): 23.8 ML/M^2
BH CV ECHO MEAS - SI(TEICH): 26.7 ML/M^2
BH CV ECHO MEAS - SV(AO): 299 ML
BH CV ECHO MEAS - SV(CUBED): 51 ML
BH CV ECHO MEAS - SV(LVOT): 106.2 ML
BH CV ECHO MEAS - SV(MOD-SP2): 40 ML
BH CV ECHO MEAS - SV(MOD-SP4): 44 ML
BH CV ECHO MEAS - SV(TEICH): 49.4 ML
BH CV ECHO MEAS - TAPSE (>1.6): 2.6 CM2
BH CV ECHO MEAS - TR MAX PG: 26 MMHG
BH CV ECHO MEAS - TR MAX VEL: 248.3 CM/SEC
BH CV ECHO MEASUREMENTS AVERAGE E/E' RATIO: 10.6
BH CV VAS BP LEFT ARM: NORMAL MMHG
BH CV XLRA - RV BASE: 4.2 CM
BH CV XLRA - RV LENGTH: 7.4 CM
BH CV XLRA - RV MID: 4.4 CM
BH CV XLRA - TDI S': 12.8 CM/SEC
LV EF 2D ECHO EST: 60 %

## 2019-01-25 PROCEDURE — 99214 OFFICE O/P EST MOD 30 MIN: CPT | Performed by: INTERNAL MEDICINE

## 2019-01-25 PROCEDURE — 93306 TTE W/DOPPLER COMPLETE: CPT | Performed by: INTERNAL MEDICINE

## 2019-01-25 PROCEDURE — 93306 TTE W/DOPPLER COMPLETE: CPT

## 2019-01-25 PROCEDURE — 93280 PM DEVICE PROGR EVAL DUAL: CPT | Performed by: INTERNAL MEDICINE

## 2019-01-25 RX ORDER — CARVEDILOL 12.5 MG/1
12.5 TABLET ORAL 2 TIMES DAILY WITH MEALS
Qty: 180 TABLET | Refills: 3 | Status: SHIPPED | OUTPATIENT
Start: 2019-01-25 | End: 2021-04-20

## 2019-01-25 RX ORDER — LOSARTAN POTASSIUM 100 MG/1
100 TABLET ORAL DAILY
Refills: 1 | COMMUNITY
Start: 2018-12-03 | End: 2019-06-21 | Stop reason: HOSPADM

## 2019-01-25 NOTE — PROGRESS NOTES
Encounter Date:01/25/2019      Patient ID: Abbi Mendez is a 83 y.o. female.    Chief Complaint: Atrial Fibrillation      PROBLEM LIST:  1. Syncope and collapse with evidence of advanced conduction system disease, high-grade AV block and multiple sinus pauses:  a. [employer] pacemaker implant January 18, 2018   b. Echo 1/18/2018: EF 55%. Left ventricular wall thickness is consistent with mild septal asymmetric hypertrophy. There is calcification of the aortic valve. There is a small (<1cm) pericardial effusion. There is no evidence of pericardial tamponade.  c. Echo 1/19/2018: Limited follow-up echocardiogram shows small, less than 1 cm circumferential pericardial effusion. There is no evidence of pericardial tamponade. On comparison with the study of January 18, 2018, no significant change in the size of effusion is noted.  2. Paroxysmal Atrial fibrillation, resolved, 03/30/2007.  a. Atenolol therapy.  b. Questionable recurrence of atrial fibrillation, 03/31/2015 via EMS, data deficit:  No confirmation via EMS strips or EKG.  Event recorder 06/09/2015-07/09/2015:  No atrial fibrillation or arrhythmias appreciated.   c. Event recorder (06/09/2015 - 07/09/2015): No atrial fibrillation; occasional isolated PACs; no ventricular ectopy.  d. Event recorder, 04/01/2016:  No atrial fibrillation; 4 runs of repetitive PACs, the longest consisting of 6 beats at 156 BPM; dyspnea, tachypalpitations and lightheadedness were not associated with rhythm disturbance.  3. Coronary artery disease:  a. Cardiac catheterization, 03/30/2007:  With 30-50% mid/distal LAD in “worst views” within region of extreme tortuosity, EF 70% post PVC without segmental wall motion abnormality; trace mitral regurgitation associated with ectopy; no mitral valve prolapse.  b. Nuclear stress test, 02/04/2010:  Negative for ischemia/scar; left ventricular ejection fraction (87%).  c. Nuclear stress test negative for ischemia and scar;  "normal LVEF, 04/10/2015.  4. History of left bundle branch block.   5. Obstructive sleep apnea:   a. Diagnosed by sleep study.   b. Currently on CPAP.    6. Hypertension.  7. Dyslipidemia.  8. History of LLE DVT x2 treated with Coumadin; Coumadin discontinued in 2002.  9. History of gastric ulcer.  10. Iron deficiency anemia.  11. Status post tubal ligation.    History of Present Illness  Patient presents today for 6 month follow-up with a history of syncope, PAF, CAD, and cardiac risk factors. Since last visit, she went to the hospital in [place] because she had a racing heart. She reports that \"they did not find anything\". She has had hypercalcemia and parathyroid issues for the past year and has been followed at . It was concluded that the culprit is her parathyroid, which will be removed by Dr. Blue Cage at . Denies chest pain, shortness of breath, leg swelling, palpitations, and syncope. Remains busy and active with personal care and household activities without limitations.  States that her blood pressure was high in the dose of Coreg was increased by PCP.  She did log of her blood pressure readings and this has been running 140s to 150s.  She is requesting clearance for parathyroid surgery.    Allergies   Allergen Reactions   • Darvon [Propoxyphene] Paresthesia   • Penicillins Rash         Current Outpatient Medications:   •  aspirin 81 MG EC tablet, Take 81 mg by mouth Daily., Disp: , Rfl:   •  atorvastatin (LIPITOR) 40 MG tablet, Take 40 mg by mouth Every Night., Disp: , Rfl:   •  busPIRone (BUSPAR) 10 MG tablet, Take 10 mg by mouth 2 (Two) Times a Day., Disp: , Rfl:   •  carvedilol (COREG) 3.125 MG tablet, Take 1 tablet by mouth 2 (Two) Times a Day With Meals. (Patient taking differently: Take 6.25 mg by mouth 2 (Two) Times a Day With Meals.), Disp: 180 tablet, Rfl: 1  •  citalopram (CeleXA) 20 MG tablet, TAKE 1 TABLET EVERY DAY, Disp: 90 tablet, Rfl: 1  •  esomeprazole (nexIUM) 40 MG capsule, Take 40 " "mg by mouth Every Morning Before Breakfast., Disp: , Rfl:   •  ferrous sulfate 325 (65 FE) MG tablet, Take 325 mg by mouth 2 (Two) Times a Day., Disp: , Rfl:   •  losartan (COZAAR) 100 MG tablet, Take 100 mg by mouth Daily., Disp: , Rfl: 1  •  nitroglycerin (NITROSTAT) 0.3 MG SL tablet, , Disp: , Rfl:     The following portions of the patient's history were reviewed and updated as appropriate: allergies, current medications, past family history, past medical history, past social history, past surgical history and problem list.    ROS  Review of Systems   Constitution: Negative for chills, fever, weight gain and weight loss.   Cardiovascular: Negative for chest pain, claudication, dyspnea on exertion, leg swelling, orthopnea, palpitations, paroxysmal nocturnal dyspnea and syncope.        No dizziness   Gastrointestinal: Negative for abdominal pain, constipation, diarrhea, nausea and vomiting.   Genitourinary:        No urinary symptoms   Neurological:        No symptoms of stroke.   All other systems reviewed and are negative.    Objective:     Blood pressure 142/66, pulse 61, height 167.6 cm (66\"), weight 76 kg (167 lb 9.6 oz), SpO2 97 %.        Physical Exam  Constitutional: She appears well-developed and well-nourished.   HENT:   HEENT exam unremarkable.   Neck: Neck supple. No JVD present.   No carotid bruits.   Cardiovascular: Normal rate, regular rhythm and normal heart sounds.    No murmur heard.  2 plus symmetric pulses.   Pulmonary/Chest: Breath sounds normal. Does not exhibit tenderness.   Abdominal:   Abdomen benign.   Musculoskeletal: Does not exhibit edema.   Neurological:   Neurological exam unremarkable.   Vitals reviewed.    Lab Review:   Lab Results   Component Value Date    GLUCOSE 96 06/21/2018    BUN 17 06/21/2018    CREATININE 1.02 06/21/2018    EGFRIFNONA 52 (L) 06/21/2018    BCR 16.7 06/21/2018    K 4.4 06/21/2018    CO2 31.0 06/21/2018    CALCIUM 11.3 (H) 06/21/2018    CALCIUM 11.8 (H) " 06/21/2018    PROTENTOTREF 5.8 (L) 04/18/2018    ALBUMIN 4.24 06/21/2018    LABIL2 1.1 04/18/2018    AST 20 06/21/2018    ALT 16 06/21/2018     Lab Results   Component Value Date    WBC 6.74 04/20/2018    HGB 12.0 04/20/2018    HCT 37.0 04/20/2018    MCV 91.8 04/20/2018     04/20/2018         ECG 12 Lead  Date/Time: 1/25/2019 1:30 PM  Performed by: Soraya Darling MD  Authorized by: Soraya Darling MD   Comparison: not compared with previous ECG   Previous ECG: no previous ECG available  Rhythm: paced  Comments: AV paced rhythm.             Device interrogation, 01/25/19: Normal [employer] pacemaker function. No events recorded. 7.5 years battery life left.  Increased PVARP eliminate PMT        Assessment:      Diagnosis Plan   1. Paroxysmal atrial fibrillation (CMS/HCC)  Stable, currently in rhythm. oh recurrences.     2. Coronary artery disease involving native coronary artery of native heart without angina pectoris  Stable, continue current medications.   3. Syncope and collapse  Normal device function, 7.5 years battery life left.   4. Essential hypertension  Increase carvedilol from 6.25 to 12.5 mg BID for better control. Continue all other current medications,   5. Hyperlipidemia LDL goal <70  Continue atorvastatin 40 mg.     Plan:   Increase carvedilol from 6.25 to 12.5 mg BID for better control of hypertension.  Echocardiogram to follow-up on pericardial effusion, if this remains acceptable we will clear her for surgery.  Stable cardiac status.  Continue current medications.   FU in 6 MO with same-day device check, sooner as needed.  Thank you for allowing us to participate in the care of your patient.     Scribed for Soraya Darling MD by Janki Ga. 1/25/2019  1:15 PM      ISoraya MD, personally performed the services described in this documentation as scribed by the above named individual in my presence, and it is both accurate and complete.  1/25/2019  1:34 PM        Please note that  portions of this note may have been completed with a voice recognition program. Efforts were made to edit the dictations, but occasionally words are mistranscribed.

## 2019-01-29 ENCOUNTER — TELEPHONE (OUTPATIENT)
Dept: CARDIOLOGY | Facility: CLINIC | Age: 84
End: 2019-01-29

## 2019-01-29 NOTE — TELEPHONE ENCOUNTER
Cleared for surgery, no need for echo on FU, she should FU at [facility] in 6 MO with device check

## 2019-01-29 NOTE — TELEPHONE ENCOUNTER
PT seen last week and had an echo- ok to go ahead and clear for parathyroidectomy? And does she need another echo when she returns in 1 year?

## 2019-02-01 ENCOUNTER — OFFICE VISIT (OUTPATIENT)
Dept: ONCOLOGY | Facility: CLINIC | Age: 84
End: 2019-02-01

## 2019-02-01 ENCOUNTER — LAB (OUTPATIENT)
Dept: LAB | Facility: HOSPITAL | Age: 84
End: 2019-02-01

## 2019-02-01 ENCOUNTER — TELEPHONE (OUTPATIENT)
Dept: ONCOLOGY | Facility: CLINIC | Age: 84
End: 2019-02-01

## 2019-02-01 VITALS
RESPIRATION RATE: 16 BRPM | HEIGHT: 66 IN | HEART RATE: 63 BPM | WEIGHT: 167 LBS | BODY MASS INDEX: 26.84 KG/M2 | OXYGEN SATURATION: 94 % | SYSTOLIC BLOOD PRESSURE: 145 MMHG | TEMPERATURE: 97.8 F | DIASTOLIC BLOOD PRESSURE: 69 MMHG

## 2019-02-01 DIAGNOSIS — R76.8 ELEVATED SERUM IMMUNOGLOBULIN FREE LIGHT CHAINS: ICD-10-CM

## 2019-02-01 DIAGNOSIS — R77.8 ABNORMAL SERUM PROTEIN ELECTROPHORESIS: ICD-10-CM

## 2019-02-01 DIAGNOSIS — E83.52 HYPERCALCEMIA: Primary | Chronic | ICD-10-CM

## 2019-02-01 DIAGNOSIS — E83.52 HYPERCALCEMIA: ICD-10-CM

## 2019-02-01 LAB
ALBUMIN SERPL-MCNC: 4.14 G/DL (ref 3.2–4.8)
ALBUMIN/GLOB SERPL: 1.8 G/DL (ref 1.5–2.5)
ALP SERPL-CCNC: 82 U/L (ref 25–100)
ALT SERPL W P-5'-P-CCNC: 19 U/L (ref 7–40)
ANION GAP SERPL CALCULATED.3IONS-SCNC: 5 MMOL/L (ref 3–11)
AST SERPL-CCNC: 22 U/L (ref 0–33)
BILIRUB SERPL-MCNC: 0.4 MG/DL (ref 0.3–1.2)
BUN BLD-MCNC: 19 MG/DL (ref 9–23)
BUN/CREAT SERPL: 17.9 (ref 7–25)
CA-I SERPL ISE-MCNC: 1.59 MMOL/L (ref 1.12–1.32)
CALCIUM SPEC-SCNC: 10.6 MG/DL (ref 8.7–10.4)
CHLORIDE SERPL-SCNC: 105 MMOL/L (ref 99–109)
CO2 SERPL-SCNC: 30 MMOL/L (ref 20–31)
CREAT BLD-MCNC: 1.06 MG/DL (ref 0.6–1.3)
CRP SERPL-MCNC: 0.02 MG/DL (ref 0–1)
ERYTHROCYTE [DISTWIDTH] IN BLOOD BY AUTOMATED COUNT: 12.8 % (ref 11.3–14.5)
ERYTHROCYTE [SEDIMENTATION RATE] IN BLOOD: 16 MM/HR (ref 0–30)
GFR SERPL CREATININE-BSD FRML MDRD: 50 ML/MIN/1.73
GLOBULIN UR ELPH-MCNC: 2.3 GM/DL
GLUCOSE BLD-MCNC: 107 MG/DL (ref 70–100)
HCT VFR BLD AUTO: 43.3 % (ref 34.5–44)
HGB BLD-MCNC: 14.6 G/DL (ref 11.5–15.5)
LYMPHOCYTES # BLD AUTO: 1.8 10*3/MM3 (ref 0.6–4.8)
LYMPHOCYTES NFR BLD AUTO: 25.2 % (ref 24–44)
MCH RBC QN AUTO: 31.7 PG (ref 27–31)
MCHC RBC AUTO-ENTMCNC: 33.7 G/DL (ref 32–36)
MCV RBC AUTO: 94.3 FL (ref 80–99)
MONOCYTES # BLD AUTO: 0.5 10*3/MM3 (ref 0–1)
MONOCYTES NFR BLD AUTO: 6.4 % (ref 0–12)
NEUTROPHILS # BLD AUTO: 4.9 10*3/MM3 (ref 1.5–8.3)
NEUTROPHILS NFR BLD AUTO: 68.4 % (ref 41–71)
PLATELET # BLD AUTO: 237 10*3/MM3 (ref 150–450)
PMV BLD AUTO: 8.5 FL (ref 6–12)
POTASSIUM BLD-SCNC: 4.2 MMOL/L (ref 3.5–5.5)
PROT SERPL-MCNC: 6.4 G/DL (ref 5.7–8.2)
RBC # BLD AUTO: 4.59 10*6/MM3 (ref 3.89–5.14)
SODIUM BLD-SCNC: 140 MMOL/L (ref 132–146)
WBC NRBC COR # BLD: 7.1 10*3/MM3 (ref 3.5–10.8)

## 2019-02-01 PROCEDURE — 86140 C-REACTIVE PROTEIN: CPT

## 2019-02-01 PROCEDURE — 99213 OFFICE O/P EST LOW 20 MIN: CPT | Performed by: NURSE PRACTITIONER

## 2019-02-01 PROCEDURE — 83970 ASSAY OF PARATHORMONE: CPT

## 2019-02-01 PROCEDURE — 86334 IMMUNOFIX E-PHORESIS SERUM: CPT

## 2019-02-01 PROCEDURE — 80053 COMPREHEN METABOLIC PANEL: CPT

## 2019-02-01 PROCEDURE — 82330 ASSAY OF CALCIUM: CPT

## 2019-02-01 PROCEDURE — 85652 RBC SED RATE AUTOMATED: CPT

## 2019-02-01 PROCEDURE — 83883 ASSAY NEPHELOMETRY NOT SPEC: CPT

## 2019-02-01 PROCEDURE — 36415 COLL VENOUS BLD VENIPUNCTURE: CPT

## 2019-02-01 PROCEDURE — 82232 ASSAY OF BETA-2 PROTEIN: CPT

## 2019-02-01 PROCEDURE — 85025 COMPLETE CBC W/AUTO DIFF WBC: CPT

## 2019-02-01 PROCEDURE — 82784 ASSAY IGA/IGD/IGG/IGM EACH: CPT

## 2019-02-01 PROCEDURE — 84165 PROTEIN E-PHORESIS SERUM: CPT

## 2019-02-01 NOTE — PROGRESS NOTES
CHIEF COMPLAINT: Cryptogenic hypercalcemia    Problem List:  Oncology/Hematology History    1.)Hypercalcemia with elevated intact parathyroid hormone level of 107 upper limit of normal A on 4/18/18:  Monoclonal gammopathy with hypercalcemia found at the time of workup for cholecystitis with poorly functioning gallbladder.  Apparently has monoclonal gammopathy but the results and the data from outside [facility] are not available to me at time of dictation.  She does have hypercalcemia with elevated ionized calcium but not a significant elevation of her total calcium.  No renal dysfunction of significance.  Has a history of iron deficiency but presently counts are acceptable.  Her total protein and ratio of total protein to albumin are normal.She has a history of coronary disease with permanent pacing and paroxysmal atrial fibrillation.  History of syncope and T12 compression fracture apparently.  -4/20/18 Ionized calcium 1.62 with normal total calcium of 10.4 and GFR of 60 with creatinine 0.9 and normal CBC.  Bone marrow was 40% cellular with normal hematopoiesis and no increase in plasma cells.  There was no clonal B or T-cell population on flow cytometry and no increase in plasma cells on flow cytometry.  Normal cytogenetics.  No tumor or granuloma.  Stainable iron present.  Parathyroid related protein was less than 2.  Ionized calcium was up to 1.68 with calcium of 11.6 and a normal albumin.  Repeat serum immunoelectrophoresis showed 200 mg/dL monoclonal protein.  Bone survey done in [place] during her initial admission there in April 18 showed no lytic bone disease according to the hospital record.  -5/21/18: CT chest abdomen and pelvis negative.  -6/21/18:  24-hour urine showed no monoclonal protein in June 2018.  Calcium was 11.8 with a creatinine of 1.  Intact parathyroid hormone level was normal at 51 at that time with a stable ionized calcium 1.63.  There was a balanced increase in kappa and lambda light  chains of 35 and 40 respectively.          Hypercalcemia    4/30/2018 Initial Diagnosis     Monoclonal gammopathy          HISTORY OF PRESENT ILLNESS:  The patient is a 83 y.o. female, here for follow up on management of cryptogenic hypercalcemia.  Feeling fairly fit with no new complaints.  States she has seen endocrinology and subsequently was referred to Dr. Blue Cage who is going to be doing surgery to remove her parathyroid.  She did fall at home a few weeks ago, states she was feeling fine, just tripped on her carpet, did bump her head.  She was seen and evaluated in the ER.  Has a significant left black eye.        Past Medical History:   Diagnosis Date   • Anxiety    • Arthritis    • Atrial fibrillation (CMS/HCC) 11/14/2016   • Gastric ulcer    • Hiatal hernia    • Iron deficiency anemia    • RADHA (obstructive sleep apnea) 11/14/2016     Past Surgical History:   Procedure Laterality Date   • CARDIAC ELECTROPHYSIOLOGY PROCEDURE N/A 1/18/2018    Procedure: Pacemaker DC new;  Surgeon: Soraya Darling MD;  Location: [facility] INVASIVE LOCATION;  Service:    • TOTAL KNEE ARTHROPLASTY Right    • TUBAL ABDOMINAL LIGATION         Allergies   Allergen Reactions   • Darvon [Propoxyphene] Paresthesia   • Penicillins Rash       Family History and Social History reviewed and changed as necessary      REVIEW OF SYSTEM:   Review of Systems   Constitutional: Negative for appetite change, chills, diaphoresis, fatigue, fever and unexpected weight change.   HENT:   Negative for mouth sores, sore throat and trouble swallowing.    Eyes: Negative for icterus.   Respiratory: Negative for cough, hemoptysis and shortness of breath.    Cardiovascular: Negative for chest pain, leg swelling and palpitations.   Gastrointestinal: Negative for abdominal distention, abdominal pain, blood in stool, constipation, diarrhea, nausea and vomiting.   Endocrine: Negative for hot flashes.   Genitourinary: Negative for bladder incontinence,  "difficulty urinating, dysuria, frequency and hematuria.    Musculoskeletal: Negative for gait problem, neck pain and neck stiffness.   Skin: Negative for rash.   Neurological: Negative for dizziness, gait problem, headaches, light-headedness and numbness.   Hematological: Negative for adenopathy. Does not bruise/bleed easily.   Psychiatric/Behavioral: Negative for depression. The patient is not nervous/anxious.    All other systems reviewed and are negative.       PHYSICAL EXAM    Vitals:    02/01/19 1116   BP: 145/69   Pulse: 63   Resp: 16   Temp: 97.8 °F (36.6 °C)   SpO2: 94%   Weight: 75.8 kg (167 lb)   Height: 167.6 cm (66\")     Constitutional: Appears well-developed and well-nourished. No distress.   ECOG: (1) Restricted in physically strenuous activity, ambulatory and able to do work of light nature  HENT:   Head: Normocephalic.   Mouth/Throat: Oropharynx is clear and moist.   Eyes: Conjunctivae are normal. Pupils are equal, round, and reactive. No scleral icterus.   Neck: Neck supple. No JVD present.    Cardiovascular: Normal rate, regular rhythm and normal heart sounds.    Pulmonary/Chest: Breath sounds normal. No respiratory distress.   Abdominal: Soft. Exhibits no distension. There is no tenderness.   Musculoskeletal:Exhibits no edema, tenderness or deformity.   Neurological: Alert and oriented to person, place, and time. Exhibits normal muscle tone.   Skin: significant ecchymosis around her left eye from recent trauma, no petechiae and no rash noted. Not diaphoretic. No cyanosis.    Psychiatric: Normal mood and affect.   Vitals reviewed.      No radiology results for the last 7 days          ASSESSMENT & PLAN:    1. Hypercalcemia with normal intact parathyroid hormone level and parathyroid related protein  2. MGUS: Balanced elevation of kappa and lambda light chains likely reactive with no urine monoclonal gammopathy and 200 mg/dL of monoclonal protein on the serum immunoelectrophoresis.  Negative bone " survey and bone marrow biopsy for monoclonal gammopathy    Discussion: Labs from October reviewed and other than for stable elevated calcium were unremarkable.  Myeloma panel from today is pending.  Previous bone marrow biopsy and bone survey were negative.  I suspect the tiny elevation of monoclonal protein in the serum and the balanced kappa and lambda light chains are likely reactive in nature.  I will call her with the results of today's myeloma panel.  Assuming everything is stable we will see her back in 6 months with repeat myeloma panel.  She is getting scheduled to have surgery on her parathyroid with Dr. Blue Cage in the near future.      I spent 15 minutes with the patient. I spent > 50% percent of this time counseling and discussing prognosis, diagnostic testing, evaluation, current status and management.    Tiffany Coyne, APRN    02/01/2019

## 2019-02-01 NOTE — TELEPHONE ENCOUNTER
----- Message from Raeann Villela RN sent at 2/1/2019  1:21 PM EST -----      Critical Test Results      MD: Omero    Date: 2/1/2019     Critical test result: ION Ca 1.59    Time results received: 12:20

## 2019-02-01 NOTE — TELEPHONE ENCOUNTER
Name of Physician notified: ANGELO Baxter    Time Physician Notified: 13:25      []  Orders received    []  Protocol/Standing orders followed    [x]  No new orders

## 2019-02-02 LAB
CALCIUM SERPL-MCNC: 11.3 MG/DL (ref 8.7–10.3)
INTACT PTH: ABNORMAL
PTH-INTACT SERPL-MCNC: 43 PG/ML (ref 15–65)

## 2019-02-03 LAB
KAPPA LC SERPL-MCNC: 34.4 MG/L (ref 3.3–19.4)
KAPPA LC/LAMBDA SER: 1.03 {RATIO} (ref 0.26–1.65)
LAMBDA LC FREE SERPL-MCNC: 33.4 MG/L (ref 5.7–26.3)

## 2019-02-04 LAB
ALBUMIN SERPL-MCNC: 3.8 G/DL (ref 2.9–4.4)
ALBUMIN/GLOB SERPL: 1.3 {RATIO} (ref 0.7–1.7)
ALPHA1 GLOB FLD ELPH-MCNC: 0.2 G/DL (ref 0–0.4)
ALPHA2 GLOB SERPL ELPH-MCNC: 0.8 G/DL (ref 0.4–1)
B-GLOBULIN SERPL ELPH-MCNC: 0.9 G/DL (ref 0.7–1.3)
B2 MICROGLOB SERPL-MCNC: 2.6 MG/L (ref 0.6–2.4)
GAMMA GLOB SERPL ELPH-MCNC: 1.1 G/DL (ref 0.4–1.8)
GLOBULIN SER CALC-MCNC: 3.1 G/DL (ref 2.2–3.9)
IGA SERPL-MCNC: 309 MG/DL (ref 64–422)
IGG SERPL-MCNC: 861 MG/DL (ref 700–1600)
IGM SERPL-MCNC: 381 MG/DL (ref 26–217)
INTERPRETATION SERPL IEP-IMP: ABNORMAL
Lab: ABNORMAL
M-SPIKE: 0.5 G/DL
PROT SERPL-MCNC: 6.9 G/DL (ref 6–8.5)

## 2019-04-26 ENCOUNTER — CLINICAL SUPPORT NO REQUIREMENTS (OUTPATIENT)
Dept: CARDIOLOGY | Facility: CLINIC | Age: 84
End: 2019-04-26

## 2019-04-26 DIAGNOSIS — R00.1 SEVERE SINUS BRADYCARDIA: ICD-10-CM

## 2019-04-26 PROCEDURE — 93296 REM INTERROG EVL PM/IDS: CPT | Performed by: PHYSICIAN ASSISTANT

## 2019-04-26 PROCEDURE — 93294 REM INTERROG EVL PM/LDLS PM: CPT | Performed by: PHYSICIAN ASSISTANT

## 2019-05-28 ENCOUNTER — OFFICE VISIT (OUTPATIENT)
Dept: ORTHOPEDIC SURGERY | Facility: CLINIC | Age: 84
End: 2019-05-28

## 2019-05-28 VITALS
HEIGHT: 66 IN | SYSTOLIC BLOOD PRESSURE: 120 MMHG | DIASTOLIC BLOOD PRESSURE: 69 MMHG | WEIGHT: 172 LBS | HEART RATE: 68 BPM | BODY MASS INDEX: 27.64 KG/M2

## 2019-05-28 DIAGNOSIS — S63.501A SPRAIN OF RIGHT WRIST, INITIAL ENCOUNTER: Primary | ICD-10-CM

## 2019-05-28 PROCEDURE — 99213 OFFICE O/P EST LOW 20 MIN: CPT | Performed by: PHYSICIAN ASSISTANT

## 2019-05-28 RX ORDER — AMLODIPINE BESYLATE 10 MG/1
10 TABLET ORAL
COMMUNITY
End: 2019-06-05

## 2019-05-28 RX ORDER — TRIAMCINOLONE ACETONIDE 1 MG/G
CREAM TOPICAL
Refills: 5 | COMMUNITY
Start: 2019-04-23 | End: 2019-06-05

## 2019-05-28 NOTE — PROGRESS NOTES
New Patient Visit        Patient: Abbi Mendez  YOB: 1935  Date of encounter: 5/28/2019    Chief Complaint   Patient presents with   • Right Hand - Pain       History of Present Illness:   Abbi Mendez is a 83 y.o. female who was referred here today by [facility] care for evaluation of acute injury to the right hand and wrist.  She is accompanied by her daughter but she gives all the history.  She states she was walking through her daughter's house and she hit the hand on the wall while going to the hallway.  She states that she developed pain across the top of her hand.  She then noticed that she began developed some swelling and increased pain with range of motion of the digits.  She denied paresthesias.  She states she does have some mild pain in the wrist prior to this but states she does have known arthritis throughout her fingers and wrist.    PMH:   Patient Active Problem List   Diagnosis   • Atrial fibrillation (CMS/HCC)   • Coronary artery disease involving native coronary artery of native heart without angina pectoris   • RADHA (obstructive sleep apnea)   • Essential hypertension   • Hyperlipidemia LDL goal <70   • Iron deficiency anemia   • Left bundle branch block   • Syncope and collapse   • Severe sinus bradycardia   • Third degree AV block (CMS/HCC)   • Syncope   • Compression deformity of vertebra   • Radiculopathy   • Pericardial effusion without cardiac tamponade   • Hypercalcemia   • Abnormal serum protein electrophoresis   • Weight loss, unintentional   • Primary hyperparathyroidism (CMS/HCC)   • Hypercalcemia   • Pacemaker   • Hiatal hernia   • Elevated serum immunoglobulin free light chains     Past Medical History:   Diagnosis Date   • Anxiety    • Arthritis    • Atrial fibrillation (CMS/HCC) 11/14/2016   • Gastric ulcer    • Hiatal hernia    • Hypertension    • Iron deficiency anemia    • RADHA (obstructive sleep apnea) 11/14/2016       PSH:  Past Surgical History:   Procedure  Laterality Date   • CARDIAC ELECTROPHYSIOLOGY PROCEDURE N/A 1/18/2018    Procedure: Pacemaker DC new;  Surgeon: Soraya Darling MD;  Location: [facility] INVASIVE LOCATION;  Service:    • TOTAL KNEE ARTHROPLASTY Right    • TUBAL ABDOMINAL LIGATION         Allergies:     Allergies   Allergen Reactions   • Darvon [Propoxyphene] Paresthesia   • Penicillins Rash       Medications:     Current Outpatient Medications:   •  amLODIPine (NORVASC) 10 MG tablet, Take 10 mg by mouth., Disp: , Rfl:   •  aspirin 81 MG EC tablet, Take 81 mg by mouth Daily., Disp: , Rfl:   •  atorvastatin (LIPITOR) 40 MG tablet, Take 40 mg by mouth Every Night., Disp: , Rfl:   •  busPIRone (BUSPAR) 10 MG tablet, Take 10 mg by mouth 2 (Two) Times a Day., Disp: , Rfl:   •  carvedilol (COREG) 12.5 MG tablet, Take 1 tablet by mouth 2 (Two) Times a Day With Meals., Disp: 180 tablet, Rfl: 3  •  citalopram (CeleXA) 20 MG tablet, TAKE 1 TABLET EVERY DAY, Disp: 90 tablet, Rfl: 1  •  esomeprazole (nexIUM) 40 MG capsule, Take 40 mg by mouth Every Morning Before Breakfast., Disp: , Rfl:   •  ferrous sulfate 325 (65 FE) MG tablet, Take 325 mg by mouth 2 (Two) Times a Day., Disp: , Rfl:   •  losartan (COZAAR) 100 MG tablet, Take 100 mg by mouth Daily., Disp: , Rfl: 1  •  nitroglycerin (NITROSTAT) 0.3 MG SL tablet, , Disp: , Rfl:   •  triamcinolone (KENALOG) 0.1 % cream, MIX 80 GRAMS WITH 453 GRAMS OF CETAPHIL CREAM AND APPLY TO FEET ONCE DAILY, Disp: , Rfl: 5    Social History:  Social History     Socioeconomic History   • Marital status: Single     Spouse name: Not on file   • Number of children: Not on file   • Years of education: Not on file   • Highest education level: Not on file   Tobacco Use   • Smoking status: Never Smoker   • Smokeless tobacco: Never Used   Substance and Sexual Activity   • Alcohol use: No   • Drug use: No   • Sexual activity: Not Currently     Partners: Male   Social History Narrative         seven children        Family  "History:     Family History   Problem Relation Age of Onset   • Heart attack Mother 69   • Heart disease Mother    • Heart disease Sister    • Heart attack Brother 60   • Heart attack Brother 45   • Lymphoma Brother    • Heart disease Daughter    • Diabetes Son    • Heart disease Daughter    • Heart disease Daughter    • Diabetes Daughter        Review of Systems:   Review of Systems   Constitutional: Positive for fatigue and fever.   HENT: Positive for hearing loss and sinus pressure.    Eyes: Negative.    Respiratory: Positive for cough.    Cardiovascular: Positive for leg swelling.   Gastrointestinal: Negative.    Endocrine: Negative.    Genitourinary: Negative.    Musculoskeletal: Positive for arthralgias and joint swelling.   Skin: Negative.    Neurological: Negative.    Hematological: Negative.    Psychiatric/Behavioral: The patient is nervous/anxious.        Physical Exam: 83 y.o. female  General Appearance:    Alert and oriented x 3, cooperative, in no acute distress                   Vitals:    05/28/19 1350   BP: 120/69   Pulse: 68   Weight: 78 kg (172 lb)   Height: 167.6 cm (66\")              Body mass index is 27.76 kg/m².        Musculoskeletal: Examination of the right hand and wrist reveals minimal swelling through the digits.  She has no point tenderness on exam.  With the exception of some mild tenderness along the distal radial ulnar joint.  She has limited motion.  Her neurovascular status is intact.    Radiology:     Radiographs of the right hand and wrist from outside facility were reviewed revealing degenerative changes throughout the wrist and digits.  She also has a very small avulsion off of the lunate on the lateral view.    Assessment    ICD-10-CM ICD-9-CM   1. Sprain of right wrist, initial encounter S63.501A 842.00       Plan:  83-year-old female with acute injury to the right wrist.  I reviewed radiographs of the right wrist and right hand from outside facility revealing moderate to " degenerative arthritis throughout the wrist and IP joints.  She also has a small avulsion along the lunate on the lateral view.  She has no point tenderness here though or throughout the rest of her wrist.  She is likely either strained the wrist or aggravated her arthritis with a recent injury.  Either way I will provide her today with a cock-up wrist brace.  I do encourage that she removes this and begin gentle range of motion of the wrist and hand.  To see her back in a month for evaluation.  X-rays are not necessary at her follow-up.    Kim Jorge PA-C

## 2019-06-04 ENCOUNTER — APPOINTMENT (OUTPATIENT)
Dept: GENERAL RADIOLOGY | Facility: HOSPITAL | Age: 84
End: 2019-06-04

## 2019-06-04 ENCOUNTER — HOSPITAL ENCOUNTER (INPATIENT)
Facility: HOSPITAL | Age: 84
LOS: 8 days | Discharge: NURSING, REHAB OR LONG-TERM CARE | End: 2019-06-13
Attending: FAMILY MEDICINE | Admitting: ORTHOPAEDIC SURGERY

## 2019-06-04 DIAGNOSIS — S46.911A STRAIN OF RIGHT SHOULDER, INITIAL ENCOUNTER: ICD-10-CM

## 2019-06-04 DIAGNOSIS — E87.6 HYPOKALEMIA: ICD-10-CM

## 2019-06-04 DIAGNOSIS — S72.002A LEFT DISPLACED FEMORAL NECK FRACTURE (HCC): Primary | ICD-10-CM

## 2019-06-04 LAB
ALBUMIN SERPL-MCNC: 3.17 G/DL (ref 3.5–5.2)
ALBUMIN/GLOB SERPL: 0.7 G/DL
ALP SERPL-CCNC: 75 U/L (ref 39–117)
ALT SERPL W P-5'-P-CCNC: 20 U/L (ref 1–33)
ANION GAP SERPL CALCULATED.3IONS-SCNC: 15.6 MMOL/L
APTT PPP: 27.9 SECONDS (ref 23.8–36.1)
AST SERPL-CCNC: 28 U/L (ref 1–32)
BASOPHILS # BLD AUTO: 0.01 10*3/MM3 (ref 0–0.2)
BASOPHILS NFR BLD AUTO: 0.1 % (ref 0–1.5)
BILIRUB SERPL-MCNC: 0.5 MG/DL (ref 0.2–1.2)
BUN BLD-MCNC: 17 MG/DL (ref 8–23)
BUN/CREAT SERPL: 16.2 (ref 7–25)
CALCIUM SPEC-SCNC: 8.2 MG/DL (ref 8.6–10.5)
CHLORIDE SERPL-SCNC: 98 MMOL/L (ref 98–107)
CO2 SERPL-SCNC: 25.4 MMOL/L (ref 22–29)
CREAT BLD-MCNC: 1.05 MG/DL (ref 0.57–1)
DEPRECATED RDW RBC AUTO: 41.2 FL (ref 37–54)
EOSINOPHIL # BLD AUTO: 0 10*3/MM3 (ref 0–0.4)
EOSINOPHIL NFR BLD AUTO: 0 % (ref 0.3–6.2)
ERYTHROCYTE [DISTWIDTH] IN BLOOD BY AUTOMATED COUNT: 12.3 % (ref 12.3–15.4)
GFR SERPL CREATININE-BSD FRML MDRD: 50 ML/MIN/1.73
GLOBULIN UR ELPH-MCNC: 4.3 GM/DL
GLUCOSE BLD-MCNC: 155 MG/DL (ref 65–99)
HCT VFR BLD AUTO: 36.5 % (ref 34–46.6)
HGB BLD-MCNC: 11.7 G/DL (ref 12–15.9)
IMM GRANULOCYTES # BLD AUTO: 0.1 10*3/MM3 (ref 0–0.05)
IMM GRANULOCYTES NFR BLD AUTO: 0.6 % (ref 0–0.5)
INR PPP: 1.19 (ref 0.9–1.1)
LYMPHOCYTES # BLD AUTO: 0.95 10*3/MM3 (ref 0.7–3.1)
LYMPHOCYTES NFR BLD AUTO: 5.3 % (ref 19.6–45.3)
MCH RBC QN AUTO: 30.2 PG (ref 26.6–33)
MCHC RBC AUTO-ENTMCNC: 32.1 G/DL (ref 31.5–35.7)
MCV RBC AUTO: 94.1 FL (ref 79–97)
MONOCYTES # BLD AUTO: 1.08 10*3/MM3 (ref 0.1–0.9)
MONOCYTES NFR BLD AUTO: 6.1 % (ref 5–12)
NEUTROPHILS # BLD AUTO: 15.66 10*3/MM3 (ref 1.7–7)
NEUTROPHILS NFR BLD AUTO: 87.9 % (ref 42.7–76)
PLATELET # BLD AUTO: 354 10*3/MM3 (ref 140–450)
PMV BLD AUTO: 10.2 FL (ref 6–12)
POTASSIUM BLD-SCNC: 3.2 MMOL/L (ref 3.5–5.2)
PROT SERPL-MCNC: 7.5 G/DL (ref 6–8.5)
PROTHROMBIN TIME: 15.7 SECONDS (ref 11–15.4)
RBC # BLD AUTO: 3.88 10*6/MM3 (ref 3.77–5.28)
SODIUM BLD-SCNC: 139 MMOL/L (ref 136–145)
WBC NRBC COR # BLD: 17.8 10*3/MM3 (ref 3.4–10.8)

## 2019-06-04 PROCEDURE — 71045 X-RAY EXAM CHEST 1 VIEW: CPT | Performed by: RADIOLOGY

## 2019-06-04 PROCEDURE — 86901 BLOOD TYPING SEROLOGIC RH(D): CPT | Performed by: PHYSICIAN ASSISTANT

## 2019-06-04 PROCEDURE — 51702 INSERT TEMP BLADDER CATH: CPT

## 2019-06-04 PROCEDURE — 99285 EMERGENCY DEPT VISIT HI MDM: CPT

## 2019-06-04 PROCEDURE — 93005 ELECTROCARDIOGRAM TRACING: CPT | Performed by: PHYSICIAN ASSISTANT

## 2019-06-04 PROCEDURE — 73110 X-RAY EXAM OF WRIST: CPT | Performed by: RADIOLOGY

## 2019-06-04 PROCEDURE — 71045 X-RAY EXAM CHEST 1 VIEW: CPT

## 2019-06-04 PROCEDURE — 73502 X-RAY EXAM HIP UNI 2-3 VIEWS: CPT | Performed by: RADIOLOGY

## 2019-06-04 PROCEDURE — 73030 X-RAY EXAM OF SHOULDER: CPT | Performed by: RADIOLOGY

## 2019-06-04 PROCEDURE — 86900 BLOOD TYPING SEROLOGIC ABO: CPT | Performed by: PHYSICIAN ASSISTANT

## 2019-06-04 PROCEDURE — 85610 PROTHROMBIN TIME: CPT | Performed by: PHYSICIAN ASSISTANT

## 2019-06-04 PROCEDURE — 73130 X-RAY EXAM OF HAND: CPT | Performed by: RADIOLOGY

## 2019-06-04 PROCEDURE — 73502 X-RAY EXAM HIP UNI 2-3 VIEWS: CPT

## 2019-06-04 PROCEDURE — 73030 X-RAY EXAM OF SHOULDER: CPT

## 2019-06-04 PROCEDURE — 73552 X-RAY EXAM OF FEMUR 2/>: CPT | Performed by: RADIOLOGY

## 2019-06-04 PROCEDURE — 25010000002 MORPHINE PER 10 MG: Performed by: FAMILY MEDICINE

## 2019-06-04 PROCEDURE — 73110 X-RAY EXAM OF WRIST: CPT

## 2019-06-04 PROCEDURE — 80053 COMPREHEN METABOLIC PANEL: CPT | Performed by: PHYSICIAN ASSISTANT

## 2019-06-04 PROCEDURE — 73130 X-RAY EXAM OF HAND: CPT

## 2019-06-04 PROCEDURE — 73552 X-RAY EXAM OF FEMUR 2/>: CPT

## 2019-06-04 PROCEDURE — 86850 RBC ANTIBODY SCREEN: CPT | Performed by: PHYSICIAN ASSISTANT

## 2019-06-04 PROCEDURE — 85025 COMPLETE CBC W/AUTO DIFF WBC: CPT | Performed by: PHYSICIAN ASSISTANT

## 2019-06-04 PROCEDURE — 85730 THROMBOPLASTIN TIME PARTIAL: CPT | Performed by: PHYSICIAN ASSISTANT

## 2019-06-04 RX ORDER — POTASSIUM CHLORIDE 20 MEQ/1
20 TABLET, EXTENDED RELEASE ORAL ONCE
Status: COMPLETED | OUTPATIENT
Start: 2019-06-05 | End: 2019-06-05

## 2019-06-04 RX ORDER — MORPHINE SULFATE 2 MG/ML
2 INJECTION, SOLUTION INTRAMUSCULAR; INTRAVENOUS ONCE
Status: COMPLETED | OUTPATIENT
Start: 2019-06-04 | End: 2019-06-04

## 2019-06-04 RX ORDER — ACETAMINOPHEN 500 MG
1000 TABLET ORAL ONCE
Status: COMPLETED | OUTPATIENT
Start: 2019-06-04 | End: 2019-06-04

## 2019-06-04 RX ORDER — SODIUM CHLORIDE 0.9 % (FLUSH) 0.9 %
10 SYRINGE (ML) INJECTION AS NEEDED
Status: DISCONTINUED | OUTPATIENT
Start: 2019-06-04 | End: 2019-06-13 | Stop reason: HOSPADM

## 2019-06-04 RX ADMIN — MORPHINE SULFATE 2 MG: 2 INJECTION, SOLUTION INTRAMUSCULAR; INTRAVENOUS at 23:54

## 2019-06-04 RX ADMIN — MORPHINE SULFATE 2 MG: 2 INJECTION, SOLUTION INTRAMUSCULAR; INTRAVENOUS at 23:07

## 2019-06-04 RX ADMIN — ACETAMINOPHEN 1000 MG: 500 TABLET ORAL at 21:52

## 2019-06-05 ENCOUNTER — ANESTHESIA (OUTPATIENT)
Dept: PERIOP | Facility: HOSPITAL | Age: 84
End: 2019-06-05

## 2019-06-05 ENCOUNTER — APPOINTMENT (OUTPATIENT)
Dept: GENERAL RADIOLOGY | Facility: HOSPITAL | Age: 84
End: 2019-06-05

## 2019-06-05 ENCOUNTER — ANESTHESIA EVENT (OUTPATIENT)
Dept: PERIOP | Facility: HOSPITAL | Age: 84
End: 2019-06-05

## 2019-06-05 PROBLEM — S72.002A LEFT DISPLACED FEMORAL NECK FRACTURE: Status: ACTIVE | Noted: 2019-06-05

## 2019-06-05 LAB
25(OH)D3 SERPL-MCNC: 17.2 NG/ML (ref 30–100)
ABO GROUP BLD: NORMAL
ABO GROUP BLD: NORMAL
ALBUMIN SERPL-MCNC: 3.16 G/DL (ref 3.5–5.2)
ALBUMIN/GLOB SERPL: 0.8 G/DL
ALP SERPL-CCNC: 74 U/L (ref 39–117)
ALT SERPL W P-5'-P-CCNC: 18 U/L (ref 1–33)
ANION GAP SERPL CALCULATED.3IONS-SCNC: 15.4 MMOL/L
AST SERPL-CCNC: 22 U/L (ref 1–32)
BACTERIA UR QL AUTO: ABNORMAL /HPF
BASOPHILS # BLD AUTO: 0.02 10*3/MM3 (ref 0–0.2)
BASOPHILS NFR BLD AUTO: 0.1 % (ref 0–1.5)
BILIRUB SERPL-MCNC: 0.5 MG/DL (ref 0.2–1.2)
BILIRUB UR QL STRIP: NEGATIVE
BLD GP AB SCN SERPL QL: NEGATIVE
BUN BLD-MCNC: 16 MG/DL (ref 8–23)
BUN/CREAT SERPL: 15.1 (ref 7–25)
CALCIUM SPEC-SCNC: 7.9 MG/DL (ref 8.6–10.5)
CHLORIDE SERPL-SCNC: 100 MMOL/L (ref 98–107)
CLARITY UR: ABNORMAL
CO2 SERPL-SCNC: 24.6 MMOL/L (ref 22–29)
COLOR UR: ABNORMAL
CREAT BLD-MCNC: 1.06 MG/DL (ref 0.57–1)
CREAT UR-MCNC: 195.3 MG/DL
DEPRECATED RDW RBC AUTO: 41.4 FL (ref 37–54)
EOSINOPHIL # BLD AUTO: 0.01 10*3/MM3 (ref 0–0.4)
EOSINOPHIL NFR BLD AUTO: 0.1 % (ref 0.3–6.2)
ERYTHROCYTE [DISTWIDTH] IN BLOOD BY AUTOMATED COUNT: 12.3 % (ref 12.3–15.4)
GFR SERPL CREATININE-BSD FRML MDRD: 50 ML/MIN/1.73
GLOBULIN UR ELPH-MCNC: 3.9 GM/DL
GLUCOSE BLD-MCNC: 130 MG/DL (ref 65–99)
GLUCOSE UR STRIP-MCNC: NEGATIVE MG/DL
HBA1C MFR BLD: 5.7 % (ref 4.8–5.6)
HCT VFR BLD AUTO: 36.7 % (ref 34–46.6)
HGB BLD-MCNC: 11.6 G/DL (ref 12–15.9)
HGB UR QL STRIP.AUTO: ABNORMAL
HYALINE CASTS UR QL AUTO: ABNORMAL /LPF
IMM GRANULOCYTES # BLD AUTO: 0.07 10*3/MM3 (ref 0–0.05)
IMM GRANULOCYTES NFR BLD AUTO: 0.4 % (ref 0–0.5)
KETONES UR QL STRIP: NEGATIVE
LEUKOCYTE ESTERASE UR QL STRIP.AUTO: ABNORMAL
LYMPHOCYTES # BLD AUTO: 1.59 10*3/MM3 (ref 0.7–3.1)
LYMPHOCYTES NFR BLD AUTO: 9.4 % (ref 19.6–45.3)
MAGNESIUM SERPL-MCNC: 1.4 MG/DL (ref 1.6–2.4)
MCH RBC QN AUTO: 30 PG (ref 26.6–33)
MCHC RBC AUTO-ENTMCNC: 31.6 G/DL (ref 31.5–35.7)
MCV RBC AUTO: 94.8 FL (ref 79–97)
MONOCYTES # BLD AUTO: 1.22 10*3/MM3 (ref 0.1–0.9)
MONOCYTES NFR BLD AUTO: 7.2 % (ref 5–12)
NEUTROPHILS # BLD AUTO: 14.03 10*3/MM3 (ref 1.7–7)
NEUTROPHILS NFR BLD AUTO: 82.8 % (ref 42.7–76)
NITRITE UR QL STRIP: NEGATIVE
PH UR STRIP.AUTO: 5.5 [PH] (ref 5–8)
PLATELET # BLD AUTO: 356 10*3/MM3 (ref 140–450)
PMV BLD AUTO: 10.4 FL (ref 6–12)
POTASSIUM BLD-SCNC: 3.1 MMOL/L (ref 3.5–5.2)
POTASSIUM BLD-SCNC: 4.5 MMOL/L (ref 3.5–5.2)
PROT SERPL-MCNC: 7.1 G/DL (ref 6–8.5)
PROT UR QL STRIP: ABNORMAL
PROT UR-MCNC: 189 MG/DL
PROT/CREAT UR: 967.7 MG/G CREA (ref 0–200)
RBC # BLD AUTO: 3.87 10*6/MM3 (ref 3.77–5.28)
RBC # UR: ABNORMAL /HPF
REF LAB TEST METHOD: ABNORMAL
RH BLD: POSITIVE
RH BLD: POSITIVE
SODIUM BLD-SCNC: 140 MMOL/L (ref 136–145)
SP GR UR STRIP: 1.02 (ref 1–1.03)
SQUAMOUS #/AREA URNS HPF: ABNORMAL /HPF
T&S EXPIRATION DATE: NORMAL
UROBILINOGEN UR QL STRIP: ABNORMAL
WBC NRBC COR # BLD: 16.94 10*3/MM3 (ref 3.4–10.8)
WBC UR QL AUTO: ABNORMAL /HPF

## 2019-06-05 PROCEDURE — 85025 COMPLETE CBC W/AUTO DIFF WBC: CPT | Performed by: INTERNAL MEDICINE

## 2019-06-05 PROCEDURE — 81001 URINALYSIS AUTO W/SCOPE: CPT | Performed by: PHYSICIAN ASSISTANT

## 2019-06-05 PROCEDURE — 25010000002 PHENYLEPHRINE PER 1 ML: Performed by: ANESTHESIOLOGY

## 2019-06-05 PROCEDURE — C1776 JOINT DEVICE (IMPLANTABLE): HCPCS | Performed by: ORTHOPAEDIC SURGERY

## 2019-06-05 PROCEDURE — 84156 ASSAY OF PROTEIN URINE: CPT | Performed by: INTERNAL MEDICINE

## 2019-06-05 PROCEDURE — 80053 COMPREHEN METABOLIC PANEL: CPT | Performed by: INTERNAL MEDICINE

## 2019-06-05 PROCEDURE — 86901 BLOOD TYPING SEROLOGIC RH(D): CPT

## 2019-06-05 PROCEDURE — 72170 X-RAY EXAM OF PELVIS: CPT

## 2019-06-05 PROCEDURE — 83036 HEMOGLOBIN GLYCOSYLATED A1C: CPT | Performed by: INTERNAL MEDICINE

## 2019-06-05 PROCEDURE — 94799 UNLISTED PULMONARY SVC/PX: CPT

## 2019-06-05 PROCEDURE — 25010000003 POTASSIUM CHLORIDE 10 MEQ/100ML SOLUTION: Performed by: FAMILY MEDICINE

## 2019-06-05 PROCEDURE — 83735 ASSAY OF MAGNESIUM: CPT | Performed by: INTERNAL MEDICINE

## 2019-06-05 PROCEDURE — 25010000002 CEFTRIAXONE: Performed by: FAMILY MEDICINE

## 2019-06-05 PROCEDURE — 25010000002 ONDANSETRON PER 1 MG: Performed by: ANESTHESIOLOGY

## 2019-06-05 PROCEDURE — 82306 VITAMIN D 25 HYDROXY: CPT | Performed by: INTERNAL MEDICINE

## 2019-06-05 PROCEDURE — 87086 URINE CULTURE/COLONY COUNT: CPT | Performed by: PHYSICIAN ASSISTANT

## 2019-06-05 PROCEDURE — 25010000002 MAGNESIUM SULFATE 2 GM/50ML SOLUTION: Performed by: FAMILY MEDICINE

## 2019-06-05 PROCEDURE — 25010000002 DEXAMETHASONE PER 1 MG: Performed by: ANESTHESIOLOGY

## 2019-06-05 PROCEDURE — 0SRS01A REPLACEMENT OF LEFT HIP JOINT, FEMORAL SURFACE WITH METAL SYNTHETIC SUBSTITUTE, UNCEMENTED, OPEN APPROACH: ICD-10-PCS | Performed by: ORTHOPAEDIC SURGERY

## 2019-06-05 PROCEDURE — 99223 1ST HOSP IP/OBS HIGH 75: CPT | Performed by: INTERNAL MEDICINE

## 2019-06-05 PROCEDURE — 25010000002 PROPOFOL 10 MG/ML EMULSION: Performed by: ANESTHESIOLOGY

## 2019-06-05 PROCEDURE — 94640 AIRWAY INHALATION TREATMENT: CPT

## 2019-06-05 PROCEDURE — 25010000002 MORPHINE PER 10 MG: Performed by: INTERNAL MEDICINE

## 2019-06-05 PROCEDURE — 72170 X-RAY EXAM OF PELVIS: CPT | Performed by: RADIOLOGY

## 2019-06-05 PROCEDURE — 25010000002 HYDROMORPHONE PER 4 MG: Performed by: INTERNAL MEDICINE

## 2019-06-05 PROCEDURE — 82570 ASSAY OF URINE CREATININE: CPT | Performed by: INTERNAL MEDICINE

## 2019-06-05 PROCEDURE — 86900 BLOOD TYPING SEROLOGIC ABO: CPT

## 2019-06-05 PROCEDURE — 25010000002 MORPHINE PER 10 MG: Performed by: FAMILY MEDICINE

## 2019-06-05 PROCEDURE — 84132 ASSAY OF SERUM POTASSIUM: CPT | Performed by: FAMILY MEDICINE

## 2019-06-05 PROCEDURE — 25010000002 FENTANYL CITRATE (PF) 100 MCG/2ML SOLUTION: Performed by: ANESTHESIOLOGY

## 2019-06-05 DEVICE — TRI-LOCK BPS FEMORAL STEM 12/14 TAPER TRI-LOCK BPS W/GRIPTION SIZE 1 STD 97MM
Type: IMPLANTABLE DEVICE | Site: HIP | Status: FUNCTIONAL
Brand: TRI-LOCK GRIPTION

## 2019-06-05 DEVICE — ARTICUL/EZE FEMORAL HEAD DIAMETER 28MM +1.5 12/14 TAPER
Type: IMPLANTABLE DEVICE | Site: HIP | Status: FUNCTIONAL
Brand: ARTICUL/EZE

## 2019-06-05 DEVICE — CAP BIPOL HIP HD AND S.CTR CUP: Type: IMPLANTABLE DEVICE | Status: FUNCTIONAL

## 2019-06-05 DEVICE — IMPLANTABLE DEVICE: Type: IMPLANTABLE DEVICE | Status: FUNCTIONAL

## 2019-06-05 DEVICE — SELF CENTERING BI-POLAR HEAD 28MM ID 45MM OD
Type: IMPLANTABLE DEVICE | Site: HIP | Status: FUNCTIONAL
Brand: SELF CENTERING

## 2019-06-05 RX ORDER — DOCUSATE SODIUM 100 MG/1
100 CAPSULE, LIQUID FILLED ORAL 2 TIMES DAILY PRN
Status: DISCONTINUED | OUTPATIENT
Start: 2019-06-05 | End: 2019-06-13 | Stop reason: HOSPADM

## 2019-06-05 RX ORDER — FAMOTIDINE 10 MG/ML
INJECTION, SOLUTION INTRAVENOUS AS NEEDED
Status: DISCONTINUED | OUTPATIENT
Start: 2019-06-05 | End: 2019-06-05 | Stop reason: SURG

## 2019-06-05 RX ORDER — POTASSIUM CHLORIDE 1.5 G/1.77G
40 POWDER, FOR SOLUTION ORAL AS NEEDED
Status: DISCONTINUED | OUTPATIENT
Start: 2019-06-05 | End: 2019-06-13 | Stop reason: HOSPADM

## 2019-06-05 RX ORDER — BUSPIRONE HYDROCHLORIDE 10 MG/1
10 TABLET ORAL 2 TIMES DAILY
Status: DISCONTINUED | OUTPATIENT
Start: 2019-06-05 | End: 2019-06-13 | Stop reason: HOSPADM

## 2019-06-05 RX ORDER — ONDANSETRON 2 MG/ML
INJECTION INTRAMUSCULAR; INTRAVENOUS AS NEEDED
Status: DISCONTINUED | OUTPATIENT
Start: 2019-06-05 | End: 2019-06-05 | Stop reason: SURG

## 2019-06-05 RX ORDER — MAGNESIUM SULFATE HEPTAHYDRATE 40 MG/ML
2 INJECTION, SOLUTION INTRAVENOUS
Status: COMPLETED | OUTPATIENT
Start: 2019-06-05 | End: 2019-06-05

## 2019-06-05 RX ORDER — SODIUM CHLORIDE, SODIUM LACTATE, POTASSIUM CHLORIDE, CALCIUM CHLORIDE 600; 310; 30; 20 MG/100ML; MG/100ML; MG/100ML; MG/100ML
125 INJECTION, SOLUTION INTRAVENOUS CONTINUOUS
Status: DISCONTINUED | OUTPATIENT
Start: 2019-06-05 | End: 2019-06-06

## 2019-06-05 RX ORDER — POTASSIUM CHLORIDE 750 MG/1
40 TABLET, FILM COATED, EXTENDED RELEASE ORAL AS NEEDED
Status: DISCONTINUED | OUTPATIENT
Start: 2019-06-05 | End: 2019-06-13 | Stop reason: HOSPADM

## 2019-06-05 RX ORDER — LOSARTAN POTASSIUM 50 MG/1
50 TABLET ORAL DAILY
Status: DISCONTINUED | OUTPATIENT
Start: 2019-06-05 | End: 2019-06-07

## 2019-06-05 RX ORDER — MEPERIDINE HYDROCHLORIDE 25 MG/ML
12.5 INJECTION INTRAMUSCULAR; INTRAVENOUS; SUBCUTANEOUS
Status: DISCONTINUED | OUTPATIENT
Start: 2019-06-05 | End: 2019-06-05 | Stop reason: HOSPADM

## 2019-06-05 RX ORDER — ATORVASTATIN CALCIUM 40 MG/1
40 TABLET, FILM COATED ORAL NIGHTLY
Status: DISCONTINUED | OUTPATIENT
Start: 2019-06-05 | End: 2019-06-13 | Stop reason: HOSPADM

## 2019-06-05 RX ORDER — ACETAMINOPHEN 650 MG/1
650 SUPPOSITORY RECTAL EVERY 4 HOURS PRN
Status: DISCONTINUED | OUTPATIENT
Start: 2019-06-05 | End: 2019-06-13 | Stop reason: HOSPADM

## 2019-06-05 RX ORDER — POTASSIUM CHLORIDE 7.45 MG/ML
10 INJECTION INTRAVENOUS
Status: DISCONTINUED | OUTPATIENT
Start: 2019-06-05 | End: 2019-06-13 | Stop reason: HOSPADM

## 2019-06-05 RX ORDER — MORPHINE SULFATE 2 MG/ML
2 INJECTION, SOLUTION INTRAMUSCULAR; INTRAVENOUS ONCE
Status: COMPLETED | OUTPATIENT
Start: 2019-06-05 | End: 2019-06-05

## 2019-06-05 RX ORDER — IPRATROPIUM BROMIDE AND ALBUTEROL SULFATE 2.5; .5 MG/3ML; MG/3ML
3 SOLUTION RESPIRATORY (INHALATION) ONCE AS NEEDED
Status: COMPLETED | OUTPATIENT
Start: 2019-06-05 | End: 2019-06-05

## 2019-06-05 RX ORDER — PROPOFOL 10 MG/ML
VIAL (ML) INTRAVENOUS AS NEEDED
Status: DISCONTINUED | OUTPATIENT
Start: 2019-06-05 | End: 2019-06-05 | Stop reason: SURG

## 2019-06-05 RX ORDER — SODIUM CHLORIDE 9 MG/ML
100 INJECTION, SOLUTION INTRAVENOUS CONTINUOUS
Status: DISCONTINUED | OUTPATIENT
Start: 2019-06-05 | End: 2019-06-06

## 2019-06-05 RX ORDER — SODIUM CHLORIDE 0.9 % (FLUSH) 0.9 %
3-10 SYRINGE (ML) INJECTION AS NEEDED
Status: DISCONTINUED | OUTPATIENT
Start: 2019-06-05 | End: 2019-06-13 | Stop reason: HOSPADM

## 2019-06-05 RX ORDER — FENTANYL CITRATE 50 UG/ML
50 INJECTION, SOLUTION INTRAMUSCULAR; INTRAVENOUS
Status: DISCONTINUED | OUTPATIENT
Start: 2019-06-05 | End: 2019-06-05 | Stop reason: HOSPADM

## 2019-06-05 RX ORDER — CLINDAMYCIN PHOSPHATE 900 MG/50ML
900 INJECTION INTRAVENOUS ONCE
Status: DISCONTINUED | OUTPATIENT
Start: 2019-06-05 | End: 2019-06-05 | Stop reason: HOSPADM

## 2019-06-05 RX ORDER — ACETAMINOPHEN 160 MG/5ML
650 SOLUTION ORAL EVERY 4 HOURS PRN
Status: DISCONTINUED | OUTPATIENT
Start: 2019-06-05 | End: 2019-06-13 | Stop reason: HOSPADM

## 2019-06-05 RX ORDER — ONDANSETRON 4 MG/1
4 TABLET, FILM COATED ORAL EVERY 6 HOURS PRN
Status: DISCONTINUED | OUTPATIENT
Start: 2019-06-05 | End: 2019-06-13 | Stop reason: HOSPADM

## 2019-06-05 RX ORDER — HYDROMORPHONE HYDROCHLORIDE 1 MG/ML
0.5 INJECTION, SOLUTION INTRAMUSCULAR; INTRAVENOUS; SUBCUTANEOUS
Status: DISCONTINUED | OUTPATIENT
Start: 2019-06-05 | End: 2019-06-08

## 2019-06-05 RX ORDER — ONDANSETRON 2 MG/ML
4 INJECTION INTRAMUSCULAR; INTRAVENOUS EVERY 6 HOURS PRN
Status: DISCONTINUED | OUTPATIENT
Start: 2019-06-05 | End: 2019-06-13 | Stop reason: HOSPADM

## 2019-06-05 RX ORDER — ACETAMINOPHEN 325 MG/1
650 TABLET ORAL EVERY 4 HOURS PRN
Status: DISCONTINUED | OUTPATIENT
Start: 2019-06-05 | End: 2019-06-13 | Stop reason: HOSPADM

## 2019-06-05 RX ORDER — BISACODYL 5 MG/1
10 TABLET, DELAYED RELEASE ORAL DAILY PRN
Status: DISCONTINUED | OUTPATIENT
Start: 2019-06-05 | End: 2019-06-13 | Stop reason: HOSPADM

## 2019-06-05 RX ORDER — SODIUM CHLORIDE 0.9 % (FLUSH) 0.9 %
3 SYRINGE (ML) INJECTION EVERY 12 HOURS SCHEDULED
Status: DISCONTINUED | OUTPATIENT
Start: 2019-06-05 | End: 2019-06-13 | Stop reason: HOSPADM

## 2019-06-05 RX ORDER — MAGNESIUM SULFATE HEPTAHYDRATE 40 MG/ML
2 INJECTION, SOLUTION INTRAVENOUS AS NEEDED
Status: DISCONTINUED | OUTPATIENT
Start: 2019-06-05 | End: 2019-06-13 | Stop reason: HOSPADM

## 2019-06-05 RX ORDER — OXYCODONE HYDROCHLORIDE AND ACETAMINOPHEN 5; 325 MG/1; MG/1
1 TABLET ORAL ONCE AS NEEDED
Status: DISCONTINUED | OUTPATIENT
Start: 2019-06-05 | End: 2019-06-05 | Stop reason: HOSPADM

## 2019-06-05 RX ORDER — DEXAMETHASONE SODIUM PHOSPHATE 4 MG/ML
INJECTION, SOLUTION INTRA-ARTICULAR; INTRALESIONAL; INTRAMUSCULAR; INTRAVENOUS; SOFT TISSUE AS NEEDED
Status: DISCONTINUED | OUTPATIENT
Start: 2019-06-05 | End: 2019-06-05 | Stop reason: SURG

## 2019-06-05 RX ORDER — POTASSIUM CHLORIDE 20 MEQ/1
40 TABLET, EXTENDED RELEASE ORAL EVERY 4 HOURS
Status: DISCONTINUED | OUTPATIENT
Start: 2019-06-05 | End: 2019-06-05

## 2019-06-05 RX ORDER — MAGNESIUM SULFATE HEPTAHYDRATE 40 MG/ML
4 INJECTION, SOLUTION INTRAVENOUS AS NEEDED
Status: DISCONTINUED | OUTPATIENT
Start: 2019-06-05 | End: 2019-06-13 | Stop reason: HOSPADM

## 2019-06-05 RX ORDER — POTASSIUM CHLORIDE 7.45 MG/ML
10 INJECTION INTRAVENOUS
Status: COMPLETED | OUTPATIENT
Start: 2019-06-05 | End: 2019-06-05

## 2019-06-05 RX ORDER — LOSARTAN POTASSIUM 50 MG/1
100 TABLET ORAL DAILY
Status: CANCELLED | OUTPATIENT
Start: 2019-06-05

## 2019-06-05 RX ORDER — NALOXONE HCL 0.4 MG/ML
0.4 VIAL (ML) INJECTION
Status: DISCONTINUED | OUTPATIENT
Start: 2019-06-05 | End: 2019-06-13 | Stop reason: HOSPADM

## 2019-06-05 RX ORDER — MORPHINE SULFATE 2 MG/ML
2 INJECTION, SOLUTION INTRAMUSCULAR; INTRAVENOUS EVERY 4 HOURS PRN
Status: DISCONTINUED | OUTPATIENT
Start: 2019-06-05 | End: 2019-06-05

## 2019-06-05 RX ORDER — SODIUM CHLORIDE 0.9 % (FLUSH) 0.9 %
3-10 SYRINGE (ML) INJECTION AS NEEDED
Status: DISCONTINUED | OUTPATIENT
Start: 2019-06-05 | End: 2019-06-05 | Stop reason: HOSPADM

## 2019-06-05 RX ORDER — ONDANSETRON 2 MG/ML
4 INJECTION INTRAMUSCULAR; INTRAVENOUS ONCE AS NEEDED
Status: DISCONTINUED | OUTPATIENT
Start: 2019-06-05 | End: 2019-06-05 | Stop reason: HOSPADM

## 2019-06-05 RX ORDER — ASPIRIN 81 MG/1
81 TABLET ORAL DAILY
Status: CANCELLED | OUTPATIENT
Start: 2019-06-05

## 2019-06-05 RX ORDER — CLINDAMYCIN PHOSPHATE 900 MG/50ML
900 INJECTION INTRAVENOUS EVERY 8 HOURS
Status: COMPLETED | OUTPATIENT
Start: 2019-06-06 | End: 2019-06-06

## 2019-06-05 RX ORDER — SODIUM CHLORIDE 0.9 % (FLUSH) 0.9 %
3 SYRINGE (ML) INJECTION EVERY 12 HOURS SCHEDULED
Status: DISCONTINUED | OUTPATIENT
Start: 2019-06-05 | End: 2019-06-05 | Stop reason: HOSPADM

## 2019-06-05 RX ORDER — FENTANYL CITRATE 50 UG/ML
INJECTION, SOLUTION INTRAMUSCULAR; INTRAVENOUS AS NEEDED
Status: DISCONTINUED | OUTPATIENT
Start: 2019-06-05 | End: 2019-06-05 | Stop reason: SURG

## 2019-06-05 RX ORDER — CLINDAMYCIN PHOSPHATE 900 MG/50ML
INJECTION INTRAVENOUS AS NEEDED
Status: DISCONTINUED | OUTPATIENT
Start: 2019-06-05 | End: 2019-06-05 | Stop reason: SURG

## 2019-06-05 RX ORDER — OXYCODONE HYDROCHLORIDE AND ACETAMINOPHEN 5; 325 MG/1; MG/1
1 TABLET ORAL EVERY 4 HOURS PRN
Status: DISCONTINUED | OUTPATIENT
Start: 2019-06-05 | End: 2019-06-13 | Stop reason: HOSPADM

## 2019-06-05 RX ORDER — CARVEDILOL 6.25 MG/1
12.5 TABLET ORAL 2 TIMES DAILY WITH MEALS
Status: DISCONTINUED | OUTPATIENT
Start: 2019-06-05 | End: 2019-06-13 | Stop reason: HOSPADM

## 2019-06-05 RX ORDER — PANTOPRAZOLE SODIUM 40 MG/1
40 TABLET, DELAYED RELEASE ORAL EVERY MORNING
Status: DISCONTINUED | OUTPATIENT
Start: 2019-06-05 | End: 2019-06-13 | Stop reason: HOSPADM

## 2019-06-05 RX ORDER — NITROGLYCERIN 0.4 MG/1
0.4 TABLET SUBLINGUAL
Status: DISCONTINUED | OUTPATIENT
Start: 2019-06-05 | End: 2019-06-13 | Stop reason: HOSPADM

## 2019-06-05 RX ORDER — ROCURONIUM BROMIDE 10 MG/ML
INJECTION, SOLUTION INTRAVENOUS AS NEEDED
Status: DISCONTINUED | OUTPATIENT
Start: 2019-06-05 | End: 2019-06-05 | Stop reason: SURG

## 2019-06-05 RX ADMIN — ONDANSETRON 4 MG: 2 INJECTION, SOLUTION INTRAMUSCULAR; INTRAVENOUS at 18:57

## 2019-06-05 RX ADMIN — FENTANYL CITRATE 100 MCG: 50 INJECTION, SOLUTION INTRAMUSCULAR; INTRAVENOUS at 17:56

## 2019-06-05 RX ADMIN — SODIUM CHLORIDE, PRESERVATIVE FREE 3 ML: 5 INJECTION INTRAVENOUS at 21:26

## 2019-06-05 RX ADMIN — PHENYLEPHRINE HYDROCHLORIDE 100 MCG: 10 INJECTION, SOLUTION INTRAMUSCULAR; INTRAVENOUS; SUBCUTANEOUS at 18:18

## 2019-06-05 RX ADMIN — SODIUM CHLORIDE, POTASSIUM CHLORIDE, SODIUM LACTATE AND CALCIUM CHLORIDE 125 ML/HR: 600; 310; 30; 20 INJECTION, SOLUTION INTRAVENOUS at 21:25

## 2019-06-05 RX ADMIN — ROCURONIUM BROMIDE 20 MG: 10 INJECTION INTRAVENOUS at 17:56

## 2019-06-05 RX ADMIN — FENTANYL CITRATE 50 MCG: 50 INJECTION, SOLUTION INTRAMUSCULAR; INTRAVENOUS at 18:36

## 2019-06-05 RX ADMIN — PHENYLEPHRINE HYDROCHLORIDE 100 MCG: 10 INJECTION, SOLUTION INTRAMUSCULAR; INTRAVENOUS; SUBCUTANEOUS at 18:42

## 2019-06-05 RX ADMIN — PROPOFOL 50 MG: 10 INJECTION, EMULSION INTRAVENOUS at 17:56

## 2019-06-05 RX ADMIN — HYDROMORPHONE HYDROCHLORIDE 0.5 MG: 1 INJECTION, SOLUTION INTRAMUSCULAR; INTRAVENOUS; SUBCUTANEOUS at 06:09

## 2019-06-05 RX ADMIN — SODIUM CHLORIDE 100 ML/HR: 9 INJECTION, SOLUTION INTRAVENOUS at 04:38

## 2019-06-05 RX ADMIN — LOSARTAN POTASSIUM 50 MG: 50 TABLET, FILM COATED ORAL at 09:08

## 2019-06-05 RX ADMIN — SODIUM CHLORIDE, PRESERVATIVE FREE 3 ML: 5 INJECTION INTRAVENOUS at 08:32

## 2019-06-05 RX ADMIN — PHENYLEPHRINE HYDROCHLORIDE 100 MCG: 10 INJECTION, SOLUTION INTRAMUSCULAR; INTRAVENOUS; SUBCUTANEOUS at 18:15

## 2019-06-05 RX ADMIN — FENTANYL CITRATE 100 MCG: 50 INJECTION, SOLUTION INTRAMUSCULAR; INTRAVENOUS at 18:05

## 2019-06-05 RX ADMIN — IPRATROPIUM BROMIDE AND ALBUTEROL SULFATE 3 ML: .5; 3 SOLUTION RESPIRATORY (INHALATION) at 20:14

## 2019-06-05 RX ADMIN — FAMOTIDINE 20 MG: 10 INJECTION, SOLUTION INTRAVENOUS at 18:57

## 2019-06-05 RX ADMIN — POTASSIUM CHLORIDE 10 MEQ: 10 INJECTION, SOLUTION INTRAVENOUS at 12:54

## 2019-06-05 RX ADMIN — CARVEDILOL 12.5 MG: 6.25 TABLET, FILM COATED ORAL at 08:32

## 2019-06-05 RX ADMIN — MAGNESIUM SULFATE IN WATER 2 G: 40 INJECTION, SOLUTION INTRAVENOUS at 13:43

## 2019-06-05 RX ADMIN — DEXAMETHASONE SODIUM PHOSPHATE 4 MG: 4 INJECTION, SOLUTION INTRAMUSCULAR; INTRAVENOUS at 18:57

## 2019-06-05 RX ADMIN — CEFTRIAXONE 1 G: 1 INJECTION, POWDER, FOR SOLUTION INTRAMUSCULAR; INTRAVENOUS at 09:08

## 2019-06-05 RX ADMIN — BUSPIRONE HYDROCHLORIDE 10 MG: 10 TABLET ORAL at 08:32

## 2019-06-05 RX ADMIN — POTASSIUM CHLORIDE 10 MEQ: 10 INJECTION, SOLUTION INTRAVENOUS at 15:58

## 2019-06-05 RX ADMIN — POTASSIUM CHLORIDE 10 MEQ: 10 INJECTION, SOLUTION INTRAVENOUS at 13:43

## 2019-06-05 RX ADMIN — HYDROMORPHONE HYDROCHLORIDE 0.5 MG: 1 INJECTION, SOLUTION INTRAMUSCULAR; INTRAVENOUS; SUBCUTANEOUS at 13:43

## 2019-06-05 RX ADMIN — MORPHINE SULFATE 2 MG: 2 INJECTION, SOLUTION INTRAMUSCULAR; INTRAVENOUS at 00:45

## 2019-06-05 RX ADMIN — SODIUM CHLORIDE: 9 INJECTION, SOLUTION INTRAVENOUS at 17:49

## 2019-06-05 RX ADMIN — POTASSIUM CHLORIDE 10 MEQ: 10 INJECTION, SOLUTION INTRAVENOUS at 16:57

## 2019-06-05 RX ADMIN — MORPHINE SULFATE 2 MG: 2 INJECTION, SOLUTION INTRAMUSCULAR; INTRAVENOUS at 03:49

## 2019-06-05 RX ADMIN — POTASSIUM CHLORIDE 10 MEQ: 10 INJECTION, SOLUTION INTRAVENOUS at 11:55

## 2019-06-05 RX ADMIN — SODIUM CHLORIDE 100 ML/HR: 9 INJECTION, SOLUTION INTRAVENOUS at 16:57

## 2019-06-05 RX ADMIN — MAGNESIUM SULFATE IN WATER 2 G: 40 INJECTION, SOLUTION INTRAVENOUS at 11:55

## 2019-06-05 RX ADMIN — HYDROMORPHONE HYDROCHLORIDE 0.5 MG: 1 INJECTION, SOLUTION INTRAMUSCULAR; INTRAVENOUS; SUBCUTANEOUS at 10:00

## 2019-06-05 RX ADMIN — POTASSIUM CHLORIDE 20 MEQ: 1500 TABLET, EXTENDED RELEASE ORAL at 00:03

## 2019-06-05 RX ADMIN — ATORVASTATIN CALCIUM 40 MG: 40 TABLET, FILM COATED ORAL at 22:17

## 2019-06-05 RX ADMIN — OXYCODONE HYDROCHLORIDE AND ACETAMINOPHEN 1 TABLET: 5; 325 TABLET ORAL at 22:17

## 2019-06-05 RX ADMIN — MAGNESIUM SULFATE IN WATER 2 G: 40 INJECTION, SOLUTION INTRAVENOUS at 10:00

## 2019-06-05 RX ADMIN — CLINDAMYCIN PHOSPHATE 900 MG: 18 INJECTION, SOLUTION INTRAVENOUS at 17:49

## 2019-06-05 RX ADMIN — PHENYLEPHRINE HYDROCHLORIDE 100 MCG: 10 INJECTION, SOLUTION INTRAMUSCULAR; INTRAVENOUS; SUBCUTANEOUS at 18:21

## 2019-06-05 RX ADMIN — MORPHINE SULFATE 2 MG: 2 INJECTION, SOLUTION INTRAMUSCULAR; INTRAVENOUS at 02:40

## 2019-06-05 RX ADMIN — POTASSIUM CHLORIDE 10 MEQ: 10 INJECTION, SOLUTION INTRAVENOUS at 14:50

## 2019-06-05 RX ADMIN — BUSPIRONE HYDROCHLORIDE 10 MG: 10 TABLET ORAL at 22:17

## 2019-06-05 NOTE — PROGRESS NOTES
Discharge Planning Assessment  [facility]     Patient Name: Abbi Mendez  MRN: [id number]  Today's Date: 6/5/2019    Admit Date: 6/4/2019    Discharge Needs Assessment     Row Name 06/05/19 0029       Living Environment    Lives With  alone    Primary Care Provided by  self    Provides Primary Care For  no one    Family Caregiver if Needed  child(ananya), adult Pt has good family support.     Quality of Family Relationships  helpful;involved;supportive    Able to Return to Prior Arrangements  yes       Transition Planning    Patient/Family Anticipates Transition to  home with family       Discharge Needs Assessment    Equipment Currently Used at Home  walker, rolling;bipap/cpap;cane, straight;commode Pt has a C-Pap machine, rolling walker, cane, and bedside commode from unknown provider.     Outpatient/Agency/Support Group Needs  -- Pt does not utilize home health services.     Discharge Facility/Level of Care Needs  acute rehab Pt fell at home. Pt's family is agreeable to inpatient rehab at [facility].         Discharge Plan     Row Name 06/05/19 6977       Plan    Plan Pt lives at home alone and family plan for pt to return home. Pt has life alert at home. Pt has a rolling walker, cane, bedside commode and C-Pap machine at home. Pt does not utilize home health. Pt's family is agreeable to inpatient rehab at [facility]. Pt does not have a POA or living will. PCP is Dr. Driver. Pt utilizes [facility] mail order for medications. SS to follow and assist as needed with discharge planning.     Patient/Family in Agreement with Plan  yes          Demographic Summary     Row Name 06/05/19 1351       General Information    Referral Source  nursing    Reason for Consult  -- SS received consult DC planning. Femoral neck fx.      Annabel Archer

## 2019-06-05 NOTE — ANESTHESIA POSTPROCEDURE EVALUATION
Patient: Abbi Mendez    Procedure Summary     Date:  06/05/19 Room / Location:  [facility] OR 03 /  [facility] OR    Anesthesia Start:  1749 Anesthesia Stop:  1940    Procedure:  HIP BIPOLAR REPLACEMENT (Left Hip) Diagnosis:       Left displaced femoral neck fracture (CMS/HCC)      (Left displaced femoral neck fracture (CMS/HCC) [S72.002A])    Surgeon:  Gerardo Jain MD Provider:  Mack Caballero MD    Anesthesia Type:  general ASA Status:  3          Anesthesia Type: general  Last vitals  /64   Temp   97.3     Pulse   77   Resp   18     SpO2   95%     Post Anesthesia Care and Evaluation    Patient location during evaluation: PACU  Patient participation: complete - patient participated  Level of consciousness: awake and alert  Pain score: 1  Pain management: adequate  Airway patency: patent  Anesthetic complications: No anesthetic complications  PONV Status: controlled  Cardiovascular status: acceptable  Respiratory status: acceptable  Hydration status: acceptable

## 2019-06-05 NOTE — PLAN OF CARE
Problem: Patient Care Overview  Goal: Plan of Care Review   06/05/19 0441   Coping/Psychosocial   Plan of Care Reviewed With patient;daughter   Plan of Care Review   Progress no change       Problem: Fall Risk (Adult)  Goal: Identify Related Risk Factors and Signs and Symptoms   06/05/19 0441   Fall Risk (Adult)   Related Risk Factors (Fall Risk) age-related changes;history of falls;gait/mobility problems;slippery/uneven surfaces;environment unfamiliar   Signs and Symptoms (Fall Risk) presence of risk factors     Goal: Absence of Fall   06/05/19 0441   Fall Risk (Adult)   Absence of Fall making progress toward outcome       Problem: Skin Injury Risk (Adult)  Goal: Identify Related Risk Factors and Signs and Symptoms   06/05/19 0441   Skin Injury Risk (Adult)   Related Risk Factors (Skin Injury Risk) advanced age;mobility impaired     Goal: Skin Health and Integrity   06/05/19 0441   Skin Injury Risk (Adult)   Skin Health and Integrity making progress toward outcome       Problem: Fracture Orthopaedic (Adult)  Goal: Signs and Symptoms of Listed Potential Problems Will be Absent, Minimized or Managed (Fracture Orthopaedic)   06/05/19 0441   Goal/Outcome Evaluation   Problems Assessed (Orthopaedic Fracture) all   Problems Present (Orthopaedic Fracture) pain;infection;situational response       Problem: Pain, Acute (Adult)  Goal: Identify Related Risk Factors and Signs and Symptoms   06/05/19 0441   Pain, Acute (Adult)   Related Risk Factors (Acute Pain) persistent pain;infection   Signs and Symptoms (Acute Pain) BADLs/IADLs reluctance/inability to perform;facial mask of pain/grimace;fatigue/weakness;guarding/abnormal posturing/positioning;verbalization of pain descriptors;questions meaning of pain     Goal: Acceptable Pain Control/Comfort Level   06/05/19 0441   Pain, Acute (Adult)   Acceptable Pain Control/Comfort Level making progress toward outcome

## 2019-06-05 NOTE — PROGRESS NOTES
[facility] HOSPITALIST PROGRESS NOTE     Patient Identification:  Name:  Abbi Mendez  Age:  83 y.o.  Sex:  female  :  1935  MRN:  [id number]  Visit Number:  [id number]  ROOM: [address] [place] [address]     Primary Care Provider:  Eric Driver MD    Length of stay in inpatient status:  0    Subjective     Chief Compliant:    Chief Complaint   Patient presents with   • Fall       History of Presenting Illness: Patient is an 83-year-old female with a history of paroxysmal atrial fibrillation, pacemaker placement, hypertension, osteoarthritis.  Patient was walking in her house last evening and tripped over a cord and fell and fractured her left hip.  He was admitted to the service.  Patient states she has had no recent chest pains, no recent syncopal events.  Patient denies any history of recent bleeding.  There is no shortness of breath.  On evaluation last evening patient had no acute changes on her EKG.  Urinalysis was suggestive of UTI.  Patient did have a elevated white count which was thought to be secondary to just acute phase reactant from the fall.  Patient has had no fever.  And has been normotensive.  Patient denies any new difficulties otherwise.  I did discuss case with her cardiologist  Dr. Darling who agreed that if she is stable she should proceed with hip repair.  Objective     Current Hospital Meds:  atorvastatin 40 mg Oral Nightly   busPIRone 10 mg Oral BID   carvedilol 12.5 mg Oral BID With Meals   ceftriaxone 1 g Intravenous Q24H   losartan 50 mg Oral Daily   pantoprazole 40 mg Oral QAM   sodium chloride 3 mL Intravenous Q12H     sodium chloride 100 mL/hr Last Rate: 100 mL/hr (19 0438)     ----------------------------------------------------------------------------------------------------------------------  Vital Signs:  Temp:  [97.8 °F (36.6 °C)-99.1 °F (37.3 °C)] 98.6 °F (37 °C)  Heart Rate:  [70-90] 85  Resp:  [16-18] 18  BP: (148-172)/(69-93) 166/80  SpO2:  [93 %-98 %] 97 %  on   ;    Device (Oxygen Therapy): room air  Body mass index is 27.13 kg/m².    Wt Readings from Last 3 Encounters:   06/05/19 76.2 kg (168 lb 0.2 oz)   05/28/19 78 kg (172 lb)   02/01/19 75.8 kg (167 lb)     Intake & Output (last 3 days)       06/02 0701 - 06/03 0700 06/03 0701 - 06/04 0700 06/04 0701 - 06/05 0700 06/05 0701 - 06/06 0700    P.O.   0     Total Intake(mL/kg)   0 (0)     Urine (mL/kg/hr)   850     Stool   0     Total Output   850     Net   -850             Stool Unmeasured Occurrence   1 x         NPO Diet NPO Except: Sips with meds  ----------------------------------------------------------------------------------------------------------------------  Physical exam:  Constitutional: No acute distress  HEENT: Normocephalic atraumatic neck is supple  Neck: Supple  Cardiovascular: Regular rate and rhythm  Pulmonary/Chest: Clear to auscultation  Abdominal: Positive bowel sounds soft nontender patient.   Musculoskeletal: Hip fracture  Neurologic: No focal deficits  Skin: No rashes  Peripheral vascular:  Genitourinary: Catheter in place  ----------------------------------------------------------------------------------------------------------------------    Last echocardiogram:  Results for orders placed during the hospital encounter of 01/25/19   Adult Transthoracic Echo Complete W/ Cont if Necessary Per Protocol    Narrative · Right ventricular cavity is borderline dilated.  · Left ventricular wall thickness is consistent with concentric   hypertrophy.  · Left ventricular diastolic dysfunction (grade I) consistent with   impaired relaxation.  · Left ventricular systolic function is normal. Estimated EF = 60%.  · Mild aortic valve stenosis is present. Aortic valve mean pressure   gradient is 10.9 mmHg.  · Mild aortic valve regurgitation is present.  · Mild-to-moderate mitral valve regurgitation is present  · Mild to moderate tricuspid valve regurgitation is present.  · Calculated right ventricular systolic  pressure from tricuspid   regurgitation is 29 mmHg.  · No significant pericardial effusion is noted.        ----------------------------------------------------------------------------------------------------------------------  Results from last 7 days   Lab Units 06/05/19  0408 06/04/19  2305   WBC 10*3/mm3 16.94* 17.80*   HEMOGLOBIN g/dL 11.6* 11.7*   HEMATOCRIT % 36.7 36.5   MCV fL 94.8 94.1   MCHC g/dL 31.6 32.1   PLATELETS 10*3/mm3 356 354   INR   --  1.19*         Results from last 7 days   Lab Units 06/05/19  0508 06/05/19  0408 06/04/19  2305   SODIUM mmol/L  --  140 139   POTASSIUM mmol/L  --  3.1* 3.2*   MAGNESIUM mg/dL 1.4*  --   --    CHLORIDE mmol/L  --  100 98   CO2 mmol/L  --  24.6 25.4   BUN mg/dL  --  16 17   CREATININE mg/dL  --  1.06* 1.05*   EGFR IF NONAFRICN AM mL/min/1.73  --  50* 50*   CALCIUM mg/dL  --  7.9* 8.2*   GLUCOSE mg/dL  --  130* 155*   ALBUMIN g/dL  --  3.16* 3.17*   BILIRUBIN mg/dL  --  0.5 0.5   ALK PHOS U/L  --  74 75   AST (SGOT) U/L  --  22 28   ALT (SGPT) U/L  --  18 20   Estimated Creatinine Clearance: 42 mL/min (A) (by C-G formula based on SCr of 1.06 mg/dL (H)).  No results found for: AMMONIA              Hemoglobin A1C   Date/Time Value Ref Range Status   06/05/2019 0508 5.70 (H) 4.80 - 5.60 % Final     No results found for: TSH, FREET4  No results found for: PREGTESTUR, PREGSERUM, HCG, HCGQUANT  Pain Management Panel     There is no flowsheet data to display.        Brief Urine Lab Results  (Last result in the past 365 days)      Color   Clarity   Blood   Leuk Est   Nitrite   Protein   CREAT   Urine HCG        06/05/19 0058 Dark Yellow Cloudy Large (3+) Small (1+) Negative 100 mg/dL (2+)                Results from last 7 days   Lab Units 06/05/19  0058   NITRITE UA  Negative   WBC UA /HPF 31-50*   BACTERIA UA /HPF Trace*   SQUAM EPITHEL UA /HPF 3-6*                    I have personally looked at the labs and they are summarized  above.  ----------------------------------------------------------------------------------------------------------------------  Detailed radiology reports for the last 24 hours:    Imaging Results (last 24 hours)     Procedure Component Value Units Date/Time    XR Hip With or Without Pelvis 2 - 3 View Left [[id number]] Collected:  06/05/19 0711     Updated:  06/05/19 0714    Narrative:       EXAMINATION: XR HIP W OR WO PELVIS 2-3 VIEW LEFT-      TECHNIQUE: XR HIP W OR WO PELVIS 2-3 VIEW LEFT-        INDICATION: left hip injury      COMPARISON: NONE     FINDINGS:          BONES: SUBCAPITAL LEFT FEMORAL NECK FRACTURE.          JOINT: No dislocation.          SOFT TISSUES:  No soft tissue mass.       Impression:       AS ABOVE.     COMMUNICATION: Per this written report.     This report was finalized on 6/5/2019 7:12 AM by Dr. Maxime Treviño MD.       XR Femur 2 View Left [[id number]] Collected:  06/05/19 0711     Updated:  06/05/19 0713    Narrative:       EXAMINATION: XR FEMUR 2 VW LEFT-      TECHNIQUE: XR FEMUR 2 VW LEFT-        INDICATION: Left leg injury      COMPARISON: NONE     FINDINGS:          BONES: MODERATELY DISPLACED SUBCAPITAL FEMORAL NECK FRACTURE.          JOINT: No dislocation.          SOFT TISSUES:  No soft tissue mass.       Impression:       MODERATELY DISPLACED SUBCAPITAL FEMORAL NECK FRACTURE.     COMMUNICATION: Per this written report.     This report was finalized on 6/5/2019 7:11 AM by Dr. Maxime Treviño MD.       XR Shoulder 2+ View Right [[id number]] Collected:  06/05/19 0711     Updated:  06/05/19 0713    Narrative:       EXAMINATION: XR SHOULDER 2+ VW RIGHT-      TECHNIQUE: XR SHOULDER 2+ VW RIGHT-        INDICATION: Right shoulder injury      COMPARISON: NONE     FINDINGS:          BONES: No acute fracture. No blastic or lytic lesions.          JOINT: No dislocation.          SOFT TISSUES:  No soft tissue mass.       Impression:       No acute or destructive bony abnormality.      COMMUNICATION: Per this written report.     This report was finalized on 6/5/2019 7:11 AM by Dr. Maxime Treviño MD.       XR Wrist 3+ View Right [[id number]] Collected:  06/05/19 0711     Updated:  06/05/19 0713    Narrative:       EXAMINATION: XR WRIST 3+ VW RIGHT-      TECHNIQUE: XR WRIST 3+ VW RIGHT-        INDICATION: Right wrist injury      COMPARISON: NONE     FINDINGS:          BONES: No acute fracture. No blastic or lytic lesions.          JOINT: No dislocation.          SOFT TISSUES:  No soft tissue mass.       Impression:       No acute or destructive bony abnormality.     COMMUNICATION: Per this written report.     This report was finalized on 6/5/2019 7:11 AM by Dr. Maxime Treviño MD.       XR Hand 3+ View Right [[id number]] Collected:  06/05/19 0710     Updated:  06/05/19 0713    Narrative:       EXAMINATION: XR HAND 3+ VW RIGHT-      TECHNIQUE: XR HAND 3+ VW RIGHT-        INDICATION: Right hand injury      COMPARISON: NONE     FINDINGS:          BONES: No acute fracture. No blastic or lytic lesions.          JOINT: ADVANCED FIRST CARPOMETACARPAL JOINT DEGENERATIVE CHANGE.  DIP JOINT OSTEOARTHRITIC CHANGE.          SOFT TISSUES:  No soft tissue mass.       Impression:       No acute or destructive bony abnormality.     COMMUNICATION: Per this written report.     This report was finalized on 6/5/2019 7:11 AM by Dr. Maxime Treviño MD.       XR Chest 1 View [[id number]] Collected:  06/05/19 0709     Updated:  06/05/19 0712    Narrative:       EXAMINATION: XR CHEST 1 VW-      CLINICAL INDICATION:     pre-op; S72.002A-Fracture of unspecified part  of neck of left femur, initial encounter for closed fracture;  S46.911A-Strain of unspecified muscle, fascia and tendon at shoulder and  upper arm level, right arm, initial encounter     TECHNIQUE:  XR CHEST 1 VW-      COMPARISON: NONE      FINDINGS: Large hiatal hernia.  COARSENED INTERSTITIAL MARKINGS NOTED THROUGHOUT.  CARDIOMEGALY.  No pleural effusion.  No  pneumothorax.   Bony and soft tissue structures are unremarkable.       Impression:       AS ABOVE.     This report was finalized on 6/5/2019 7:10 AM by Dr. Maxime Treviño MD.           Final impressions for the last 30 days of radiology reports:    Xr Shoulder 2+ View Right    Result Date: 6/5/2019  No acute or destructive bony abnormality.  COMMUNICATION: Per this written report.  This report was finalized on 6/5/2019 7:11 AM by Dr. Maxime Treviño MD.      Xr Wrist 3+ View Right    Result Date: 6/5/2019  No acute or destructive bony abnormality.  COMMUNICATION: Per this written report.  This report was finalized on 6/5/2019 7:11 AM by Dr. Maxime Treviño MD.      Xr Hand 3+ View Right    Result Date: 6/5/2019  No acute or destructive bony abnormality.  COMMUNICATION: Per this written report.  This report was finalized on 6/5/2019 7:11 AM by Dr. Maxime Treviño MD.      Xr Femur 2 View Left    Result Date: 6/5/2019  MODERATELY DISPLACED SUBCAPITAL FEMORAL NECK FRACTURE.  COMMUNICATION: Per this written report.  This report was finalized on 6/5/2019 7:11 AM by Dr. Maxime Treviño MD.      Xr Chest 1 View    Result Date: 6/5/2019  AS ABOVE.  This report was finalized on 6/5/2019 7:10 AM by Dr. Maxime Treviño MD.      Xr Hip With Or Without Pelvis 2 - 3 View Left    Result Date: 6/5/2019  AS ABOVE.  COMMUNICATION: Per this written report.  This report was finalized on 6/5/2019 7:12 AM by Dr. Maxime Treviño MD.      I have personally looked at the radiology images and read the final radiology report.    Assessment & Plan      Left hip fracture--after review of her case is felt the patient should proceed with open reduction internal fixation.  Patient will be listed moderate risk secondary to age.  Otherwise appears to be stable    UTI--Rocephin 1 g IV daily    Hypokalemia--supplement protocol    Hypo-magnesium--supplement per protocol    Hypertension--continue current treatment  VTE Prophylaxis:   Mechanical Order History:      Ordered        06/05/19 0306  Maintain Sequential Compression Device  Continuous     Comments:  Right leg only       06/05/19 0306  Place Sequential Compression Device  Once     Comments:  Right leg only         Pharmalogical Order History:     None          The patient is high risk due to the following diagnoses/reasons: Fracture    Tristan Melara MD  [facility]  06/05/19  8:15 AM

## 2019-06-05 NOTE — ANESTHESIA PREPROCEDURE EVALUATION
Anesthesia Evaluation     Patient summary reviewed and Nursing notes reviewed   no history of anesthetic complications:  NPO Solid Status: > 8 hours  NPO Liquid Status: > 8 hours           Airway   Mallampati: II  TM distance: >3 FB  Neck ROM: full  no difficulty expected  Dental - normal exam   (+) edentulous    Pulmonary - normal exam   (+) sleep apnea on CPAP,   (-) asthma, not a smoker  Cardiovascular   Exercise tolerance: good (4-7 METS)    NYHA Classification: II  Patient on routine beta blocker and Beta blocker given within 24 hours of surgery  Rhythm: irregular    (+) pacemaker pacemaker interrogated 3-6 months ago, hypertension, CAD, dysrhythmias Atrial Fib, hyperlipidemia,   (-) past MI, angina, CHF      Neuro/Psych  (+) syncope, numbness, psychiatric history Depression,     (-) seizures, CVA  GI/Hepatic/Renal/Endo    (+)  hiatal hernia,    (-) liver disease, no renal disease, diabetes, hypothyroidism    Musculoskeletal     Abdominal  - normal exam    Bowel sounds: normal.   Substance History - negative use     OB/GYN negative ob/gyn ROS         Other   (+) arthritis                     Anesthesia Plan    ASA 3     general     intravenous induction   Anesthetic plan, all risks, benefits, and alternatives have been provided, discussed and informed consent has been obtained with: patient and child.  Use of blood products discussed with patient and child  Consented to blood products.

## 2019-06-05 NOTE — PLAN OF CARE
Problem: Patient Care Overview  Goal: Plan of Care Review  Outcome: Ongoing (interventions implemented as appropriate)    Goal: Discharge Needs Assessment  Outcome: Ongoing (interventions implemented as appropriate)      Problem: Fall Risk (Adult)  Goal: Identify Related Risk Factors and Signs and Symptoms  Outcome: Ongoing (interventions implemented as appropriate)    Goal: Absence of Fall  Outcome: Ongoing (interventions implemented as appropriate)      Problem: Skin Injury Risk (Adult)  Goal: Identify Related Risk Factors and Signs and Symptoms  Outcome: Ongoing (interventions implemented as appropriate)    Goal: Skin Health and Integrity  Outcome: Ongoing (interventions implemented as appropriate)      Problem: Fracture Orthopaedic (Adult)  Goal: Signs and Symptoms of Listed Potential Problems Will be Absent, Minimized or Managed (Fracture Orthopaedic)  Outcome: Ongoing (interventions implemented as appropriate)      Problem: Pain, Acute (Adult)  Goal: Identify Related Risk Factors and Signs and Symptoms  Outcome: Ongoing (interventions implemented as appropriate)    Goal: Acceptable Pain Control/Comfort Level  Outcome: Ongoing (interventions implemented as appropriate)

## 2019-06-05 NOTE — CONSULTS
Patient Care Team:  Eric Driver MD as PCP - General  Soraya Darling MD as PCP - Internal Medicine (Cardiology)  Josesito Danielson MD as Consulting Physician (Orthopedic Surgery)    Chief complaint left hip injury    Subjective     History of Present Illness   83 year old female complains of a fall at home yesterday injurying the left hip. She denies any LOC. Denies any syncope. She uses a walker but not all the time. Family at bedside. Patient complains of pain. She is in traction.     Review of Systems   Constitutional: Negative for chills and fever.   HENT: Negative for hearing loss, tinnitus and trouble swallowing.    Eyes: Negative for photophobia, discharge and visual disturbance.   Respiratory: Negative for cough, shortness of breath and wheezing.    Cardiovascular: Negative for chest pain, palpitations and leg swelling.   Gastrointestinal: Negative for abdominal pain, constipation, diarrhea, nausea and vomiting.   Endocrine: Negative for polydipsia, polyphagia and polyuria.   Genitourinary: Negative for dysuria, frequency.   Musculoskeletal: Negative for gait problem and neck pain.        Left hip pain.   Skin: Negative for rash and wound.   Neurological: Negative for dizziness, tremors, seizures, syncope, weakness and light-headedness.   Hematological: Does not bruise/bleed easily.   Psychiatric/Behavioral: Negative for confusion, hallucinations.      Past Medical History:   Diagnosis Date   • Anxiety    • Arthritis    • Atrial fibrillation (CMS/HCC) 11/14/2016   • Gastric ulcer    • Hiatal hernia    • Hypertension    • Iron deficiency anemia    • RADHA (obstructive sleep apnea) 11/14/2016     Past Surgical History:   Procedure Laterality Date   • CARDIAC ELECTROPHYSIOLOGY PROCEDURE N/A 1/18/2018    Procedure: Pacemaker DC new;  Surgeon: Soraya Darling MD;  Location: [facility] INVASIVE LOCATION;  Service:    • TOTAL KNEE ARTHROPLASTY Right    • TUBAL ABDOMINAL LIGATION       Family History    Problem Relation Age of Onset   • Heart attack Mother 69   • Heart disease Mother    • Heart disease Sister    • Heart attack Brother 60   • Heart attack Brother 45   • Lymphoma Brother    • Heart disease Daughter    • Diabetes Son    • Heart disease Daughter    • Heart disease Daughter    • Diabetes Daughter      Social History     Tobacco Use   • Smoking status: Never Smoker   • Smokeless tobacco: Never Used   Substance Use Topics   • Alcohol use: No   • Drug use: No     Medications Prior to Admission   Medication Sig Dispense Refill Last Dose   • aspirin 81 MG EC tablet Take 81 mg by mouth Daily.   6/4/2019 at 0800   • atorvastatin (LIPITOR) 40 MG tablet Take 40 mg by mouth Every Night.   6/4/2019 at 1930   • busPIRone (BUSPAR) 10 MG tablet Take 10 mg by mouth 2 (Two) Times a Day.   6/4/2019 at 1930   • carvedilol (COREG) 12.5 MG tablet Take 1 tablet by mouth 2 (Two) Times a Day With Meals. 180 tablet 3 6/4/2019 at 1930   • esomeprazole (nexIUM) 40 MG capsule Take 40 mg by mouth Every Morning Before Breakfast.   6/4/2019 at 0800   • losartan (COZAAR) 100 MG tablet Take 100 mg by mouth Daily.  1 6/4/2019 at 0800     Allergies:  Darvon [propoxyphene] and Penicillins    Objective      Vital Signs  Temp:  [97.8 °F (36.6 °C)-99.1 °F (37.3 °C)] 98.1 °F (36.7 °C)  Heart Rate:  [70-90] 81  Resp:  [16-18] 18  BP: (148-172)/(69-93) 155/78    Physical Exam  Constitutional: No acute distress  HEENT: Normocephalic atraumatic neck is supple  Neck: Supple  Cardiovascular: Regular rate and rhythm  Pulmonary/Chest: Clear to auscultation, equal chest rise.   Abdominal: Positive bowel sounds soft nontender patient.   Musculoskeletal: left hip tender to palpate. Traction in place.   Neurologic: No focal deficits  Skin: No rashes  Peripheral vascular:  Genitourinary: Catheter in place      Results Review:   I reviewed the patient's new clinical results.      Assessment/Plan       Left displaced femoral neck fracture  (CMS/[facility])      Assessment & Plan  Plan: will keep the patient npo. Plan for bipolar hip replacement per dr harvey today. Patient and family verbalize understanding and wish to proceed.   I discussed the patients findings and my recommendations with patient and family    ANGELO Aguayo  06/05/19  2:00 PM    Time: 20mins     I agree with the above note as I independently saw the patient and spoke with the family extensively about the patient's injury and the treatment options.  Surgical replacement of the ball of the hip is the recommended treatment.  R/B/A discussed with patient and family and consent obtained. Will plan for surgery today.    Gerardo Harvey M.D.

## 2019-06-05 NOTE — ANESTHESIA POSTPROCEDURE EVALUATION
Patient: Abbi Mendez    Procedure Summary     Date:  06/05/19 Room / Location:   [facility] OR 03 /  [facility]    Anesthesia Start:  1749 Anesthesia Stop:  1940    Procedure:  HIP BIPOLAR REPLACEMENT (Left Hip) Diagnosis:       Left displaced femoral neck fracture (CMS/HCC)      (Left displaced femoral neck fracture (CMS/HCC) [S72.002A])    Surgeon:  Gerardo Jain MD Provider:  Mack Caballero MD    Anesthesia Type:  general ASA Status:  3          Anesthesia Type: general  Last vitals  BP   (!) 185/108(Dr. Caballero made aware. ) (06/05/19 1719)   Temp   99.7 °F (37.6 °C) (06/05/19 1719)   Pulse   87 (06/05/19 1719)   Resp   22 (06/05/19 1719)     SpO2   95 % (06/05/19 1719)     Post Anesthesia Care and Evaluation    Patient location during evaluation: PACU  Patient participation: complete - patient participated  Level of consciousness: awake and alert  Pain score: 1  Pain management: adequate  Airway patency: patent  Anesthetic complications: No anesthetic complications  PONV Status: controlled  Cardiovascular status: acceptable  Respiratory status: acceptable  Hydration status: acceptable

## 2019-06-05 NOTE — PAYOR COMM NOTE
"[facility]  NPI:[id number]    Utilization Review  Contact: Gifty Grimes RN  Phone: [phone]  Fax:[phone]    INITIATE INPATIENT AUTHORIZATION    Abbi Mendez (83 y.o. Female)     Date of Birth Social Security Number Address Home Phone MRN    1935  [id number]  [place] [address] [phone] [id number]    Quaker Marital Status          Gnosticist Single       Admission Date Admission Type Admitting Provider Attending Provider Department, Room/Bed    19 Emergency Lenora Kwan DO Hays, Ray G, MD [address], 3348/1S    Discharge Date Discharge Disposition Discharge Destination                       Attending Provider:  Tristan Melara MD    Allergies:  Darvon [Propoxyphene], Penicillins    Isolation:  None   Infection:  None   Code Status:  CPR    Ht:  167.6 cm (65.98\")   Wt:  76.2 kg (168 lb 0.2 oz)    Admission Cmt:  None   Principal Problem:  None                Active Insurance as of 2019     Primary Coverage     Payor Plan Insurance Group Employer/Plan Group    HUMANA MEDICARE REPLACEMENT HUMANA MEDICARE REPL [id number]     Payor Plan Address Payor Plan Phone Number Payor Plan Fax Number Effective Dates    [address] [address] [phone]  2018 - None Entered    [facility] [id number]       Subscriber Name Subscriber Birth Date Member ID       ABBI MENDEZ 1935 [id number]                 Emergency Contacts      (Rel.) Home Phone Work Phone Mobile Phone    Sherine Lyle (Daughter) [phone] -- --    Shruthi Gama (Daughter) -- -- [phone]    Suze Ortiz (Daughter) -- -- [phone]    BENJAASTRID (Daughter) [phone] -- --               History & Physical      Lenora Kwan DO at 2019  3:24 AM          Hospitalist History and Physical    Patient Identification:  Name: Abbi Mendez  Age/Sex: 83 y.o. female  :  1935  MRN: [id number]         Primary Care Physician: Eric Driver, " MD    Chief Complaint   Patient presents with   • Fall       History of Present Illness  Patient is a 83 y.o. 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    Patient states that she accidentally tripped over a heating pad cord. The patient reports that she had applied heat to her right shoulder.  The patient reports that 2 weeks ago she also suffered a fall causing a right hand fracture, treated at an urgent care clinic. Patient's right hand is currently in a brace.    Patient reports that she experienced left hip pain shortly after her fall.  She denies any loss of consciousness.  She denies any head trauma.  Patient reports that EMS was contacted and she was brought to the emergency department.  The patient denies any shortness of breath, chest pain and/or dizziness prior to, during and/or after her fall.    The patient is functionally independent at home.  The patient denies any recent chest pain, dyspnea on exertion/or generalized weakness.  She denies any recent syncopal events. She further denies any cough, nausea, vomiting, abdominal pain, dysuria, constipation and/or diarrhea.    In the emergency department, the patient was found to have a left hip fracture.  CMP was remarkable for potassium of 3.2.  Calcium 8.2 and albumin 3.17.  CBC was remarkable for white blood cell count of 17.80 with 87.9% neutrophils.  Chest x-ray is negative for infiltrate and/or effusion per my view.  EKG has been reviewed by me and appears to be a paced rhythm without acute ST-T wave changes.    Patient has been admitted to the medical surgical floor for further evaluation and management.   A Rowell catheter was placed in the emergency department.    Present during visit: The patient's daughter and FANNY Nova    Past History:  Past Medical History:   Diagnosis Date   • Anxiety    • Arthritis    • Atrial fibrillation (CMS/HCC) 11/14/2016   • Gastric ulcer    • Hiatal hernia    • Hypertension    • Iron deficiency anemia    • RADHA (obstructive sleep apnea) 11/14/2016     Past Surgical History:   Procedure Laterality Date   • CARDIAC ELECTROPHYSIOLOGY PROCEDURE N/A 1/18/2018    Procedure: Pacemaker DC new;  Surgeon: Soraya Darling MD;  Location: [facility] INVASIVE LOCATION;  Service:    • TOTAL KNEE ARTHROPLASTY Right    • TUBAL ABDOMINAL LIGATION       Family History   Problem Relation Age of Onset   • Heart attack Mother 69   • Heart disease Mother    • Heart disease Sister    • Heart attack Brother 60   • Heart attack Brother 45   • Lymphoma Brother    • Heart disease Daughter    • Diabetes Son    • Heart disease Daughter    • Heart disease Daughter    • Diabetes Daughter      Social History     Tobacco Use   • Smoking status: Never Smoker   • Smokeless tobacco: Never Used   Substance Use Topics   • Alcohol use: No   • Drug use: No     Medications Prior to Admission   Medication Sig Dispense Refill Last Dose   • aspirin 81 MG EC tablet Take 81 mg by mouth Daily.   6/4/2019 at 0800   • atorvastatin (LIPITOR) 40 MG tablet Take 40 mg by mouth Every Night.   6/4/2019 at 1930   • busPIRone (BUSPAR) 10 MG tablet Take 10 mg by mouth 2 (Two) Times a Day.   6/4/2019 at 1930   • carvedilol (COREG) 12.5 MG tablet Take 1 tablet by mouth 2 (Two) Times a Day With Meals. 180 tablet 3 6/4/2019 at 1930   • esomeprazole (nexIUM) 40 MG capsule Take 40 mg by mouth Every Morning Before Breakfast.   6/4/2019 at 0800   • losartan (COZAAR) 100 MG tablet Take 100 mg by mouth Daily.  1 6/4/2019 at 0800     Allergies: Darvon [propoxyphene] and Penicillins    Review of Systems:  Review of Systems   Constitutional: Negative for  chills, diaphoresis and fever.   HENT: Negative for hearing loss, tinnitus and trouble swallowing.    Eyes: Negative for photophobia, discharge and visual disturbance.   Respiratory: Negative for cough, shortness of breath and wheezing.    Cardiovascular: Negative for chest pain, palpitations and leg swelling.   Gastrointestinal: Negative for abdominal pain, constipation, diarrhea, nausea and vomiting.   Endocrine: Negative for polydipsia, polyphagia and polyuria.   Genitourinary: Negative for dysuria, frequency and hematuria.   Musculoskeletal: Negative for gait problem, myalgias and neck pain.        Left hip pain.   Skin: Negative for rash and wound.   Neurological: Negative for dizziness, tremors, seizures, syncope, weakness and light-headedness.   Hematological: Does not bruise/bleed easily.   Psychiatric/Behavioral: Negative for confusion, hallucinations and suicidal ideas.      Vital Signs  Temp:  [97.8 °F (36.6 °C)-99.1 °F (37.3 °C)] 99.1 °F (37.3 °C)  Heart Rate:  [70-87] 87  Resp:  [16-18] 18  BP: (148-152)/(72-79) 152/79  Body mass index is 27.12 kg/m².    Physical Exam:  Physical Exam   Constitutional: She is oriented to person, place, and time. She appears well-developed and well-nourished. No distress.   HENT:   Head: Normocephalic and atraumatic.   Mouth/Throat: Oropharynx is clear and moist.   Eyes: Conjunctivae and EOM are normal. Pupils are equal, round, and reactive to light.   Neck: Neck supple. No tracheal deviation present. No thyromegaly present.   Cardiovascular: Normal rate and regular rhythm. Exam reveals no gallop and no friction rub.   No murmur heard.  Pulses:       Dorsalis pedis pulses are 1+ on the right side, and 1+ on the left side.   Pulmonary/Chest: Breath sounds normal. No respiratory distress. She has no wheezes. She has no rales.   Abdominal: Soft. Bowel sounds are normal. She exhibits no distension. There is no hepatomegaly. There is no tenderness. There is no guarding.    Genitourinary:   Genitourinary Comments: A flynn catheter is in place and is draining a yellow urine.    Musculoskeletal: Normal range of motion. She exhibits no tenderness.   Neurological: She is alert and oriented to person, place, and time. No cranial nerve deficit.   Skin: Skin is warm and dry. No rash noted. No erythema.   Psychiatric: She has a normal mood and affect.      Results Review:    Results from last 7 days   Lab Units 06/04/19  2305   WBC 10*3/mm3 17.80*   HEMOGLOBIN g/dL 11.7*   HEMATOCRIT % 36.5   PLATELETS 10*3/mm3 354     Results from last 7 days   Lab Units 06/04/19  2305   SODIUM mmol/L 139   POTASSIUM mmol/L 3.2*   CHLORIDE mmol/L 98   CO2 mmol/L 25.4   BUN mg/dL 17   CREATININE mg/dL 1.05*   CALCIUM mg/dL 8.2*   GLUCOSE mg/dL 155*     Results from last 7 days   Lab Units 06/04/19  2305   BILIRUBIN mg/dL 0.5   ALK PHOS U/L 75   AST (SGOT) U/L 28   ALT (SGPT) U/L 20       Results from last 7 days   Lab Units 06/04/19  2305   INR  1.19*       Imaging:    I have personally reviewed the EKG. Pending official cardiology interpretation, however, per my view: paced rhythm. No acute ST-T changes.    Imaging Results (most recent)     Procedure Component Value Units Date/Time    XR Chest 1 View [[id number]] Updated:  06/04/19 2337    XR Femur 2 View Left [[id number]] Updated:  06/04/19 2211    XR Hip With or Without Pelvis 2 - 3 View Left [[id number]] Updated:  06/04/19 2211    XR Wrist 3+ View Right [[id number]] Updated:  06/04/19 2211    XR Hand 3+ View Right [[id number]] Updated:  06/04/19 2210    XR Shoulder 2+ View Right [[id number]] Updated:  06/04/19 2208        *Hip xray reveals left sided fracture; chest xray without infiltrate and/or effusion.    Assessment/Plan     -Acute, traumatic, closed left hip fracture status post fall: Patient has been admitted to the medical surgical floor with telemetry.  She is currently nothing by mouth while awaiting an orthopedic surgery evaluation. She has been  started on gentle IV fluid hydration.  Continue with as needed IV analgesics with holding parameters. Patient's geriatric sensitive perioperative cardiac risk index is 0.2%. Patient felt to be acceptable to proceed with surgery at this time as a low to moderate risk patient for this intermediate procedure.    -Leukocytosis is likely reactive and due to above: Repeat CBC in a.m.  Hold on IV antibiotic therapy at this time.    -Acute, mild hypokalemia: Patient has received supplementation in the emergency department.  Repeat chemistries in the a.m.  I have also requested magnesium level.    -Hyperglycemia: May be stressed induced; patient without history of diabetes mellitus. Will obtain a HgbA1c in am.     -Mild hypoalbuminemia.     -Mildly decreased hemoglobin.     -Microscopic hematuria with proteinuria: Obtain protein to creatinine ratio.     -CAD status post previous stent: Patient is currently chest pain free.     -Essential hypertension, currently uncontrolled and may be pain induced: Will plan for PRN IV antihypertensive.    -History of primary hyperparathyroidism.    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     DVT prophylaxis: SCD on right leg    Estimated Length of Stay: > 2 MNs    CODE: FULL/CPR    I discussed the patients findings and my recommendations with patient, family and nursing staff    Lenora Kwan DO  06/05/19  3:24 AM    Electronically signed by Lenora Kwan DO at 6/5/2019  4:24 AM          Emergency Department Notes      Zeinab Carey PA at 6/4/2019  9:13 PM          Subjective     Fall   Mechanism of injury: fall    Injury location:  Leg  Leg injury location:  L hip  Incident location:  Home  Time since incident:  30 minutes  Arrived directly from scene: yes    Fall:     Fall occurred:  Tripped (Tripped over a heating pad cord)    Impact surface:  Carpet    Point of impact: left hip.    Entrapped after fall: no    Suspicion of alcohol  use: no    Suspicion of drug use: no    Tetanus status:  Up to date  Associated symptoms: no abdominal pain, no back pain, no blindness, no chest pain, no difficulty breathing, no headaches, no hearing loss, no loss of consciousness, no nausea, no neck pain, no seizures and no vomiting    Risk factors: anticoagulation therapy and CAD    Risk factors: no AICD, no asthma, no beta blocker therapy, no CABG, no CHF, no COPD, no diabetes, no dialysis, no hemophilia, no kidney disease, no pacemaker, no past MI, not pregnant and no steroid use        Review of Systems   Constitutional: Negative.  Negative for fever.   HENT: Negative.  Negative for hearing loss.    Eyes: Negative for blindness.   Respiratory: Negative.    Cardiovascular: Negative.  Negative for chest pain.   Gastrointestinal: Negative.  Negative for abdominal pain, nausea and vomiting.   Endocrine: Negative.    Genitourinary: Negative.  Negative for dysuria.   Musculoskeletal: Negative for arthralgias, back pain, gait problem, joint swelling, myalgias, neck pain and neck stiffness.        Left hip pain    Skin: Negative.    Neurological: Negative.  Negative for seizures, loss of consciousness and headaches.   Psychiatric/Behavioral: Negative.    All other systems reviewed and are negative.      Past Medical History:   Diagnosis Date   • Anxiety    • Arthritis    • Atrial fibrillation (CMS/HCC) 11/14/2016   • Gastric ulcer    • Hiatal hernia    • Hypertension    • Iron deficiency anemia    • RADHA (obstructive sleep apnea) 11/14/2016       Allergies   Allergen Reactions   • Darvon [Propoxyphene] Paresthesia   • Penicillins Rash       Past Surgical History:   Procedure Laterality Date   • CARDIAC ELECTROPHYSIOLOGY PROCEDURE N/A 1/18/2018    Procedure: Pacemaker DC new;  Surgeon: Soraya Darling MD;  Location: [facility] INVASIVE LOCATION;  Service:    • TOTAL KNEE ARTHROPLASTY Right    • TUBAL ABDOMINAL LIGATION         Family History   Problem Relation Age of  Onset   • Heart attack Mother 69   • Heart disease Mother    • Heart disease Sister    • Heart attack Brother 60   • Heart attack Brother 45   • Lymphoma Brother    • Heart disease Daughter    • Diabetes Son    • Heart disease Daughter    • Heart disease Daughter    • Diabetes Daughter        Social History     Socioeconomic History   • Marital status: Single     Spouse name: Not on file   • Number of children: Not on file   • Years of education: Not on file   • Highest education level: Not on file   Tobacco Use   • Smoking status: Never Smoker   • Smokeless tobacco: Never Used   Substance and Sexual Activity   • Alcohol use: No   • Drug use: No   • Sexual activity: Not Currently     Partners: Male   Social History Narrative         seven children            Objective   Physical Exam   Constitutional: She is oriented to person, place, and time. She appears well-developed and well-nourished. No distress.   HENT:   Head: Normocephalic and atraumatic.   Right Ear: External ear normal.   Left Ear: External ear normal.   Nose: Nose normal.   Eyes: Conjunctivae and EOM are normal. Pupils are equal, round, and reactive to light.   Neck: Normal range of motion. Neck supple. No JVD present. No tracheal deviation present.   Cardiovascular: Normal rate, regular rhythm and normal heart sounds.   No murmur heard.  Pulmonary/Chest: Effort normal and breath sounds normal. No respiratory distress. She has no wheezes.   Abdominal: Soft. Bowel sounds are normal. There is no tenderness.   Musculoskeletal: Normal range of motion. She exhibits tenderness. She exhibits no edema or deformity.   Tenderness to palpation over the left hip; ROM is active but limited; neurovascular status and sensation LLE intact. Limited ROM noted in the left shoulder and wrist. Wrist brace on left wrist from hand injury from a fall 1 week ago.    Neurological: She is alert and oriented to person, place, and time. She displays normal reflexes. No  cranial nerve deficit or sensory deficit. She exhibits normal muscle tone. Coordination normal.   Skin: Skin is warm and dry. No rash noted. She is not diaphoretic. No erythema. No pallor.   Psychiatric: She has a normal mood and affect. Her behavior is normal. Thought content normal.   Nursing note and vitals reviewed.      Procedures          ED Course  ED Course as of Jun 05 0120   Tue Jun 04, 2019   2300 Hand fracture noted; already being treated per Dr. Townsend.  XR Hand 3+ View Right [MM]   2300 No acute fractures per Dr. Townsend.  XR Wrist 3+ View Right [MM]   2300 No acute findings per Dr. Townsend.  XR Shoulder 2+ View Right [MM]   2301 No acute fractures per Dr. Townsend.  XR Femur 2 View Left [MM]   2301 Left femoral neck fracture per Dr. Townsend.  XR Hip With or Without Pelvis 2 - 3 View Left [MM]   2306 Spoke with Dr. Jain how has requested I make her NPO after midnight and put her in 5 lbs of bucks traction.   [MM]   Wed Jun 05, 2019   0021 No acute findings per Dr. Townsend.  XR Chest 1 View [MM]   0055 Spoke with Dr. Kwan and she has accepted her for admission.   [MM]   0119 Paced rhythm; no acute changes per Dr. Townsend.  ECG 12 Lead [MM]      ED Course User Index  [MM] Zeinab Carey PA                  MDM  Number of Diagnoses or Management Options  Hypokalemia:   Left displaced femoral neck fracture (CMS/HCC):   Strain of right shoulder, initial encounter:      Amount and/or Complexity of Data Reviewed  Clinical lab tests: ordered and reviewed  Tests in the radiology section of CPT®:  ordered and reviewed  Decide to obtain previous medical records or to obtain history from someone other than the patient: yes  Discuss the patient with other providers: yes          Final diagnoses:   Left displaced femoral neck fracture (CMS/HCC)   Strain of right shoulder, initial encounter   Hypokalemia            Zeinab Carey PA  06/05/19 0120      Electronically signed by Zeinab Carey PA at 6/5/2019   "1:20 AM       Hospital Medications (all)       Dose Frequency Start End    acetaminophen (TYLENOL) tablet 1,000 mg 1,000 mg Once 6/4/2019 6/4/2019    Sig - Route: Take 2 tablets by mouth 1 (One) Time. - Oral    Cosign for Ordering: Accepted by Kim Townsend DO on 6/5/2019  1:05 AM    atorvastatin (LIPITOR) tablet 40 mg 40 mg Nightly 6/5/2019     Sig - Route: Take 1 tablet by mouth Every Night. - Oral    busPIRone (BUSPAR) tablet 10 mg 10 mg 2 Times Daily 6/5/2019     Sig - Route: Take 1 tablet by mouth 2 (Two) Times a Day. - Oral    carvedilol (COREG) tablet 12.5 mg 12.5 mg 2 Times Daily With Meals 6/5/2019     Sig - Route: Take 2 tablets by mouth 2 (Two) Times a Day With Meals. - Oral    cefTRIAXone (ROCEPHIN) 1 g/100 mL 0.9% NS (MBP) 1 g Every 24 Hours 6/5/2019 6/10/2019    Sig - Route: Infuse 100 mL into a venous catheter Daily. - Intravenous    HYDROmorphone (DILAUDID) injection 0.5 mg 0.5 mg Every 3 Hours PRN 6/5/2019 6/15/2019    Sig - Route: Infuse 0.5 mL into a venous catheter Every 3 (Three) Hours As Needed for Severe Pain . - Intravenous    losartan (COZAAR) tablet 50 mg 50 mg Daily 6/5/2019     Sig - Route: Take 1 tablet by mouth Daily. - Oral    Cosign for Ordering: Required by Tristan Melara MD    Magnesium Sulfate 2 gram / 50mL Infusion (GIVE X 3 BAGS TO EQUAL 6GM TOTAL DOSE) - Mg 1.1 - 1.5 mg/dl 2 g As Needed 6/5/2019     Sig - Route: Infuse 50 mL into a venous catheter As Needed (See Administration Instructions). - Intravenous    Linked Group 1:  \"Or\" Linked Group Details        Magnesium Sulfate 2 gram Bolus, followed by 8 gram infusion (total Mg dose 10 grams)- Mg less than or equal to 1mg/dL 2 g As Needed 6/5/2019     Sig - Route: Infuse 50 mL into a venous catheter As Needed (See Administration Instructions). - Intravenous    Linked Group 1:  \"Or\" Linked Group Details        magnesium sulfate 2g/50 mL (PREMIX) infusion 2 g Every 2 Hours 6/5/2019 6/5/2019    Sig - Route: Infuse 50 mL into " "a venous catheter Every 2 (Two) Hours. - Intravenous    Magnesium Sulfate 4 gram infusion- Mg 1.6-1.9 mg/dL 4 g As Needed 6/5/2019     Sig - Route: Infuse 100 mL into a venous catheter As Needed (See Administration Instructions). - Intravenous    Linked Group 1:  \"Or\" Linked Group Details        morphine injection 2 mg 2 mg Once 6/4/2019 6/4/2019    Sig - Route: Infuse 1 mL into a venous catheter 1 (One) Time. - Intravenous    morphine injection 2 mg 2 mg Once 6/4/2019 6/4/2019    Sig - Route: Infuse 1 mL into a venous catheter 1 (One) Time. - Intravenous    morphine injection 2 mg 2 mg Once 6/5/2019 6/5/2019    Sig - Route: Infuse 1 mL into a venous catheter 1 (One) Time. - Intravenous    morphine injection 2 mg 2 mg Once 6/5/2019 6/5/2019    Sig - Route: Infuse 1 mL into a venous catheter 1 (One) Time. - Intravenous    naloxone (NARCAN) injection 0.4 mg 0.4 mg Every 5 Minutes PRN 6/5/2019     Sig - Route: Infuse 1 mL into a venous catheter Every 5 (Five) Minutes As Needed for Respiratory Depression. - Intravenous    Linked Group 2:  \"And\" Linked Group Details        nitroglycerin (NITROSTAT) SL tablet 0.4 mg 0.4 mg Every 5 Minutes PRN 6/5/2019     Sig - Route: Place 1 tablet under the tongue Every 5 (Five) Minutes As Needed for Chest Pain. - Sublingual    pantoprazole (PROTONIX) EC tablet 40 mg 40 mg Every Morning 6/5/2019     Sig - Route: Take 1 tablet by mouth Every Morning. - Oral    potassium chloride (K-DUR,KLOR-CON) CR tablet 20 mEq 20 mEq Once 6/5/2019 6/5/2019    Sig - Route: Take 1 tablet by mouth 1 (One) Time. - Oral    Cosign for Ordering: Accepted by Kim Townsend DO on 6/5/2019  1:05 AM    potassium chloride (K-DUR,KLOR-CON) ER tablet 40 mEq 40 mEq As Needed 6/5/2019     Sig - Route: Take 4 tablets by mouth As Needed (Potassium Replacement.  See Admin Instructions). - Oral    Cosign for Ordering: Required by Tristan Melara MD    Linked Group 3:  \"Or\" Linked Group Details        potassium " "chloride (KLOR-CON) packet 40 mEq 40 mEq As Needed 6/5/2019     Sig - Route: Take 40 mEq by mouth As Needed (potassium replacement, see admin instructions). - Oral    Cosign for Ordering: Required by Tristan Melara MD    Linked Group 3:  \"Or\" Linked Group Details        potassium chloride 10 mEq in 100 mL IVPB 10 mEq Every 1 Hour PRN 6/5/2019     Sig - Route: Infuse 100 mL into a venous catheter Every 1 (One) Hour As Needed (Potassium Replacement - See Admin Instructions). - Intravenous    Cosign for Ordering: Required by Tristan Melara MD    Linked Group 3:  \"Or\" Linked Group Details        potassium chloride 10 mEq in 100 mL IVPB 10 mEq Every 1 Hour 6/5/2019 6/5/2019    Sig - Route: Infuse 100 mL into a venous catheter Every 1 (One) Hour. - Intravenous    sodium chloride 0.9 % flush 10 mL 10 mL As Needed 6/4/2019     Sig - Route: Infuse 10 mL into a venous catheter As Needed for Line Care. - Intravenous    Cosign for Ordering: Accepted by Kim Townsend DO on 6/5/2019  1:05 AM    Linked Group 4:  \"And\" Linked Group Details        sodium chloride 0.9 % flush 3 mL 3 mL Every 12 Hours Scheduled 6/5/2019     Sig - Route: Infuse 3 mL into a venous catheter Every 12 (Twelve) Hours. - Intravenous    sodium chloride 0.9 % flush 3-10 mL 3-10 mL As Needed 6/5/2019     Sig - Route: Infuse 3-10 mL into a venous catheter As Needed for Line Care. - Intravenous    sodium chloride 0.9 % infusion 100 mL/hr Continuous 6/5/2019     Sig - Route: Infuse 100 mL/hr into a venous catheter Continuous. - Intravenous    morphine injection 2 mg (Discontinued) 2 mg Every 4 Hours PRN 6/5/2019 6/5/2019    Sig - Route: Infuse 1 mL into a venous catheter Every 4 (Four) Hours As Needed for Severe Pain . - Intravenous    Linked Group 2:  \"And\" Linked Group Details        potassium chloride (K-DUR,KLOR-CON) CR tablet 40 mEq (Discontinued) 40 mEq Every 4 Hours 6/5/2019 6/5/2019    Sig - Route: Take 2 tablets by mouth Every 4 (Four) Hours. - " Oral    Reason for Discontinue: *Re-Entry             Physician Progress Notes (all)      Tristan Melara MD at 2019  8:14 AM              [facility]     Patient Identification:  Name:  Abbi Mendez  Age:  83 y.o.  Sex:  female  :  1935  MRN:  [id number]  Visit Number:  [id number]  ROOM: [address] [place] [address]     Primary Care Provider:  Eric Driver MD    Length of stay in inpatient status:  0    Subjective     Chief Compliant:    Chief Complaint   Patient presents with   • Fall       History of Presenting Illness: Patient is an 83-year-old female with a history of paroxysmal atrial fibrillation, pacemaker placement, hypertension, osteoarthritis.  Patient was walking in her house last evening and tripped over a cord and fell and fractured her left hip.  He was admitted to the service.  Patient states she has had no recent chest pains, no recent syncopal events.  Patient denies any history of recent bleeding.  There is no shortness of breath.  On evaluation last evening patient had no acute changes on her EKG.  Urinalysis was suggestive of UTI.  Patient did have a elevated white count which was thought to be secondary to just acute phase reactant from the fall.  Patient has had no fever.  And has been normotensive.  Patient denies any new difficulties otherwise.  I did discuss case with her cardiologist  Dr. Darling who agreed that if she is stable she should proceed with hip repair.  Objective     Current Hospital Meds:  atorvastatin 40 mg Oral Nightly   busPIRone 10 mg Oral BID   carvedilol 12.5 mg Oral BID With Meals   ceftriaxone 1 g Intravenous Q24H   losartan 50 mg Oral Daily   pantoprazole 40 mg Oral QAM   sodium chloride 3 mL Intravenous Q12H     sodium chloride 100 mL/hr Last Rate: 100 mL/hr (19 0438)     ----------------------------------------------------------------------------------------------------------------------  Vital Signs:  Temp:  [97.8 °F (36.6 °C)-99.1  °F (37.3 °C)] 98.6 °F (37 °C)  Heart Rate:  [70-90] 85  Resp:  [16-18] 18  BP: (148-172)/(69-93) 166/80  SpO2:  [93 %-98 %] 97 %  on   ;   Device (Oxygen Therapy): room air  Body mass index is 27.13 kg/m².    Wt Readings from Last 3 Encounters:   06/05/19 76.2 kg (168 lb 0.2 oz)   05/28/19 78 kg (172 lb)   02/01/19 75.8 kg (167 lb)     Intake & Output (last 3 days)       06/02 0701 - 06/03 0700 06/03 0701 - 06/04 0700 06/04 0701 - 06/05 0700 06/05 0701 - 06/06 0700    P.O.   0     Total Intake(mL/kg)   0 (0)     Urine (mL/kg/hr)   850     Stool   0     Total Output   850     Net   -850             Stool Unmeasured Occurrence   1 x         NPO Diet NPO Except: Sips with meds  ----------------------------------------------------------------------------------------------------------------------  Physical exam:  Constitutional: No acute distress  HEENT: Normocephalic atraumatic neck is supple  Neck: Supple  Cardiovascular: Regular rate and rhythm  Pulmonary/Chest: Clear to auscultation  Abdominal: Positive bowel sounds soft nontender patient.   Musculoskeletal: Hip fracture  Neurologic: No focal deficits  Skin: No rashes  Peripheral vascular:  Genitourinary: Catheter in place  ----------------------------------------------------------------------------------------------------------------------    Last echocardiogram:  Results for orders placed during the hospital encounter of 01/25/19   Adult Transthoracic Echo Complete W/ Cont if Necessary Per Protocol    Narrative · Right ventricular cavity is borderline dilated.  · Left ventricular wall thickness is consistent with concentric   hypertrophy.  · Left ventricular diastolic dysfunction (grade I) consistent with   impaired relaxation.  · Left ventricular systolic function is normal. Estimated EF = 60%.  · Mild aortic valve stenosis is present. Aortic valve mean pressure   gradient is 10.9 mmHg.  · Mild aortic valve regurgitation is present.  · Mild-to-moderate mitral  valve regurgitation is present  · Mild to moderate tricuspid valve regurgitation is present.  · Calculated right ventricular systolic pressure from tricuspid   regurgitation is 29 mmHg.  · No significant pericardial effusion is noted.        ----------------------------------------------------------------------------------------------------------------------  Results from last 7 days   Lab Units 06/05/19  0408 06/04/19  2305   WBC 10*3/mm3 16.94* 17.80*   HEMOGLOBIN g/dL 11.6* 11.7*   HEMATOCRIT % 36.7 36.5   MCV fL 94.8 94.1   MCHC g/dL 31.6 32.1   PLATELETS 10*3/mm3 356 354   INR   --  1.19*         Results from last 7 days   Lab Units 06/05/19  0508 06/05/19  0408 06/04/19  2305   SODIUM mmol/L  --  140 139   POTASSIUM mmol/L  --  3.1* 3.2*   MAGNESIUM mg/dL 1.4*  --   --    CHLORIDE mmol/L  --  100 98   CO2 mmol/L  --  24.6 25.4   BUN mg/dL  --  16 17   CREATININE mg/dL  --  1.06* 1.05*   EGFR IF NONAFRICN AM mL/min/1.73  --  50* 50*   CALCIUM mg/dL  --  7.9* 8.2*   GLUCOSE mg/dL  --  130* 155*   ALBUMIN g/dL  --  3.16* 3.17*   BILIRUBIN mg/dL  --  0.5 0.5   ALK PHOS U/L  --  74 75   AST (SGOT) U/L  --  22 28   ALT (SGPT) U/L  --  18 20   Estimated Creatinine Clearance: 42 mL/min (A) (by C-G formula based on SCr of 1.06 mg/dL (H)).  No results found for: AMMONIA              Hemoglobin A1C   Date/Time Value Ref Range Status   06/05/2019 0508 5.70 (H) 4.80 - 5.60 % Final     No results found for: TSH, FREET4  No results found for: PREGTESTUR, PREGSERUM, HCG, HCGQUANT  Pain Management Panel     There is no flowsheet data to display.        Brief Urine Lab Results  (Last result in the past 365 days)      Color   Clarity   Blood   Leuk Est   Nitrite   Protein   CREAT   Urine HCG        06/05/19 0058 Dark Yellow Cloudy Large (3+) Small (1+) Negative 100 mg/dL (2+)                Results from last 7 days   Lab Units 06/05/19  0058   NITRITE UA  Negative   WBC UA /HPF 31-50*   BACTERIA UA /HPF Trace*   SQUAM EPITHEL UA  /[facility] 3-6*                    I have personally looked at the labs and they are summarized above.  ----------------------------------------------------------------------------------------------------------------------  Detailed radiology reports for the last 24 hours:    Imaging Results (last 24 hours)     Procedure Component Value Units Date/Time    XR Hip With or Without Pelvis 2 - 3 View Left [[id number]] Collected:  06/05/19 0711     Updated:  06/05/19 0714    Narrative:       EXAMINATION: XR HIP W OR WO PELVIS 2-3 VIEW LEFT-      TECHNIQUE: XR HIP W OR WO PELVIS 2-3 VIEW LEFT-        INDICATION: left hip injury      COMPARISON: NONE     FINDINGS:          BONES: SUBCAPITAL LEFT FEMORAL NECK FRACTURE.          JOINT: No dislocation.          SOFT TISSUES:  No soft tissue mass.       Impression:       AS ABOVE.     COMMUNICATION: Per this written report.     This report was finalized on 6/5/2019 7:12 AM by Dr. Maxime Treviño MD.       XR Femur 2 View Left [[id number]] Collected:  06/05/19 0711     Updated:  06/05/19 0713    Narrative:       EXAMINATION: XR FEMUR 2 VW LEFT-      TECHNIQUE: XR FEMUR 2 VW LEFT-        INDICATION: Left leg injury      COMPARISON: NONE     FINDINGS:          BONES: MODERATELY DISPLACED SUBCAPITAL FEMORAL NECK FRACTURE.          JOINT: No dislocation.          SOFT TISSUES:  No soft tissue mass.       Impression:       MODERATELY DISPLACED SUBCAPITAL FEMORAL NECK FRACTURE.     COMMUNICATION: Per this written report.     This report was finalized on 6/5/2019 7:11 AM by Dr. Maxime Treviño MD.       XR Shoulder 2+ View Right [[id number]] Collected:  06/05/19 0711     Updated:  06/05/19 0713    Narrative:       EXAMINATION: XR SHOULDER 2+ VW RIGHT-      TECHNIQUE: XR SHOULDER 2+ VW RIGHT-        INDICATION: Right shoulder injury      COMPARISON: NONE     FINDINGS:          BONES: No acute fracture. No blastic or lytic lesions.          JOINT: No dislocation.          SOFT TISSUES:  No soft  tissue mass.       Impression:       No acute or destructive bony abnormality.     COMMUNICATION: Per this written report.     This report was finalized on 6/5/2019 7:11 AM by Dr. Maxime Treviño MD.       XR Wrist 3+ View Right [[id number]] Collected:  06/05/19 0711     Updated:  06/05/19 0713    Narrative:       EXAMINATION: XR WRIST 3+ VW RIGHT-      TECHNIQUE: XR WRIST 3+ VW RIGHT-        INDICATION: Right wrist injury      COMPARISON: NONE     FINDINGS:          BONES: No acute fracture. No blastic or lytic lesions.          JOINT: No dislocation.          SOFT TISSUES:  No soft tissue mass.       Impression:       No acute or destructive bony abnormality.     COMMUNICATION: Per this written report.     This report was finalized on 6/5/2019 7:11 AM by Dr. Maxime Treviño MD.       XR Hand 3+ View Right [[id number]] Collected:  06/05/19 0710     Updated:  06/05/19 0713    Narrative:       EXAMINATION: XR HAND 3+ VW RIGHT-      TECHNIQUE: XR HAND 3+ VW RIGHT-        INDICATION: Right hand injury      COMPARISON: NONE     FINDINGS:          BONES: No acute fracture. No blastic or lytic lesions.          JOINT: ADVANCED FIRST CARPOMETACARPAL JOINT DEGENERATIVE CHANGE.  DIP JOINT OSTEOARTHRITIC CHANGE.          SOFT TISSUES:  No soft tissue mass.       Impression:       No acute or destructive bony abnormality.     COMMUNICATION: Per this written report.     This report was finalized on 6/5/2019 7:11 AM by Dr. Maxime Treviño MD.       XR Chest 1 View [[id number]] Collected:  06/05/19 0709     Updated:  06/05/19 0712    Narrative:       EXAMINATION: XR CHEST 1 VW-      CLINICAL INDICATION:     pre-op; S72.002A-Fracture of unspecified part  of neck of left femur, initial encounter for closed fracture;  S46.911A-Strain of unspecified muscle, fascia and tendon at shoulder and  upper arm level, right arm, initial encounter     TECHNIQUE:  XR CHEST 1 VW-      COMPARISON: NONE      FINDINGS: Large hiatal hernia.  COARSENED  INTERSTITIAL MARKINGS NOTED THROUGHOUT.  CARDIOMEGALY.  No pleural effusion.  No pneumothorax.   Bony and soft tissue structures are unremarkable.       Impression:       AS ABOVE.     This report was finalized on 6/5/2019 7:10 AM by Dr. Maxime Treviño MD.           Final impressions for the last 30 days of radiology reports:    Xr Shoulder 2+ View Right    Result Date: 6/5/2019  No acute or destructive bony abnormality.  COMMUNICATION: Per this written report.  This report was finalized on 6/5/2019 7:11 AM by Dr. Maxime Treviño MD.      Xr Wrist 3+ View Right    Result Date: 6/5/2019  No acute or destructive bony abnormality.  COMMUNICATION: Per this written report.  This report was finalized on 6/5/2019 7:11 AM by Dr. Maxime Treviño MD.      Xr Hand 3+ View Right    Result Date: 6/5/2019  No acute or destructive bony abnormality.  COMMUNICATION: Per this written report.  This report was finalized on 6/5/2019 7:11 AM by Dr. Maxime Treviño MD.      Xr Femur 2 View Left    Result Date: 6/5/2019  MODERATELY DISPLACED SUBCAPITAL FEMORAL NECK FRACTURE.  COMMUNICATION: Per this written report.  This report was finalized on 6/5/2019 7:11 AM by Dr. Maxime Treviño MD.      Xr Chest 1 View    Result Date: 6/5/2019  AS ABOVE.  This report was finalized on 6/5/2019 7:10 AM by Dr. Maxime Treviño MD.      Xr Hip With Or Without Pelvis 2 - 3 View Left    Result Date: 6/5/2019  AS ABOVE.  COMMUNICATION: Per this written report.  This report was finalized on 6/5/2019 7:12 AM by Dr. Maxime Treviño MD.      I have personally looked at the radiology images and read the final radiology report.    Assessment & Plan       Left hip fracture--after review of her case is felt the patient should proceed with open reduction internal fixation.  Patient will be listed moderate risk secondary to age.  Otherwise appears to be stable    UTI--Rocephin 1 g IV daily    Hypokalemia--supplement protocol    Hypo-magnesium--supplement per  protocol    Hypertension--continue current treatment  VTE Prophylaxis:   Mechanical Order History:     Ordered        06/05/19 0306  Maintain Sequential Compression Device  Continuous     Comments:  Right leg only       06/05/19 0306  Place Sequential Compression Device  Once     Comments:  Right leg only         Pharmalogical Order History:     None          The patient is high risk due to the following diagnoses/reasons: Fracture    Tristan Melara MD  [facility]  06/05/19  8:15 AM    Electronically signed by Tristan Melara MD at 6/5/2019  8:20 AM       Consult Notes (all)     No notes of this type exist for this encounter.

## 2019-06-05 NOTE — OP NOTE
Preop diagnosis: Left displaced femoral neck fracture (CMS/HCC) [S72.002A]    Post op diagnosis: Post-Op Diagnosis Codes:     * Left displaced femoral neck fracture (CMS/HCC) [S72.002A]    Procedure:     Procedure(s):  Left HIP BIPOLAR REPLACEMENT    Surgeon(s):  Gerardo Jain MD    Surgeon: Dr. Jain    Assistant: Circulator: Ary Sylvester RN  Scrub Person: Barrington Gautam  Vendor Representative: Osorio Hester  Assistant: Roger Fortune    Anesthesia: general    Complications: none    Grafts/Implants: Size 1 Depuy Triloc stem, size 45 shell and size 28 (1.5 mm neck length head ball)    EBL:  100ml    Specimens: none    Indications for procedure: Abbi Mendez is a 83 y.o. female who presented with Left displaced femoral neck fracture (CMS/HCC) [S72.002A].  This type of fracture does not do well with non operative treatment.  Surgical treatment would offer the best prognosis for mobilization.  Non operative treatment is fraught with severe pulmonary complications such as pneumonia and death.  The surgery involves replacing the ball of the hip with an artificial ball to allow the patient a stable limb to bear weight on and transfer to give them the chance to not succumb to these dreaded pulmonary complications.  Risks, benefits, and alternatives explained to the patient who understands and consents with a clear mind having been allowed to ask multiple questions.  No guarantees given or implied.      Brief description of the procedure:  After adequate anesthesia was induced, the patient was placed in the lateral decubitus position with the left side facing upwards.  The left lower extremity was prepped and draped in standard surgical fashion.  The incision was made, a modified Hardinge approach to the left hip through the skin, subcutaneous tissue, and fascia michelle. The Charnley retractor was then placed and the trochanteric bursa was dissected free.  The anterior fibers of the gluteus medius muscle  were already avulsed off the greater trochanter.  The rest of the gluteus medius, minimus, and capsular layer was incised over the anterior aspect of the greater trochanter leaving a small cuff of tissue for later repair and splitting the medius 2.5 cm proximally over the anterior fourth of its fibers.  The hip joint was encountered and the femoral neck fracture identified.  The corkscrew was used to remove the femoral head from the acetabulum.  It was then sized and measured a size 45.  The 45 sized trial shell was trialed into the acetabulum and had a good fit and was then removed.  Attention was directed to the femoral side where the leg was dropped sterilely off the side of the table into the bag.  The box osteotome and the canal finder was then used to enter the femoral canal and the broaching was performed starting off with the size 0 and increasing by size increments of 1 until the size 1 broach had a good fit for the Netheosuy Triloc system.  The actual stem size 1 was opened and impacted into the femur with a standard offset stem.  The trial  size 1.5 neck length on a 28 head ball inside a liner and inside a 45 sized shell was impacted onto the trunion and the hip was located without difficulty with a palpable and audible clunk with traction, internal rotation, and adduction.  The hip was stable on range of motion and the leg lengths seemed equal as palpable through the drape.  The hip dislcocated and the trials were exchanged for the actual implants and relocated and very stable with good leg lengths.  The wound thoroughly irrigated with saline solution.  The wound closed in layers. The medius, minimus, and capsular layer was closed with number one stratafix suture.  The fascia michelle layer closed also with number one Stratafix suture.  The subcutaneous layer closed with 2-0 Vicryl suture. The subcuticular layer closed with 3-0 stratafix suture. Prineo dressing applied.  Sterile dressings were applied and the  patient was placed supine on the operating table and taken to the recovery room in stable condition with no complications.    Gerardo Jain M.D.  Date: 6/5/2019  Time: 7:05 PM

## 2019-06-05 NOTE — H&P
Hospitalist History and Physical    Patient Identification:  Name: Abbi Mendez  Age/Sex: 83 y.o. female  :  1935  MRN: [id number]         Primary Care Physician: Eric Driver MD    Chief Complaint   Patient presents with   • Fall       History of Present Illness  Patient is a 83 y.o. 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    Patient states that she accidentally tripped over a heating pad cord. The patient reports that she had applied heat to her right shoulder.  The patient reports that 2 weeks ago she also suffered a fall causing a right hand fracture, treated at an urgent care clinic. Patient's right hand is currently in a brace.    Patient reports that she experienced left hip pain shortly after her fall.  She denies any loss of consciousness.  She denies any head trauma.  Patient reports that EMS was contacted and she was brought to the emergency department.  The patient denies any shortness of breath, chest pain and/or dizziness prior to, during and/or after her fall.    The patient is functionally independent at home.  The patient denies any recent chest pain, dyspnea on exertion/or generalized weakness.  She denies any recent syncopal events. She further denies any cough, nausea, vomiting, abdominal pain, dysuria, constipation and/or diarrhea.    In the emergency department, the patient was found to have a left hip fracture.  CMP was remarkable for potassium of 3.2.  Calcium 8.2 and albumin 3.17.  CBC was remarkable for white blood cell count of 17.80 with 87.9% neutrophils.  Chest x-ray is negative for infiltrate and/or effusion per my view.  EKG  has been reviewed by me and appears to be a paced rhythm without acute ST-T wave changes.    Patient has been admitted to the medical surgical floor for further evaluation and management.  A Rowell catheter was placed in the emergency department.    Present during visit: The patient's daughter and FANNY Nova    Past History:  Past Medical History:   Diagnosis Date   • Anxiety    • Arthritis    • Atrial fibrillation (CMS/HCC) 11/14/2016   • Gastric ulcer    • Hiatal hernia    • Hypertension    • Iron deficiency anemia    • RADHA (obstructive sleep apnea) 11/14/2016     Past Surgical History:   Procedure Laterality Date   • CARDIAC ELECTROPHYSIOLOGY PROCEDURE N/A 1/18/2018    Procedure: Pacemaker DC new;  Surgeon: Soraya Darling MD;  Location: [facility] INVASIVE LOCATION;  Service:    • TOTAL KNEE ARTHROPLASTY Right    • TUBAL ABDOMINAL LIGATION       Family History   Problem Relation Age of Onset   • Heart attack Mother 69   • Heart disease Mother    • Heart disease Sister    • Heart attack Brother 60   • Heart attack Brother 45   • Lymphoma Brother    • Heart disease Daughter    • Diabetes Son    • Heart disease Daughter    • Heart disease Daughter    • Diabetes Daughter      Social History     Tobacco Use   • Smoking status: Never Smoker   • Smokeless tobacco: Never Used   Substance Use Topics   • Alcohol use: No   • Drug use: No     Medications Prior to Admission   Medication Sig Dispense Refill Last Dose   • aspirin 81 MG EC tablet Take 81 mg by mouth Daily.   6/4/2019 at 0800   • atorvastatin (LIPITOR) 40 MG tablet Take 40 mg by mouth Every Night.   6/4/2019 at 1930   • busPIRone (BUSPAR) 10 MG tablet Take 10 mg by mouth 2 (Two) Times a Day.   6/4/2019 at 1930   • carvedilol (COREG) 12.5 MG tablet Take 1 tablet by mouth 2 (Two) Times a Day With Meals. 180 tablet 3 6/4/2019 at 1930   • esomeprazole (nexIUM) 40 MG capsule Take 40 mg by mouth Every Morning Before Breakfast.   6/4/2019 at 0800   • losartan (COZAAR)  100 MG tablet Take 100 mg by mouth Daily.  1 6/4/2019 at 0800     Allergies: Darvon [propoxyphene] and Penicillins    Review of Systems:  Review of Systems   Constitutional: Negative for chills, diaphoresis and fever.   HENT: Negative for hearing loss, tinnitus and trouble swallowing.    Eyes: Negative for photophobia, discharge and visual disturbance.   Respiratory: Negative for cough, shortness of breath and wheezing.    Cardiovascular: Negative for chest pain, palpitations and leg swelling.   Gastrointestinal: Negative for abdominal pain, constipation, diarrhea, nausea and vomiting.   Endocrine: Negative for polydipsia, polyphagia and polyuria.   Genitourinary: Negative for dysuria, frequency and hematuria.   Musculoskeletal: Negative for gait problem, myalgias and neck pain.        Left hip pain.   Skin: Negative for rash and wound.   Neurological: Negative for dizziness, tremors, seizures, syncope, weakness and light-headedness.   Hematological: Does not bruise/bleed easily.   Psychiatric/Behavioral: Negative for confusion, hallucinations and suicidal ideas.      Vital Signs  Temp:  [97.8 °F (36.6 °C)-99.1 °F (37.3 °C)] 99.1 °F (37.3 °C)  Heart Rate:  [70-87] 87  Resp:  [16-18] 18  BP: (148-152)/(72-79) 152/79  Body mass index is 27.12 kg/m².    Physical Exam:  Physical Exam   Constitutional: She is oriented to person, place, and time. She appears well-developed and well-nourished. No distress.   HENT:   Head: Normocephalic and atraumatic.   Mouth/Throat: Oropharynx is clear and moist.   Eyes: Conjunctivae and EOM are normal. Pupils are equal, round, and reactive to light.   Neck: Neck supple. No tracheal deviation present. No thyromegaly present.   Cardiovascular: Normal rate and regular rhythm. Exam reveals no gallop and no friction rub.   No murmur heard.  Pulses:       Dorsalis pedis pulses are 1+ on the right side, and 1+ on the left side.   Pulmonary/Chest: Breath sounds normal. No respiratory distress.  She has no wheezes. She has no rales.   Abdominal: Soft. Bowel sounds are normal. She exhibits no distension. There is no hepatomegaly. There is no tenderness. There is no guarding.   Genitourinary:   Genitourinary Comments: A flynn catheter is in place and is draining a yellow urine.    Musculoskeletal: Normal range of motion. She exhibits no tenderness.   Neurological: She is alert and oriented to person, place, and time. No cranial nerve deficit.   Skin: Skin is warm and dry. No rash noted. No erythema.   Psychiatric: She has a normal mood and affect.      Results Review:    Results from last 7 days   Lab Units 06/04/19  2305   WBC 10*3/mm3 17.80*   HEMOGLOBIN g/dL 11.7*   HEMATOCRIT % 36.5   PLATELETS 10*3/mm3 354     Results from last 7 days   Lab Units 06/04/19  2305   SODIUM mmol/L 139   POTASSIUM mmol/L 3.2*   CHLORIDE mmol/L 98   CO2 mmol/L 25.4   BUN mg/dL 17   CREATININE mg/dL 1.05*   CALCIUM mg/dL 8.2*   GLUCOSE mg/dL 155*     Results from last 7 days   Lab Units 06/04/19  2305   BILIRUBIN mg/dL 0.5   ALK PHOS U/L 75   AST (SGOT) U/L 28   ALT (SGPT) U/L 20       Results from last 7 days   Lab Units 06/04/19  2305   INR  1.19*       Imaging:    I have personally reviewed the EKG. Pending official cardiology interpretation, however, per my view: paced rhythm. No acute ST-T changes.    Imaging Results (most recent)     Procedure Component Value Units Date/Time    XR Chest 1 View [[id number]] Updated:  06/04/19 2337    XR Femur 2 View Left [[id number]] Updated:  06/04/19 2211    XR Hip With or Without Pelvis 2 - 3 View Left [[id number]] Updated:  06/04/19 2211    XR Wrist 3+ View Right [[id number]] Updated:  06/04/19 2211    XR Hand 3+ View Right [[id number]] Updated:  06/04/19 2210    XR Shoulder 2+ View Right [[id number]] Updated:  06/04/19 2208        *Hip xray reveals left sided fracture; chest xray without infiltrate and/or effusion.    Assessment/Plan     -Acute, traumatic, closed left hip fracture status  post fall: Patient has been admitted to the medical surgical floor with telemetry.  She is currently nothing by mouth while awaiting an orthopedic surgery evaluation. She has been started on gentle IV fluid hydration.  Continue with as needed IV analgesics with holding parameters. Patient's geriatric sensitive perioperative cardiac risk index is 0.2%. Patient felt to be acceptable to proceed with surgery at this time as a low to moderate risk patient for this intermediate procedure.    -Leukocytosis is likely reactive and due to above: Repeat CBC in a.m.  Hold on IV antibiotic therapy at this time.    -Acute, mild hypokalemia: Patient has received supplementation in the emergency department.  Repeat chemistries in the a.m.  I have also requested magnesium level.    -Hyperglycemia: May be stressed induced; patient without history of diabetes mellitus. Will obtain a HgbA1c in am.     -Mild hypoalbuminemia.     -Mildly decreased hemoglobin.     -Microscopic hematuria with proteinuria: Obtain protein to creatinine ratio.     -CAD status post previous stent: Patient is currently chest pain free.     -Essential hypertension, currently uncontrolled and may be pain induced: Will plan for PRN IV antihypertensive.    -History of primary hyperparathyroidism.    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     DVT prophylaxis: SCD on right leg    Estimated Length of Stay: > 2 MNs    CODE: FULL/CPR    I discussed the patients findings and my recommendations with patient, family and nursing staff    Lenora Kwan DO  06/05/19  3:24 AM

## 2019-06-05 NOTE — ED PROVIDER NOTES
Subjective     Fall   Mechanism of injury: fall    Injury location:  Leg  Leg injury location:  L hip  Incident location:  Home  Time since incident:  30 minutes  Arrived directly from scene: yes    Fall:     Fall occurred:  Tripped (Tripped over a heating pad cord)    Impact surface:  Carpet    Point of impact: left hip.    Entrapped after fall: no    Suspicion of alcohol use: no    Suspicion of drug use: no    Tetanus status:  Up to date  Associated symptoms: no abdominal pain, no back pain, no blindness, no chest pain, no difficulty breathing, no headaches, no hearing loss, no loss of consciousness, no nausea, no neck pain, no seizures and no vomiting    Risk factors: anticoagulation therapy and CAD    Risk factors: no AICD, no asthma, no beta blocker therapy, no CABG, no CHF, no COPD, no diabetes, no dialysis, no hemophilia, no kidney disease, no pacemaker, no past MI, not pregnant and no steroid use        Review of Systems   Constitutional: Negative.  Negative for fever.   HENT: Negative.  Negative for hearing loss.    Eyes: Negative for blindness.   Respiratory: Negative.    Cardiovascular: Negative.  Negative for chest pain.   Gastrointestinal: Negative.  Negative for abdominal pain, nausea and vomiting.   Endocrine: Negative.    Genitourinary: Negative.  Negative for dysuria.   Musculoskeletal: Negative for arthralgias, back pain, gait problem, joint swelling, myalgias, neck pain and neck stiffness.        Left hip pain    Skin: Negative.    Neurological: Negative.  Negative for seizures, loss of consciousness and headaches.   Psychiatric/Behavioral: Negative.    All other systems reviewed and are negative.      Past Medical History:   Diagnosis Date   • Anxiety    • Arthritis    • Atrial fibrillation (CMS/HCC) 11/14/2016   • Gastric ulcer    • Hiatal hernia    • Hypertension    • Iron deficiency anemia    • RADHA (obstructive sleep apnea) 11/14/2016       Allergies   Allergen Reactions   • Darvon  [Propoxyphene] Paresthesia   • Penicillins Rash       Past Surgical History:   Procedure Laterality Date   • CARDIAC ELECTROPHYSIOLOGY PROCEDURE N/A 1/18/2018    Procedure: Pacemaker DC new;  Surgeon: Soraya Darling MD;  Location: [facility] INVASIVE LOCATION;  Service:    • TOTAL KNEE ARTHROPLASTY Right    • TUBAL ABDOMINAL LIGATION         Family History   Problem Relation Age of Onset   • Heart attack Mother 69   • Heart disease Mother    • Heart disease Sister    • Heart attack Brother 60   • Heart attack Brother 45   • Lymphoma Brother    • Heart disease Daughter    • Diabetes Son    • Heart disease Daughter    • Heart disease Daughter    • Diabetes Daughter        Social History     Socioeconomic History   • Marital status: Single     Spouse name: Not on file   • Number of children: Not on file   • Years of education: Not on file   • Highest education level: Not on file   Tobacco Use   • Smoking status: Never Smoker   • Smokeless tobacco: Never Used   Substance and Sexual Activity   • Alcohol use: No   • Drug use: No   • Sexual activity: Not Currently     Partners: Male   Social History Narrative         seven children            Objective   Physical Exam   Constitutional: She is oriented to person, place, and time. She appears well-developed and well-nourished. No distress.   HENT:   Head: Normocephalic and atraumatic.   Right Ear: External ear normal.   Left Ear: External ear normal.   Nose: Nose normal.   Eyes: Conjunctivae and EOM are normal. Pupils are equal, round, and reactive to light.   Neck: Normal range of motion. Neck supple. No JVD present. No tracheal deviation present.   Cardiovascular: Normal rate, regular rhythm and normal heart sounds.   No murmur heard.  Pulmonary/Chest: Effort normal and breath sounds normal. No respiratory distress. She has no wheezes.   Abdominal: Soft. Bowel sounds are normal. There is no tenderness.   Musculoskeletal: Normal range of motion. She exhibits  tenderness. She exhibits no edema or deformity.   Tenderness to palpation over the left hip; ROM is active but limited; neurovascular status and sensation LLE intact. Limited ROM noted in the left shoulder and wrist. Wrist brace on left wrist from hand injury from a fall 1 week ago.    Neurological: She is alert and oriented to person, place, and time. She displays normal reflexes. No cranial nerve deficit or sensory deficit. She exhibits normal muscle tone. Coordination normal.   Skin: Skin is warm and dry. No rash noted. She is not diaphoretic. No erythema. No pallor.   Psychiatric: She has a normal mood and affect. Her behavior is normal. Thought content normal.   Nursing note and vitals reviewed.      Procedures           ED Course  ED Course as of Jun 05 0120   Tue Jun 04, 2019   2300 Hand fracture noted; already being treated per Dr. Townsend.  XR Hand 3+ View Right [MM]   2300 No acute fractures per Dr. Townsend.  XR Wrist 3+ View Right [MM]   2300 No acute findings per Dr. Townsend.  XR Shoulder 2+ View Right [MM]   2301 No acute fractures per Dr. Townsend.  XR Femur 2 View Left [MM]   2301 Left femoral neck fracture per Dr. Townsend.  XR Hip With or Without Pelvis 2 - 3 View Left [MM]   2306 Spoke with Dr. Jain how has requested I make her NPO after midnight and put her in 5 lbs of bucks traction.   [MM]   Wed Jun 05, 2019   0021 No acute findings per Dr. Townsend.  XR Chest 1 View [MM]   0055 Spoke with Dr. Kwan and she has accepted her for admission.   [MM]   0119 Paced rhythm; no acute changes per Dr. Townsend.  ECG 12 Lead [MM]      ED Course User Index  [MM] Zeinab Carey PA                  MDM  Number of Diagnoses or Management Options  Hypokalemia:   Left displaced femoral neck fracture (CMS/HCC):   Strain of right shoulder, initial encounter:      Amount and/or Complexity of Data Reviewed  Clinical lab tests: ordered and reviewed  Tests in the radiology section of CPT®: ordered and reviewed  Decide to  obtain previous medical records or to obtain history from someone other than the patient: yes  Discuss the patient with other providers: yes          Final diagnoses:   Left displaced femoral neck fracture (CMS/HCC)   Strain of right shoulder, initial encounter   Hypokalemia            Zeinab Carey PA  06/05/19 4468

## 2019-06-06 LAB
ANION GAP SERPL CALCULATED.3IONS-SCNC: 12.7 MMOL/L
BACTERIA SPEC AEROBE CULT: NORMAL
BASOPHILS # BLD AUTO: 0.01 10*3/MM3 (ref 0–0.2)
BASOPHILS NFR BLD AUTO: 0.1 % (ref 0–1.5)
BUN BLD-MCNC: 25 MG/DL (ref 8–23)
BUN/CREAT SERPL: 19.8 (ref 7–25)
CALCIUM SPEC-SCNC: 7.4 MG/DL (ref 8.6–10.5)
CHLORIDE SERPL-SCNC: 104 MMOL/L (ref 98–107)
CO2 SERPL-SCNC: 23.3 MMOL/L (ref 22–29)
CREAT BLD-MCNC: 1.26 MG/DL (ref 0.57–1)
DEPRECATED RDW RBC AUTO: 45.5 FL (ref 37–54)
EOSINOPHIL # BLD AUTO: 0 10*3/MM3 (ref 0–0.4)
EOSINOPHIL NFR BLD AUTO: 0 % (ref 0.3–6.2)
ERYTHROCYTE [DISTWIDTH] IN BLOOD BY AUTOMATED COUNT: 12.9 % (ref 12.3–15.4)
GFR SERPL CREATININE-BSD FRML MDRD: 41 ML/MIN/1.73
GLUCOSE BLD-MCNC: 201 MG/DL (ref 65–99)
HCT VFR BLD AUTO: 33.6 % (ref 34–46.6)
HGB BLD-MCNC: 10.5 G/DL (ref 12–15.9)
IMM GRANULOCYTES # BLD AUTO: 0.04 10*3/MM3 (ref 0–0.05)
IMM GRANULOCYTES NFR BLD AUTO: 0.3 % (ref 0–0.5)
LYMPHOCYTES # BLD AUTO: 0.69 10*3/MM3 (ref 0.7–3.1)
LYMPHOCYTES NFR BLD AUTO: 4.7 % (ref 19.6–45.3)
MAGNESIUM SERPL-MCNC: 2.5 MG/DL (ref 1.6–2.4)
MCH RBC QN AUTO: 30.3 PG (ref 26.6–33)
MCHC RBC AUTO-ENTMCNC: 31.3 G/DL (ref 31.5–35.7)
MCV RBC AUTO: 97.1 FL (ref 79–97)
MONOCYTES # BLD AUTO: 0.97 10*3/MM3 (ref 0.1–0.9)
MONOCYTES NFR BLD AUTO: 6.6 % (ref 5–12)
NEUTROPHILS # BLD AUTO: 12.93 10*3/MM3 (ref 1.7–7)
NEUTROPHILS NFR BLD AUTO: 88.3 % (ref 42.7–76)
PLATELET # BLD AUTO: 324 10*3/MM3 (ref 140–450)
PMV BLD AUTO: 10.3 FL (ref 6–12)
POTASSIUM BLD-SCNC: 4 MMOL/L (ref 3.5–5.2)
RBC # BLD AUTO: 3.46 10*6/MM3 (ref 3.77–5.28)
SODIUM BLD-SCNC: 140 MMOL/L (ref 136–145)
WBC NRBC COR # BLD: 14.64 10*3/MM3 (ref 3.4–10.8)

## 2019-06-06 PROCEDURE — 97167 OT EVAL HIGH COMPLEX 60 MIN: CPT | Performed by: OCCUPATIONAL THERAPIST

## 2019-06-06 PROCEDURE — 97162 PT EVAL MOD COMPLEX 30 MIN: CPT

## 2019-06-06 PROCEDURE — 80048 BASIC METABOLIC PNL TOTAL CA: CPT | Performed by: FAMILY MEDICINE

## 2019-06-06 PROCEDURE — 25010000002 HYDROMORPHONE PER 4 MG: Performed by: INTERNAL MEDICINE

## 2019-06-06 PROCEDURE — 99233 SBSQ HOSP IP/OBS HIGH 50: CPT | Performed by: PHYSICIAN ASSISTANT

## 2019-06-06 PROCEDURE — 94799 UNLISTED PULMONARY SVC/PX: CPT

## 2019-06-06 PROCEDURE — 97110 THERAPEUTIC EXERCISES: CPT

## 2019-06-06 PROCEDURE — 85025 COMPLETE CBC W/AUTO DIFF WBC: CPT | Performed by: FAMILY MEDICINE

## 2019-06-06 PROCEDURE — 83735 ASSAY OF MAGNESIUM: CPT | Performed by: FAMILY MEDICINE

## 2019-06-06 PROCEDURE — 97116 GAIT TRAINING THERAPY: CPT

## 2019-06-06 PROCEDURE — 25010000002 CEFTRIAXONE: Performed by: FAMILY MEDICINE

## 2019-06-06 RX ADMIN — OXYCODONE HYDROCHLORIDE AND ACETAMINOPHEN 1 TABLET: 5; 325 TABLET ORAL at 09:02

## 2019-06-06 RX ADMIN — OXYCODONE HYDROCHLORIDE AND ACETAMINOPHEN 1 TABLET: 5; 325 TABLET ORAL at 14:43

## 2019-06-06 RX ADMIN — CLINDAMYCIN IN 5 PERCENT DEXTROSE 900 MG: 18 INJECTION, SOLUTION INTRAVENOUS at 08:38

## 2019-06-06 RX ADMIN — SODIUM CHLORIDE, PRESERVATIVE FREE 3 ML: 5 INJECTION INTRAVENOUS at 08:39

## 2019-06-06 RX ADMIN — CARVEDILOL 12.5 MG: 6.25 TABLET, FILM COATED ORAL at 17:11

## 2019-06-06 RX ADMIN — ATORVASTATIN CALCIUM 40 MG: 40 TABLET, FILM COATED ORAL at 20:31

## 2019-06-06 RX ADMIN — BUSPIRONE HYDROCHLORIDE 10 MG: 10 TABLET ORAL at 20:31

## 2019-06-06 RX ADMIN — APIXABAN 2.5 MG: 2.5 TABLET, FILM COATED ORAL at 20:31

## 2019-06-06 RX ADMIN — CLINDAMYCIN IN 5 PERCENT DEXTROSE 900 MG: 18 INJECTION, SOLUTION INTRAVENOUS at 02:09

## 2019-06-06 RX ADMIN — BUSPIRONE HYDROCHLORIDE 10 MG: 10 TABLET ORAL at 08:38

## 2019-06-06 RX ADMIN — OFLOXACIN 50000 UNITS: 300 TABLET, COATED ORAL at 14:43

## 2019-06-06 RX ADMIN — LOSARTAN POTASSIUM 50 MG: 50 TABLET, FILM COATED ORAL at 08:38

## 2019-06-06 RX ADMIN — OXYCODONE HYDROCHLORIDE AND ACETAMINOPHEN 1 TABLET: 5; 325 TABLET ORAL at 18:47

## 2019-06-06 RX ADMIN — APIXABAN 2.5 MG: 2.5 TABLET, FILM COATED ORAL at 08:38

## 2019-06-06 RX ADMIN — CARVEDILOL 12.5 MG: 6.25 TABLET, FILM COATED ORAL at 08:38

## 2019-06-06 RX ADMIN — PANTOPRAZOLE SODIUM 40 MG: 40 TABLET, DELAYED RELEASE ORAL at 06:15

## 2019-06-06 RX ADMIN — HYDROMORPHONE HYDROCHLORIDE 0.5 MG: 1 INJECTION, SOLUTION INTRAMUSCULAR; INTRAVENOUS; SUBCUTANEOUS at 15:38

## 2019-06-06 RX ADMIN — CEFTRIAXONE 1 G: 1 INJECTION, POWDER, FOR SOLUTION INTRAMUSCULAR; INTRAVENOUS at 10:01

## 2019-06-06 NOTE — PLAN OF CARE
Problem: Patient Care Overview  Goal: Individualization and Mutuality  Outcome: Ongoing (interventions implemented as appropriate)      Problem: Fall Risk (Adult)  Goal: Absence of Fall  Outcome: Ongoing (interventions implemented as appropriate)      Problem: Skin Injury Risk (Adult)  Goal: Skin Health and Integrity  Outcome: Ongoing (interventions implemented as appropriate)      Problem: Pain, Acute (Adult)  Goal: Acceptable Pain Control/Comfort Level  Outcome: Ongoing (interventions implemented as appropriate)

## 2019-06-06 NOTE — PROGRESS NOTES
Discharge Planning Assessment  BALWINDER Owusu     Patient Name: Abbi Mendez  MRN: [id number]  Today's Date: 6/6/2019    Admit Date: 6/4/2019    Discharge Plan     Row Name 06/06/19 1154       Plan    Plan SS received consult discharge planning. SS ask Dr. Melara for an inpatient rehab consult. Inpatient rehab consult ordered. SS to follow.         Destination      Service Provider Request Status Selected Services Address Phone Number Fax Number     [facility] Pending - No Request Sent N/A [address] [id number] [phone] --     Annabel Archer

## 2019-06-06 NOTE — THERAPY EVALUATION
Acute Care - Occupational Therapy Initial Evaluation   [facility]     Patient Name: Abbi Mendez  : 1935  MRN: [id number]  Today's Date: 2019  Onset of Illness/Injury or Date of Surgery: 19  Date of Referral to OT: 19  Referring Physician: Dr. Jain    Admit Date: 2019       ICD-10-CM ICD-9-CM   1. Left displaced femoral neck fracture (CMS/HCC) S72.002A 820.8   2. Strain of right shoulder, initial encounter S46.911A 840.9   3. Hypokalemia E87.6 276.8     Patient Active Problem List   Diagnosis   • Atrial fibrillation (CMS/HCC)   • Coronary artery disease involving native coronary artery of native heart without angina pectoris   • RADHA (obstructive sleep apnea)   • Essential hypertension   • Hyperlipidemia LDL goal <70   • Iron deficiency anemia   • Left bundle branch block   • Syncope and collapse   • Severe sinus bradycardia   • Third degree AV block (CMS/HCC)   • Syncope   • Compression deformity of vertebra   • Radiculopathy   • Pericardial effusion without cardiac tamponade   • Hypercalcemia   • Abnormal serum protein electrophoresis   • Weight loss, unintentional   • Primary hyperparathyroidism (CMS/HCC)   • Hypercalcemia   • Pacemaker   • Hiatal hernia   • Elevated serum immunoglobulin free light chains   • Left displaced femoral neck fracture (CMS/HCC)     Past Medical History:   Diagnosis Date   • Anxiety    • Arthritis    • Atrial fibrillation (CMS/HCC) 2016   • Gastric ulcer    • Hiatal hernia    • Hypertension    • Iron deficiency anemia    • RADHA (obstructive sleep apnea) 2016     Past Surgical History:   Procedure Laterality Date   • CARDIAC ELECTROPHYSIOLOGY PROCEDURE N/A 2018    Procedure: Pacemaker DC new;  Surgeon: Soryaa Darling MD;  [facility];  Service:    • TOTAL KNEE ARTHROPLASTY Right    • TUBAL ABDOMINAL LIGATION            OT ASSESSMENT FLOWSHEET (last 12 hours)      Occupational Therapy Evaluation     Row Name 19  1619                   OT Evaluation Time/Intention    Subjective Information  complains of;weakness  -BF        Document Type  evaluation  -BF        Mode of Treatment  occupational therapy  -BF        Patient Effort  good  -BF        Comment  Pt's daughter reports pt has been falling frequently and pt presents with decreased strength. Pt's daughters work and pt requires assistance with ADLs at this time. Pt would benefit from skilled inpatient therapy to increase strength, independence with ADLs, and fxal mobility.   -BF           General Information    Patient Profile Reviewed?  yes  -BF        Onset of Illness/Injury or Date of Surgery  06/04/19  -BF        Referring Physician  Dr. Jain  -BF        Patient Observations  alert;agree to therapy;cooperative  -BF        Patient/Family Observations  Pt supine in bed, awake & alert, daughter present.  -BF        Prior Level of Function  -- Pt reports she was independent prior to shoulder/hand injury  -BF        Equipment Currently Used at Home  shower chair Pt has BSC, walker, cane, and shower chair  -BF        Pertinent History of Current Functional Problem  Pt & daughter report history of frequent falls. Pt reports she fell a couple of weeks ago resulting in hand fx and injured shoulder. Pt had another fall resulting in hip fx.   -BF        Existing Precautions/Restrictions  fall;hip;other (see comments) R hand fx splint  -BF        Barriers to Rehab  physical barrier  -BF           Relationship/Environment    Family Caregiver if Needed  child(ananya), adult  -BF           Cognitive Assessment/Intervention- PT/OT    Orientation Status (Cognition)  oriented x 3  -BF        Follows Commands (Cognition)  follows one step commands;verbal cues/prompting required  -BF           ADL Assessment/Intervention    BADL Assessment/Intervention  bathing;upper body dressing;lower body dressing;grooming;feeding;toileting  -BF           Bathing Assessment/Intervention    Comment  (Bathing)  Max A  -BF           Upper Body Dressing Assessment/Training    Comment (Upper Body Dressing)  Max A  -BF           Lower Body Dressing Assessment/Training    Comment (Lower Body Dressing)  Total A  -BF           Grooming Assessment/Training    Comment (Grooming)  Mod A  -BF           Self-Feeding Assessment/Training    Comment (Feeding)  Set-up  -BF           Toileting Assessment/Training    Comment (Toileting)  Total A  -BF           BADL Safety/Performance    Impairments, BADL Safety/Performance  endurance/activity tolerance;range of motion;strength  -BF           General ROM    GENERAL ROM COMMENTS  R shoulder trace, remaining BUE AROM WFL  -BF           MMT (Manual Muscle Testing)    General MMT Comments  R shoulder 1/5; remaining BUE grossly 3/5  -BF           Positioning and Restraints    Post Treatment Position  bed  -BF        In Bed  supine;call light within reach;encouraged to call for assist;with family/caregiver  -BF           Wound 06/05/19 1838 Left hip incision    Wound - Properties Group Date first assessed: 06/05/19  -KH Time first assessed: 1838  -KH Side: Left  -KH Location: hip  -KH Type: incision  -KH       Plan of Care Review    Plan of Care Reviewed With  patient;daughter  -BF           Clinical Impression (OT)    Date of Referral to OT  06/05/19  -BF        OT Diagnosis  L hip bipolar replacement  -BF        Patient/Family Goals Statement (OT Eval)  To increase independence with self-care, get stronger  -BF        Criteria for Skilled Therapeutic Interventions Met (OT Eval)  yes  -BF        Rehab Potential (OT Eval)  good, to achieve stated therapy goals  -BF        Therapy Frequency (OT Eval)  3 times/wk  -BF        Predicted Duration of Therapy Intervention (Therapy Eval)  1-2 weeks  -BF        Anticipated Equipment Needs at Discharge (OT)  -- TBD  -BF        Anticipated Discharge Disposition (OT)  inpatient rehabilitation facility  -BF           Planned OT Interventions     Planned Therapy Interventions (OT Eval)  activity tolerance training;adaptive equipment training;BADL retraining;ROM/therapeutic exercise;strengthening exercise;transfer/mobility retraining;passive ROM/stretching  -BF           OT Goals    Dressing Goal Selection (OT)  dressing, OT goal 1  -BF        Strength Goal Selection (OT)  strength, OT goal 1  -BF        Additional Documentation  Strength Goal Selection (OT) (Row)  -BF           Dressing Goal 1 (OT)    Activity/Assistive Device (Dressing Goal 1, OT)  upper body dressing  -BF        [place]/Cues Needed (Dressing Goal 1, OT)  minimum assist (75% or more patient effort)  -BF        Time Frame (Dressing Goal 1, OT)  long term goal (LTG);by discharge  -BF           Strength Goal 1 (OT)    Strength Goal 1 (OT)  Increase BUE MMT score to 4-/5 through available AROM.   -BF        Time Frame (Strength Goal 1, OT)  long term goal (LTG);by discharge  -BF          User Key  (r) = Recorded By, (t) = Taken By, (c) = Cosigned By    Initials Name Effective Dates    KH Ary Sylvester RN 06/16/16 -     BF Katia Enriquez, DEEP 04/03/18 -                OT Recommendation and Plan  Outcome Summary/Treatment Plan (OT)  Anticipated Equipment Needs at Discharge (OT): (TBD)  Anticipated Discharge Disposition (OT): inpatient rehabilitation facility  Planned Therapy Interventions (OT Eval): activity tolerance training, adaptive equipment training, BADL retraining, ROM/therapeutic exercise, strengthening exercise, transfer/mobility retraining, passive ROM/stretching  Therapy Frequency (OT Eval): 3 times/wk  Plan of Care Review  Plan of Care Reviewed With: patient, daughter  Plan of Care Reviewed With: patient, daughter    Outcome Measures     Row Name 06/06/19 1300             How much help from another is currently needed...    Putting on and taking off regular lower body clothing?  1  -BF      Bathing (including washing, rinsing, and drying)  2  -BF      Toileting  (which includes using toilet bed pan or urinal)  1  -BF      Putting on and taking off regular upper body clothing  2  -BF      Taking care of personal grooming (such as brushing teeth)  2  -BF      Eating meals  3  -BF      Score  11  -BF         Functional Assessment    Outcome Measure Options  AM-PAC 6 Clicks Daily Activity (OT)  -BF        User Key  (r) = Recorded By, (t) = Taken By, (c) = Cosigned By    Initials Name Provider Type    Katia Menjivar OT Occupational Therapist          Time Calculation:   Time Calculation- OT     Row Name 06/06/19 1301             Time Calculation- OT    Total Timed Code Minutes- OT  45 minute(s)  -BF        User Key  (r) = Recorded By, (t) = Taken By, (c) = Cosigned By    Initials Name Provider Type    Katia Menjivar OT Occupational Therapist        Therapy Charges for Today     Code Description Service Date Service Provider Modifiers Qty    [id number]  OT EVAL HIGH COMPLEXITY 3 6/6/2019 Katia Enriquez OT GO 1               Katia Enriquez OT  6/6/2019

## 2019-06-06 NOTE — PROGRESS NOTES
LOS: 1 day   Patient Care Team:  Eric Driver MD as PCP - General  Soraya Darling MD as PCP - Internal Medicine (Cardiology)  Josesito Danielson MD as Consulting Physician (Orthopedic Surgery)    Post Op Day 1      Procedure(s):  HIP BIPOLAR REPLACEMENT     Subjective   Patient resting quietly in bed using IS. Family at bedside. States she has gotten up with therapy today. Pain fairly controlled.   Interval History:     Patient Complaints: none  Patient Denies:  Cough, chest pain, fever or chills   History taken from: patient family    Review of Systems:    All systems were reviewed and negative except for:  Musculoskeletal: positive for  joint swelling    Objective     Vital Signs  Temp:  [97.2 °F (36.2 °C)-99.7 °F (37.6 °C)] 98 °F (36.7 °C)  Heart Rate:  [71-87] 84  Resp:  [10-22] 18  BP: (100-185)/() 127/64    Physical Exam:   General Appearance: alert, appears stated age and cooperative  Head: normocephalic, without obvious abnormality and atraumatic  Lungs: clear to auscultation, respirations regular, respirations even and respirations unlabored  Heart: regular rhythm & normal rate, normal S1, S2, no murmur, no gallop, no rub and no click  Extremities: dressing cdi. no active bleeding. Active rom foot and ankle. Cap refill less than 3 secs.      Results Review:     I reviewed the patient's new clinical results.    Medication Review: yes    Assessment/Plan       Left displaced femoral neck fracture (CMS/HCC)      Plan: continue post op care as written. Encourage mobilization. Continue dvt prophylaxis. Will continue to follow.     Plan for disposition:Where: home and SNF    ANGELO Aguayo  06/06/19  5:15 PM      Time: 15 mins     I agree with the above.    Gerardo Jain M.D.

## 2019-06-06 NOTE — THERAPY EVALUATION
Acute Care - Physical Therapy Initial Evaluation   [facility]     Patient Name: Abbi Mendez  : 1935  MRN: [id number]  Today's Date: 2019   Onset of Illness/Injury or Date of Surgery: 19  Date of Referral to PT: 19  Referring Physician: Dr. Jain      Admit Date: 2019    Visit Dx:     ICD-10-CM ICD-9-CM   1. Left displaced femoral neck fracture (CMS/HCC) S72.002A 820.8   2. Strain of right shoulder, initial encounter S46.911A 840.9   3. Hypokalemia E87.6 276.8     Patient Active Problem List   Diagnosis   • Atrial fibrillation (CMS/HCC)   • Coronary artery disease involving native coronary artery of native heart without angina pectoris   • RADHA (obstructive sleep apnea)   • Essential hypertension   • Hyperlipidemia LDL goal <70   • Iron deficiency anemia   • Left bundle branch block   • Syncope and collapse   • Severe sinus bradycardia   • Third degree AV block (CMS/HCC)   • Syncope   • Compression deformity of vertebra   • Radiculopathy   • Pericardial effusion without cardiac tamponade   • Hypercalcemia   • Abnormal serum protein electrophoresis   • Weight loss, unintentional   • Primary hyperparathyroidism (CMS/HCC)   • Hypercalcemia   • Pacemaker   • Hiatal hernia   • Elevated serum immunoglobulin free light chains   • Left displaced femoral neck fracture (CMS/HCC)     Past Medical History:   Diagnosis Date   • Anxiety    • Arthritis    • Atrial fibrillation (CMS/HCC) 2016   • Gastric ulcer    • Hiatal hernia    • Hypertension    • Iron deficiency anemia    • RADHA (obstructive sleep apnea) 2016     Past Surgical History:   Procedure Laterality Date   • CARDIAC ELECTROPHYSIOLOGY PROCEDURE N/A 2018    Procedure: Pacemaker DC new;  Surgeon: Soraya Darling MD;  Location: [facility] INVASIVE LOCATION;  Service:    • TOTAL KNEE ARTHROPLASTY Right    • TUBAL ABDOMINAL LIGATION          PT ASSESSMENT (last 12 hours)      Physical Therapy Evaluation     Row Name 19 3334           PT Evaluation Time/Intention    Subjective Information  complains of;fatigue RN reports pt. had been UIC x 4 hrs this AM.   -AG     Document Type  evaluation  -AG     Mode of Treatment  individual therapy;physical therapy  -AG     Patient Effort  good  -AG     Row Name 06/06/19 1314          General Information    Patient Profile Reviewed?  yes  -AG     Onset of Illness/Injury or Date of Surgery  06/04/19  -AG     Referring Physician  Dr. Jain  -     Patient Observations  alert;cooperative;agree to therapy  -AG     Patient/Family Observations  pt. supine in bed; daughter present.  Pt. on room air; IV in place.  Pt. pleasant and cooperative, in no apparent distress.   -AG     Prior Level of Function  independent:;all household mobility ambulating with walker at times d/t hip OA pain  -AG     Equipment Currently Used at Home  cane, straight;commode, bedside;shower chair;walker, rolling  -AG     Pertinent History of Current Functional Problem  pt. underwent L JANET following L displaced femoral neck fx; pt. reports L hip pain d/t OA prior to falling.   -AG     Existing Precautions/Restrictions  fall;hip;left  -AG     Limitations/Impairments  safety/cognitive  -AG     Equipment Issued to Patient  gait belt  -AG     Risks Reviewed  patient and family:;LOB;nausea/vomiting;dizziness;increased discomfort  -AG     Benefits Reviewed  patient and family:;improve function;increase independence;increase strength;increase balance;decrease risk of DVT;increase knowledge  -AG     Row Name 06/06/19 1314          Relationship/Environment    Lives With  alone  -AG     Family Caregiver if Needed  child(ananya), adult daughter is supportive  -AG     Row Name 06/06/19 1314          Resource/Environmental Concerns    Current Living Arrangements  home/apartment/condo  -AG     Resource/Environmental Concerns  none  -AG     Row Name 06/06/19 1314          Cognitive Assessment/Intervention- PT/OT    Orientation Status (Cognition)   oriented x 3  -AG     Follows Commands (Cognition)  WFL;follows one step commands;repetition of directions required  -     Row Name 06/06/19 1314          Safety Issues, Functional Mobility    Safety Issues Affecting Function (Mobility)  safety precaution awareness;safety precautions follow-through/compliance  -     Impairments Affecting Function (Mobility)  balance;coordination;endurance/activity tolerance;pain;range of motion (ROM);strength  -AG     Row Name 06/06/19 1314          Mobility Assessment/Treatment    Extremity Weight-bearing Status  left lower extremity;right lower extremity  -     Left Lower Extremity (Weight-bearing Status)  weight-bearing as tolerated (WBAT)  -AG     Right Lower Extremity (Weight-bearing Status)  weight-bearing as tolerated (WBAT)  -     Row Name 06/06/19 1314          Bed Mobility Assessment/Treatment    Bed Mobility Assessment/Treatment  scooting/bridging;supine-sit;sit-supine  -AG     Scooting/Bridging [place] (Bed Mobility)  verbal cues;nonverbal cues (demo/gesture);minimum assist (75% patient effort)  -     Supine-Sit [place] (Bed Mobility)  verbal cues;nonverbal cues (demo/gesture);moderate assist (50% patient effort)  -AG     Sit-Supine [place] (Bed Mobility)  verbal cues;nonverbal cues (demo/gesture);moderate assist (50% patient effort)  -AG     Bed Mobility, Safety Issues  decreased use of legs for bridging/pushing  -     Assistive Device (Bed Mobility)  bed rails  -     Row Name 06/06/19 1314          Transfer Assessment/Treatment    Transfer Assessment/Treatment  sit-stand transfer;stand-sit transfer;stand pivot/stand step transfer  -AG     Maintains Weight-bearing Status (Transfers)  able to maintain  -AG     Sit-Stand [place] (Transfers)  verbal cues;nonverbal cues (demo/gesture);minimum assist (75% patient effort)  -AG     Stand-Sit [place] (Transfers)  verbal cues;nonverbal cues (demo/gesture);minimum assist (75% patient effort)   -AG     Row Name 06/06/19 1314          Sit-Stand Transfer    Assistive Device (Sit-Stand Transfers)  walker, front-wheeled  -AG     Row Name 06/06/19 1314          Stand-Sit Transfer    Assistive Device (Stand-Sit Transfers)  walker, front-wheeled  -AG     Row Name 06/06/19 1314          Stand Pivot/Stand Step Transfer    Stand Pivot/Stand Step [place]  verbal cues;nonverbal cues (demo/gesture);minimum assist (75% patient effort)  -     Assistive Device (Stand Pivot Stand Step Transfer)  walker, front-wheeled  -AG     Row Name 06/06/19 1314          Gait/Stairs Assessment/Training    [place] Level (Gait)  verbal cues;nonverbal cues (demo/gesture);minimum assist (75% patient effort);1 person to manage equipment  -     Assistive Device (Gait)  walker, front-wheeled  -     Distance in Feet (Gait)  40  -AG     Pattern (Gait)  step-to  -AG     Deviations/Abnormal Patterns (Gait)  antalgic;stride length decreased  -     Bilateral Gait Deviations  forward flexed posture;weight shift ability decreased  -AG     Row Name 06/06/19 1314          General ROM    GENERAL ROM COMMENTS  R LE AROM WFL; L LE decr 50%  -AG     Row Name 06/06/19 1314          MMT (Manual Muscle Testing)    General MMT Comments  R LE 4/5; L LE 2+/5  -     Row Name 06/06/19 1314          Motor Assessment/Intervention    Additional Documentation  Balance (Group);Balance Interventions (Group);Therapeutic Exercise (Group);Therapeutic Exercise Interventions (Group)  -     Row Name 06/06/19 1314          Therapeutic Exercise    Therapeutic Exercise  supine, lower extremities  -     Additional Documentation  Therapeutic Exercise (Row)  -     Row Name 06/06/19 1314          Lower Extremity Supine Therapeutic Exercise    Performed, Supine Lower Extremity (Therapeutic Exercise)  hip abduction/adduction;ankle dorsiflexion/plantarflexion;heel slides  -     Exercise Type, Supine Lower Extremity (Therapeutic Exercise)  AAROM (active  assistive range of motion);AROM (active range of motion)  -AG     Expected Outcomes, Supine Lower Extremity (Therapeutic Exercise)  improve functional tolerance, community activity;improve functional tolerance, household activity;improve functional tolerance, self-care activity;improve performance, gait skills;improve performance, transfer skills;strengthen normal movement patterns;strengthen, facilitate independent active range of motion  -AG     Restrictions, Supine Lower Extremity (Therapeutic Exercise)  L JANET precautions  -AG     Sets/Reps Detail, Supine Lower Extremity (Therapeutic Exercise)  2 x 10  -AG     Transfers Skills, Training to Functional Activity, Supine Lower Extremity (Therapeutic Exercise)  able to transfer skills from training to functional activity  -AG     [facility] Name 06/06/19 1314          Balance    Balance  static sitting balance;static standing balance;dynamic sitting balance;dynamic standing balance  -AG     Row Name 06/06/19 1314          Static Sitting Balance    Level of [place] (Unsupported Sitting, Static Balance)  supervision  -AG     Sitting Position (Unsupported Sitting, Static Balance)  sitting on edge of bed  -AG     Time Able to Maintain Position (Unsupported Sitting, Static Balance)  more than 5 minutes  -[facility] Name 06/06/19 1314          Static Standing Balance    Level of [place] (Supported Standing, Static Balance)  minimal assist, 75% patient effort  -AG     Assistive Device Utilized (Supported Standing, Static Balance)  walker, rolling  -[facility] Name 06/06/19 1314          Sensory Assessment/Intervention    Sensory General Assessment  no sensation deficits identified  -AG     Row Name 06/06/19 1314          Vision Assessment/Intervention    Visual Impairment/Limitations  WFL  -[facility] Name 06/06/19 1314          Pain Assessment    Additional Documentation  Pain Scale: FACES Pre/Post-Treatment (Group)  -[facility] Name 06/06/19 1314          Pain Scale:  Numbers Pre/Post-Treatment    Pain Location - Side  Left  -AG     Pain Location  hip  -AG     Pain Intervention(s)  Repositioned;Ambulation/increased activity  -AG     Row Name 06/06/19 1314          Pain Scale: FACES Pre/Post-Treatment    Pain: FACES Scale, Pretreatment  0-->no hurt  -AG     Pain: FACES Scale, Post-Treatment  4-->hurts little more  -AG     Row Name             Wound 06/05/19 1838 Left hip incision    Wound - Properties Group Date first assessed: 06/05/19  -KH Time first assessed: 1838  -KH Side: Left  -KH Location: hip  -KH Type: incision  -KH    Row Name 06/06/19 1314          Coping    Observed Emotional State  accepting;calm;cooperative  -AG     Verbalized Emotional State  acceptance  -AG     Row Name 06/06/19 1314          Plan of Care Review    Plan of Care Reviewed With  patient;daughter  -AG     Row Name 06/06/19 1314          Physical Therapy Clinical Impression    Date of Referral to PT  06/05/19  -AG     PT Diagnosis (PT Clinical Impression)  decr ROM, strength, mobility s/p L JANET  -AG     Prognosis (PT Clinical Impression)  good  -AG     Functional Level at Time of Evaluation (PT Clinical Impression)  Min/ Mod A  -AG     Patient/Family Goals Statement (PT Clinical Impression)  return home at [facility]  -     Criteria for Skilled Interventions Met (PT Clinical Impression)  yes;treatment indicated  -AG     Pathology/Pathophysiology Noted (Describe Specifically for Each System)  musculoskeletal  -AG     Impairments Found (describe specific impairments)  aerobic capacity/endurance;ergonomics and body mechanics;gait, locomotion, and balance;joint integrity and mobility  -AG     Functional Limitations in Following Categories (Describe Specific Limitations)  self-care;community/leisure  -AG     Rehab Potential (PT Clinical Summary)  good, to achieve stated therapy goals  -AG     Predicted Duration of Therapy (PT)  LOS  -AG     Care Plan Review (PT)  evaluation/treatment results reviewed;care  plan/treatment goals reviewed;risks/benefits reviewed;current/potential barriers reviewed;patient/other agree to care plan  -AG     Care Plan Review, Other Participant (PT Clinical Impression)  daughter  -AG     Row Name 06/06/19 1314          Physical Therapy Goals    Bed Mobility Goal Selection (PT)  bed mobility, PT goal 1  -AG     Transfer Goal Selection (PT)  transfer, PT goal 1  -AG     Gait Training Goal Selection (PT)  gait training, PT goal 1  -AG     Row Name 06/06/19 1314          Bed Mobility Goal 1 (PT)    Activity/Assistive Device (Bed Mobility Goal 1, PT)  scooting;sit to supine/supine to sit  -AG     [place] Level/Cues Needed (Bed Mobility Goal 1, PT)  minimum assist (75% or more patient effort)  -AG     Time Frame (Bed Mobility Goal 1, PT)  by discharge  -     Row Name 06/06/19 1314          Transfer Goal 1 (PT)    Activity/Assistive Device (Transfer Goal 1, PT)  sit-to-stand/stand-to-sit;bed-to-chair/chair-to-bed;walker, rolling  -AG     [place] Level/Cues Needed (Transfer Goal 1, PT)  contact guard assist  -AG     Time Frame (Transfer Goal 1, PT)  by discharge  -     Row Name 06/06/19 1314          Gait Training Goal 1 (PT)    Activity/Assistive Device (Gait Training Goal 1, PT)  gait (walking locomotion);assistive device use;decrease fall risk;diminish gait deviation;improve balance and speed;increase endurance/gait distance;walker, rolling  -AG     [place] Level (Gait Training Goal 1, PT)  contact guard assist  -AG     Distance (Gait Goal 1, PT)  80  -AG     Time Frame (Gait Training Goal 1, PT)  by discharge  -     Row Name 06/06/19 1314          Positioning and Restraints    Pre-Treatment Position  in bed  -AG     Post Treatment Position  bed  -AG     In Bed  notified nsg;supine;call light within reach;encouraged to call for assist;with family/caregiver;side rails up x3  -AG       User Key  (r) = Recorded By, (t) = Taken By, (c) = Cosigned By    Initials Name Provider Type     Ary Coyne, RN Registered Nurse    Sandy Sloan, PT Physical Therapist        Physical Therapy Education     Title: PT OT SLP Therapies (Done)     Topic: Physical Therapy (Done)     Point: Mobility training (Done)     Learning Progress Summary           Patient Acceptance, H, VU,NR by  at 6/6/2019  1:28 PM    Comment:  Reviewed written JANET precautions    Acceptance, E,D, VU,NR by  at 6/6/2019  1:13 PM    Comment:  PT reviewed and instructed in HEP, JANET precautions.   Family Acceptance, H, VU,NR by  at 6/6/2019  1:28 PM    Comment:  Reviewed written JANET precautions                   Point: Home exercise program (Done)     Learning Progress Summary           Patient Acceptance, H, VU,NR by  at 6/6/2019  1:28 PM    Comment:  Reviewed written JANET precautions    Acceptance, E,D, VU,NR by  at 6/6/2019  1:13 PM    Comment:  PT reviewed and instructed in HEP, JANET precautions.   Family Acceptance, H, VU,NR by  at 6/6/2019  1:28 PM    Comment:  Reviewed written JANET precautions                   Point: Body mechanics (Done)     Learning Progress Summary           Patient Acceptance, H, VU,NR by  at 6/6/2019  1:28 PM    Comment:  Reviewed written JANET precautions    Acceptance, E,D, VU,NR by  at 6/6/2019  1:13 PM    Comment:  PT reviewed and instructed in HEP, JANET precautions.   Family Acceptance, H, VU,NR by  at 6/6/2019  1:28 PM    Comment:  Reviewed written JANET precautions                   Point: Precautions (Done)     Learning Progress Summary           Patient Acceptance, H, VU,NR by  at 6/6/2019  1:28 PM    Comment:  Reviewed written JANET precautions    Acceptance, E,D, VU,NR by  at 6/6/2019  1:13 PM    Comment:  PT reviewed and instructed in HEP, JANET precautions.   Family Acceptance, H, VU,NR by  at 6/6/2019  1:28 PM    Comment:  Reviewed written JANET precautions                               User Key     Initials Effective Dates Name Provider Type Discipline     04/03/18 -   Sandy Covington, PT Physical Therapist PT              PT Recommendation and Plan  Anticipated Discharge Disposition (PT): inpatient rehabilitation facility  Planned Therapy Interventions (PT Eval): balance training, bed mobility training, gait training, home exercise program, patient/family education, postural re-education, ROM (range of motion), strengthening, transfer training  Therapy Frequency (PT Clinical Impression): other (see comments)(6 days/ week; 1-2 times/ day)  Outcome Summary/Treatment Plan (PT)  Anticipated Equipment Needs at Discharge (PT): raised toilet seat, front wheeled walker  Anticipated Discharge Disposition (PT): inpatient rehabilitation facility  Plan of Care Reviewed With: patient, daughter  Outcome Measures     Row Name 06/06/19 1329 06/06/19 1300          How much help from another person do you currently need...    Turning from your back to your side while in flat bed without using bedrails?  3  -AG  --     Moving from lying on back to sitting on the side of a flat bed without bedrails?  3  -AG  --     Moving to and from a bed to a chair (including a wheelchair)?  3  -AG  --     Standing up from a chair using your arms (e.g., wheelchair, bedside chair)?  3  -AG  --     Climbing 3-5 steps with a railing?  2  -AG  --     To walk in hospital room?  3  -AG  --     AM-PAC 6 Clicks Score  17  -AG  --        How much help from another is currently needed...    Putting on and taking off regular lower body clothing?  --  1  -BF     Bathing (including washing, rinsing, and drying)  --  2  -BF     Toileting (which includes using toilet bed pan or urinal)  --  1  -BF     Putting on and taking off regular upper body clothing  --  2  -BF     Taking care of personal grooming (such as brushing teeth)  --  2  -BF     Eating meals  --  3  -BF     Score  --  11  -BF        Functional Assessment    Outcome Measure Options  AM-PAC 6 Clicks Basic Mobility (PT);Other Outcome Measure  -AG  AM-PAC 6 Clicks Daily  Activity (OT)  -BF       User Key  (r) = Recorded By, (t) = Taken By, (c) = Cosigned By    Initials Name Provider Type    BF Katia Enriquez, OT Occupational Therapist    Sandy Sloan, PT Physical Therapist         Time Calculation:   PT Charges     Row Name 06/06/19 1312             Time Calculation    PT Received On  06/06/19  -AG      PT - Next Appointment  06/07/19  -      PT Goal Re-Cert Due Date  06/20/19  -         Time Calculation- PT    TCU Minutes- PT  60 min  -AG        User Key  (r) = Recorded By, (t) = Taken By, (c) = Cosigned By    Initials Name Provider Type    Sandy Sloan, PT Physical Therapist        Therapy Charges for Today     Code Description Service Date Service Provider Modifiers Qty    03210619225 HC PT EVAL MOD COMPLEXITY 2 6/6/2019 Sandy Covington, PT GP 1    13860051142 HC PT THER SUPP EA 15 MIN 6/6/2019 Sandy Covington, PT GP 1    38228067797 HC PT THER PROC EA 15 MIN 6/6/2019 Sandy Covington, PT GP 1    34557765945 HC GAIT TRAINING EA 15 MIN 6/6/2019 Sandy Covington, PT GP 1          PT G-Codes  Outcome Measure Options: AM-PAC 6 Clicks Basic Mobility (PT), Other Outcome Measure  AM-PAC 6 Clicks Score: 17  Score: 11      Sandy Covington PT  6/6/2019

## 2019-06-06 NOTE — PHARMACY PATIENT ASSISTANCE
Pharmacy looked into pricing for new medication of eliquis.  Ms. Mendez's  copay is around $8.50.  No other issues at this time.    Thank You;  Roxanne Frost RPH  06/06/19  3:36 PM

## 2019-06-07 LAB
ANION GAP SERPL CALCULATED.3IONS-SCNC: 12.5 MMOL/L
BASOPHILS # BLD AUTO: 0.01 10*3/MM3 (ref 0–0.2)
BASOPHILS NFR BLD AUTO: 0.1 % (ref 0–1.5)
BUN BLD-MCNC: 23 MG/DL (ref 8–23)
BUN/CREAT SERPL: 22.3 (ref 7–25)
CALCIUM SPEC-SCNC: 7.4 MG/DL (ref 8.6–10.5)
CHLORIDE SERPL-SCNC: 103 MMOL/L (ref 98–107)
CO2 SERPL-SCNC: 20.5 MMOL/L (ref 22–29)
CREAT BLD-MCNC: 1.03 MG/DL (ref 0.57–1)
DEPRECATED RDW RBC AUTO: 44 FL (ref 37–54)
EOSINOPHIL # BLD AUTO: 0.38 10*3/MM3 (ref 0–0.4)
EOSINOPHIL NFR BLD AUTO: 2.5 % (ref 0.3–6.2)
ERYTHROCYTE [DISTWIDTH] IN BLOOD BY AUTOMATED COUNT: 12.7 % (ref 12.3–15.4)
FOLATE SERPL-MCNC: 11.9 NG/ML (ref 4.78–24.2)
GFR SERPL CREATININE-BSD FRML MDRD: 51 ML/MIN/1.73
GLUCOSE BLD-MCNC: 144 MG/DL (ref 65–99)
HCT VFR BLD AUTO: 35.3 % (ref 34–46.6)
HGB BLD-MCNC: 10.7 G/DL (ref 12–15.9)
IMM GRANULOCYTES # BLD AUTO: 0.04 10*3/MM3 (ref 0–0.05)
IMM GRANULOCYTES NFR BLD AUTO: 0.3 % (ref 0–0.5)
LYMPHOCYTES # BLD AUTO: 1.45 10*3/MM3 (ref 0.7–3.1)
LYMPHOCYTES NFR BLD AUTO: 9.6 % (ref 19.6–45.3)
MCH RBC QN AUTO: 29.8 PG (ref 26.6–33)
MCHC RBC AUTO-ENTMCNC: 30.3 G/DL (ref 31.5–35.7)
MCV RBC AUTO: 98.3 FL (ref 79–97)
MONOCYTES # BLD AUTO: 0.89 10*3/MM3 (ref 0.1–0.9)
MONOCYTES NFR BLD AUTO: 5.9 % (ref 5–12)
NEUTROPHILS # BLD AUTO: 12.34 10*3/MM3 (ref 1.7–7)
NEUTROPHILS NFR BLD AUTO: 81.6 % (ref 42.7–76)
PHOSPHATE SERPL-MCNC: 2.4 MG/DL (ref 2.5–4.5)
PLATELET # BLD AUTO: 358 10*3/MM3 (ref 140–450)
PMV BLD AUTO: 10.4 FL (ref 6–12)
POTASSIUM BLD-SCNC: 3.6 MMOL/L (ref 3.5–5.2)
POTASSIUM BLD-SCNC: 4.4 MMOL/L (ref 3.5–5.2)
RBC # BLD AUTO: 3.59 10*6/MM3 (ref 3.77–5.28)
SODIUM BLD-SCNC: 136 MMOL/L (ref 136–145)
VIT B12 BLD-MCNC: 523 PG/ML (ref 211–946)
WBC NRBC COR # BLD: 15.11 10*3/MM3 (ref 3.4–10.8)

## 2019-06-07 PROCEDURE — 84132 ASSAY OF SERUM POTASSIUM: CPT | Performed by: FAMILY MEDICINE

## 2019-06-07 PROCEDURE — 84100 ASSAY OF PHOSPHORUS: CPT | Performed by: PHYSICIAN ASSISTANT

## 2019-06-07 PROCEDURE — 85025 COMPLETE CBC W/AUTO DIFF WBC: CPT | Performed by: FAMILY MEDICINE

## 2019-06-07 PROCEDURE — 94799 UNLISTED PULMONARY SVC/PX: CPT

## 2019-06-07 PROCEDURE — 97110 THERAPEUTIC EXERCISES: CPT

## 2019-06-07 PROCEDURE — 82746 ASSAY OF FOLIC ACID SERUM: CPT | Performed by: PHYSICIAN ASSISTANT

## 2019-06-07 PROCEDURE — 97116 GAIT TRAINING THERAPY: CPT

## 2019-06-07 PROCEDURE — 80048 BASIC METABOLIC PNL TOTAL CA: CPT | Performed by: FAMILY MEDICINE

## 2019-06-07 PROCEDURE — 99233 SBSQ HOSP IP/OBS HIGH 50: CPT | Performed by: PHYSICIAN ASSISTANT

## 2019-06-07 PROCEDURE — 25010000002 CEFTRIAXONE: Performed by: FAMILY MEDICINE

## 2019-06-07 PROCEDURE — 97530 THERAPEUTIC ACTIVITIES: CPT

## 2019-06-07 PROCEDURE — 82607 VITAMIN B-12: CPT | Performed by: PHYSICIAN ASSISTANT

## 2019-06-07 RX ORDER — LOSARTAN POTASSIUM 50 MG/1
50 TABLET ORAL DAILY
Status: DISCONTINUED | OUTPATIENT
Start: 2019-06-07 | End: 2019-06-09

## 2019-06-07 RX ORDER — SODIUM CHLORIDE 9 MG/ML
50 INJECTION, SOLUTION INTRAVENOUS CONTINUOUS
Status: DISCONTINUED | OUTPATIENT
Start: 2019-06-07 | End: 2019-06-08

## 2019-06-07 RX ORDER — POTASSIUM CHLORIDE 20 MEQ/1
40 TABLET, EXTENDED RELEASE ORAL EVERY 4 HOURS
Status: COMPLETED | OUTPATIENT
Start: 2019-06-07 | End: 2019-06-07

## 2019-06-07 RX ORDER — HYDRALAZINE HYDROCHLORIDE 20 MG/ML
10 INJECTION INTRAMUSCULAR; INTRAVENOUS EVERY 6 HOURS PRN
Status: DISCONTINUED | OUTPATIENT
Start: 2019-06-07 | End: 2019-06-13 | Stop reason: HOSPADM

## 2019-06-07 RX ADMIN — CEFTRIAXONE 1 G: 1 INJECTION, POWDER, FOR SOLUTION INTRAMUSCULAR; INTRAVENOUS at 09:10

## 2019-06-07 RX ADMIN — OXYCODONE HYDROCHLORIDE AND ACETAMINOPHEN 1 TABLET: 5; 325 TABLET ORAL at 06:09

## 2019-06-07 RX ADMIN — BUSPIRONE HYDROCHLORIDE 10 MG: 10 TABLET ORAL at 09:02

## 2019-06-07 RX ADMIN — PANTOPRAZOLE SODIUM 40 MG: 40 TABLET, DELAYED RELEASE ORAL at 06:09

## 2019-06-07 RX ADMIN — BUSPIRONE HYDROCHLORIDE 10 MG: 10 TABLET ORAL at 21:03

## 2019-06-07 RX ADMIN — POTASSIUM CHLORIDE 40 MEQ: 1500 TABLET, EXTENDED RELEASE ORAL at 17:43

## 2019-06-07 RX ADMIN — APIXABAN 2.5 MG: 2.5 TABLET, FILM COATED ORAL at 09:02

## 2019-06-07 RX ADMIN — CARVEDILOL 12.5 MG: 6.25 TABLET, FILM COATED ORAL at 17:42

## 2019-06-07 RX ADMIN — LOSARTAN POTASSIUM 50 MG: 50 TABLET, FILM COATED ORAL at 10:20

## 2019-06-07 RX ADMIN — OXYCODONE HYDROCHLORIDE AND ACETAMINOPHEN 1 TABLET: 5; 325 TABLET ORAL at 21:03

## 2019-06-07 RX ADMIN — OXYCODONE HYDROCHLORIDE AND ACETAMINOPHEN 1 TABLET: 5; 325 TABLET ORAL at 10:55

## 2019-06-07 RX ADMIN — APIXABAN 2.5 MG: 2.5 TABLET, FILM COATED ORAL at 21:03

## 2019-06-07 RX ADMIN — SODIUM CHLORIDE, PRESERVATIVE FREE 3 ML: 5 INJECTION INTRAVENOUS at 21:04

## 2019-06-07 RX ADMIN — SODIUM CHLORIDE, PRESERVATIVE FREE 3 ML: 5 INJECTION INTRAVENOUS at 09:03

## 2019-06-07 RX ADMIN — CARVEDILOL 12.5 MG: 6.25 TABLET, FILM COATED ORAL at 09:02

## 2019-06-07 RX ADMIN — SODIUM CHLORIDE 50 ML/HR: 9 INJECTION, SOLUTION INTRAVENOUS at 09:09

## 2019-06-07 RX ADMIN — ATORVASTATIN CALCIUM 40 MG: 40 TABLET, FILM COATED ORAL at 21:03

## 2019-06-07 RX ADMIN — POTASSIUM & SODIUM PHOSPHATES POWDER PACK 280-160-250 MG 2 PACKET: 280-160-250 PACK at 10:20

## 2019-06-07 RX ADMIN — POTASSIUM CHLORIDE 40 MEQ: 1500 TABLET, EXTENDED RELEASE ORAL at 15:24

## 2019-06-07 RX ADMIN — OXYCODONE HYDROCHLORIDE AND ACETAMINOPHEN 1 TABLET: 5; 325 TABLET ORAL at 15:24

## 2019-06-07 NOTE — PLAN OF CARE
Problem: Patient Care Overview  Goal: Plan of Care Review  Outcome: Ongoing (interventions implemented as appropriate)   06/05/19 2244 06/06/19 2030   Coping/Psychosocial   Plan of Care Reviewed With --  patient   Plan of Care Review   Progress no change --        Problem: Fall Risk (Adult)  Goal: Identify Related Risk Factors and Signs and Symptoms  Outcome: Ongoing (interventions implemented as appropriate)    Goal: Absence of Fall  Outcome: Ongoing (interventions implemented as appropriate)      Problem: Skin Injury Risk (Adult)  Goal: Identify Related Risk Factors and Signs and Symptoms  Outcome: Ongoing (interventions implemented as appropriate)    Goal: Skin Health and Integrity  Outcome: Ongoing (interventions implemented as appropriate)      Problem: Fracture Orthopaedic (Adult)  Goal: Signs and Symptoms of Listed Potential Problems Will be Absent, Minimized or Managed (Fracture Orthopaedic)  Outcome: Ongoing (interventions implemented as appropriate)      Problem: Pain, Acute (Adult)  Goal: Identify Related Risk Factors and Signs and Symptoms  Outcome: Ongoing (interventions implemented as appropriate)    Goal: Acceptable Pain Control/Comfort Level  Outcome: Ongoing (interventions implemented as appropriate)      Problem: Self-Care Deficit (Adult,Obstetrics,Pediatric)  Goal: Improved Ability to Perform BADL and IADL  Outcome: Ongoing (interventions implemented as appropriate)

## 2019-06-07 NOTE — PROGRESS NOTES
.                            [facility] Medicine Services  PROGRESS NOTE     Patient Identification:  Name:  Abbi Mendez  Age:  83 y.o.  Sex:  female  :  1935  MRN:  [id number]  Visit Number:  [id number]  Primary Care Provider:  Eric Driver MD    Length of stay:  2    ----------------------------------------------------------------------------------------------------------------------  Subjective     Chief Complaint:  Follow up for hip fracture, UTI    History of Presenting Illness:  Patient is an 83-year-old female with past medical history significant for paroxysmal atrial fibrillation, coronary artery disease status post stenting, essential hypertension, hyperlipidemia primary hyperparathyroidism, third-degree AV block status post permanent pacemaker implantation, stage III CKD, and advanced age was admitted on 2014 still with mechanical fall resulting in mild hip fracture.  Patient underwent left hip replacement: Tolerated procedure well.  Inpatient rehab consulted for further therapy.    Subjective:  Today, the patient is resting in bed per my evaluation this morning.  No overnight events reported, no new complaints.  She states she is doing well.  She reports moderate pain but, but states that it is improving.  Pain controlled with current regimen.  She denies any paresthesias, no numbness or tingling.  Tolerating physical therapy, ambulated yesterday 40 feet with minimal assistance.  She denies any chest pain or pressure, no dyspnea.  No cough.  Denies any fevers or chills.  Denies any abdominal pain, nausea, vomiting or diarrhea.  No urinary symptoms.    Present during exam: n/a  ----------------------------------------------------------------------------------------------------------------------  Objective     Consults:  · Orthopedic surgery     Procedures:  · 2019: Rowell catheter insertion -- Activity restriction due to hip fracture   · 2019: Left hip bipolar  replacement -- Dr. Jain, orthopedic surgery   · 6/6/2019: Rowell catheter removal     Current Hospital Meds:    apixaban 2.5 mg Oral Q12H   atorvastatin 40 mg Oral Nightly   busPIRone 10 mg Oral BID   carvedilol 12.5 mg Oral BID With Meals   ceftriaxone 1 g Intravenous Q24H   cholecalciferol 50,000 Units Oral Weekly   pantoprazole 40 mg Oral QAM   sodium chloride 3 mL Intravenous Q12H       sodium chloride 50 mL/hr Last Rate: 50 mL/hr (06/07/19 0909)     ----------------------------------------------------------------------------------------------------------------------  Vital Signs:  Temp:  [98 °F (36.7 °C)-99.1 °F (37.3 °C)] 99 °F (37.2 °C)  Heart Rate:  [] 82  Resp:  [16-20] 16  BP: (100-175)/(59-82) 175/82    SpO2 Percentage    06/06/19 2239 06/07/19 0309 06/07/19 0630   SpO2: 100% 99% 94%     SpO2:  [94 %-100 %] 94 %  on   ;   Device (Oxygen Therapy): room air    Body mass index is 27.13 kg/m².  Wt Readings from Last 3 Encounters:   06/05/19 76.2 kg (168 lb 0.2 oz)   05/28/19 78 kg (172 lb)   02/01/19 75.8 kg (167 lb)        Intake/Output Summary (Last 24 hours) at 6/7/2019 0926  Last data filed at 6/7/2019 0845  Gross per 24 hour   Intake 2610 ml   Output 550 ml   Net 2060 ml     Diet Regular; Cardiac  ----------------------------------------------------------------------------------------------------------------------  Physical exam:  Physical Exam   Constitutional: She is oriented to person, place, and time. She appears well-developed and well-nourished.  Non-toxic appearance. No distress.   Pleasant, no acute distress   HENT:   Head: Normocephalic and atraumatic.   Right Ear: External ear normal.   Left Ear: External ear normal.   Nose: Nose normal.   Mouth/Throat: Oropharynx is clear and moist and mucous membranes are normal. No oropharyngeal exudate.   Eyes: Conjunctivae and EOM are normal. Pupils are equal, round, and reactive to light. No scleral icterus.   Neck: Trachea normal and normal range  of motion. Neck supple. No JVD present. No muscular tenderness present. Carotid bruit is not present. No tracheal deviation present. No thyromegaly present.   Cardiovascular: Normal rate, regular rhythm, normal heart sounds and intact distal pulses. Exam reveals no gallop and no friction rub.   No murmur heard.  Pulmonary/Chest: Effort normal and breath sounds normal. No respiratory distress. She has no wheezes. She has no rhonchi. She has no rales. She exhibits no tenderness.   Abdominal: Soft. Bowel sounds are normal. She exhibits no distension and no mass. There is no hepatosplenomegaly. There is no tenderness. There is no rebound and no guarding. No hernia.   Genitourinary:   Genitourinary Comments: No Rowell catheter in place, making urine   Musculoskeletal: She exhibits no edema or deformity.        Left hip: She exhibits tenderness and swelling.        Right lower leg: She exhibits no swelling.        Left lower leg: She exhibits no swelling.   Left hip incision, clean dry and intact.  Mild edema.  Mild tenderness to palpation.  Good range of motion.  Distal pulses intact.  Neurovascularly intact bilaterally.  Sensation intact.  Cap refill less than 3 seconds.  Intact bilaterally.     Vascular Status -  Her right foot exhibits normal foot vasculature  and no edema. Her left foot exhibits normal foot vasculature  and no edema.  Neurological: She is alert and oriented to person, place, and time. She has normal strength. No cranial nerve deficit or sensory deficit.   Skin: Skin is warm and dry. Capillary refill takes less than 2 seconds. No rash noted. No erythema. No pallor.   Psychiatric: She has a normal mood and affect. Her speech is normal and behavior is normal. Judgment and thought content normal.   Nursing note and vitals reviewed.     ----------------------------------------------------------------------------------------------------------------------  Tele:    Paced 70s    I have personally reviewed the  EKG/Telemetry strips   ----------------------------------------------------------------------------------------------------------------------            Results from last 7 days   Lab Units 06/07/19  0817 06/06/19 0415 06/05/19  0408 06/04/19  2305   WBC 10*3/mm3 15.11* 14.64* 16.94* 17.80*   HEMOGLOBIN g/dL 10.7* 10.5* 11.6* 11.7*   HEMATOCRIT % 35.3 33.6* 36.7 36.5   MCV fL 98.3* 97.1* 94.8 94.1   MCHC g/dL 30.3* 31.3* 31.6 32.1   PLATELETS 10*3/mm3 358 324 356 354   INR   --   --   --  1.19*     Results from last 7 days   Lab Units 06/07/19  0817 06/06/19  0802 06/06/19 0415 06/05/19 2155 06/05/19  0508 06/05/19  0408 06/04/19  2305   SODIUM mmol/L 136  --  140  --   --  140 139   POTASSIUM mmol/L 3.6  --  4.0 4.5  --  3.1* 3.2*   MAGNESIUM mg/dL  --  2.5*  --   --  1.4*  --   --    CHLORIDE mmol/L 103  --  104  --   --  100 98   CO2 mmol/L 20.5*  --  23.3  --   --  24.6 25.4   BUN mg/dL 23  --  25*  --   --  16 17   CREATININE mg/dL 1.03*  --  1.26*  --   --  1.06* 1.05*   EGFR IF NONAFRICN AM mL/min/1.73 51*  --  41*  --   --  50* 50*   CALCIUM mg/dL 7.4*  --  7.4*  --   --  7.9* 8.2*   GLUCOSE mg/dL 144*  --  201*  --   --  130* 155*   ALBUMIN g/dL  --   --   --   --   --  3.16* 3.17*   BILIRUBIN mg/dL  --   --   --   --   --  0.5 0.5   ALK PHOS U/L  --   --   --   --   --  74 75   AST (SGOT) U/L  --   --   --   --   --  22 28   ALT (SGPT) U/L  --   --   --   --   --  18 20   Estimated Creatinine Clearance: 43.2 mL/min (A) (by C-G formula based on SCr of 1.03 mg/dL (H)).    Hemoglobin A1C   Date/Time Value Ref Range Status   06/05/2019 0508 5.70 (H) 4.80 - 5.60 % Final     I have personally reviewed the above laboratory results.   ----------------------------------------------------------------------------------------------------------------------  Imaging Results (last 24 hours)     Procedure Component Value Units Date/Time    XR Pelvis 1 or 2 View [[id number]] Collected:  06/06/19 0871     Updated:   06/06/19 0713    Narrative:       FINDINGS:       BONES: No acute fracture. No blastic or lytic lesions.       JOINT: TOTAL LEFT HIP ARTHROPLASTY.       SOFT TISSUES:  No soft tissue mass.    Impression:       No acute or destructive bony abnormality.     This report was finalized on 6/6/2019 7:11 AM by Dr. Maxime Treviño MD.      I have personally reviewed the above radiology results.   ----------------------------------------------------------------------------------------------------------------------  Assessment/Plan     · Acute, traumatic, closed left hip fracture status post mechanical fall: Pt s/p left hip replacement per ortho surgery. Elizabeth procedure well. Cont post op orders and recommendations per ortho, assistance is appreciated. PT. PRN pain meds with holding parameters.   · Leukocytosis: Stable. Likely stress-induced post fracture/operation.  Patient afebrile, no active signs or symptoms of infection.  Monitor closely with antibiotic therapy.  Repeat CBC in a.m.  · Urinary tract infection: Patient has been on IV Rocephin empirically.  Urine culture finalized with mixed markus, less than 25,000 CFU per mL.  Will discontinue Rocephin.  Patient afebrile.  Denies any urinary symptoms.  Rowell catheter has been removed previously.    · Acute hypokalemia: Double supplementation. Continue to replace per protocol as necessary.  Repeat chemistry panel in the morning.  · Hypomagnesemia: Resolved with supplementation.  Repeat magnesium level in the morning.    · Hypophosphatemia: Replace per protocol.  Repeat phosphorus level in the morning.  · History of coronary artery disease status post stenting in the past: No chest pain or symptoms of ACS.  Closely monitor on telemetry. Continue medical therapy.   · Paroxysmal atrial fibrillation: Cont Coreg for rate control, she remains rate controlled. Cont telemetry monitoring. Eliquis for anticoagulation.   · Essential hypertension: BP moderately controlled. Cont Coreg and  losartan with holding parameters. Closely monitor and titrate medications as necessary.    · Hyperlipidemia: Continue statin therapy.   · History of primary hyperparathyroidism: Monitor.   · History of third-degree AV block status post permanent pacemaker implantation: telemetry monitoring.   · Hyperglycemia: A1C 5.70. Monitor with daily chemistry, no indication for SSI at this time.   · Stage III CKD: Appears stable.  Treatment was slightly increased yesterday, has resolved to baseline.  She is on losartan, will have to monitor closely.  I did start some gentle IV fluids today until patient is drinking more.  Avoid nephrotoxins. Monitor closely, repeat chem panel in AM.   · Vitamin D deficiency: Cholecalciferol supplementation.   · Mild hypoalbuminemia: Likely multifactorial. Encourage PO intake. Monitor closely.   · Anxiety: Continue Buspar.   · Microscopic hematuria/proteinuria: Protein/creatinine ratio elevated, likely hypertensive nephropathy. Closely monitor, recommend outpatient follow up once stabilized from surgery standpoint. Treat HTN as previously mentioned.   · Macrocytic anemia: Folate and vitamin B12 levels pending.  Supplemented patient found to be deficient.  Hemoglobin stable.  Likely ABLA postoperatively.  Closely monitor H&H, transfuse should hemoglobin drop below 7.0.  Repeat CBC in a.m.  · Vitamin D deficiency: Cholecalciferol 50,000 units weekly x8 weeks.  · F/E/N: Gentle IV fluids. Replace electrolytes per protocol as necessary. Cardiac diet.     --------------------------------------------------  DVT Prophylaxis:  Eliquis to serve   GI Prophylaxis: Protonix   Activity: up with assistance    The patient is high risk due to the following diagnoses/reasons:  Hip fracture, advanced age, CAD, UTI, electrolyte abnormalities, afib, anticoagulation    I have discussed the patient's assessment and plan with the patient and nursing staff.     Disposition: Patient that she would rather go home with  home health on discharge with physical therapy.  However her daughter previously mentioned she may benefit from prolonged therapy inpatient.  Patient agreeable to submit to insurance for inpatient rehab.  Monitor how she does with physical therapy, depending on how she does we will further delineate whether she can go home with home health versus SNF/inpatient rehab.       Alize Parsons PA-C  Hospitalist Service -- [facility]   Pager: [phone]    06/07/19  9:26 AM    Attending Physician: Tristan Melara MD    ----------------------------------------------------------------------------------------------------------------------  I have evaluated patient and agree with treatment plan above.

## 2019-06-07 NOTE — THERAPY TREATMENT NOTE
Acute Care - Physical Therapy Treatment Note   [facility]     Patient Name: Abbi Mendez  : 1935  MRN: [id number]  Today's Date: 2019  Onset of Illness/Injury or Date of Surgery: 19  Date of Referral to PT: 19  Referring Physician: Dr. Jain    Admit Date: 2019    Visit Dx:    ICD-10-CM ICD-9-CM   1. Left displaced femoral neck fracture (CMS/HCC) S72.002A 820.8   2. Strain of right shoulder, initial encounter S46.911A 840.9   3. Hypokalemia E87.6 276.8     Patient Active Problem List   Diagnosis   • Atrial fibrillation (CMS/HCC)   • Coronary artery disease involving native coronary artery of native heart without angina pectoris   • RADHA (obstructive sleep apnea)   • Essential hypertension   • Hyperlipidemia LDL goal <70   • Iron deficiency anemia   • Left bundle branch block   • Syncope and collapse   • Severe sinus bradycardia   • Third degree AV block (CMS/HCC)   • Syncope   • Compression deformity of vertebra   • Radiculopathy   • Pericardial effusion without cardiac tamponade   • Hypercalcemia   • Abnormal serum protein electrophoresis   • Weight loss, unintentional   • Primary hyperparathyroidism (CMS/HCC)   • Hypercalcemia   • Pacemaker   • Hiatal hernia   • Elevated serum immunoglobulin free light chains   • Left displaced femoral neck fracture (CMS/HCC)       Therapy Treatment    Rehabilitation Treatment Summary     Row Name 19 1558             Treatment Time/Intention    Discipline  physical therapy assistant  -LL      Document Type  therapy note (daily note) BID treatment session  -LL      Subjective Information  complains of;fatigue;pain  -LL      Mode of Treatment  individual therapy;physical therapy  -LL      Patient/Family Observations  Patient supine in bed prior to both am & pm session  -LL      Therapy Frequency (PT Clinical Impression)  other (see comments) 6 days/ week; 1-2 times/ day  -LL      Patient Effort  good  -LL      Existing Precautions/Restrictions   fall;hip;left  -LL      Patient Response to Treatment  Patient tolerated both am & pm sessions well with adequate rest breaks.  -LL      Recorded by [LL] Loyda Vega PTA 06/07/19 1605      Row Name 06/07/19 1558             Cognitive Assessment/Intervention- PT/OT    Orientation Status (Cognition)  oriented x 3  -LL      Follows Commands (Cognition)  WFL;follows one step commands;repetition of directions required  -LL      Recorded by [LL] Loyda Vega PTA 06/07/19 1605      Row Name 06/07/19 1558             Safety Issues, Functional Mobility    Impairments Affecting Function (Mobility)  balance;coordination;endurance/activity tolerance;pain;range of motion (ROM);strength  -LL      Recorded by [LL] oLyda Vega PTA 06/07/19 1605      Row Name 06/07/19 1558             Mobility Assessment/Intervention    Extremity Weight-bearing Status  left lower extremity;right lower extremity  -LL      Left Lower Extremity (Weight-bearing Status)  weight-bearing as tolerated (WBAT)  -LL      Right Lower Extremity (Weight-bearing Status)  weight-bearing as tolerated (WBAT)  -LL      Recorded by [LL] Loyda Vega PTA 06/07/19 1605      Row Name 06/07/19 1558             Bed Mobility Assessment/Treatment    Bed Mobility Assessment/Treatment  scooting/bridging;supine-sit;sit-supine  -LL      Scooting/Bridging [place] (Bed Mobility)  verbal cues;nonverbal cues (demo/gesture);minimum assist (75% patient effort)  -LL      Supine-Sit [place] (Bed Mobility)  verbal cues;nonverbal cues (demo/gesture);moderate assist (50% patient effort)  -LL      Sit-Supine [place] (Bed Mobility)  verbal cues;nonverbal cues (demo/gesture);moderate assist (50% patient effort)  -LL      Bed Mobility, Safety Issues  decreased use of legs for bridging/pushing  -LL      Assistive Device (Bed Mobility)  bed rails  -LL      Recorded by [LL] Loyda Vega PTA 06/07/19 1605      Row Name 06/07/19 1558             Transfer  Assessment/Treatment    Transfer Assessment/Treatment  sit-stand transfer;stand-sit transfer;stand pivot/stand step transfer;toilet transfer  -LL      Recorded by [LL] Loyda Vega, [facility] 06/07/19 1605      Row Name 06/07/19 1558             Sit-Stand Transfer    Sit-Stand [place] (Transfers)  verbal cues;nonverbal cues (demo/gesture);minimum assist (75% patient effort)  -      Assistive Device (Sit-Stand Transfers)  walker, front-wheeled  -LL      Recorded by [LL] Loyda Vega, [facility] 06/07/19 1605      Row Name 06/07/19 1558             Stand-Sit Transfer    Stand-Sit [place] (Transfers)  verbal cues;nonverbal cues (demo/gesture);minimum assist (75% patient effort)  -      Assistive Device (Stand-Sit Transfers)  walker, front-wheeled  -LL      Recorded by [LL] Loyda Vega, [facility] 06/07/19 1605      Row Name 06/07/19 1558             Stand Pivot/Stand Step Transfer    Stand Pivot/Stand Step [place]  verbal cues;nonverbal cues (demo/gesture);minimum assist (75% patient effort)  -      Assistive Device (Stand Pivot Stand Step Transfer)  walker, front-wheeled  -LL      Recorded by [LL] Loyda Vega, [facility] 06/07/19 1605      Row Name 06/07/19 1558             Toilet Transfer    Type (Toilet Transfer)  stand pivot/stand step  -LL      [place] Level (Toilet Transfer)  moderate assist (50% patient effort);2 person assist;verbal cues  -      Assistive Device (Toilet Transfer)  commode, bedside without drop arms;walker, front-wheeled  -LL      Recorded by [LL] Loyda Vega, [facility] 06/07/19 1605      Row Name 06/07/19 1558             Gait/Stairs Assessment/Training    [place] Level (Gait)  verbal cues;nonverbal cues (demo/gesture);minimum assist (75% patient effort);1 person to manage equipment  -LL      Assistive Device (Gait)  walker, front-wheeled  -LL      Distance in Feet (Gait)  50 in am; 60 in pm  -LL      Pattern (Gait)  step-to  -LL      Deviations/Abnormal Patterns (Gait)   antalgic;stride length decreased  -LL      Bilateral Gait Deviations  forward flexed posture;weight shift ability decreased  -LL      Recorded by [LL] Loyda Vega PTA 06/07/19 1605      Row Name 06/07/19 1558             Lower Extremity Supine Therapeutic Exercise    Performed, Supine Lower Extremity (Therapeutic Exercise)  hip flexion/extension;quadriceps sets;gluteal sets;heel slides;ankle pumps  -LL      Exercise Type, Supine Lower Extremity (Therapeutic Exercise)  AROM (active range of motion);AAROM (active assistive range of motion)  -LL      Expected Outcomes, Supine Lower Extremity (Therapeutic Exercise)  improve functional tolerance, community activity;improve functional tolerance, household activity;improve functional tolerance, self-care activity;improve performance, gait skills;improve performance, transfer skills;strengthen normal movement patterns  -LL      Restrictions, Supine Lower Extremity (Therapeutic Exercise)  L JANET precautions  -LL      Sets/Reps Detail, Supine Lower Extremity (Therapeutic Exercise)  1 x 15  -LL      Transfers Skills, Training to Functional Activity, Supine Lower Extremity (Therapeutic Exercise)  able to transfer skills from training to functional activity  -LL      Recorded by [LL] Loyda Vega PTA 06/07/19 1605      Row Name 06/07/19 1558             Positioning and Restraints    Pre-Treatment Position  in bed in am & pm  -LL      Post Treatment Position  chair in am; bed in pm  -LL      In Bed  notified nsg;call light within reach;encouraged to call for assist;side rails up x2;heels elevated in pm  -LL      In Chair  sitting;call light within reach;encouraged to call for assist;with family/caregiver;notified nsg with tray table in front of her in am  -LL      Recorded by [LL] Loyda Vega PTA 06/07/19 1605      Row Name 06/07/19 1558             Pain Scale: Numbers Pre/Post-Treatment    Pain Location - Side  Left  -LL      Pain Location  hip  -LL      Recorded by  [LL] Loyda Vega, ALFREDO 06/07/19 1605      Row Name 06/07/19 1558             Pain Scale: FACES Pre/Post-Treatment    Pain: FACES Scale, Pretreatment  0-->no hurt  -LL      Pain: FACES Scale, Post-Treatment  4-->hurts little more  -LL      Recorded by [LL] Loyda Vega PTA 06/07/19 1605      Row Name 06/07/19 1558             Sensory Assessment/Intervention    Sensory General Assessment  no sensation deficits identified  -LL      Recorded by [LL] Loyda Vega PTA 06/07/19 1605      Row Name 06/07/19 1558             Vision Assessment/Intervention    Visual Impairment/Limitations  WFL  -LL      Recorded by [LL] Loyda Vega PTA 06/07/19 1605      Row Name                Wound 06/05/19 1838 Left hip incision    Wound - Properties Group Date first assessed: 06/05/19 [KH] Time first assessed: 1838 [] Side: Left [] Location: hip [] Type: incision [] Recorded by:  [KH] Ary Sylvester RN 06/05/19 1838    Row Name 06/07/19 1558             Coping    Observed Emotional State  accepting;calm;cooperative  -LL      Verbalized Emotional State  acceptance  -LL      Recorded by [LL] Loyda Vega PTA 06/07/19 1605      Row Name 06/07/19 1558             Outcome Summary/Treatment Plan (PT)    Anticipated Equipment Needs at Discharge (PT)  raised toilet seat;front wheeled walker  -LL      Anticipated Discharge Disposition (PT)  inpatient rehabilitation facility  -LL      Recorded by [LL] Loyda Vega PTA 06/07/19 1605        User Key  (r) = Recorded By, (t) = Taken By, (c) = Cosigned By    Initials Name Effective Dates Discipline     Ary Sylvester RN 06/16/16 -  Nurse    LL Loyda Vega PTA 05/02/16 -  PT          Wound 06/05/19 1838 Left hip incision (Active)   Dressing Appearance dry;intact 6/7/2019  7:05 AM   Base dressing in place, unable to visualize 6/6/2019  8:30 PM   Drainage Amount none 6/7/2019  7:05 AM           Physical Therapy Education     Title: PT OT SLP Therapies  (Done)     Topic: Physical Therapy (Done)     Point: Mobility training (Done)     Learning Progress Summary           Patient Acceptance, E, VU,NR by LL at 6/7/2019  4:05 PM    Acceptance, E,TB, VU by TT at 6/7/2019 12:26 AM    Acceptance, H, VU,NR by AG at 6/6/2019  1:28 PM    Comment:  Reviewed written JANET precautions    Acceptance, E,D, VU,NR by AG at 6/6/2019  1:13 PM    Comment:  PT reviewed and instructed in HEP, JANET precautions.   Family Acceptance, H, VU,NR by AG at 6/6/2019  1:28 PM    Comment:  Reviewed written JANET precautions                   Point: Home exercise program (Done)     Learning Progress Summary           Patient Acceptance, E, VU,NR by LL at 6/7/2019  4:05 PM    Acceptance, E,TB, VU by TT at 6/7/2019 12:26 AM    Acceptance, H, VU,NR by AG at 6/6/2019  1:28 PM    Comment:  Reviewed written JANET precautions    Acceptance, E,D, VU,NR by AG at 6/6/2019  1:13 PM    Comment:  PT reviewed and instructed in HEP, JANET precautions.   Family Acceptance, H, VU,NR by AG at 6/6/2019  1:28 PM    Comment:  Reviewed written JANET precautions                   Point: Body mechanics (Done)     Learning Progress Summary           Patient Acceptance, E, VU,NR by LL at 6/7/2019  4:05 PM    Acceptance, E,TB, VU by TT at 6/7/2019 12:26 AM    Acceptance, H, VU,NR by AG at 6/6/2019  1:28 PM    Comment:  Reviewed written JANET precautions    Acceptance, E,D, VU,NR by AG at 6/6/2019  1:13 PM    Comment:  PT reviewed and instructed in HEP, JANET precautions.   Family Acceptance, H, VU,NR by AG at 6/6/2019  1:28 PM    Comment:  Reviewed written JANET precautions                   Point: Precautions (Done)     Learning Progress Summary           Patient Acceptance, E, VU,NR by LL at 6/7/2019  4:05 PM    Acceptance, E,TB, VU by TT at 6/7/2019 12:26 AM    Acceptance, H, VU,NR by AG at 6/6/2019  1:28 PM    Comment:  Reviewed written JANET precautions    Acceptance, E,D, VU,NR by AG at 6/6/2019  1:13 PM    Comment:  PT reviewed and  instructed in HEP, JANET precautions.   Family Acceptance, H, FABRICIO,NR by  at 6/6/2019  1:28 PM    Comment:  Reviewed written JANET precautions                               User Key     Initials Effective Dates Name Provider Type Discipline    TT 06/08/18 -  Frida Nugent, RN Registered Nurse Nurse     04/03/18 -  Sandy Covington, PT Physical Therapist PT     05/02/16 -  Loyda Vega PTA Physical Therapy Assistant PT                PT Recommendation and Plan  Anticipated Discharge Disposition (PT): inpatient rehabilitation facility  Therapy Frequency (PT Clinical Impression): other (see comments)(6 days/ week; 1-2 times/ day)  Outcome Summary/Treatment Plan (PT)  Anticipated Equipment Needs at Discharge (PT): raised toilet seat, front wheeled walker  Anticipated Discharge Disposition (PT): inpatient rehabilitation facility  Outcome Measures     Row Name 06/07/19 1600 06/06/19 1329 06/06/19 1300       How much help from another person do you currently need...    Turning from your back to your side while in flat bed without using bedrails?  3  -LL  3  -AG  --    Moving from lying on back to sitting on the side of a flat bed without bedrails?  3  -LL  3  -AG  --    Moving to and from a bed to a chair (including a wheelchair)?  3  -LL  3  -AG  --    Standing up from a chair using your arms (e.g., wheelchair, bedside chair)?  3  -LL  3  -AG  --    Climbing 3-5 steps with a railing?  2  -LL  2  -AG  --    To walk in hospital room?  3  -LL  3  -AG  --    AM-PAC 6 Clicks Score  17  -LL  17  -AG  --       How much help from another is currently needed...    Putting on and taking off regular lower body clothing?  --  --  1  -BF    Bathing (including washing, rinsing, and drying)  --  --  2  -BF    Toileting (which includes using toilet bed pan or urinal)  --  --  1  -BF    Putting on and taking off regular upper body clothing  --  --  2  -BF    Taking care of personal grooming (such as brushing teeth)  --  --  2  -BF     Eating meals  --  --  3  -BF    Score  --  --  11  -BF       Functional Assessment    Outcome Measure Options  AM-PAC 6 Clicks Basic Mobility (PT);Other Outcome Measure  -LL  AM-PAC 6 Clicks Basic Mobility (PT);Other Outcome Measure  -AG  AM-PAC 6 Clicks Daily Activity (OT)  -BF      User Key  (r) = Recorded By, (t) = Taken By, (c) = Cosigned By    Initials Name Provider Type    BF Katia Enriquez, OT Occupational Therapist    AG Sandy Covington, PT Physical Therapist    LL Loyda Vega, ALFREDO Physical Therapy Assistant         Time Calculation:   PT Charges     Row Name 06/07/19 1606 06/07/19 1605          Time Calculation    PT Received On  --  06/07/19  -LL        Time Calculation- PT    Total Timed Code Minutes- PT  45 minute(s)  -LL  30 minute(s)  -LL       User Key  (r) = Recorded By, (t) = Taken By, (c) = Cosigned By    Initials Name Provider Type    LL Loyda Vega, ALFREDO Physical Therapy Assistant        Therapy Charges for Today     Code Description Service Date Service Provider Modifiers Qty    50775531736 HC GAIT TRAINING EA 15 MIN 6/7/2019 Loyda Vega, ALFREDO GP 2    26682886564 HC PT THERAPEUTIC ACT EA 15 MIN 6/7/2019 Loyda Vega, ALFREDO GP 2    36022540191 HC PT THER PROC EA 15 MIN 6/7/2019 Loyda Vega, ALFREDO GP 1    34181252809 HC PT THER SUPP EA 15 MIN 6/7/2019 Loyda Vega, ALFREDO GP 3          PT G-Codes  Outcome Measure Options: AM-PAC 6 Clicks Basic Mobility (PT), Other Outcome Measure  AM-PAC 6 Clicks Score: 17  Score: 11    Loyda. ALFREDO Vega  6/7/2019

## 2019-06-07 NOTE — NURSING NOTE
Was notified by [facility] that patient has be [facility] Acute Rehab admission. A peer to peer may be completed by calling [phone]. Case reference number is [id number]. This must be done within 72 hours.

## 2019-06-07 NOTE — PLAN OF CARE
Problem: Patient Care Overview  Goal: Discharge Needs Assessment  Outcome: Ongoing (interventions implemented as appropriate)      Problem: Fall Risk (Adult)  Goal: Absence of Fall  Outcome: Ongoing (interventions implemented as appropriate)      Problem: Skin Injury Risk (Adult)  Goal: Skin Health and Integrity  Outcome: Ongoing (interventions implemented as appropriate)      Problem: Pain, Acute (Adult)  Goal: Acceptable Pain Control/Comfort Level  Outcome: Ongoing (interventions implemented as appropriate)

## 2019-06-07 NOTE — PROGRESS NOTES
LOS: 2 days   Patient Care Team:  Eric Driver MD as PCP - General  Soraya Darling MD as PCP - Internal Medicine (Cardiology)  Josesito Danielson MD as Consulting Physician (Orthopedic Surgery)    Post Op Day 2      Procedure(s):  HIP BIPOLAR REPLACEMENT     Subjective   Patient has been up in chair today. Pain controlled. Denies any new issues.RN at bedside.   Interval History:     Patient Complaints: none  Patient Denies:  Cough, fever or chills  History taken from: patient RN    Review of Systems:    All systems were reviewed and negative except for:  Musculoskeletal: positive for  joint swelling    Objective     Vital Signs  Temp:  [98 °F (36.7 °C)-99.1 °F (37.3 °C)] 98.3 °F (36.8 °C)  Heart Rate:  [] 73  Resp:  [16-20] 20  BP: (125-175)/(62-82) 147/71    Physical Exam:   General Appearance: alert, appears stated age and cooperative  Head: normocephalic, without obvious abnormality and atraumatic  Lungs: clear to auscultation, respirations regular, respirations even and respirations unlabored  Heart: regular rhythm & normal rate, normal S1, S2, no murmur, no gallop, no rub and no click  Extremities: dressing removed prineo intact. no active bleeding. Active rom foot and ankle light touch sensation intact.      Results Review:     I reviewed the patient's new clinical results.    Medication Review: yes    Assessment/Plan       Left displaced femoral neck fracture (CMS/HCC)      Plan: follow up with dr. jain in 2 weeks. Encourage mobilization. Continue dvt prophylaxis.     Plan for disposition:Where: [facility]    Jocelin June Vira, ANGELO  06/07/19  1:24 PM      Time: 15 mins     I agree with the above note as I saw patient myself.  She is doing well.  Sitting up in chair.  Very alert.  Family present.      Cont. PT/rehab, pulmonary toilet, pain control, and DVT prophylaxis.    Gerardo Jain M.D.

## 2019-06-07 NOTE — PROGRESS NOTES
Discharge Planning Assessment   [facility]     Patient Name: Abbi Mendez  MRN: [id number]  Today's Date: 6/7/2019    Admit Date: 6/4/2019      Discharge Plan     Row Name 06/07/19 1629       Plan    Plan SS received call from inpatient rehab at [facility] per Kassandra who states [facility] Medicare has denied admission. SS spoke to family and they request a swing bed consult. SS notified Dr. Melara. SS to follow.         Destination      Service Provider Request Status Selected Services Address Phone Number Fax Number     Iban Pastor [facility] [address] [place] [address] [phone] --     Annabel Archer

## 2019-06-08 LAB
ANION GAP SERPL CALCULATED.3IONS-SCNC: 10 MMOL/L
BASOPHILS # BLD AUTO: 0.02 10*3/MM3 (ref 0–0.2)
BASOPHILS NFR BLD AUTO: 0.2 % (ref 0–1.5)
BUN BLD-MCNC: 16 MG/DL (ref 8–23)
BUN/CREAT SERPL: 19.3 (ref 7–25)
CALCIUM SPEC-SCNC: 7.4 MG/DL (ref 8.6–10.5)
CHLORIDE SERPL-SCNC: 104 MMOL/L (ref 98–107)
CO2 SERPL-SCNC: 24 MMOL/L (ref 22–29)
CREAT BLD-MCNC: 0.83 MG/DL (ref 0.57–1)
DEPRECATED RDW RBC AUTO: 45.4 FL (ref 37–54)
EOSINOPHIL # BLD AUTO: 0.46 10*3/MM3 (ref 0–0.4)
EOSINOPHIL NFR BLD AUTO: 3.6 % (ref 0.3–6.2)
ERYTHROCYTE [DISTWIDTH] IN BLOOD BY AUTOMATED COUNT: 13 % (ref 12.3–15.4)
GFR SERPL CREATININE-BSD FRML MDRD: 66 ML/MIN/1.73
GLUCOSE BLD-MCNC: 104 MG/DL (ref 65–99)
HCT VFR BLD AUTO: 33.6 % (ref 34–46.6)
HGB BLD-MCNC: 10.6 G/DL (ref 12–15.9)
IMM GRANULOCYTES # BLD AUTO: 0.03 10*3/MM3 (ref 0–0.05)
IMM GRANULOCYTES NFR BLD AUTO: 0.2 % (ref 0–0.5)
LYMPHOCYTES # BLD AUTO: 1.56 10*3/MM3 (ref 0.7–3.1)
LYMPHOCYTES NFR BLD AUTO: 12.4 % (ref 19.6–45.3)
MAGNESIUM SERPL-MCNC: 1.5 MG/DL (ref 1.6–2.4)
MCH RBC QN AUTO: 30.3 PG (ref 26.6–33)
MCHC RBC AUTO-ENTMCNC: 31.5 G/DL (ref 31.5–35.7)
MCV RBC AUTO: 96 FL (ref 79–97)
MONOCYTES # BLD AUTO: 1.19 10*3/MM3 (ref 0.1–0.9)
MONOCYTES NFR BLD AUTO: 9.4 % (ref 5–12)
NEUTROPHILS # BLD AUTO: 9.35 10*3/MM3 (ref 1.7–7)
NEUTROPHILS NFR BLD AUTO: 74.2 % (ref 42.7–76)
PHOSPHATE SERPL-MCNC: 2.5 MG/DL (ref 2.5–4.5)
PLATELET # BLD AUTO: 361 10*3/MM3 (ref 140–450)
PMV BLD AUTO: 10.6 FL (ref 6–12)
POTASSIUM BLD-SCNC: 4.6 MMOL/L (ref 3.5–5.2)
RBC # BLD AUTO: 3.5 10*6/MM3 (ref 3.77–5.28)
SODIUM BLD-SCNC: 138 MMOL/L (ref 136–145)
WBC NRBC COR # BLD: 12.61 10*3/MM3 (ref 3.4–10.8)

## 2019-06-08 PROCEDURE — 97110 THERAPEUTIC EXERCISES: CPT | Performed by: PHYSICAL THERAPIST

## 2019-06-08 PROCEDURE — 80048 BASIC METABOLIC PNL TOTAL CA: CPT | Performed by: PHYSICIAN ASSISTANT

## 2019-06-08 PROCEDURE — 99232 SBSQ HOSP IP/OBS MODERATE 35: CPT | Performed by: PHYSICIAN ASSISTANT

## 2019-06-08 PROCEDURE — 85025 COMPLETE CBC W/AUTO DIFF WBC: CPT | Performed by: PHYSICIAN ASSISTANT

## 2019-06-08 PROCEDURE — 84100 ASSAY OF PHOSPHORUS: CPT | Performed by: PHYSICIAN ASSISTANT

## 2019-06-08 PROCEDURE — 83735 ASSAY OF MAGNESIUM: CPT | Performed by: PHYSICIAN ASSISTANT

## 2019-06-08 PROCEDURE — 97116 GAIT TRAINING THERAPY: CPT | Performed by: PHYSICAL THERAPIST

## 2019-06-08 PROCEDURE — 25010000002 MAGNESIUM SULFATE 2 GM/50ML SOLUTION: Performed by: ORTHOPAEDIC SURGERY

## 2019-06-08 RX ORDER — MAGNESIUM SULFATE HEPTAHYDRATE 40 MG/ML
2 INJECTION, SOLUTION INTRAVENOUS
Status: COMPLETED | OUTPATIENT
Start: 2019-06-08 | End: 2019-06-08

## 2019-06-08 RX ADMIN — MAGNESIUM SULFATE IN WATER 2 G: 40 INJECTION, SOLUTION INTRAVENOUS at 12:33

## 2019-06-08 RX ADMIN — ATORVASTATIN CALCIUM 40 MG: 40 TABLET, FILM COATED ORAL at 21:56

## 2019-06-08 RX ADMIN — CARVEDILOL 12.5 MG: 6.25 TABLET, FILM COATED ORAL at 17:03

## 2019-06-08 RX ADMIN — SODIUM CHLORIDE, PRESERVATIVE FREE 3 ML: 5 INJECTION INTRAVENOUS at 10:17

## 2019-06-08 RX ADMIN — MAGNESIUM SULFATE IN WATER 2 G: 40 INJECTION, SOLUTION INTRAVENOUS at 08:07

## 2019-06-08 RX ADMIN — BUSPIRONE HYDROCHLORIDE 10 MG: 10 TABLET ORAL at 21:56

## 2019-06-08 RX ADMIN — ACETAMINOPHEN 650 MG: 325 TABLET ORAL at 15:48

## 2019-06-08 RX ADMIN — APIXABAN 2.5 MG: 2.5 TABLET, FILM COATED ORAL at 21:56

## 2019-06-08 RX ADMIN — CARVEDILOL 12.5 MG: 6.25 TABLET, FILM COATED ORAL at 08:01

## 2019-06-08 RX ADMIN — MAGNESIUM SULFATE IN WATER 2 G: 40 INJECTION, SOLUTION INTRAVENOUS at 10:17

## 2019-06-08 RX ADMIN — APIXABAN 2.5 MG: 2.5 TABLET, FILM COATED ORAL at 08:01

## 2019-06-08 RX ADMIN — LOSARTAN POTASSIUM 50 MG: 50 TABLET, FILM COATED ORAL at 08:01

## 2019-06-08 RX ADMIN — BUSPIRONE HYDROCHLORIDE 10 MG: 10 TABLET ORAL at 08:01

## 2019-06-08 RX ADMIN — SODIUM CHLORIDE 50 ML/HR: 9 INJECTION, SOLUTION INTRAVENOUS at 05:31

## 2019-06-08 RX ADMIN — ACETAMINOPHEN 650 MG: 325 TABLET ORAL at 21:56

## 2019-06-08 RX ADMIN — OXYCODONE HYDROCHLORIDE AND ACETAMINOPHEN 1 TABLET: 5; 325 TABLET ORAL at 08:06

## 2019-06-08 RX ADMIN — PANTOPRAZOLE SODIUM 40 MG: 40 TABLET, DELAYED RELEASE ORAL at 05:31

## 2019-06-08 NOTE — PROGRESS NOTES
.                            [facility]  PROGRESS NOTE     Patient Identification:  Name:  Abbi Mendez  Age:  83 y.o.  Sex:  female  :  1935  MRN:  [id number]  Visit Number:  [id number]  Primary Care Provider:  Eric Driver MD    Length of stay:  3    ----------------------------------------------------------------------------------------------------------------------  Subjective     Chief Complaint:  Follow up for hip fracture, UTI    History of Presenting Illness:  Patient is an 83-year-old female with past medical history significant for paroxysmal atrial fibrillation, coronary artery disease status post stenting, essential hypertension, hyperlipidemia primary hyperparathyroidism, third-degree AV block status post permanent pacemaker implantation, stage III CKD, and advanced age was admitted on 2014 still with mechanical fall resulting in mild hip fracture.  Patient underwent left hip replacement: Tolerated procedure well.  Insurance denied inpatient rehab, pending possible swing bed admission.     Subjective:  Today, the patient was sitting up in bedside chair per my evaluation this morning. No new complaints or overnight events reported. Patient states pain is moderately controlled. Denies any paraesthesias, no numbness or tingling. She is working with PT, tolerating well. Yesterday she ambulated 50 ft in the AM and 60 ft in the PM with minimum assistance. She denies any chest pain or pressure, no dyspnea.  No cough.  Denies any fevers or chills.  Denies any abdominal pain, nausea, vomiting or diarrhea.  No urinary symptoms.    Present during exam: n/a  ----------------------------------------------------------------------------------------------------------------------  Objective     Consults:  · Orthopedic surgery     Procedures:  · 2019: Rowell catheter insertion -- Activity restriction due to hip fracture   · 2019: Left hip bipolar replacement --  Dr. Jain, orthopedic surgery   · 6/6/2019: Rowell catheter removal     Current Hospital Meds:    apixaban 2.5 mg Oral Q12H   atorvastatin 40 mg Oral Nightly   busPIRone 10 mg Oral BID   carvedilol 12.5 mg Oral BID With Meals   cholecalciferol 50,000 Units Oral Weekly   losartan 50 mg Oral Daily   magnesium sulfate 2 g Intravenous Q2H   pantoprazole 40 mg Oral QAM   sodium chloride 3 mL Intravenous Q12H        ----------------------------------------------------------------------------------------------------------------------  Vital Signs:  Temp:  [98.3 °F (36.8 °C)-98.9 °F (37.2 °C)] 98.6 °F (37 °C)  Heart Rate:  [70-86] 86  Resp:  [18-20] 18  BP: (138-167)/(66-84) 156/78    SpO2 Percentage    06/07/19 2359 06/08/19 0255 06/08/19 0644   SpO2: 95% 97% 98%     SpO2:  [95 %-98 %] 98 %  on   ;   Device (Oxygen Therapy): room air    Body mass index is 27.13 kg/m².  Wt Readings from Last 3 Encounters:   06/05/19 76.2 kg (168 lb 0.2 oz)   05/28/19 78 kg (172 lb)   02/01/19 75.8 kg (167 lb)        Intake/Output Summary (Last 24 hours) at 6/8/2019 1005  Last data filed at 6/8/2019 0900  Gross per 24 hour   Intake 1520 ml   Output 350 ml   Net 1170 ml     Diet Regular; Cardiac  ----------------------------------------------------------------------------------------------------------------------  Physical exam:  Physical Exam   Constitutional: She is oriented to person, place, and time. She appears well-developed and well-nourished.  Non-toxic appearance. No distress.   Pleasant, no acute distress   HENT:   Head: Normocephalic and atraumatic.   Right Ear: External ear normal.   Left Ear: External ear normal.   Nose: Nose normal.   Mouth/Throat: Oropharynx is clear and moist and mucous membranes are normal. No oropharyngeal exudate.   Eyes: Conjunctivae and EOM are normal. Pupils are equal, round, and reactive to light. No scleral icterus.   Neck: Trachea normal and normal range of motion. Neck supple. No JVD present. No  muscular tenderness present. Carotid bruit is not present. No tracheal deviation present. No thyromegaly present.   Cardiovascular: Normal rate, regular rhythm, normal heart sounds and intact distal pulses. Exam reveals no gallop and no friction rub.   No murmur heard.  Pulmonary/Chest: Effort normal and breath sounds normal. No respiratory distress. She has no wheezes. She has no rhonchi. She has no rales. She exhibits no tenderness.   Abdominal: Soft. Bowel sounds are normal. She exhibits no distension and no mass. There is no hepatosplenomegaly. There is no tenderness. There is no rebound and no guarding. No hernia.   Genitourinary:   Genitourinary Comments: No Rowell catheter in place, making urine   Musculoskeletal: She exhibits no edema or deformity.        Left hip: She exhibits tenderness and swelling.        Right lower leg: She exhibits no swelling.        Left lower leg: She exhibits no swelling.   Left hip incision, clean dry and intact.  Mild edema.  Mild tenderness to palpation.  Good range of motion.  Distal pulses intact.  Neurovascularly intact bilaterally.  Sensation intact.  Cap refill less than 3 seconds.  Intact bilaterally.     Vascular Status -  Her right foot exhibits normal foot vasculature  and no edema. Her left foot exhibits normal foot vasculature  and no edema.  Neurological: She is alert and oriented to person, place, and time. She has normal strength. No cranial nerve deficit or sensory deficit.   Skin: Skin is warm and dry. Capillary refill takes less than 2 seconds. No rash noted. No erythema. No pallor.   Psychiatric: She has a normal mood and affect. Her speech is normal and behavior is normal. Judgment and thought content normal.   Nursing note and vitals reviewed.     ----------------------------------------------------------------------------------------------------------------------  Tele:    Paced 70s-80s    I have personally reviewed the EKG/Telemetry strips    ----------------------------------------------------------------------------------------------------------------------            Results from last 7 days   Lab Units 06/08/19 0604 06/07/19 0817 06/06/19 0415 06/04/19  2305   WBC 10*3/mm3 12.61* 15.11* 14.64*   < > 17.80*   HEMOGLOBIN g/dL 10.6* 10.7* 10.5*   < > 11.7*   HEMATOCRIT % 33.6* 35.3 33.6*   < > 36.5   MCV fL 96.0 98.3* 97.1*   < > 94.1   MCHC g/dL 31.5 30.3* 31.3*   < > 32.1   PLATELETS 10*3/mm3 361 358 324   < > 354   INR   --   --   --   --  1.19*    < > = values in this interval not displayed.     Results from last 7 days   Lab Units 06/08/19 0604 06/07/19 2155 06/07/19 0817 06/06/19  0802 06/06/19  0415  06/05/19  0508 06/05/19  0408 06/04/19  2305   SODIUM mmol/L 138  --  136  --  140  --   --  140 139   POTASSIUM mmol/L 4.6 4.4 3.6  --  4.0   < >  --  3.1* 3.2*   MAGNESIUM mg/dL 1.5*  --   --  2.5*  --   --  1.4*  --   --    CHLORIDE mmol/L 104  --  103  --  104  --   --  100 98   CO2 mmol/L 24.0  --  20.5*  --  23.3  --   --  24.6 25.4   BUN mg/dL 16  --  23  --  25*  --   --  16 17   CREATININE mg/dL 0.83  --  1.03*  --  1.26*  --   --  1.06* 1.05*   EGFR IF NONAFRICN AM mL/min/1.73 66  --  51*  --  41*  --   --  50* 50*   CALCIUM mg/dL 7.4*  --  7.4*  --  7.4*  --   --  7.9* 8.2*   GLUCOSE mg/dL 104*  --  144*  --  201*  --   --  130* 155*   ALBUMIN g/dL  --   --   --   --   --   --   --  3.16* 3.17*   BILIRUBIN mg/dL  --   --   --   --   --   --   --  0.5 0.5   ALK PHOS U/L  --   --   --   --   --   --   --  74 75   AST (SGOT) U/L  --   --   --   --   --   --   --  22 28   ALT (SGPT) U/L  --   --   --   --   --   --   --  18 20    < > = values in this interval not displayed.   Estimated Creatinine Clearance: 53.6 mL/min (by C-G formula based on SCr of 0.83 mg/dL).    I have personally reviewed the above laboratory results.    ----------------------------------------------------------------------------------------------------------------------  Imaging Results (last 24 hours)     Procedure Component Value Units Date/Time    XR Pelvis 1 or 2 View [[id number]] Collected:  06/06/19 0711     Updated:  06/06/19 0713    Narrative:       FINDINGS:       BONES: No acute fracture. No blastic or lytic lesions.       JOINT: TOTAL LEFT HIP ARTHROPLASTY.       SOFT TISSUES:  No soft tissue mass.    Impression:       No acute or destructive bony abnormality.     This report was finalized on 6/6/2019 7:11 AM by Dr. Maxime Treviño MD.      I have personally reviewed the above radiology results.   ----------------------------------------------------------------------------------------------------------------------  Assessment/Plan     · Acute, traumatic, closed left hip fracture status post mechanical fall: Pt s/p left hip replacement per ortho surgery. Elizabeth procedure well. Cont post op orders and recommendations per ortho, assistance is appreciated. PT, tolerating well. PRN pain meds with holding parameters. Pending possible swing bed admission.   · Leukocytosis: Stable. Likely stress-induced post fracture/operation.  Patient afebrile, no active signs or symptoms of infection.  Monitor closely with antibiotic therapy.  Repeat CBC in a.m.  · Urinary tract infection: Patient has been on IV Rocephin empirically.  Urine culture finalized with mixed markus, less than 25,000 CFU per mL.  Will discontinue Rocephin.  Patient afebrile.  Denies any urinary symptoms.  Rowell catheter has been removed previously.    · Acute hypokalemia: Resolved with supplementation. Continue to replace per protocol as necessary.  Repeat chemistry panel in the morning.  · Hypomagnesemia: Continue to replace per protocol.  Repeat magnesium level in the morning.    · Hypophosphatemia: Resolved with supplementation. Replace per protocol.  Repeat phosphorus level in the morning.  · History  of coronary artery disease status post stenting in the past: No chest pain or symptoms of ACS.  Closely monitor on telemetry. Continue medical therapy.   · Paroxysmal atrial fibrillation: Cont Coreg for rate control, she remains rate controlled. Cont telemetry monitoring. Eliquis for anticoagulation.   · Essential hypertension: BP moderately controlled. DC IV fluids. Cont Coreg and losartan with holding parameters. Closely monitor and titrate medications as necessary. Pt on half dose of her home losartan, consider increasing to home dose in AM if BP remains above goal.    · Hyperlipidemia: Continue statin therapy.   · History of primary hyperparathyroidism: Monitor. Calcium low but stable.   · History of third-degree AV block status post permanent pacemaker implantation: telemetry monitoring.   · Hyperglycemia: A1C 5.70. Monitor with daily chemistry, no indication for SSI at this time.   · Stage III CKD: Appears stable.  Avoid nephrotoxins. Monitor closely, repeat chem panel in AM.   · Vitamin D deficiency: Cholecalciferol supplementation.   · Mild hypoalbuminemia: Likely multifactorial. Encourage PO intake. Monitor closely.   · Anxiety: Continue Buspar.   · Microscopic hematuria/proteinuria: Protein/creatinine ratio elevated, likely hypertensive nephropathy. Closely monitor, recommend outpatient follow up once stabilized from surgery standpoint. Treat HTN as previously mentioned.   · Macrocytic anemia: Folate and vitamin B12 levels pending.  Supplemented patient found to be deficient.  Hemoglobin stable.  Likely ABLA postoperatively.  Closely monitor H&H, transfuse should hemoglobin drop below 7.0.  Repeat CBC in a.m.  · Vitamin D deficiency: Cholecalciferol 50,000 units weekly x8 weeks.  · F/E/N: Gentle IV fluids. Replace electrolytes per protocol as necessary. Cardiac diet.     --------------------------------------------------  DVT Prophylaxis:  Eliquis to serve   GI Prophylaxis: Protonix   Activity: up with  assistance    The patient is high risk due to the following diagnoses/reasons:  Hip fracture, advanced age, CAD, UTI, electrolyte abnormalities, afib, anticoagulation    I have discussed the patient's assessment and plan with the patient and nursing staff.     Disposition: Patient that she would rather go home with home health on discharge with physical therapy.  However her daughter previously mentioned she may benefit from prolonged therapy inpatient as she lives at home alone. Insurance denied inpatient rehab. Swing bed consult placed, pending.    Alize Parsons PA-C  Hospitalist Service -- [facility]   Pager: [phone]    06/08/19  10:05 AM    Attending Physician: Tristan Melara MD    ----------------------------------------------------------------------------------------------------------------------

## 2019-06-08 NOTE — THERAPY TREATMENT NOTE
Acute Care - Physical Therapy Treatment Note   [facility]     Patient Name: Abbi Mendez  : 1935  MRN: [id number]  Today's Date: 2019  Onset of Illness/Injury or Date of Surgery: 19  Date of Referral to PT: 19  Referring Physician: Dr. Jain    Admit Date: 2019    Visit Dx:    ICD-10-CM ICD-9-CM   1. Left displaced femoral neck fracture (CMS/HCC) S72.002A 820.8   2. Strain of right shoulder, initial encounter S46.911A 840.9   3. Hypokalemia E87.6 276.8     Patient Active Problem List   Diagnosis   • Atrial fibrillation (CMS/HCC)   • Coronary artery disease involving native coronary artery of native heart without angina pectoris   • RADHA (obstructive sleep apnea)   • Essential hypertension   • Hyperlipidemia LDL goal <70   • Iron deficiency anemia   • Left bundle branch block   • Syncope and collapse   • Severe sinus bradycardia   • Third degree AV block (CMS/HCC)   • Syncope   • Compression deformity of vertebra   • Radiculopathy   • Pericardial effusion without cardiac tamponade   • Hypercalcemia   • Abnormal serum protein electrophoresis   • Weight loss, unintentional   • Primary hyperparathyroidism (CMS/HCC)   • Hypercalcemia   • Pacemaker   • Hiatal hernia   • Elevated serum immunoglobulin free light chains   • Left displaced femoral neck fracture (CMS/HCC)       Therapy Treatment    Rehabilitation Treatment Summary     Row Name 19 1000             Treatment Time/Intention    Discipline  physical therapist  -AD      Document Type  therapy note (daily note)  -AD      Subjective Information  complains of;weakness;fatigue;pain  -AD      Mode of Treatment  physical therapy  -AD      Patient/Family Observations  Pt supine in bed with nsg present and left IV. She was alert and agreeable to therapy.  -AD      Patient Effort  good  -AD      Comment  No family or visitors present.  -AD      Patient Response to Treatment  Pt tolerated today's session well, ambulating 20' with RW and  minimal assistance. Bed mobility and sit<>stand transfers were performed with min assist.  -AD      Recorded by [AD] Dalton, Ashley Claudene, PT 06/08/19 1009      Row Name 06/08/19 1000             Cognitive Assessment/Intervention- PT/OT    Orientation Status (Cognition)  oriented x 3  -AD      Follows Commands (Cognition)  WFL;follows one step commands;50-74% accuracy  -AD      Recorded by [AD] Dalton, Ashley Claudene, PT 06/08/19 1009      Row Name 06/08/19 1000             Bed Mobility Assessment/Treatment    Bed Mobility Assessment/Treatment  scooting/bridging;supine-sit  -AD      Scooting/Bridging [place] (Bed Mobility)  verbal cues;nonverbal cues (demo/gesture);minimum assist (75% patient effort)  -AD      Supine-Sit [place] (Bed Mobility)  verbal cues;nonverbal cues (demo/gesture);minimum assist (75% patient effort)  -AD      Bed Mobility, Safety Issues  decreased use of legs for bridging/pushing  -AD      Assistive Device (Bed Mobility)  bed rails  -AD      Recorded by [AD] Dalton, Ashley Claudene, PT 06/08/19 1009      Row Name 06/08/19 1000             Sit-Stand Transfer    Sit-Stand [place] (Transfers)  verbal cues;nonverbal cues (demo/gesture);minimum assist (75% patient effort)  -AD      Assistive Device (Sit-Stand Transfers)  walker, front-wheeled  -AD      Recorded by [AD] Dalton, Ashley Claudene, PT 06/08/19 1009      Row Name 06/08/19 1000             Stand-Sit Transfer    Stand-Sit [place] (Transfers)  verbal cues;nonverbal cues (demo/gesture);minimum assist (75% patient effort)  -AD      Assistive Device (Stand-Sit Transfers)  walker, front-wheeled  -AD      Recorded by [AD] Dalton, Ashley Claudene, PT 06/08/19 1009      Row Name 06/08/19 1000             Gait/Stairs Assessment/Training    [place] Level (Gait)  verbal cues;nonverbal cues (demo/gesture);minimum assist (75% patient effort)  -AD      Assistive Device (Gait)  walker, front-wheeled  -AD      Distance in Feet  (Gait)  20 Pt report's increased pain, just received medication.  -AD      Pattern (Gait)  step-to  -AD      Deviations/Abnormal Patterns (Gait)  antalgic;stride length decreased  -AD      Bilateral Gait Deviations  forward flexed posture;weight shift ability decreased  -AD      Recorded by [AD] Dalton, Ashley Claudene, PT 06/08/19 1009      Row Name 06/08/19 1000             Therapeutic Exercise    Therapeutic Exercise  seated, lower extremities  -AD      Recorded by [AD] Dalton, Ashley Claudene, PT 06/08/19 1009      Row Name 06/08/19 1000             Lower Extremity Seated Therapeutic Exercise    Performed, Seated Lower Extremity (Therapeutic Exercise)  hip flexion/extension;ankle dorsiflexion/plantarflexion;LAQ (long arc quad), knee extension  -AD      Exercise Type, Seated Lower Extremity (Therapeutic Exercise)  AAROM (active assistive range of motion);AROM (active range of motion)  -AD      Transfers Skills, Training to Functional Activity, Seated Lower Extremity (Therapeutic Exercise)  transfers skills to functional activity most of the time  -AD      Recorded by [AD] Dalton, Ashley Claudene, PT 06/08/19 1009      Row Name 06/08/19 1000             Positioning and Restraints    Pre-Treatment Position  in bed Nsg present  -AD      In Chair  sitting;call light within reach;with nsg Pillow under left heel, per pt request.  -AD      Recorded by [AD] Dalton, Ashley Claudene, PT 06/08/19 1009      Row Name 06/08/19 1000             Pain Scale: Numbers Pre/Post-Treatment    Pain Location - Side  Left  -AD      Pain Location  hip  -AD      Pain Intervention(s)  Repositioned;Rest Nsg provided medication immediately prior to session.  -AD      Recorded by [AD] Dalton, Ashley Claudene, PT 06/08/19 1009      Row Name 06/08/19 1000             Pain Scale: FACES Pre/Post-Treatment    Pain: FACES Scale, Pretreatment  4-->hurts little more  -AD      Pain: FACES Scale, Post-Treatment  4-->hurts little more  -AD      Recorded  by [AD] Dalton, Ashley Claudene, PT 06/08/19 1009      Row Name                Wound 06/05/19 1838 Left hip incision    Wound - Properties Group Date first assessed: 06/05/19 [KH] Time first assessed: 1838 [KH] Side: Left [KH] Location: hip [KH] Type: incision [KH] Recorded by:  [KH] Ary Sylvester RN 06/05/19 1838    Row Name 06/08/19 1000             Plan of Care Review    Plan of Care Reviewed With  patient  -AD      Recorded by [AD] Dalton, Ashley Claudene, PT 06/08/19 1009      Row Name 06/08/19 1000             Outcome Summary/Treatment Plan (PT)    Daily Summary of Progress (PT)  progress towards functional goals is fair  -AD      Recorded by [AD] Dalton, Ashley Claudene, PT 06/08/19 1009        User Key  (r) = Recorded By, (t) = Taken By, (c) = Cosigned By    Initials Name Effective Dates Discipline    AD Dalton, Ashley Claudene, PT 04/03/18 -  PT    Ary Coyne RN 06/16/16 -  Nurse          Wound 06/05/19 1838 Left hip incision (Active)   Dressing Appearance dry;intact 6/7/2019  9:03 PM           Physical Therapy Education     Title: PT OT SLP Therapies (Done)     Topic: Physical Therapy (Done)     Point: Mobility training (Done)     Learning Progress Summary           Patient Acceptance, E,TB, VU by AD at 6/8/2019 10:09 AM    Acceptance, E, VU,NR by LL at 6/7/2019  4:05 PM    Acceptance, E,TB, VU by TT at 6/7/2019 12:26 AM    Acceptance, H, VU,NR by AG at 6/6/2019  1:28 PM    Comment:  Reviewed written JANET precautions    Acceptance, E,D, VU,NR by AG at 6/6/2019  1:13 PM    Comment:  PT reviewed and instructed in HEP, JANET precautions.   Family Acceptance, H, VU,NR by AG at 6/6/2019  1:28 PM    Comment:  Reviewed written JANET precautions                   Point: Home exercise program (Done)     Learning Progress Summary           Patient Acceptance, E,TB, VU by AD at 6/8/2019 10:09 AM    Acceptance, E, VU,NR by LL at 6/7/2019  4:05 PM    Acceptance, E,TB, VU by TT at 6/7/2019 12:26 AM     Acceptance, H, VU,NR by AG at 6/6/2019  1:28 PM    Comment:  Reviewed written JANET precautions    Acceptance, E,D, VU,NR by AG at 6/6/2019  1:13 PM    Comment:  PT reviewed and instructed in HEP, JANET precautions.   Family Acceptance, H, VU,NR by AG at 6/6/2019  1:28 PM    Comment:  Reviewed written JANET precautions                   Point: Body mechanics (Done)     Learning Progress Summary           Patient Acceptance, E,TB, VU by AD at 6/8/2019 10:09 AM    Acceptance, E, VU,NR by LL at 6/7/2019  4:05 PM    Acceptance, E,TB, VU by TT at 6/7/2019 12:26 AM    Acceptance, H, VU,NR by AG at 6/6/2019  1:28 PM    Comment:  Reviewed written JANET precautions    Acceptance, E,D, VU,NR by AG at 6/6/2019  1:13 PM    Comment:  PT reviewed and instructed in HEP, JANET precautions.   Family Acceptance, H, VU,NR by AG at 6/6/2019  1:28 PM    Comment:  Reviewed written JANET precautions                   Point: Precautions (Done)     Learning Progress Summary           Patient Acceptance, E,TB, VU by AD at 6/8/2019 10:09 AM    Acceptance, E, VU,NR by LL at 6/7/2019  4:05 PM    Acceptance, E,TB, VU by TT at 6/7/2019 12:26 AM    Acceptance, H, VU,NR by AG at 6/6/2019  1:28 PM    Comment:  Reviewed written JANET precautions    Acceptance, E,D, VU,NR by AG at 6/6/2019  1:13 PM    Comment:  PT reviewed and instructed in HEP, JANET precautions.   Family Acceptance, H, VU,NR by AG at 6/6/2019  1:28 PM    Comment:  Reviewed written JANET precautions                               User Key     Initials Effective Dates Name Provider Type Discipline    AD 04/03/18 -  Dalton, Ashley Claudene, PT Physical Therapist PT    TT 06/08/18 -  Frida Nugent, RN Registered Nurse Nurse     04/03/18 -  Sandy Covington, PT Physical Therapist PT    LL 05/02/16 -  Loyda Vega PTA Physical Therapy Assistant PT                PT Recommendation and Plan     Outcome Summary/Treatment Plan (PT)  Daily Summary of Progress (PT): progress towards functional goals is  fair  Plan of Care Reviewed With: patient  Progress: improving  Outcome Summary: Pt tolerated session well, including gait training and therapeutic exercises. She will be progressed as tolerated.  Outcome Measures     Row Name 06/08/19 1000 06/07/19 1600 06/06/19 1329       How much help from another person do you currently need...    Turning from your back to your side while in flat bed without using bedrails?  3  -AD  3  -LL  3  -AG    Moving from lying on back to sitting on the side of a flat bed without bedrails?  3  -AD  3  -LL  3  -AG    Moving to and from a bed to a chair (including a wheelchair)?  3  -AD  3  -LL  3  -AG    Standing up from a chair using your arms (e.g., wheelchair, bedside chair)?  3  -AD  3  -LL  3  -AG    Climbing 3-5 steps with a railing?  2  -AD  2  -LL  2  -AG    To walk in hospital room?  3  -AD  3  -LL  3  -AG    AM-PAC 6 Clicks Score  17  -AD  17  -LL  17  -AG       Functional Assessment    Outcome Measure Options  AM-PAC 6 Clicks Basic Mobility (PT)  -AD  AM-PAC 6 Clicks Basic Mobility (PT);Other Outcome Measure  -LL  AM-PAC 6 Clicks Basic Mobility (PT);Other Outcome Measure  -AG    Row Name 06/06/19 1300             How much help from another is currently needed...    Putting on and taking off regular lower body clothing?  1  -BF      Bathing (including washing, rinsing, and drying)  2  -BF      Toileting (which includes using toilet bed pan or urinal)  1  -BF      Putting on and taking off regular upper body clothing  2  -BF      Taking care of personal grooming (such as brushing teeth)  2  -BF      Eating meals  3  -BF      Score  11  -BF         Functional Assessment    Outcome Measure Options  AM-PAC 6 Clicks Daily Activity (OT)  -BF        User Key  (r) = Recorded By, (t) = Taken By, (c) = Cosigned By    Initials Name Provider Type    AD Dalton, Ashley Claudene, PT Physical Therapist    BF Katia Enriquez, OT Occupational Therapist    AG Sandy Covington, PT Physical  Therapist    Loyda Kraus PTA Physical Therapy Assistant         Time Calculation:   PT Charges     Row Name 06/08/19 1010             Time Calculation    Start Time  -- 25 minutes  -AD      PT Received On  06/08/19  -AD      PT - Next Appointment  06/10/19  -AD      PT Goal Re-Cert Due Date  06/20/19  -AD        User Key  (r) = Recorded By, (t) = Taken By, (c) = Cosigned By    Initials Name Provider Type    AD Dalton, Ashley Claudene, PT Physical Therapist        Therapy Charges for Today     Code Description Service Date Service Provider Modifiers Qty    24499306312 HC PT THER PROC EA 15 MIN 6/8/2019 Dalton, Ashley Claudene, PT GP 1    91506432461 HC PT THER SUPP EA 15 MIN 6/8/2019 Dalton, Ashley Claudene, PT GP 1    78907666747 HC GAIT TRAINING EA 15 MIN 6/8/2019 Dalton, Ashley Claudene, PT GP 1          PT G-Codes  Outcome Measure Options: AM-PAC 6 Clicks Basic Mobility (PT)  AM-PAC 6 Clicks Score: 17  Score: 11    Ashley Claudene Dalton, KEYSHAWN  6/8/2019

## 2019-06-08 NOTE — PLAN OF CARE
Problem: Patient Care Overview  Goal: Plan of Care Review  Outcome: Ongoing (interventions implemented as appropriate)   06/05/19 2244 06/07/19 2103   Coping/Psychosocial   Plan of Care Reviewed With --  patient   Plan of Care Review   Progress no change --      Goal: Individualization and Mutuality  Outcome: Ongoing (interventions implemented as appropriate)    Goal: Discharge Needs Assessment  Outcome: Ongoing (interventions implemented as appropriate)    Goal: Interprofessional Rounds/Family Conf  Outcome: Ongoing (interventions implemented as appropriate)      Problem: Fall Risk (Adult)  Goal: Identify Related Risk Factors and Signs and Symptoms  Outcome: Ongoing (interventions implemented as appropriate)   06/05/19 0441   Fall Risk (Adult)   Related Risk Factors (Fall Risk) age-related changes;history of falls;gait/mobility problems;slippery/uneven surfaces;environment unfamiliar   Signs and Symptoms (Fall Risk) presence of risk factors     Goal: Absence of Fall  Outcome: Ongoing (interventions implemented as appropriate)      Problem: Skin Injury Risk (Adult)  Goal: Identify Related Risk Factors and Signs and Symptoms  Outcome: Ongoing (interventions implemented as appropriate)   06/05/19 2244   Skin Injury Risk (Adult)   Related Risk Factors (Skin Injury Risk) advanced age;body weight extremes;cognitive impairment;mechanical forces;medical devices;medication;mobility impaired;moisture;tissue perfusion altered     Goal: Skin Health and Integrity  Outcome: Ongoing (interventions implemented as appropriate)      Problem: Fracture Orthopaedic (Adult)  Goal: Signs and Symptoms of Listed Potential Problems Will be Absent, Minimized or Managed (Fracture Orthopaedic)  Outcome: Ongoing (interventions implemented as appropriate)   06/05/19 2244   Goal/Outcome Evaluation   Problems Assessed (Orthopaedic Fracture) all   Problems Present (Orthopaedic Fracture) neurovascular impairment/injury;pain;situational response;skin  integrity impairment       Problem: Pain, Acute (Adult)  Goal: Identify Related Risk Factors and Signs and Symptoms  Outcome: Ongoing (interventions implemented as appropriate)   06/05/19 0441   Pain, Acute (Adult)   Related Risk Factors (Acute Pain) persistent pain;infection   Signs and Symptoms (Acute Pain) BADLs/IADLs reluctance/inability to perform;facial mask of pain/grimace;fatigue/weakness;guarding/abnormal posturing/positioning;verbalization of pain descriptors;questions meaning of pain     Goal: Acceptable Pain Control/Comfort Level  Outcome: Ongoing (interventions implemented as appropriate)      Problem: Self-Care Deficit (Adult,Obstetrics,Pediatric)  Goal: Improved Ability to Perform BADL and IADL  Outcome: Ongoing (interventions implemented as appropriate)   06/07/19 6558   Self-Care Deficit (Adult,Obstetrics,Pediatric)   Improved Ability to Perform BADL and IADL making progress toward outcome

## 2019-06-08 NOTE — PLAN OF CARE
Problem: Patient Care Overview  Goal: Plan of Care Review  Outcome: Ongoing (interventions implemented as appropriate)   06/08/19 1009   Coping/Psychosocial   Plan of Care Reviewed With patient   Plan of Care Review   Progress improving   OTHER   Outcome Summary Pt tolerated session well, including gait training and therapeutic exercises. She will be progressed as tolerated.

## 2019-06-08 NOTE — PLAN OF CARE
Problem: Patient Care Overview  Goal: Plan of Care Review  Outcome: Ongoing (interventions implemented as appropriate)   06/08/19 1043   Coping/Psychosocial   Plan of Care Reviewed With patient   Plan of Care Review   Progress improving       Problem: Fall Risk (Adult)  Goal: Identify Related Risk Factors and Signs and Symptoms  Outcome: Ongoing (interventions implemented as appropriate)   06/08/19 1043   Fall Risk (Adult)   Related Risk Factors (Fall Risk) age-related changes;gait/mobility problems;history of falls;slippery/uneven surfaces;environment unfamiliar   Signs and Symptoms (Fall Risk) presence of risk factors     Goal: Absence of Fall  Outcome: Ongoing (interventions implemented as appropriate)   06/08/19 1043   Fall Risk (Adult)   Absence of Fall making progress toward outcome       Problem: Skin Injury Risk (Adult)  Goal: Identify Related Risk Factors and Signs and Symptoms  Outcome: Ongoing (interventions implemented as appropriate)   06/08/19 1043   Skin Injury Risk (Adult)   Related Risk Factors (Skin Injury Risk) advanced age;hospitalization prolonged;mobility impaired     Goal: Skin Health and Integrity  Outcome: Ongoing (interventions implemented as appropriate)   06/08/19 1043   Skin Injury Risk (Adult)   Skin Health and Integrity making progress toward outcome       Problem: Fracture Orthopaedic (Adult)  Goal: Signs and Symptoms of Listed Potential Problems Will be Absent, Minimized or Managed (Fracture Orthopaedic)  Outcome: Ongoing (interventions implemented as appropriate)   06/08/19 1043   Goal/Outcome Evaluation   Problems Assessed (Orthopaedic Fracture) all;pain   Problems Present (Orthopaedic Fracture) pain;situational response       Problem: Pain, Acute (Adult)  Goal: Identify Related Risk Factors and Signs and Symptoms  Outcome: Ongoing (interventions implemented as appropriate)   06/08/19 1043   Pain, Acute (Adult)   Related Risk Factors (Acute Pain) infection;persistent pain;surgery    Signs and Symptoms (Acute Pain) fatigue/weakness;verbalization of pain descriptors     Goal: Acceptable Pain Control/Comfort Level  Outcome: Ongoing (interventions implemented as appropriate)   06/08/19 1043   Pain, Acute (Adult)   Acceptable Pain Control/Comfort Level making progress toward outcome       Problem: Self-Care Deficit (Adult,Obstetrics,Pediatric)  Goal: Improved Ability to Perform BADL and IADL  Outcome: Ongoing (interventions implemented as appropriate)   06/08/19 1043   Self-Care Deficit (Adult,Obstetrics,Pediatric)   Improved Ability to Perform BADL and IADL making progress toward outcome

## 2019-06-09 LAB
ANION GAP SERPL CALCULATED.3IONS-SCNC: 11.3 MMOL/L
BASOPHILS # BLD AUTO: 0.02 10*3/MM3 (ref 0–0.2)
BASOPHILS NFR BLD AUTO: 0.2 % (ref 0–1.5)
BUN BLD-MCNC: 17 MG/DL (ref 8–23)
BUN/CREAT SERPL: 18.7 (ref 7–25)
CALCIUM SPEC-SCNC: 7.6 MG/DL (ref 8.6–10.5)
CHLORIDE SERPL-SCNC: 103 MMOL/L (ref 98–107)
CO2 SERPL-SCNC: 23.7 MMOL/L (ref 22–29)
CREAT BLD-MCNC: 0.91 MG/DL (ref 0.57–1)
DEPRECATED RDW RBC AUTO: 44 FL (ref 37–54)
EOSINOPHIL # BLD AUTO: 0.44 10*3/MM3 (ref 0–0.4)
EOSINOPHIL NFR BLD AUTO: 3.7 % (ref 0.3–6.2)
ERYTHROCYTE [DISTWIDTH] IN BLOOD BY AUTOMATED COUNT: 12.9 % (ref 12.3–15.4)
GFR SERPL CREATININE-BSD FRML MDRD: 59 ML/MIN/1.73
GLUCOSE BLD-MCNC: 108 MG/DL (ref 65–99)
HCT VFR BLD AUTO: 32.7 % (ref 34–46.6)
HGB BLD-MCNC: 10.5 G/DL (ref 12–15.9)
IMM GRANULOCYTES # BLD AUTO: 0.04 10*3/MM3 (ref 0–0.05)
IMM GRANULOCYTES NFR BLD AUTO: 0.3 % (ref 0–0.5)
LYMPHOCYTES # BLD AUTO: 1.8 10*3/MM3 (ref 0.7–3.1)
LYMPHOCYTES NFR BLD AUTO: 15.1 % (ref 19.6–45.3)
MAGNESIUM SERPL-MCNC: 2.1 MG/DL (ref 1.6–2.4)
MCH RBC QN AUTO: 30.3 PG (ref 26.6–33)
MCHC RBC AUTO-ENTMCNC: 32.1 G/DL (ref 31.5–35.7)
MCV RBC AUTO: 94.5 FL (ref 79–97)
MONOCYTES # BLD AUTO: 1.03 10*3/MM3 (ref 0.1–0.9)
MONOCYTES NFR BLD AUTO: 8.6 % (ref 5–12)
NEUTROPHILS # BLD AUTO: 8.58 10*3/MM3 (ref 1.7–7)
NEUTROPHILS NFR BLD AUTO: 72.1 % (ref 42.7–76)
NRBC BLD AUTO-RTO: 0 /100 WBC (ref 0–0.2)
PLATELET # BLD AUTO: 380 10*3/MM3 (ref 140–450)
PMV BLD AUTO: 10.7 FL (ref 6–12)
POTASSIUM BLD-SCNC: 3.7 MMOL/L (ref 3.5–5.2)
RBC # BLD AUTO: 3.46 10*6/MM3 (ref 3.77–5.28)
SODIUM BLD-SCNC: 138 MMOL/L (ref 136–145)
WBC NRBC COR # BLD: 11.91 10*3/MM3 (ref 3.4–10.8)

## 2019-06-09 PROCEDURE — 99233 SBSQ HOSP IP/OBS HIGH 50: CPT | Performed by: PHYSICIAN ASSISTANT

## 2019-06-09 PROCEDURE — 85025 COMPLETE CBC W/AUTO DIFF WBC: CPT | Performed by: PHYSICIAN ASSISTANT

## 2019-06-09 PROCEDURE — 94799 UNLISTED PULMONARY SVC/PX: CPT

## 2019-06-09 PROCEDURE — 80048 BASIC METABOLIC PNL TOTAL CA: CPT | Performed by: PHYSICIAN ASSISTANT

## 2019-06-09 PROCEDURE — 83735 ASSAY OF MAGNESIUM: CPT | Performed by: PHYSICIAN ASSISTANT

## 2019-06-09 RX ORDER — LOSARTAN POTASSIUM 50 MG/1
100 TABLET ORAL DAILY
Status: DISCONTINUED | OUTPATIENT
Start: 2019-06-09 | End: 2019-06-13

## 2019-06-09 RX ADMIN — ACETAMINOPHEN 650 MG: 325 TABLET ORAL at 16:22

## 2019-06-09 RX ADMIN — ACETAMINOPHEN 650 MG: 325 TABLET ORAL at 10:28

## 2019-06-09 RX ADMIN — ATORVASTATIN CALCIUM 40 MG: 40 TABLET, FILM COATED ORAL at 23:59

## 2019-06-09 RX ADMIN — BUSPIRONE HYDROCHLORIDE 10 MG: 10 TABLET ORAL at 08:35

## 2019-06-09 RX ADMIN — PANTOPRAZOLE SODIUM 40 MG: 40 TABLET, DELAYED RELEASE ORAL at 08:34

## 2019-06-09 RX ADMIN — SODIUM CHLORIDE, PRESERVATIVE FREE 3 ML: 5 INJECTION INTRAVENOUS at 08:36

## 2019-06-09 RX ADMIN — CARVEDILOL 12.5 MG: 6.25 TABLET, FILM COATED ORAL at 17:07

## 2019-06-09 RX ADMIN — APIXABAN 2.5 MG: 2.5 TABLET, FILM COATED ORAL at 08:35

## 2019-06-09 RX ADMIN — CARVEDILOL 12.5 MG: 6.25 TABLET, FILM COATED ORAL at 08:35

## 2019-06-09 RX ADMIN — LOSARTAN POTASSIUM 100 MG: 50 TABLET, FILM COATED ORAL at 09:49

## 2019-06-09 RX ADMIN — OXYCODONE HYDROCHLORIDE AND ACETAMINOPHEN 1 TABLET: 5; 325 TABLET ORAL at 20:53

## 2019-06-09 NOTE — PLAN OF CARE
Problem: Patient Care Overview  Goal: Plan of Care Review  Outcome: Ongoing (interventions implemented as appropriate)   06/09/19 0129   Coping/Psychosocial   Plan of Care Reviewed With patient   Plan of Care Review   Progress improving       Problem: Fall Risk (Adult)  Goal: Absence of Fall  Outcome: Ongoing (interventions implemented as appropriate)   06/09/19 0129   Fall Risk (Adult)   Absence of Fall making progress toward outcome       Problem: Skin Injury Risk (Adult)  Goal: Skin Health and Integrity  Outcome: Ongoing (interventions implemented as appropriate)   06/09/19 0129   Skin Injury Risk (Adult)   Skin Health and Integrity making progress toward outcome       Problem: Fracture Orthopaedic (Adult)  Goal: Signs and Symptoms of Listed Potential Problems Will be Absent, Minimized or Managed (Fracture Orthopaedic)  Outcome: Ongoing (interventions implemented as appropriate)   06/09/19 0129   Goal/Outcome Evaluation   Problems Assessed (Orthopaedic Fracture) all   Problems Present (Orthopaedic Fracture) none       Problem: Pain, Acute (Adult)  Goal: Acceptable Pain Control/Comfort Level  Outcome: Ongoing (interventions implemented as appropriate)   06/09/19 0129   Pain, Acute (Adult)   Acceptable Pain Control/Comfort Level making progress toward outcome       Problem: Self-Care Deficit (Adult,Obstetrics,Pediatric)  Goal: Improved Ability to Perform BADL and IADL  Outcome: Ongoing (interventions implemented as appropriate)   06/09/19 0129   Self-Care Deficit (Adult,Obstetrics,Pediatric)   Improved Ability to Perform BADL and IADL making progress toward outcome

## 2019-06-09 NOTE — PROGRESS NOTES
.                            [facility] Medicine Services  PROGRESS NOTE     Patient Identification:  Name:  Abbi Mendez  Age:  83 y.o.  Sex:  female  :  1935  MRN:  [id number]  Visit Number:  [id number]  Primary Care Provider:  Eric Driver MD    Length of stay:  4    ----------------------------------------------------------------------------------------------------------------------  Subjective     Chief Complaint:  Follow up for hip fracture, UTI    History of Presenting Illness:  Patient is an 83-year-old female with past medical history significant for paroxysmal atrial fibrillation, coronary artery disease status post stenting, essential hypertension, hyperlipidemia primary hyperparathyroidism, third-degree AV block status post permanent pacemaker implantation, stage III CKD, and advanced age was admitted on 2014 still with mechanical fall resulting in mild hip fracture.  Patient underwent left hip replacement: Tolerated procedure well.  Insurance denied inpatient rehab, pending possible swing bed admission.     Subjective:  Today, the patient was sitting up in bedside chair per my evaluation this morning. No new complaints or overnight events reported. Patient states pain is controlled with current regimen. Denies any paraesthesias, no numbness or tingling. She is working with PT, tolerating well. She denies any chest pain or pressure, no dyspnea.  No cough.  Denies any fevers or chills.  Denies any abdominal pain, nausea, vomiting or diarrhea.  No urinary symptoms.    Present during exam: n/a  ----------------------------------------------------------------------------------------------------------------------  Objective     Consults:  · Orthopedic surgery     Procedures:  · 2019: Rowell catheter insertion -- Activity restriction due to hip fracture   · 2019: Left hip bipolar replacement -- Dr. Jain, orthopedic surgery   · 2019: Rowell catheter removal      Current Hospital Meds:    apixaban 2.5 mg Oral Q12H   atorvastatin 40 mg Oral Nightly   busPIRone 10 mg Oral BID   carvedilol 12.5 mg Oral BID With Meals   cholecalciferol 50,000 Units Oral Weekly   losartan 100 mg Oral Daily   pantoprazole 40 mg Oral QAM   sodium chloride 3 mL Intravenous Q12H        ----------------------------------------------------------------------------------------------------------------------  Vital Signs:  Temp:  [98 °F (36.7 °C)-100.7 °F (38.2 °C)] 98 °F (36.7 °C)  Heart Rate:  [67-88] 75  Resp:  [18] 18  BP: (102-170)/(55-80) 170/80    SpO2 Percentage    06/08/19 2308 06/09/19 0105 06/09/19 0304   SpO2: 94% 92% 94%     SpO2:  [92 %-97 %] 94 %  on  Flow (L/min):  [2] 2;   Device (Oxygen Therapy): room air    Body mass index is 27.13 kg/m².  Wt Readings from Last 3 Encounters:   06/05/19 76.2 kg (168 lb 0.2 oz)   05/28/19 78 kg (172 lb)   02/01/19 75.8 kg (167 lb)        Intake/Output Summary (Last 24 hours) at 6/9/2019 0832  Last data filed at 6/9/2019 0304  Gross per 24 hour   Intake 1180 ml   Output 350 ml   Net 830 ml     Diet Regular; Cardiac  ----------------------------------------------------------------------------------------------------------------------  Physical exam:  Physical Exam   Constitutional: She is oriented to person, place, and time. She appears well-developed and well-nourished.  Non-toxic appearance. No distress.   Pleasant, no acute distress   HENT:   Head: Normocephalic and atraumatic.   Right Ear: External ear normal.   Left Ear: External ear normal.   Nose: Nose normal.   Mouth/Throat: Oropharynx is clear and moist and mucous membranes are normal. No oropharyngeal exudate.   Eyes: Conjunctivae and EOM are normal. Pupils are equal, round, and reactive to light. No scleral icterus.   Neck: Trachea normal and normal range of motion. Neck supple. No JVD present. No muscular tenderness present. Carotid bruit is not present. No tracheal deviation present. No  thyromegaly present.   Cardiovascular: Normal rate, regular rhythm, normal heart sounds and intact distal pulses. Exam reveals no gallop and no friction rub.   No murmur heard.  Pulmonary/Chest: Effort normal and breath sounds normal. No respiratory distress. She has no wheezes. She has no rhonchi. She has no rales. She exhibits no tenderness.   Abdominal: Soft. Bowel sounds are normal. She exhibits no distension and no mass. There is no hepatosplenomegaly. There is no tenderness. There is no rebound and no guarding. No hernia.   Genitourinary:   Genitourinary Comments: No Rowell catheter in place, making urine   Musculoskeletal: She exhibits no edema or deformity.        Left hip: She exhibits tenderness and swelling.        Right lower leg: She exhibits no swelling.        Left lower leg: She exhibits no swelling.   Left hip incision, clean dry and intact.  Mild edema.  Mild tenderness to palpation.  Good range of motion.  Distal pulses intact.  Neurovascularly intact bilaterally.  Sensation intact.  Cap refill less than 3 seconds.  Intact bilaterally.     Vascular Status -  Her right foot exhibits normal foot vasculature  and no edema. Her left foot exhibits normal foot vasculature  and no edema.  Neurological: She is alert and oriented to person, place, and time. She has normal strength. No cranial nerve deficit or sensory deficit.   Skin: Skin is warm and dry. Capillary refill takes less than 2 seconds. No rash noted. No erythema. No pallor.   Psychiatric: She has a normal mood and affect. Her speech is normal and behavior is normal. Judgment and thought content normal.   Nursing note and vitals reviewed.     ----------------------------------------------------------------------------------------------------------------------  Tele:    Paced 70s    I have personally reviewed the EKG/Telemetry strips    ----------------------------------------------------------------------------------------------------------------------            Results from last 7 days   Lab Units 06/09/19 0455 06/08/19 0604 06/07/19  0817  06/04/19  2305   WBC 10*3/mm3 11.91* 12.61* 15.11*   < > 17.80*   HEMOGLOBIN g/dL 10.5* 10.6* 10.7*   < > 11.7*   HEMATOCRIT % 32.7* 33.6* 35.3   < > 36.5   MCV fL 94.5 96.0 98.3*   < > 94.1   MCHC g/dL 32.1 31.5 30.3*   < > 32.1   PLATELETS 10*3/mm3 380 361 358   < > 354   INR   --   --   --   --  1.19*    < > = values in this interval not displayed.     Results from last 7 days   Lab Units 06/09/19 0455 06/08/19 0604 06/07/19 2155 06/07/19  0817 06/06/19  0802  06/05/19  0408 06/04/19  2305   SODIUM mmol/L 138 138  --  136  --    < > 140 139   POTASSIUM mmol/L 3.7 4.6 4.4 3.6  --    < > 3.1* 3.2*   MAGNESIUM mg/dL 2.1 1.5*  --   --  2.5*   < >  --   --    CHLORIDE mmol/L 103 104  --  103  --    < > 100 98   CO2 mmol/L 23.7 24.0  --  20.5*  --    < > 24.6 25.4   BUN mg/dL 17 16  --  23  --    < > 16 17   CREATININE mg/dL 0.91 0.83  --  1.03*  --    < > 1.06* 1.05*   EGFR IF NONAFRICN AM mL/min/1.73 59* 66  --  51*  --    < > 50* 50*   CALCIUM mg/dL 7.6* 7.4*  --  7.4*  --    < > 7.9* 8.2*   GLUCOSE mg/dL 108* 104*  --  144*  --    < > 130* 155*   ALBUMIN g/dL  --   --   --   --   --   --  3.16* 3.17*   BILIRUBIN mg/dL  --   --   --   --   --   --  0.5 0.5   ALK PHOS U/L  --   --   --   --   --   --  74 75   AST (SGOT) U/L  --   --   --   --   --   --  22 28   ALT (SGPT) U/L  --   --   --   --   --   --  18 20    < > = values in this interval not displayed.   Estimated Creatinine Clearance: 48.9 mL/min (by C-G formula based on SCr of 0.91 mg/dL).    I have personally reviewed the above laboratory results.   ----------------------------------------------------------------------------------------------------------------------  Imaging Results (last 24 hours)     Procedure Component Value Units Date/Time     XR Pelvis 1 or 2 View [[id number]] Collected:  06/06/19 0711     Updated:  06/06/19 0713    Narrative:       FINDINGS:       BONES: No acute fracture. No blastic or lytic lesions.       JOINT: TOTAL LEFT HIP ARTHROPLASTY.       SOFT TISSUES:  No soft tissue mass.    Impression:       No acute or destructive bony abnormality.     This report was finalized on 6/6/2019 7:11 AM by Dr. Maxime Treviño MD.      I have personally reviewed the above radiology results.   ----------------------------------------------------------------------------------------------------------------------  Assessment/Plan     · Acute, traumatic, closed left hip fracture status post mechanical fall: Pt s/p left hip replacement per ortho surgery. Elizabeth procedure well. Cont post op orders and recommendations per ortho, assistance is appreciated. PT, tolerating well. PRN pain meds with holding parameters. Pending possible swing bed admission on Monday.   · Leukocytosis: Stable. Likely stress-induced post fracture/operation.  Patient afebrile, no active signs or symptoms of infection.  Monitor closely with antibiotic therapy.  Repeat CBC in a.m.  · Urinary tract infection: Patient has been on IV Rocephin empirically. Urine culture finalized with mixed markus, less than 25,000 CFU per mL.  Will discontinue Rocephin.  Patient afebrile.  Denies any urinary symptoms.  Rowell catheter has been removed previously.    · Acute hypokalemia: Resolved with supplementation. Continue to replace per protocol as necessary.  Repeat chemistry panel in the morning.  · Hypomagnesemia: Resolved with supplementation. Continue to replace per protocol.  Repeat magnesium level in the morning.    · Hypophosphatemia: Resolved with supplementation. Replace per protocol.  Repeat phosphorus level in the morning.  · History of coronary artery disease status post stenting in the past: No chest pain or symptoms of ACS.  Closely monitor on telemetry. Continue medical therapy.    · Paroxysmal atrial fibrillation: Cont Coreg for rate control, she remains paced. Cont telemetry monitoring. Eliquis for anticoagulation.   · Essential hypertension: BP slightly elevated. Cont Coreg. Pt was on losartan at half home dose, increase to home dose of 100 mg daily today. Closely monitor and titrate medications as necessary.   · Hyperlipidemia: Continue statin therapy.   · History of primary hyperparathyroidism: Monitor. Calcium low but stable.   · History of third-degree AV block status post permanent pacemaker implantation: Telemetry monitoring.   · Hyperglycemia: A1C 5.70. Monitor with daily chemistry, no indication for SSI at this time.   · Stage III CKD: Appears stable.  Avoid nephrotoxins. Monitor closely, repeat chem panel in AM.   · Vitamin D deficiency: Cholecalciferol supplementation.   · Mild hypoalbuminemia: Likely multifactorial. Encourage PO intake. Monitor closely.   · Anxiety: Continue Buspar.   · Microscopic hematuria/proteinuria: Protein/creatinine ratio elevated, likely hypertensive nephropathy. Closely monitor, recommend outpatient follow up once stabilized from surgery standpoint. Treat HTN as previously mentioned.   · Macrocytic anemia: Folate and vitamin B12 levels pending.  Supplemented patient found to be deficient.  Hemoglobin stable.  Likely ABLA postoperatively.  Closely monitor H&H, transfuse should hemoglobin drop below 7.0.  Repeat CBC in a.m.  · Vitamin D deficiency: Cholecalciferol 50,000 units weekly x8 weeks.  · F/E/N: Gentle IV fluids. Replace electrolytes per protocol as necessary. Cardiac diet.     --------------------------------------------------  DVT Prophylaxis:  Eliquis to serve   GI Prophylaxis: Protonix   Activity: up with assistance    The patient is high risk due to the following diagnoses/reasons:  Hip fracture, advanced age, CAD, UTI, electrolyte abnormalities, afib, anticoagulation    I have discussed the patient's assessment and plan with the patient and  nursing staff.     Disposition: Patient that she would rather go home with home health on discharge with physical therapy.  However her daughter previously mentioned she may benefit from prolonged therapy inpatient as she lives at home alone. Insurance denied inpatient rehab. Swing bed consult placed, pending.    Alize Parsons PA-C  Hospitalist Service -- [facility]   Pager: [phone]    06/09/19  8:32 AM    Attending Physician: Tristan Melara MD    ----------------------------------------------------------------------------------------------------------------------

## 2019-06-09 NOTE — PLAN OF CARE
Problem: Fall Risk (Adult)  Goal: Identify Related Risk Factors and Signs and Symptoms  Outcome: Ongoing (interventions implemented as appropriate)   06/09/19 1005   Fall Risk (Adult)   Related Risk Factors (Fall Risk) age-related changes;gait/mobility problems;history of falls;environment unfamiliar;slippery/uneven surfaces   Signs and Symptoms (Fall Risk) presence of risk factors     Goal: Absence of Fall  Outcome: Ongoing (interventions implemented as appropriate)   06/09/19 1005   Fall Risk (Adult)   Absence of Fall making progress toward outcome       Problem: Skin Injury Risk (Adult)  Goal: Identify Related Risk Factors and Signs and Symptoms  Outcome: Ongoing (interventions implemented as appropriate)   06/09/19 1005   Skin Injury Risk (Adult)   Related Risk Factors (Skin Injury Risk) advanced age;mobility impaired     Goal: Skin Health and Integrity  Outcome: Ongoing (interventions implemented as appropriate)   06/09/19 1005   Skin Injury Risk (Adult)   Skin Health and Integrity making progress toward outcome       Problem: Fracture Orthopaedic (Adult)  Goal: Signs and Symptoms of Listed Potential Problems Will be Absent, Minimized or Managed (Fracture Orthopaedic)  Outcome: Ongoing (interventions implemented as appropriate)   06/09/19 1005   Goal/Outcome Evaluation   Problems Assessed (Orthopaedic Fracture) all;pain;situational response   Problems Present (Orthopaedic Fracture) functional deficit/self-care deficit;pain;situational response       Problem: Pain, Acute (Adult)  Goal: Identify Related Risk Factors and Signs and Symptoms  Outcome: Ongoing (interventions implemented as appropriate)   06/09/19 1005   Pain, Acute (Adult)   Related Risk Factors (Acute Pain) surgery;persistent pain;trauma   Signs and Symptoms (Acute Pain) verbalization of pain descriptors     Goal: Acceptable Pain Control/Comfort Level  Outcome: Ongoing (interventions implemented as appropriate)   06/09/19 1005   Pain, Acute (Adult)    Acceptable Pain Control/Comfort Level making progress toward outcome       Problem: Self-Care Deficit (Adult,Obstetrics,Pediatric)  Goal: Improved Ability to Perform BADL and IADL  Outcome: Ongoing (interventions implemented as appropriate)   06/09/19 1005   Self-Care Deficit (Adult,Obstetrics,Pediatric)   Improved Ability to Perform BADL and IADL making progress toward outcome

## 2019-06-10 LAB
ANION GAP SERPL CALCULATED.3IONS-SCNC: 11.2 MMOL/L
BUN BLD-MCNC: 19 MG/DL (ref 8–23)
BUN/CREAT SERPL: 18.8 (ref 7–25)
CALCIUM SPEC-SCNC: 7.9 MG/DL (ref 8.6–10.5)
CHLORIDE SERPL-SCNC: 102 MMOL/L (ref 98–107)
CO2 SERPL-SCNC: 24.8 MMOL/L (ref 22–29)
CREAT BLD-MCNC: 1.01 MG/DL (ref 0.57–1)
DEPRECATED RDW RBC AUTO: 42.3 FL (ref 37–54)
ERYTHROCYTE [DISTWIDTH] IN BLOOD BY AUTOMATED COUNT: 12.5 % (ref 12.3–15.4)
GFR SERPL CREATININE-BSD FRML MDRD: 52 ML/MIN/1.73
GLUCOSE BLD-MCNC: 114 MG/DL (ref 65–99)
HCT VFR BLD AUTO: 35.3 % (ref 34–46.6)
HGB BLD-MCNC: 10.8 G/DL (ref 12–15.9)
MAGNESIUM SERPL-MCNC: 1.6 MG/DL (ref 1.6–2.4)
MCH RBC QN AUTO: 29.4 PG (ref 26.6–33)
MCHC RBC AUTO-ENTMCNC: 30.6 G/DL (ref 31.5–35.7)
MCV RBC AUTO: 96.2 FL (ref 79–97)
PLATELET # BLD AUTO: 396 10*3/MM3 (ref 140–450)
PMV BLD AUTO: 10.2 FL (ref 6–12)
POTASSIUM BLD-SCNC: 4.1 MMOL/L (ref 3.5–5.2)
RBC # BLD AUTO: 3.67 10*6/MM3 (ref 3.77–5.28)
SODIUM BLD-SCNC: 138 MMOL/L (ref 136–145)
WBC NRBC COR # BLD: 12.15 10*3/MM3 (ref 3.4–10.8)

## 2019-06-10 PROCEDURE — 97110 THERAPEUTIC EXERCISES: CPT

## 2019-06-10 PROCEDURE — 85027 COMPLETE CBC AUTOMATED: CPT | Performed by: PHYSICIAN ASSISTANT

## 2019-06-10 PROCEDURE — 83735 ASSAY OF MAGNESIUM: CPT | Performed by: PHYSICIAN ASSISTANT

## 2019-06-10 PROCEDURE — 25010000002 HYDRALAZINE PER 20 MG: Performed by: PHYSICIAN ASSISTANT

## 2019-06-10 PROCEDURE — 99232 SBSQ HOSP IP/OBS MODERATE 35: CPT | Performed by: PHYSICIAN ASSISTANT

## 2019-06-10 PROCEDURE — 97116 GAIT TRAINING THERAPY: CPT

## 2019-06-10 PROCEDURE — 80048 BASIC METABOLIC PNL TOTAL CA: CPT | Performed by: PHYSICIAN ASSISTANT

## 2019-06-10 RX ORDER — MAGNESIUM SULFATE HEPTAHYDRATE 40 MG/ML
4 INJECTION, SOLUTION INTRAVENOUS ONCE
Status: COMPLETED | OUTPATIENT
Start: 2019-06-10 | End: 2019-06-10

## 2019-06-10 RX ORDER — HYDRALAZINE HYDROCHLORIDE 20 MG/ML
INJECTION INTRAMUSCULAR; INTRAVENOUS
Status: DISCONTINUED
Start: 2019-06-10 | End: 2019-06-10 | Stop reason: WASHOUT

## 2019-06-10 RX ADMIN — LOSARTAN POTASSIUM 100 MG: 50 TABLET, FILM COATED ORAL at 08:35

## 2019-06-10 RX ADMIN — OXYCODONE HYDROCHLORIDE AND ACETAMINOPHEN 1 TABLET: 5; 325 TABLET ORAL at 11:01

## 2019-06-10 RX ADMIN — CARVEDILOL 12.5 MG: 6.25 TABLET, FILM COATED ORAL at 17:10

## 2019-06-10 RX ADMIN — HYDRALAZINE HYDROCHLORIDE 10 MG: 20 INJECTION INTRAMUSCULAR; INTRAVENOUS at 04:06

## 2019-06-10 RX ADMIN — APIXABAN 2.5 MG: 2.5 TABLET, FILM COATED ORAL at 08:35

## 2019-06-10 RX ADMIN — APIXABAN 2.5 MG: 2.5 TABLET, FILM COATED ORAL at 20:12

## 2019-06-10 RX ADMIN — PANTOPRAZOLE SODIUM 40 MG: 40 TABLET, DELAYED RELEASE ORAL at 08:35

## 2019-06-10 RX ADMIN — BUSPIRONE HYDROCHLORIDE 10 MG: 10 TABLET ORAL at 00:00

## 2019-06-10 RX ADMIN — ATORVASTATIN CALCIUM 40 MG: 40 TABLET, FILM COATED ORAL at 20:12

## 2019-06-10 RX ADMIN — SODIUM CHLORIDE, PRESERVATIVE FREE 3 ML: 5 INJECTION INTRAVENOUS at 08:36

## 2019-06-10 RX ADMIN — APIXABAN 2.5 MG: 2.5 TABLET, FILM COATED ORAL at 00:00

## 2019-06-10 RX ADMIN — CARVEDILOL 12.5 MG: 6.25 TABLET, FILM COATED ORAL at 08:35

## 2019-06-10 RX ADMIN — SODIUM CHLORIDE, PRESERVATIVE FREE 3 ML: 5 INJECTION INTRAVENOUS at 20:12

## 2019-06-10 RX ADMIN — MAGNESIUM SULFATE HEPTAHYDRATE 4 G: 40 INJECTION, SOLUTION INTRAVENOUS at 17:11

## 2019-06-10 RX ADMIN — BUSPIRONE HYDROCHLORIDE 10 MG: 10 TABLET ORAL at 20:12

## 2019-06-10 RX ADMIN — BUSPIRONE HYDROCHLORIDE 10 MG: 10 TABLET ORAL at 08:35

## 2019-06-10 NOTE — PROGRESS NOTES
.                            [facility] Medicine Services  PROGRESS NOTE     Patient Identification:  Name:  Abbi Mendez  Age:  83 y.o.  Sex:  female  :  1935  MRN:  [id number]  Visit Number:  [id number]  Primary Care Provider:  Eric Driver MD    Length of stay:  5    ----------------------------------------------------------------------------------------------------------------------  Subjective     Chief Complaint:  Follow up for hip fracture, UTI    History of Presenting Illness:  Patient is an 83-year-old female with past medical history significant for paroxysmal atrial fibrillation, coronary artery disease status post stenting, essential hypertension, hyperlipidemia primary hyperparathyroidism, third-degree AV block status post permanent pacemaker implantation, stage III CKD, and advanced age was admitted on 2014 still with mechanical fall resulting in mild hip fracture.  Patient underwent left hip replacement: Tolerated procedure well.  Insurance denied inpatient rehab, pending possible swing bed admission.     Subjective:  Today, the patient was sitting up in bedside chair per my evaluation this morning. No new complaints or overnight events reported. Patient states pain is controlled with current regimen. Denies any paraesthesias, no numbness or tingling. She is working with PT, tolerating well.  States she thinks she needs to stay a little bit longer to get stronger she lives at home alone.  She denies any chest pain or pressure, no dyspnea.  No cough.  Denies any fevers or chills.  Denies any abdominal pain, nausea, vomiting or diarrhea.  No urinary symptoms.    Present during exam: n/a  ----------------------------------------------------------------------------------------------------------------------  Objective     Consults:  · Orthopedic surgery     Procedures:  · 2019: Rowell catheter insertion -- Activity restriction due to hip fracture   · 2019: Left  hip bipolar replacement -- Dr. Jain, orthopedic surgery   · 6/6/2019: Rowell catheter removal     Current Hospital Meds:    apixaban 2.5 mg Oral Q12H   atorvastatin 40 mg Oral Nightly   busPIRone 10 mg Oral BID   carvedilol 12.5 mg Oral BID With Meals   cholecalciferol 50,000 Units Oral Weekly   losartan 100 mg Oral Daily   pantoprazole 40 mg Oral QAM   sodium chloride 3 mL Intravenous Q12H        ----------------------------------------------------------------------------------------------------------------------  Vital Signs:  Temp:  [98 °F (36.7 °C)-98.7 °F (37.1 °C)] 98.6 °F (37 °C)  Heart Rate:  [70-80] 80  Resp:  [18] 18  BP: (134-180)/(65-88) 156/74    SpO2 Percentage    06/10/19 0000 06/10/19 0358 06/10/19 0630   SpO2: 96% 96% 95%     SpO2:  [95 %-96 %] 95 %  on   ;   Device (Oxygen Therapy): room air    Body mass index is 27.13 kg/m².  Wt Readings from Last 3 Encounters:   06/05/19 76.2 kg (168 lb 0.2 oz)   05/28/19 78 kg (172 lb)   02/01/19 75.8 kg (167 lb)        Intake/Output Summary (Last 24 hours) at 6/10/2019 1132  Last data filed at 6/10/2019 0444  Gross per 24 hour   Intake 480 ml   Output --   Net 480 ml     Diet Regular; Cardiac  ----------------------------------------------------------------------------------------------------------------------  Physical exam:  Physical Exam   Constitutional: She is oriented to person, place, and time. She appears well-developed and well-nourished.  Non-toxic appearance. No distress.   Pleasant, no acute distress   HENT:   Head: Normocephalic and atraumatic.   Right Ear: External ear normal.   Left Ear: External ear normal.   Nose: Nose normal.   Mouth/Throat: Oropharynx is clear and moist and mucous membranes are normal. No oropharyngeal exudate.   Eyes: Conjunctivae and EOM are normal. Pupils are equal, round, and reactive to light. No scleral icterus.   Neck: Trachea normal and normal range of motion. Neck supple. No JVD present. No muscular tenderness  present. Carotid bruit is not present. No tracheal deviation present. No thyromegaly present.   Cardiovascular: Normal rate, regular rhythm, normal heart sounds and intact distal pulses. Exam reveals no gallop and no friction rub.   No murmur heard.  Pulmonary/Chest: Effort normal and breath sounds normal. No respiratory distress. She has no wheezes. She has no rhonchi. She has no rales. She exhibits no tenderness.   Abdominal: Soft. Bowel sounds are normal. She exhibits no distension and no mass. There is no hepatosplenomegaly. There is no tenderness. There is no rebound and no guarding. No hernia.   Genitourinary:   Genitourinary Comments: No Rowell catheter in place, making urine   Musculoskeletal: She exhibits no edema or deformity.        Left hip: She exhibits tenderness and swelling.        Right lower leg: She exhibits no swelling.        Left lower leg: She exhibits no swelling.   Left hip incision, clean dry and intact.  Mild edema.  Mild tenderness to palpation.  Good range of motion.  Distal pulses intact.  Neurovascularly intact bilaterally.  Sensation intact.  Cap refill less than 3 seconds.  Intact bilaterally.     Vascular Status -  Her right foot exhibits normal foot vasculature  and no edema. Her left foot exhibits normal foot vasculature  and no edema.  Neurological: She is alert and oriented to person, place, and time. She has normal strength. No cranial nerve deficit or sensory deficit.   Skin: Skin is warm and dry. Capillary refill takes less than 2 seconds. No rash noted. No erythema. No pallor.   Psychiatric: She has a normal mood and affect. Her speech is normal and behavior is normal. Judgment and thought content normal.   Nursing note and vitals reviewed.     ----------------------------------------------------------------------------------------------------------------------  Tele:    Paced 70s    I have personally reviewed the EKG/Telemetry strips    ----------------------------------------------------------------------------------------------------------------------            Results from last 7 days   Lab Units 06/10/19  0149 06/09/19  0455 06/08/19  0604 06/04/19  2305   WBC 10*3/mm3 12.15* 11.91* 12.61*   < > 17.80*   HEMOGLOBIN g/dL 10.8* 10.5* 10.6*   < > 11.7*   HEMATOCRIT % 35.3 32.7* 33.6*   < > 36.5   MCV fL 96.2 94.5 96.0   < > 94.1   MCHC g/dL 30.6* 32.1 31.5   < > 32.1   PLATELETS 10*3/mm3 396 380 361   < > 354   INR   --   --   --   --  1.19*    < > = values in this interval not displayed.     Results from last 7 days   Lab Units 06/10/19  0136 06/09/19 0455 06/08/19 0604 06/05/19  0408 06/04/19  2305   SODIUM mmol/L 138 138 138   < > 140 139   POTASSIUM mmol/L 4.1 3.7 4.6   < > 3.1* 3.2*   MAGNESIUM mg/dL 1.6 2.1 1.5*   < >  --   --    CHLORIDE mmol/L 102 103 104   < > 100 98   CO2 mmol/L 24.8 23.7 24.0   < > 24.6 25.4   BUN mg/dL 19 17 16   < > 16 17   CREATININE mg/dL 1.01* 0.91 0.83   < > 1.06* 1.05*   EGFR IF NONAFRICN AM mL/min/1.73 52* 59* 66   < > 50* 50*   CALCIUM mg/dL 7.9* 7.6* 7.4*   < > 7.9* 8.2*   GLUCOSE mg/dL 114* 108* 104*   < > 130* 155*   ALBUMIN g/dL  --   --   --   --  3.16* 3.17*   BILIRUBIN mg/dL  --   --   --   --  0.5 0.5   ALK PHOS U/L  --   --   --   --  74 75   AST (SGOT) U/L  --   --   --   --  22 28   ALT (SGPT) U/L  --   --   --   --  18 20    < > = values in this interval not displayed.   Estimated Creatinine Clearance: 44 mL/min (A) (by C-G formula based on SCr of 1.01 mg/dL (H)).    I have personally reviewed the above laboratory results.   ----------------------------------------------------------------------------------------------------------------------  Imaging Results (last 24 hours)     Procedure Component Value Units Date/Time    XR Pelvis 1 or 2 View [[id number]] Collected:  06/06/19 0711     Updated:  06/06/19 0713    Narrative:       FINDINGS:       BONES: No acute fracture. No blastic or lytic  lesions.       JOINT: TOTAL LEFT HIP ARTHROPLASTY.       SOFT TISSUES:  No soft tissue mass.    Impression:       No acute or destructive bony abnormality.     This report was finalized on 6/6/2019 7:11 AM by Dr. Maxime Treviño MD.      I have personally reviewed the above radiology results.   ----------------------------------------------------------------------------------------------------------------------  Assessment/Plan     · Acute, traumatic, closed left hip fracture status post mechanical fall: Pt s/p left hip replacement per ortho surgery. Elizabeth procedure well. Cont post op orders and recommendations per ortho, assistance is appreciated. PT, tolerating well. PRN pain meds with holding parameters. Pending possible swing bed admission.   · Leukocytosis: Stable. Likely stress-induced post fracture/operation.  Patient afebrile, no active signs or symptoms of infection.  Monitor closely with antibiotic therapy.  Repeat CBC in a.m.  · Urinary tract infection: Patient has been on IV Rocephin empirically. Urine culture finalized with mixed markus, less than 25,000 CFU per mL.  Will discontinue Rocephin.  Patient afebrile.  Denies any urinary symptoms.  Rowell catheter has been removed previously.    · Acute hypokalemia: Resolved with supplementation. Continue to replace per protocol as necessary.  Repeat chemistry panel in the morning.  · Hypomagnesemia Continue to replace per protocol.  Repeat magnesium level in the morning.    · Hypophosphatemia: Resolved with supplementation. Replace per protocol.  Repeat phosphorus level in the morning.  · History of coronary artery disease status post stenting in the past: No chest pain or symptoms of ACS.  Closely monitor on telemetry. Continue medical therapy.   · Paroxysmal atrial fibrillation: Cont Coreg for rate control, she remains paced. Cont telemetry monitoring. Eliquis for anticoagulation.   · Essential hypertension: BP moderately controlled. Cont Coreg and home  losartan, she was increased to her home dose yesterday. Closely monitor and titrate medications as necessary.   · Hyperlipidemia: Continue statin therapy.   · History of primary hyperparathyroidism: Monitor. Calcium low but stable.   · History of third-degree AV block status post permanent pacemaker implantation: Telemetry monitoring.   · Hyperglycemia: A1C 5.70. Monitor with daily chemistry, no indication for SSI at this time.   · Stage III CKD: Appears stable.  Avoid nephrotoxins. Monitor closely, repeat chem panel in AM.   · Vitamin D deficiency: Cholecalciferol supplementation.   · Mild hypoalbuminemia: Likely multifactorial. Encourage PO intake. Monitor closely.   · Anxiety: Continue Buspar.   · Microscopic hematuria/proteinuria: Protein/creatinine ratio elevated, likely hypertensive nephropathy. Closely monitor, recommend outpatient follow up once stabilized from surgery standpoint. Treat HTN as previously mentioned.   · Macrocytic anemia: Folate and vitamin B12 levels pending.  Supplemented patient found to be deficient.  Hemoglobin stable.  Likely ABLA postoperatively.  Closely monitor H&H, transfuse should hemoglobin drop below 7.0.  Repeat CBC in a.m.  · Vitamin D deficiency: Cholecalciferol 50,000 units weekly x8 weeks.  · F/E/N: Gentle IV fluids. Replace electrolytes per protocol as necessary. Cardiac diet.     --------------------------------------------------  DVT Prophylaxis:  Eliquis to serve   GI Prophylaxis: Protonix   Activity: Up with assistance, PT    The patient is high risk due to the following diagnoses/reasons:  Hip fracture, advanced age, CAD, UTI, electrolyte abnormalities, afib, anticoagulation    I have discussed the patient's assessment and plan with the patient and nursing staff.     Disposition: Pending possible swing bed admission, insurance denied inpatient rehab admission previously.     Alize Parsons PA-C  Hospitalist Service -- [facility]   Pager:  [phone]    06/10/19  11:32 AM    Attending Physician: Jeffrey Parsons, Sally    ----------------------------------------------------------------------------------------------------------------------

## 2019-06-10 NOTE — PROGRESS NOTES
Discharge Planning Assessment   Umer     Patient Name: Abbi Mendez  MRN: [id number]  Today's Date: 6/10/2019    Admit Date: 6/4/2019    Discharge Plan     Row Name 06/10/19 1414       Plan    Plan  SS spoke with swing bed nurse, Candy who states she has submitted pt's information to [facility] for a pre-auth. SS will follow.      Marleen Benitez

## 2019-06-10 NOTE — DISCHARGE PLACEMENT REQUEST
"Per request of MD, patient was screened for Swing Bed placement.   I have reviewed patient's patients information at this time. She was only able to ambulate 60 feet with therapy on 6/7/19 with minimal assist. I will send patients info to verifications for benefit information. I will keep SS, CM and the MD notified when  I receive approval to admit.     Candy Bliss RN/SB  Swing Bed   Phone: [phone]  Fax: [phone]  Email: [email or web address]  Abbi Mendez (83 y.o. Female)     Date of Birth Social Security Number Address Home Phone MRN    1935  [address]  [place] [address] [phone] [id number]    Oriental orthodox Marital Status          Cheondoism Single       Admission Date Admission Type Admitting Provider Attending Provider Department, Room/Bed    6/4/19 Emergency Lenora Kwan DO Troxell, Christopher A, DO [address], [address]    Discharge Date Discharge Disposition Discharge Destination                       Attending Provider:  Jeffrey Parsons DO    Allergies:  Darvon [Propoxyphene], Penicillins    Isolation:  None   Infection:  None   Code Status:  CPR    Ht:  167.6 cm (65.98\")   Wt:  76.2 kg (168 lb 0.2 oz)    Admission Cmt:  None   Principal Problem:  None                  "

## 2019-06-10 NOTE — PLAN OF CARE
Problem: Fall Risk (Adult)  Goal: Identify Related Risk Factors and Signs and Symptoms  Outcome: Ongoing (interventions implemented as appropriate)    Goal: Absence of Fall  Outcome: Ongoing (interventions implemented as appropriate)      Problem: Skin Injury Risk (Adult)  Goal: Identify Related Risk Factors and Signs and Symptoms  Outcome: Ongoing (interventions implemented as appropriate)    Goal: Skin Health and Integrity  Outcome: Ongoing (interventions implemented as appropriate)      Problem: Fracture Orthopaedic (Adult)  Goal: Signs and Symptoms of Listed Potential Problems Will be Absent, Minimized or Managed (Fracture Orthopaedic)  Outcome: Ongoing (interventions implemented as appropriate)      Problem: Pain, Acute (Adult)  Goal: Identify Related Risk Factors and Signs and Symptoms  Outcome: Ongoing (interventions implemented as appropriate)    Goal: Acceptable Pain Control/Comfort Level  Outcome: Ongoing (interventions implemented as appropriate)

## 2019-06-10 NOTE — PAYOR COMM NOTE
"[facility]  Swing Bed Program  Phone:[phone]  Fax: [phone]    Ref#:[id number]      Abbi Mendez (83 y.o. Female)     Date of Birth Social Security Number Address Home Phone MRN    1935  [id number]  [place] [address] [phone] [id number]    Rastafarian Marital Status          Advent Single       Admission Date Admission Type Admitting Provider Attending Provider Department, Room/Bed    19 Emergency Lenora Kwan DO Troxell, Christopher A, DO [address], 3348/1S    Discharge Date Discharge Disposition Discharge Destination                       Attending Provider:  Jeffrey Parsons DO    Allergies:  Darvon [Propoxyphene], Penicillins    Isolation:  None   Infection:  None   Code Status:  CPR    Ht:  167.6 cm (65.98\")   Wt:  76.2 kg (168 lb 0.2 oz)    Admission Cmt:  None   Principal Problem:  None                Active Insurance as of 2019     Primary Coverage     Payor Plan Insurance Group Employer/Plan Group    HUMANA MEDICARE REPLACEMENT HUMANA MEDICARE REPL [id number]     Payor Plan Address Payor Plan Phone Number Payor Plan Fax Number Effective Dates    [address] [address] [phone]  2018 - None Entered    [facility]ly [facility] [id number]       Subscriber Name Subscriber Birth Date Member ID       ABBI MENDEZ 1935 [id number]                 Emergency Contacts      (Rel.) Home Phone Work Phone Mobile Phone    Shruthi Gama (Daughter) -- -- [phone]    Suze Ortiz (Daughter) -- -- [phone]    ASTRID YOUSIF (Daughter) [phone] -- --               History & Physical      Lenora Kwan DO at 2019  3:24 AM          Hospitalist History and Physical    Patient Identification:  Name: Abbi Mendez  Age/Sex: 83 y.o. female  :  1935  MRN: [id number]         Primary Care Physician: Eric Driver MD    Chief Complaint   Patient presents with   • Fall       History of Present " Illness  Patient is a 83 y.o. 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    Patient states that she accidentally tripped over a heating pad cord. The patient reports that she had applied heat to her right shoulder.  The patient reports that 2 weeks ago she also suffered a fall causing a right hand fracture, treated at an urgent care clinic. Patient's right hand is currently in a brace.    Patient reports that she experienced left hip pain shortly after her fall.  She denies any loss of consciousness.  She denies any head trauma.  Patient reports that EMS was contacted and she was brought to the emergency department.  The patient denies any shortness of breath, chest pain and/or dizziness prior to, during and/or after her fall.    The patient is functionally independent at home.  The patient denies any recent chest pain, dyspnea on exertion/or generalized weakness.  She denies any recent syncopal events. She further denies any cough, nausea, vomiting, abdominal pain, dysuria, constipation and/or diarrhea.    In the emergency department, the patient was found to have a left hip fracture.  CMP was remarkable for potassium of 3.2.  Calcium 8.2 and albumin 3.17.  CBC was remarkable for white blood cell count of 17.80 with 87.9% neutrophils.  Chest x-ray is negative for infiltrate and/or effusion per my view.  EKG has been reviewed by me and appears to be a paced rhythm without acute ST-T wave changes.    Patient has been admitted to the medical surgical floor for further evaluation and management.  A Rowell catheter was placed in the emergency department.    Present during  visit: The patient's daughter and FANNY Nova    Past History:  Past Medical History:   Diagnosis Date   • Anxiety    • Arthritis    • Atrial fibrillation (CMS/HCC) 11/14/2016   • Gastric ulcer    • Hiatal hernia    • Hypertension    • Iron deficiency anemia    • RADHA (obstructive sleep apnea) 11/14/2016     Past Surgical History:   Procedure Laterality Date   • CARDIAC ELECTROPHYSIOLOGY PROCEDURE N/A 1/18/2018    Procedure: Pacemaker DC new;  Surgeon: Soraya Darling MD;  Location: [facility] INVASIVE LOCATION;  Service:    • TOTAL KNEE ARTHROPLASTY Right    • TUBAL ABDOMINAL LIGATION       Family History   Problem Relation Age of Onset   • Heart attack Mother 69   • Heart disease Mother    • Heart disease Sister    • Heart attack Brother 60   • Heart attack Brother 45   • Lymphoma Brother    • Heart disease Daughter    • Diabetes Son    • Heart disease Daughter    • Heart disease Daughter    • Diabetes Daughter      Social History     Tobacco Use   • Smoking status: Never Smoker   • Smokeless tobacco: Never Used   Substance Use Topics   • Alcohol use: No   • Drug use: No     Medications Prior to Admission   Medication Sig Dispense Refill Last Dose   • aspirin 81 MG EC tablet Take 81 mg by mouth Daily.   6/4/2019 at 0800   • atorvastatin (LIPITOR) 40 MG tablet Take 40 mg by mouth Every Night.   6/4/2019 at 1930   • busPIRone (BUSPAR) 10 MG tablet Take 10 mg by mouth 2 (Two) Times a Day.   6/4/2019 at 1930   • carvedilol (COREG) 12.5 MG tablet Take 1 tablet by mouth 2 (Two) Times a Day With Meals. 180 tablet 3 6/4/2019 at 1930   • esomeprazole (nexIUM) 40 MG capsule Take 40 mg by mouth Every Morning Before Breakfast.   6/4/2019 at 0800   • losartan (COZAAR) 100 MG tablet Take 100 mg by mouth Daily.  1 6/4/2019 at 0800     Allergies: Darvon [propoxyphene] and Penicillins    Review of Systems:  Review of Systems   Constitutional: Negative for chills, diaphoresis and fever.   HENT: Negative for hearing loss, tinnitus and  trouble swallowing.    Eyes: Negative for photophobia, discharge and visual disturbance.   Respiratory: Negative for cough, shortness of breath and wheezing.    Cardiovascular: Negative for chest pain, palpitations and leg swelling.   Gastrointestinal: Negative for abdominal pain, constipation, diarrhea, nausea and vomiting.   Endocrine: Negative for polydipsia, polyphagia and polyuria.   Genitourinary: Negative for dysuria, frequency and hematuria.   Musculoskeletal: Negative for gait problem, myalgias and neck pain.        Left hip pain.   Skin: Negative for rash and wound.   Neurological: Negative for dizziness, tremors, seizures, syncope, weakness and light-headedness.   Hematological: Does not bruise/bleed easily.   Psychiatric/Behavioral: Negative for confusion, hallucinations and suicidal ideas.      Vital Signs  Temp:  [97.8 °F (36.6 °C)-99.1 °F (37.3 °C)] 99.1 °F (37.3 °C)  Heart Rate:  [70-87] 87  Resp:  [16-18] 18  BP: (148-152)/(72-79) 152/79  Body mass index is 27.12 kg/m².    Physical Exam:  Physical Exam   Constitutional: She is oriented to person, place, and time. She appears well-developed and well-nourished. No distress.   HENT:   Head: Normocephalic and atraumatic.   Mouth/Throat: Oropharynx is clear and moist.   Eyes: Conjunctivae and EOM are normal. Pupils are equal, round, and reactive to light.   Neck: Neck supple. No tracheal deviation present. No thyromegaly present.   Cardiovascular: Normal rate and regular rhythm. Exam reveals no gallop and no friction rub.   No murmur heard.  Pulses:       Dorsalis pedis pulses are 1+ on the right side, and 1+ on the left side.   Pulmonary/Chest: Breath sounds normal. No respiratory distress. She has no wheezes. She has no rales.   Abdominal: Soft. Bowel sounds are normal. She exhibits no distension. There is no hepatomegaly. There is no tenderness. There is no guarding.   Genitourinary:   Genitourinary Comments: A flynn catheter is in place and is  draining a yellow urine.    Musculoskeletal: Normal range of motion. She exhibits no tenderness.   Neurological: She is alert and oriented to person, place, and time. No cranial nerve deficit.   Skin: Skin is warm and dry. No rash noted. No erythema.   Psychiatric: She has a normal mood and affect.      Results Review:    Results from last 7 days   Lab Units 06/04/19  2305   WBC 10*3/mm3 17.80*   HEMOGLOBIN g/dL 11.7*   HEMATOCRIT % 36.5   PLATELETS 10*3/mm3 354     Results from last 7 days   Lab Units 06/04/19  2305   SODIUM mmol/L 139   POTASSIUM mmol/L 3.2*   CHLORIDE mmol/L 98   CO2 mmol/L 25.4   BUN mg/dL 17   CREATININE mg/dL 1.05*   CALCIUM mg/dL 8.2*   GLUCOSE mg/dL 155*     Results from last 7 days   Lab Units 06/04/19  2305   BILIRUBIN mg/dL 0.5   ALK PHOS U/L 75   AST (SGOT) U/L 28   ALT (SGPT) U/L 20       Results from last 7 days   Lab Units 06/04/19  2305   INR  1.19*       Imaging:    I have personally reviewed the EKG. Pending official cardiology interpretation, however, per my view: paced rhythm. No acute ST-T changes.    Imaging Results (most recent)     Procedure Component Value Units Date/Time    XR Chest 1 View [[id number]] Updated:  06/04/19 2337    XR Femur 2 View Left [[id number]] Updated:  06/04/19 2211    XR Hip With or Without Pelvis 2 - 3 View Left [[id number]] Updated:  06/04/19 2211    XR Wrist 3+ View Right [[id number]] Updated:  06/04/19 2211    XR Hand 3+ View Right [[id number]] Updated:  06/04/19 2210    XR Shoulder 2+ View Right [[id number]] Updated:  06/04/19 2208        *Hip xray reveals left sided fracture; chest xray without infiltrate and/or effusion.    Assessment/Plan     -Acute, traumatic, closed left hip fracture status post fall: Patient has been admitted to the medical surgical floor with telemetry.  She is currently nothing by mouth while awaiting an orthopedic surgery evaluation. She has been started on gentle IV fluid hydration.  Continue with as needed IV analgesics with  holding parameters. Patient's geriatric sensitive perioperative cardiac risk index is 0.2%. Patient felt to be acceptable to proceed with surgery at this time as a low to moderate risk patient for this intermediate procedure.    -Leukocytosis is likely reactive and due to above: Repeat CBC in a.m.  Hold on IV antibiotic therapy at this time.    -Acute, mild hypokalemia: Patient has received supplementation in the emergency department.  Repeat chemistries in the a.m.  I have also requested magnesium level.    -Hyperglycemia: May be stressed induced; patient without history of diabetes mellitus. Will obtain a HgbA1c in am.     -Mild hypoalbuminemia.     -Mildly decreased hemoglobin.     -Microscopic hematuria with proteinuria: Obtain protein to creatinine ratio.     -CAD status post previous stent: Patient is currently chest pain free.     -Essential hypertension, currently uncontrolled and may be pain induced: Will plan for PRN IV antihypertensive.    -History of primary hyperparathyroidism.    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     DVT prophylaxis: SCD on right leg    Estimated Length of Stay: > 2 MNs    CODE: FULL/CPR    I discussed the patients findings and my recommendations with patient, family and nursing staff    Lenora Kwan DO  06/05/19  3:24 AM    Electronically signed by Lenora Kwan DO at 6/5/2019  4:24 AM         Orders (last 48 hrs)     Start     Ordered    06/11/19 0600  Magnesium  Morning Draw      06/10/19 0818    06/11/19 0600  Basic Metabolic Panel  Morning Draw      06/10/19 0818    06/11/19 0600  CBC (No Diff)  Morning Draw      06/10/19 0818    06/10/19 0600  Basic Metabolic Panel  Morning Draw      06/09/19 0831    06/10/19 0600  Magnesium  Morning Draw      06/09/19 0831    06/10/19 0600  CBC (No Diff)  Morning Draw      06/09/19 0831    06/10/19 0401  hydrALAZINE (APRESOLINE) 20 MG/ML injection  - ADS Override Pull  Status:   Discontinued     Comments:  Created by cabinet override    06/10/19 0401    06/09/19 0930  losartan (COZAAR) tablet 100 mg  Daily      06/09/19 0831    06/09/19 0600  Basic Metabolic Panel  Morning Draw      06/08/19 0835    06/09/19 0600  Magnesium  Morning Draw      06/08/19 0835    06/09/19 0600  CBC & Differential  Morning Draw      06/08/19 0835    06/09/19 0600  CBC Auto Differential  PROCEDURE ONCE      06/09/19 0001    06/09/19 0000  apixaban (ELIQUIS) 2.5 MG tablet tablet  Every 12 Hours Scheduled      06/09/19 1153    06/07/19 1100  losartan (COZAAR) tablet 50 mg  Daily,   Status:  Discontinued      06/07/19 0935    06/07/19 0940  potassium & sodium phosphates (PHOS-NAK) 280-160-250 MG packet - for Phosphorus less than 1.25 mg/dL  Every 6 Hours PRN      06/07/19 0941    06/07/19 0940  potassium & sodium phosphates (PHOS-NAK) 280-160-250 MG packet - for Phosphorus 1.25 - 2.5 mg/dL  Once As Needed      06/07/19 0941    06/07/19 0822  hydrALAZINE (APRESOLINE) injection 10 mg  Every 6 Hours PRN      06/07/19 0822    06/06/19 1400  cholecalciferol (VITAMIN D3) capsule 50,000 Units  Weekly      06/06/19 1237    06/06/19 0900  apixaban (ELIQUIS) tablet 2.5 mg  Every 12 Hours Scheduled      06/05/19 2052    06/05/19 2100  atorvastatin (LIPITOR) tablet 40 mg  Nightly      06/05/19 0426    06/05/19 2052  acetaminophen (TYLENOL) tablet 650 mg  Every 4 Hours PRN      06/05/19 2052 06/05/19 2052  acetaminophen (TYLENOL) 160 MG/5ML solution 650 mg  Every 4 Hours PRN      06/05/19 2052 06/05/19 2052  acetaminophen (TYLENOL) suppository 650 mg  Every 4 Hours PRN      06/05/19 2052 06/05/19 2052  ondansetron (ZOFRAN) tablet 4 mg  Every 6 Hours PRN      06/05/19 2052 06/05/19 2052  ondansetron (ZOFRAN) injection 4 mg  Every 6 Hours PRN      06/05/19 2052 06/05/19 2052  oxyCODONE-acetaminophen (PERCOCET) 5-325 MG per tablet 1 tablet  Every 4 Hours PRN      06/05/19 2052 06/05/19 2052  docusate sodium  (COLACE) capsule 100 mg  2 Times Daily PRN      06/05/19 2052 06/05/19 2052  bisacodyl (DULCOLAX) EC tablet 10 mg  Daily PRN      06/05/19 2052 06/05/19 1037  potassium chloride (K-DUR,KLOR-CON) ER tablet 40 mEq  As Needed      06/05/19 1037    06/05/19 1037  potassium chloride (KLOR-CON) packet 40 mEq  As Needed      06/05/19 1037    06/05/19 1037  potassium chloride 10 mEq in 100 mL IVPB  Every 1 Hour PRN      06/05/19 1037    06/05/19 0900  sodium chloride 0.9 % flush 3 mL  Every 12 Hours Scheduled      06/05/19 0306    06/05/19 0900  busPIRone (BUSPAR) tablet 10 mg  2 Times Daily      06/05/19 0426    06/05/19 0821  Magnesium Sulfate 2 gram Bolus, followed by 8 gram infusion (total Mg dose 10 grams)- Mg less than or equal to 1mg/dL  As Needed      06/05/19 0822    06/05/19 0821  Magnesium Sulfate 2 gram / 50mL Infusion (GIVE X 3 BAGS TO EQUAL 6GM TOTAL DOSE) - Mg 1.1 - 1.5 mg/dl  As Needed      06/05/19 0822    06/05/19 0821  Magnesium Sulfate 4 gram infusion- Mg 1.6-1.9 mg/dL  As Needed      06/05/19 0822    06/05/19 0800  carvedilol (COREG) tablet 12.5 mg  2 Times Daily With Meals      06/05/19 0426    06/05/19 0700  pantoprazole (PROTONIX) EC tablet 40 mg  Every Morning      06/05/19 0426    06/05/19 0306  sodium chloride 0.9 % flush 3-10 mL  As Needed      06/05/19 0306    06/05/19 0306  naloxone (NARCAN) injection 0.4 mg  Every 5 Minutes PRN      06/05/19 0306    06/05/19 0306  nitroglycerin (NITROSTAT) SL tablet 0.4 mg  Every 5 Minutes PRN      06/05/19 0306    06/04/19 2250  sodium chloride 0.9 % flush 10 mL  As Needed      06/04/19 2250    Unscheduled  ECG 12 Lead  As Needed     Comments:  Nurse to Release if Patient Expericences Acute Chest Pain or Dysrhythmias    06/05/19 0306    Unscheduled  Potassium  As Needed     Comments:  For Ventricular Arrhythmias      06/05/19 0306    Unscheduled  Magnesium  As Needed     Comments:  For Ventricular Arrhythmias      06/05/19 0306    Unscheduled  Troponin   As Needed     Comments:  For Chest Pain      19    Unscheduled  Digoxin Level  As Needed     Comments:  For Atrial Arrhythmias      19    Unscheduled  Blood Gas, Arterial  As Needed     Comments:  Per O2 PolicyNotify Physician      19    Unscheduled  Magnesium  As Needed      19    Unscheduled  Potassium  As Needed      19    Unscheduled  Ice to Incision for 48 Hours  As Needed      19    Unscheduled  Apply Ice to Incision PRN for Edema, After Activity or Exercise, and to Lessen Discomfort  As Needed      19    Unscheduled  Patient May Shower With Assistance  As Needed      19    Unscheduled  Bladder Scan if Patient Unable to Void 4-6 Hours After Catheter Removal  As Needed      19    Unscheduled  Straight Cath Every 4-6 Hours As Needed If Patient is Unable to Void After 4-6 Hours, Bladder Scan Volume is Greater Than 350-500mL & Patient Has Symptoms of Bladder Discomfort / Distention  As Needed      19    Unscheduled  Consult Pharmacist For Review of Medications That May Cause Urinary Retention - RN To Place Order for Consult it Needed  As Needed      19    Unscheduled  Schedule / Prompt Voiding For Patients With Urinary Incontinence  As Needed      19           Physician Progress Notes (last 24 hours) (Notes from 2019  2:19 PM through 6/10/2019  2:19 PM)      Alize Parsons PA at 6/10/2019 11:32 AM          .                            [facility] Medicine Services  PROGRESS NOTE     Patient Identification:  Name:  Abbi Mendez  Age:  83 y.o.  Sex:  female  :  1935  MRN:  [id number]  Visit Number:  [id number]  Primary Care Provider:  Eric Driver MD    Length of stay:  5    ----------------------------------------------------------------------------------------------------------------------  Subjective     Chief Complaint:  Follow up for hip  fracture, UTI    History of Presenting Illness:  Patient is an 83-year-old female with past medical history significant for paroxysmal atrial fibrillation, coronary artery disease status post stenting, essential hypertension, hyperlipidemia primary hyperparathyroidism, third-degree AV block status post permanent pacemaker implantation, stage III CKD, and advanced age was admitted on 6/4/2014 still with mechanical fall resulting in mild hip fracture.  Patient underwent left hip replacement: Tolerated procedure well.  Insurance denied inpatient rehab, pending possible swing bed admission.     Subjective:  Today, the patient was sitting up in bedside chair per my evaluation this morning. No new complaints or overnight events reported. Patient states pain is controlled with current regimen. Denies any paraesthesias, no numbness or tingling. She is working with PT, tolerating well.  States she thinks she needs to stay a little bit longer to get stronger she lives at home alone.  She denies any chest pain or pressure, no dyspnea.  No cough.  Denies any fevers or chills.  Denies any abdominal pain, nausea, vomiting or diarrhea.  No urinary symptoms.    Present during exam: n/a  ----------------------------------------------------------------------------------------------------------------------  Objective     Consults:  · Orthopedic surgery     Procedures:  · 6/5/2019: Rowell catheter insertion -- Activity restriction due to hip fracture   · 6/5/2019: Left hip bipolar replacement -- Dr. Jain, orthopedic surgery   · 6/6/2019: Rowell catheter removal     Current Hospital Meds:    apixaban 2.5 mg Oral Q12H   atorvastatin 40 mg Oral Nightly   busPIRone 10 mg Oral BID   carvedilol 12.5 mg Oral BID With Meals   cholecalciferol 50,000 Units Oral Weekly   losartan 100 mg Oral Daily   pantoprazole 40 mg Oral QAM   sodium chloride 3 mL Intravenous Q12H         ----------------------------------------------------------------------------------------------------------------------  Vital Signs:  Temp:  [98 °F (36.7 °C)-98.7 °F (37.1 °C)] 98.6 °F (37 °C)  Heart Rate:  [70-80] 80  Resp:  [18] 18  BP: (134-180)/(65-88) 156/74    SpO2 Percentage    06/10/19 0000 06/10/19 0358 06/10/19 0630   SpO2: 96% 96% 95%     SpO2:  [95 %-96 %] 95 %  on   ;   Device (Oxygen Therapy): room air    Body mass index is 27.13 kg/m².  Wt Readings from Last 3 Encounters:   06/05/19 76.2 kg (168 lb 0.2 oz)   05/28/19 78 kg (172 lb)   02/01/19 75.8 kg (167 lb)        Intake/Output Summary (Last 24 hours) at 6/10/2019 1132  Last data filed at 6/10/2019 0444  Gross per 24 hour   Intake 480 ml   Output --   Net 480 ml     Diet Regular; Cardiac  ----------------------------------------------------------------------------------------------------------------------  Physical exam:  Physical Exam   Constitutional: She is oriented to person, place, and time. She appears well-developed and well-nourished.  Non-toxic appearance. No distress.   Pleasant, no acute distress   HENT:   Head: Normocephalic and atraumatic.   Right Ear: External ear normal.   Left Ear: External ear normal.   Nose: Nose normal.   Mouth/Throat: Oropharynx is clear and moist and mucous membranes are normal. No oropharyngeal exudate.   Eyes: Conjunctivae and EOM are normal. Pupils are equal, round, and reactive to light. No scleral icterus.   Neck: Trachea normal and normal range of motion. Neck supple. No JVD present. No muscular tenderness present. Carotid bruit is not present. No tracheal deviation present. No thyromegaly present.   Cardiovascular: Normal rate, regular rhythm, normal heart sounds and intact distal pulses. Exam reveals no gallop and no friction rub.   No murmur heard.  Pulmonary/Chest: Effort normal and breath sounds normal. No respiratory distress. She has no wheezes. She has no rhonchi. She has no rales. She exhibits  no tenderness.   Abdominal: Soft. Bowel sounds are normal. She exhibits no distension and no mass. There is no hepatosplenomegaly. There is no tenderness. There is no rebound and no guarding. No hernia.   Genitourinary:   Genitourinary Comments: No Rowell catheter in place, making urine   Musculoskeletal: She exhibits no edema or deformity.        Left hip: She exhibits tenderness and swelling.        Right lower leg: She exhibits no swelling.        Left lower leg: She exhibits no swelling.   Left hip incision, clean dry and intact.  Mild edema.  Mild tenderness to palpation.  Good range of motion.  Distal pulses intact.  Neurovascularly intact bilaterally.  Sensation intact.  Cap refill less than 3 seconds.  Intact bilaterally.     Vascular Status -  Her right foot exhibits normal foot vasculature  and no edema. Her left foot exhibits normal foot vasculature  and no edema.  Neurological: She is alert and oriented to person, place, and time. She has normal strength. No cranial nerve deficit or sensory deficit.   Skin: Skin is warm and dry. Capillary refill takes less than 2 seconds. No rash noted. No erythema. No pallor.   Psychiatric: She has a normal mood and affect. Her speech is normal and behavior is normal. Judgment and thought content normal.   Nursing note and vitals reviewed.     ----------------------------------------------------------------------------------------------------------------------  Tele:    Paced 70s    I have personally reviewed the EKG/Telemetry strips   ----------------------------------------------------------------------------------------------------------------------            Results from last 7 days   Lab Units 06/10/19  0149 06/09/19  0455 06/08/19  0604  06/04/19  2305   WBC 10*3/mm3 12.15* 11.91* 12.61*   < > 17.80*   HEMOGLOBIN g/dL 10.8* 10.5* 10.6*   < > 11.7*   HEMATOCRIT % 35.3 32.7* 33.6*   < > 36.5   MCV fL 96.2 94.5 96.0   < > 94.1   MCHC g/dL 30.6* 32.1 31.5   < > 32.1    PLATELETS 10*3/mm3 396 380 361   < > 354   INR   --   --   --   --  1.19*    < > = values in this interval not displayed.     Results from last 7 days   Lab Units 06/10/19  0136 06/09/19  0455 06/08/19  0604  06/05/19  0408 06/04/19  2305   SODIUM mmol/L 138 138 138   < > 140 139   POTASSIUM mmol/L 4.1 3.7 4.6   < > 3.1* 3.2*   MAGNESIUM mg/dL 1.6 2.1 1.5*   < >  --   --    CHLORIDE mmol/L 102 103 104   < > 100 98   CO2 mmol/L 24.8 23.7 24.0   < > 24.6 25.4   BUN mg/dL 19 17 16   < > 16 17   CREATININE mg/dL 1.01* 0.91 0.83   < > 1.06* 1.05*   EGFR IF NONAFRICN AM mL/min/1.73 52* 59* 66   < > 50* 50*   CALCIUM mg/dL 7.9* 7.6* 7.4*   < > 7.9* 8.2*   GLUCOSE mg/dL 114* 108* 104*   < > 130* 155*   ALBUMIN g/dL  --   --   --   --  3.16* 3.17*   BILIRUBIN mg/dL  --   --   --   --  0.5 0.5   ALK PHOS U/L  --   --   --   --  74 75   AST (SGOT) U/L  --   --   --   --  22 28   ALT (SGPT) U/L  --   --   --   --  18 20    < > = values in this interval not displayed.   Estimated Creatinine Clearance: 44 mL/min (A) (by C-G formula based on SCr of 1.01 mg/dL (H)).    I have personally reviewed the above laboratory results.   ----------------------------------------------------------------------------------------------------------------------  Imaging Results (last 24 hours)     Procedure Component Value Units Date/Time    XR Pelvis 1 or 2 View [[id number]] Collected:  06/06/19 0711     Updated:  06/06/19 0713    Narrative:       FINDINGS:       BONES: No acute fracture. No blastic or lytic lesions.       JOINT: TOTAL LEFT HIP ARTHROPLASTY.       SOFT TISSUES:  No soft tissue mass.    Impression:       No acute or destructive bony abnormality.     This report was finalized on 6/6/2019 7:11 AM by Dr. Maxime Treviño MD.      I have personally reviewed the above radiology results.   ----------------------------------------------------------------------------------------------------------------------  Assessment/Plan     · Acute,  traumatic, closed left hip fracture status post mechanical fall: Pt s/p left hip replacement per ortho surgery. Elizabeth procedure well. Cont post op orders and recommendations per ortho, assistance is appreciated. PT, tolerating well. PRN pain meds with holding parameters. Pending possible swing bed admission.   · Leukocytosis: Stable. Likely stress-induced post fracture/operation.  Patient afebrile, no active signs or symptoms of infection.  Monitor closely with antibiotic therapy.  Repeat CBC in a.m.  · Urinary tract infection: Patient has been on IV Rocephin empirically. Urine culture finalized with mixed markus, less than 25,000 CFU per mL.  Will discontinue Rocephin.  Patient afebrile.  Denies any urinary symptoms.  Rowell catheter has been removed previously.    · Acute hypokalemia: Resolved with supplementation. Continue to replace per protocol as necessary.  Repeat chemistry panel in the morning.  · Hypomagnesemia Continue to replace per protocol.  Repeat magnesium level in the morning.    · Hypophosphatemia: Resolved with supplementation. Replace per protocol.  Repeat phosphorus level in the morning.  · History of coronary artery disease status post stenting in the past: No chest pain or symptoms of ACS.  Closely monitor on telemetry. Continue medical therapy.   · Paroxysmal atrial fibrillation: Cont Coreg for rate control, she remains paced. Cont telemetry monitoring. Eliquis for anticoagulation.   · Essential hypertension: BP moderately controlled. Cont Coreg and home losartan, she was increased to her home dose yesterday. Closely monitor and titrate medications as necessary.   · Hyperlipidemia: Continue statin therapy.   · History of primary hyperparathyroidism: Monitor. Calcium low but stable.   · History of third-degree AV block status post permanent pacemaker implantation: Telemetry monitoring.   · Hyperglycemia: A1C 5.70. Monitor with daily chemistry, no indication for SSI at this time.   · Stage III CKD:  Appears stable.  Avoid nephrotoxins. Monitor closely, repeat chem panel in AM.   · Vitamin D deficiency: Cholecalciferol supplementation.   · Mild hypoalbuminemia: Likely multifactorial. Encourage PO intake. Monitor closely.   · Anxiety: Continue Buspar.   · Microscopic hematuria/proteinuria: Protein/creatinine ratio elevated, likely hypertensive nephropathy. Closely monitor, recommend outpatient follow up once stabilized from surgery standpoint. Treat HTN as previously mentioned.   · Macrocytic anemia: Folate and vitamin B12 levels pending.  Supplemented patient found to be deficient.  Hemoglobin stable.  Likely ABLA postoperatively.  Closely monitor H&H, transfuse should hemoglobin drop below 7.0.  Repeat CBC in a.m.  · Vitamin D deficiency: Cholecalciferol 50,000 units weekly x8 weeks.  · F/E/N: Gentle IV fluids. Replace electrolytes per protocol as necessary. Cardiac diet.     --------------------------------------------------  DVT Prophylaxis:  Eliquis to serve   GI Prophylaxis: Protonix   Activity: Up with assistance, PT    The patient is high risk due to the following diagnoses/reasons:  Hip fracture, advanced age, CAD, UTI, electrolyte abnormalities, afib, anticoagulation    I have discussed the patient's assessment and plan with the patient and nursing staff.     Disposition: Pending possible swing bed admission, insurance denied inpatient rehab admission previously.     Alize Parsons PA-C  Hospitalist Service -- [facility]   Pager: [phone]    06/10/19  11:32 AM    Attending Physician: Jeffrey Parsons, *    ----------------------------------------------------------------------------------------------------------------------        Electronically signed by Alize Parsons PA at 6/10/2019 11:40 AM       Consult Notes (last 24 hours) (Notes from 6/9/2019  2:19 PM through 6/10/2019  2:19 PM)     No notes of this type exist for this encounter.        :  6/6/2019  1:02 PM             Signed        Expand All Collapse All         Acute Care - Occupational Therapy Initial Evaluation   Umer     Patient Name: Abbi Mendez                  : 1935                        MRN: [id number]  Today's Date: 2019                     Onset of Illness/Injury or Date of Surgery: 19  Date of Referral to OT: 19                  Referring Physician: Dr. Jain                      Admit Date: 2019         ICD-10-CM ICD-9-CM   1. Left displaced femoral neck fracture (CMS/HCC) S72.002A 820.8   2. Strain of right shoulder, initial encounter S46.911A 840.9   3. Hypokalemia E87.6 276.8          Patient Active Problem List   Diagnosis   • Atrial fibrillation (CMS/HCC)   • Coronary artery disease involving native coronary artery of native heart without angina pectoris   • RADHA (obstructive sleep apnea)   • Essential hypertension   • Hyperlipidemia LDL goal <70   • Iron deficiency anemia   • Left bundle branch block   • Syncope and collapse   • Severe sinus bradycardia   • Third degree AV block (CMS/HCC)   • Syncope   • Compression deformity of vertebra   • Radiculopathy   • Pericardial effusion without cardiac tamponade   • Hypercalcemia   • Abnormal serum protein electrophoresis   • Weight loss, unintentional   • Primary hyperparathyroidism (CMS/HCC)   • Hypercalcemia   • Pacemaker   • Hiatal hernia   • Elevated serum immunoglobulin free light chains   • Left displaced femoral neck fracture (CMS/HCC)      Medical History        Past Medical History:   Diagnosis Date   • Anxiety     • Arthritis     • Atrial fibrillation (CMS/HCC) 2016   • Gastric ulcer     • Hiatal hernia     • Hypertension     • Iron deficiency anemia     • RADHA (obstructive sleep apnea) 2016         Surgical History         Past Surgical History:   Procedure Laterality Date   • CARDIAC ELECTROPHYSIOLOGY PROCEDURE N/A 2018     Procedure: Pacemaker DC new;  Surgeon: Soraya Darling MD;  Location: [facility]  INVASIVE LOCATION;  Service:    • TOTAL KNEE ARTHROPLASTY Right     • TUBAL ABDOMINAL LIGATION                                 OT ASSESSMENT FLOWSHEET (last 12 hours)                    Occupational Therapy Evaluation                   Row Name 06/06/19 1230                                        OT Evaluation Time/Intention      Subjective Information  complains of;weakness  -BF              Document Type  evaluation  -BF              Mode of Treatment  occupational therapy  -BF              Patient Effort  good  -BF              Comment  Pt's daughter reports pt has been falling frequently and pt presents with decreased strength. Pt's daughters work and pt requires assistance with ADLs at this time. Pt would benefit from skilled inpatient therapy to increase strength, independence with ADLs, and fxal mobility.   -BF                            General Information      Patient Profile Reviewed?  yes  -BF              Onset of Illness/Injury or Date of Surgery  06/04/19  -BF              Referring Physician  Dr. Jain  -BF              Patient Observations  alert;agree to therapy;cooperative  -BF              Patient/Family Observations  Pt supine in bed, awake & alert, daughter present.  -BF              Prior Level of Function  -- Pt reports she was independent prior to shoulder/hand injury  -BF              Equipment Currently Used at Home  shower chair Pt has BSC, walker, cane, and shower chair  -BF              Pertinent History of Current Functional Problem  Pt & daughter report history of frequent falls. Pt reports she fell a couple of weeks ago resulting in hand fx and injured shoulder. Pt had another fall resulting in hip fx.   -BF              Existing Precautions/Restrictions  fall;hip;other (see comments) R hand fx splint  -BF              Barriers to Rehab  physical barrier  -BF                            Relationship/Environment      Family Caregiver if Needed  child(ananya), adult  -BF                             Cognitive Assessment/Intervention- PT/OT      Orientation Status (Cognition)  oriented x 3  -BF              Follows Commands (Cognition)  follows one step commands;verbal cues/prompting required  -BF                            ADL Assessment/Intervention      BADL Assessment/Intervention  bathing;upper body dressing;lower body dressing;grooming;feeding;toileting  -BF                            Bathing Assessment/Intervention      Comment (Bathing)  Max A  -BF                            Upper Body Dressing Assessment/Training      Comment (Upper Body Dressing)  Max A  -BF                            Lower Body Dressing Assessment/Training      Comment (Lower Body Dressing)  Total A  -BF                            Grooming Assessment/Training      Comment (Grooming)  Mod A  -BF                            Self-Feeding Assessment/Training      Comment (Feeding)  Set-up  -BF                            Toileting Assessment/Training      Comment (Toileting)  Total A  -BF                            BADL Safety/Performance      Impairments, BADL Safety/Performance  endurance/activity tolerance;range of motion;strength  -BF                            General ROM      GENERAL ROM COMMENTS  R shoulder trace, remaining BUE AROM WFL  -BF                            MMT (Manual Muscle Testing)      General MMT Comments  R shoulder 1/5; remaining BUE grossly 3/5  -BF                            Positioning and Restraints      Post Treatment Position  bed  -BF              In Bed  supine;call light within reach;encouraged to call for assist;with family/caregiver  -BF                            Wound 06/05/19 1838 Left hip incision      Wound - Properties Group Date first assessed: 06/05/19  -KH Time first assessed: 1838  -KH Side: Left  - Location: hip  -KH Type: incision  -KH                    Plan of Care Review      Plan of Care Reviewed With  patient;daughter  -BF                            Clinical Impression (OT)       Date of Referral to OT  06/05/19  -BF              OT Diagnosis  L hip bipolar replacement  -BF              Patient/Family Goals Statement (OT Eval)  To increase independence with self-care, get stronger  -BF              Criteria for Skilled Therapeutic Interventions Met (OT Eval)  yes  -BF              Rehab Potential (OT Eval)  good, to achieve stated therapy goals  -BF              Therapy Frequency (OT Eval)  3 times/wk  -BF              Predicted Duration of Therapy Intervention (Therapy Eval)  1-2 weeks  -BF              Anticipated Equipment Needs at Discharge (OT)  -- TBD  -BF              Anticipated Discharge Disposition (OT)  inpatient rehabilitation facility  -BF                            Planned OT Interventions      Planned Therapy Interventions (OT Eval)  activity tolerance training;adaptive equipment training;BADL retraining;ROM/therapeutic exercise;strengthening exercise;transfer/mobility retraining;passive ROM/stretching  -BF                            OT Goals      Dressing Goal Selection (OT)  dressing, OT goal 1  -BF              Strength Goal Selection (OT)  strength, OT goal 1  -BF              Additional Documentation  Strength Goal Selection (OT) (Row)  -BF                            Dressing Goal 1 (OT)      Activity/Assistive Device (Dressing Goal 1, OT)  upper body dressing  -BF              [place]/Cues Needed (Dressing Goal 1, OT)  minimum assist (75% or more patient effort)  -BF              Time Frame (Dressing Goal 1, OT)  long term goal (LTG);by discharge  -BF                            Strength Goal 1 (OT)      Strength Goal 1 (OT)  Increase BUE MMT score to 4-/5 through available AROM.   -BF              Time Frame (Strength Goal 1, OT)  long term goal (LTG);by discharge  -BF                                User Key  (r) = Recorded By, (t) = Taken By, (c) = Cosigned By     Initials Name Effective Dates      Ary Sylvester RN 06/16/16 -      BF Katia Enriquez  DEEP Reid 04/03/18 -                       OT Recommendation and Plan  Outcome Summary/Treatment Plan (OT)  Anticipated Equipment Needs at Discharge (OT): (TBD)  Anticipated Discharge Disposition (OT): inpatient rehabilitation facility  Planned Therapy Interventions (OT Eval): activity tolerance training, adaptive equipment training, BADL retraining, ROM/therapeutic exercise, strengthening exercise, transfer/mobility retraining, passive ROM/stretching  Therapy Frequency (OT Eval): 3 times/wk  Plan of Care Review  Plan of Care Reviewed With: patient, daughter  Plan of Care Reviewed With: patient, daughter                Outcome Measures                    Row Name 06/06/19 1300                           How much help from another is currently needed...     Putting on and taking off regular lower body clothing?  1  -BF         Bathing (including washing, rinsing, and drying)  2  -BF         Toileting (which includes using toilet bed pan or urinal)  1  -BF         Putting on and taking off regular upper body clothing  2  -BF         Taking care of personal grooming (such as brushing teeth)  2  -BF         Eating meals  3  -BF         Score  11  -BF                     Functional Assessment     Outcome Measure Options  AM-PAC 6 Clicks Daily Activity (OT)  -BF                           User Key  (r) = Recorded By, (t) = Taken By, (c) = Cosigned By     Initials Name Provider Type     Katia Menjivar OT Occupational Therapist                Time Calculation:              Time Calculation- OT                 Row Name 06/06/19 1301                           Time Calculation- OT     Total Timed Code Minutes- OT  45 minute(s)  -BF                           User Key  (r) = Recorded By, (t) = Taken By, (c) = Cosigned By     Initials Name Provider Type     Katia Menjivar OT Occupational Therapist                    Therapy Charges for Today      Code Description Service Date Service Provider Modifiers Qty      [id number]  OT EVAL HIGH COMPLEXITY 3 2019 Katia Enriquez OT                     Acute Care - Physical Therapy Treatment Note  BALWINDER Owusu     Patient Name: Abbi Mendez                  : 1935                        MRN: [id number]  Today's Date: 2019                     Onset of Illness/Injury or Date of Surgery: 19  Date of Referral to PT: 19                  Referring Physician: Dr. Jain                      Admit Date: 2019     Visit Dx:      ICD-10-CM ICD-9-CM   1. Left displaced femoral neck fracture (CMS/HCC) S72.002A 820.8   2. Strain of right shoulder, initial encounter S46.911A 840.9   3. Hypokalemia E87.6 276.8          Patient Active Problem List   Diagnosis   • Atrial fibrillation (CMS/HCC)   • Coronary artery disease involving native coronary artery of native heart without angina pectoris   • RADHA (obstructive sleep apnea)   • Essential hypertension   • Hyperlipidemia LDL goal <70   • Iron deficiency anemia   • Left bundle branch block   • Syncope and collapse   • Severe sinus bradycardia   • Third degree AV block (CMS/HCC)   • Syncope   • Compression deformity of vertebra   • Radiculopathy   • Pericardial effusion without cardiac tamponade   • Hypercalcemia   • Abnormal serum protein electrophoresis   • Weight loss, unintentional   • Primary hyperparathyroidism (CMS/HCC)   • Hypercalcemia   • Pacemaker   • Hiatal hernia   • Elevated serum immunoglobulin free light chains   • Left displaced femoral neck fracture (CMS/HCC)         Therapy Treatment                  Rehabilitation Treatment Summary                      Row Name 19 1000                               Treatment Time/Intention       Discipline  physical therapist  -AD           Document Type  therapy note (daily note)  -AD           Subjective Information  complains of;weakness;fatigue;pain  -AD           Mode of Treatment  physical therapy  -AD           Patient/Family Observations  Pt  supine in bed with nsg present and left IV. She was alert and agreeable to therapy.  -AD           Patient Effort  good  -AD           Comment  No family or visitors present.  -AD           Patient Response to Treatment  Pt tolerated today's session well, ambulating 20' with RW and minimal assistance. Bed mobility and sit<>stand transfers were performed with min assist.  -AD           Recorded by [AD] Dalton, Ashley Claudene, PT 06/08/19 1009                        Row Name 06/08/19 1000                               Cognitive Assessment/Intervention- PT/OT       Orientation Status (Cognition)  oriented x 3  -AD           Follows Commands (Cognition)  WFL;follows one step commands;50-74% accuracy  -AD           Recorded by [AD] Dalton, Ashley Claudene, PT 06/08/19 1009                        Row Name 06/08/19 1000                               Bed Mobility Assessment/Treatment       Bed Mobility Assessment/Treatment  scooting/bridging;supine-sit  -AD           Scooting/Bridging [place] (Bed Mobility)  verbal cues;nonverbal cues (demo/gesture);minimum assist (75% patient effort)  -AD           Supine-Sit [place] (Bed Mobility)  verbal cues;nonverbal cues (demo/gesture);minimum assist (75% patient effort)  -AD           Bed Mobility, Safety Issues  decreased use of legs for bridging/pushing  -AD           Assistive Device (Bed Mobility)  bed rails  -AD           Recorded by [AD] Dalton, Ashley Claudene, PT 06/08/19 1009                        Row Name 06/08/19 1000                               Sit-Stand Transfer       Sit-Stand [place] (Transfers)  verbal cues;nonverbal cues (demo/gesture);minimum assist (75% patient effort)  -AD           Assistive Device (Sit-Stand Transfers)  walker, front-wheeled  -AD           Recorded by [AD] Dalton, Ashley Claudene, PT 06/08/19 1009                        Row Name 06/08/19 1000                               Stand-Sit Transfer       Stand-Sit [place]  (Transfers)  verbal cues;nonverbal cues (demo/gesture);minimum assist (75% patient effort)  -AD           Assistive Device (Stand-Sit Transfers)  walker, front-wheeled  -AD           Recorded by [AD] Dalton, Ashley Claudene, PT 06/08/19 1009                        Row Name 06/08/19 1000                               Gait/Stairs Assessment/Training       [place] Level (Gait)  verbal cues;nonverbal cues (demo/gesture);minimum assist (75% patient effort)  -AD           Assistive Device (Gait)  walker, front-wheeled  -AD           Distance in Feet (Gait)  20 Pt report's increased pain, just received medication.  -AD           Pattern (Gait)  step-to  -AD           Deviations/Abnormal Patterns (Gait)  antalgic;stride length decreased  -AD           Bilateral Gait Deviations  forward flexed posture;weight shift ability decreased  -AD           Recorded by [AD] Dalton, Ashley Claudene, PT 06/08/19 1009                        Row Name 06/08/19 1000                               Therapeutic Exercise       Therapeutic Exercise  seated, lower extremities  -AD           Recorded by [AD] Dalton, Ashley Claudene, PT 06/08/19 1009                        Row Name 06/08/19 1000                               Lower Extremity Seated Therapeutic Exercise       Performed, Seated Lower Extremity (Therapeutic Exercise)  hip flexion/extension;ankle dorsiflexion/plantarflexion;LAQ (long arc quad), knee extension  -AD           Exercise Type, Seated Lower Extremity (Therapeutic Exercise)  AAROM (active assistive range of motion);AROM (active range of motion)  -AD           Transfers Skills, Training to Functional Activity, Seated Lower Extremity (Therapeutic Exercise)  transfers skills to functional activity most of the time  -AD           Recorded by [AD] Dalton, Ashley Claudene, PT 06/08/19 1009                        Row Name 06/08/19 1000                               Positioning and Restraints       Pre-Treatment Position  in bed  Nsg present  -AD           In Chair  sitting;call light within reach;with nsg Pillow under left heel, per pt request.  -AD           Recorded by [AD] Dalton, Ashley Claudene, PT 06/08/19 1009                        Row Name 06/08/19 1000                               Pain Scale: Numbers Pre/Post-Treatment       Pain Location - Side  Left  -AD           Pain Location  hip  -AD           Pain Intervention(s)  Repositioned;Rest Nsg provided medication immediately prior to session.  -AD           Recorded by [AD] Dalton, Ashley Claudene, PT 06/08/19 1009                        Row Name 06/08/19 1000                               Pain Scale: FACES Pre/Post-Treatment       Pain: FACES Scale, Pretreatment  4-->hurts little more  -AD           Pain: FACES Scale, Post-Treatment  4-->hurts little more  -AD           Recorded by [AD] Dalton, Ashley Claudene, PT 06/08/19 1009                        Row Name                                    Wound 06/05/19 1838 Left hip incision       Wound - Properties Group Date first assessed: 06/05/19 [KH] Time first assessed: 1838 [KH] Side: Left [KH] Location: hip [KH] Type: incision [KH] Recorded by:  [KH] Ary Sylvester RN 06/05/19 1838                    Row Name 06/08/19 1000                               Plan of Care Review       Plan of Care Reviewed With  patient  -AD           Recorded by [AD] Dalton, Ashley Claudene, PT 06/08/19 1009                        Row Name 06/08/19 1000                               Outcome Summary/Treatment Plan (PT)       Daily Summary of Progress (PT)  progress towards functional goals is fair  -AD           Recorded by [AD] Dalton, Ashley Claudene, PT 06/08/19 1009                               User Key  (r) = Recorded By, (t) = Taken By, (c) = Cosigned By     Initials Name Effective Dates Discipline     AD Dalton, Ashley Claudene, PT 04/03/18 -  PT     Ary Coyne RN 06/16/16 -  Nurse                     Wound 06/05/19 1838 Left  hip incision (Active)   Dressing Appearance dry;intact 6/7/2019  9:03 PM                      Physical Therapy Education              Title: PT OT SLP Therapies (Done)                Topic: Physical Therapy (Done)                Point: Mobility training (Done)                Learning Progress Summary                 Patient Acceptance, E,TB, VU by AD at 6/8/2019 10:09 AM     Acceptance, E, VU,NR by LL at 6/7/2019  4:05 PM     Acceptance, E,TB, VU by TT at 6/7/2019 12:26 AM     Acceptance, H, VU,NR by AG at 6/6/2019  1:28 PM     Comment:  Reviewed written JANET precautions     Acceptance, E,D, VU,NR by AG at 6/6/2019  1:13 PM     Comment:  PT reviewed and instructed in HEP, JANET precautions.   Family Acceptance, H, VU,NR by AG at 6/6/2019  1:28 PM     Comment:  Reviewed written JANET precautions                                   Point: Home exercise program (Done)                Learning Progress Summary                 Patient Acceptance, E,TB, VU by AD at 6/8/2019 10:09 AM     Acceptance, E, VU,NR by LL at 6/7/2019  4:05 PM     Acceptance, E,TB, VU by TT at 6/7/2019 12:26 AM     Acceptance, H, VU,NR by AG at 6/6/2019  1:28 PM     Comment:  Reviewed written JANET precautions     Acceptance, E,D, VU,NR by AG at 6/6/2019  1:13 PM     Comment:  PT reviewed and instructed in HEP, JANET precautions.   Family Acceptance, H, VU,NR by AG at 6/6/2019  1:28 PM     Comment:  Reviewed written JANET precautions                                   Point: Body mechanics (Done)                Learning Progress Summary                 Patient Acceptance, E,TB, VU by AD at 6/8/2019 10:09 AM     Acceptance, E, VU,NR by LL at 6/7/2019  4:05 PM     Acceptance, E,TB, VU by TT at 6/7/2019 12:26 AM     Acceptance, H, VU,NR by AG at 6/6/2019  1:28 PM     Comment:  Reviewed written JANET precautions     Acceptance, E,D, VU,NR by AG at 6/6/2019  1:13 PM     Comment:  PT reviewed and instructed in HEP, JANET precautions.   Family Acceptance, H, VU,NR by AG at  6/6/2019  1:28 PM     Comment:  Reviewed written JANET precautions                                   Point: Precautions (Done)                Learning Progress Summary                 Patient Acceptance, E,TB, VU by AD at 6/8/2019 10:09 AM     Acceptance, E, VU,NR by LL at 6/7/2019  4:05 PM     Acceptance, E,TB, VU by TT at 6/7/2019 12:26 AM     Acceptance, H, VU,NR by AG at 6/6/2019  1:28 PM     Comment:  Reviewed written JANET precautions     Acceptance, E,D, VU,NR by AG at 6/6/2019  1:13 PM     Comment:  PT reviewed and instructed in HEP, JANET precautions.   Family Acceptance, H, VU,NR by AG at 6/6/2019  1:28 PM     Comment:  Reviewed written JANET precautions                                                 User Key               Initials Effective Dates Name Provider Type Discipline      AD 04/03/18 -  Dalton, Ashley Claudene, PT Physical Therapist PT      TT 06/08/18 -  Frida Nugent, RN Registered Nurse Nurse      AG 04/03/18 -  Sandy Covington, PT Physical Therapist PT      LL 05/02/16 -  Loyda Vega PTA Physical Therapy Assistant PT                        PT Recommendation and Plan  Outcome Summary/Treatment Plan (PT)  Daily Summary of Progress (PT): progress towards functional goals is fair  Plan of Care Reviewed With: patient  Progress: improving  Outcome Summary: Pt tolerated session well, including gait training and therapeutic exercises. She will be progressed as tolerated.             Outcome Measures                    Row Name 06/08/19 1000 06/07/19 1600 06/06/19 4876                 How much help from another person do you currently need...     Turning from your back to your side while in flat bed without using bedrails?  3  -AD  3  -LL  3  -AG     Moving from lying on back to sitting on the side of a flat bed without bedrails?  3  -AD  3  -LL  3  -AG     Moving to and from a bed to a chair (including a wheelchair)?  3  -AD  3  -LL  3  -AG     Standing up from a chair using your arms (e.g.,  wheelchair, bedside chair)?  3  -AD  3  -LL  3  -AG     Climbing 3-5 steps with a railing?  2  -AD  2  -LL  2  -AG     To walk in hospital room?  3  -AD  3  -LL  3  -AG     AM-PAC 6 Clicks Score  17  -AD  17  -LL  17  -AG                 Functional Assessment     Outcome Measure Options  AM-PAC 6 Clicks Basic Mobility (PT)  -AD  AM-PAC 6 Clicks Basic Mobility (PT);Other Outcome Measure  -LL  AM-PAC 6 Clicks Basic Mobility (PT);Other Outcome Measure  -AG                Row Name 06/06/19 1300                           How much help from another is currently needed...     Putting on and taking off regular lower body clothing?  1  -BF         Bathing (including washing, rinsing, and drying)  2  -BF         Toileting (which includes using toilet bed pan or urinal)  1  -BF         Putting on and taking off regular upper body clothing  2  -BF         Taking care of personal grooming (such as brushing teeth)  2  -BF         Eating meals  3  -BF         Score  11  -BF                     Functional Assessment     Outcome Measure Options  AM-PAC 6 Clicks Daily Activity (OT)  -BF                           User Key  (r) = Recorded By, (t) = Taken By, (c) = Cosigned By     Initials Name Provider Type     AD Dalton, Ashley Claudene, PT Physical Therapist     BF Katia Enriquez, OT Occupational Therapist     Sandy Sloan, PT Physical Therapist     LL Loyda Vega, PTA Physical Therapy Assistant              Time Calculation:              PT Charges                 Row Name 06/08/19 1010                           Time Calculation     Start Time  -- 25 minutes  -AD         PT Received On  06/08/19  -AD         PT - Next Appointment  06/10/19  -AD         PT Goal Re-Cert Due Date  06/20/19  -AD                           User Key  (r) = Recorded By, (t) = Taken By, (c) = Cosigned By     Initials Name Provider Type     AD Dalton, Ashley Claudene, PT Physical Therapist                    Therapy Charges for Today       Code Description Service Date Service Provider Modifiers Qty     77686670413  PT THER PROC EA 15 MIN 6/8/2019 Dalton, Ashley Claudene, PT GP 1     69105551109 HC PT THER SUPP EA 15 MIN 6/8/2019 Dalton, Ashley Claudene, PT GP 1     [id number]  GAIT TRAINING EA 15 MIN 6/8/2019 Dalton, Ashley Claudene, PT GP 1             PT G-Codes  Outcome Measure Options: AM-PAC 6 Clicks Basic Mobility (PT)  AM-PAC 6 Clicks Score: 17  Score: 11     Ashley Claudene Dalton, PT              6/8/2019

## 2019-06-10 NOTE — THERAPY TREATMENT NOTE
Acute Care - Physical Therapy Treatment Note   [facility]     Patient Name: Abbi Mendez  : 1935  MRN: [id number]  Today's Date: 6/10/2019  Onset of Illness/Injury or Date of Surgery: 19  Date of Referral to PT: 19  Referring Physician: Dr. Jain    Admit Date: 2019    Visit Dx:    ICD-10-CM ICD-9-CM   1. Left displaced femoral neck fracture (CMS/HCC) S72.002A 820.8   2. Strain of right shoulder, initial encounter S46.911A 840.9   3. Hypokalemia E87.6 276.8     Patient Active Problem List   Diagnosis   • Atrial fibrillation (CMS/HCC)   • Coronary artery disease involving native coronary artery of native heart without angina pectoris   • RADHA (obstructive sleep apnea)   • Essential hypertension   • Hyperlipidemia LDL goal <70   • Iron deficiency anemia   • Left bundle branch block   • Syncope and collapse   • Severe sinus bradycardia   • Third degree AV block (CMS/HCC)   • Syncope   • Compression deformity of vertebra   • Radiculopathy   • Pericardial effusion without cardiac tamponade   • Hypercalcemia   • Abnormal serum protein electrophoresis   • Weight loss, unintentional   • Primary hyperparathyroidism (CMS/HCC)   • Hypercalcemia   • Pacemaker   • Hiatal hernia   • Elevated serum immunoglobulin free light chains   • Left displaced femoral neck fracture (CMS/HCC)       Therapy Treatment    Rehabilitation Treatment Summary     Row Name 06/10/19 1846             Treatment Time/Intention    Discipline  physical therapist  -AG      Document Type  therapy note (daily note)  -AG      Subjective Information  complains of;pain  -AG      Mode of Treatment  individual therapy;physical therapy  -AG      Patient/Family Observations  pt. supine; pleasant and cooperative  -AG      Existing Precautions/Restrictions  fall;hip;left  -AG      Patient Response to Treatment  pt. continues to progress with functional mobility; has dificulty repeating JANET precautions.   -AG      Recorded by [AG] Sandy Covington,  PT 06/10/19 1851      Row Name 06/10/19 1846             Cognitive Assessment/Intervention- PT/OT    Orientation Status (Cognition)  oriented x 3  -AG      Follows Commands (Cognition)  WFL;follows one step commands  -AG      Recorded by [AG] Sandy Covington, PT 06/10/19 1851      Row Name 06/10/19 1846             Safety Issues, Functional Mobility    Safety Issues Affecting Function (Mobility)  safety precaution awareness;safety precautions follow-through/compliance  -AG      Recorded by [AG] Sandy Covington, PT 06/10/19 1851      Row Name 06/10/19 1846             Mobility Assessment/Intervention    Extremity Weight-bearing Status  left lower extremity;right lower extremity  -AG      Left Lower Extremity (Weight-bearing Status)  weight-bearing as tolerated (WBAT)  -AG      Right Lower Extremity (Weight-bearing Status)  weight-bearing as tolerated (WBAT)  -AG      Recorded by [AG] Sandy Covington, PT 06/10/19 1851      Row Name 06/10/19 1846             Bed Mobility Assessment/Treatment    Bed Mobility Assessment/Treatment  scooting/bridging;supine-sit  -AG      Scooting/Bridging [place] (Bed Mobility)  verbal cues;nonverbal cues (demo/gesture);minimum assist (75% patient effort)  -AG      Supine-Sit [place] (Bed Mobility)  verbal cues;nonverbal cues (demo/gesture);minimum assist (75% patient effort)  -AG      Bed Mobility, Safety Issues  decreased use of legs for bridging/pushing  -AG      Assistive Device (Bed Mobility)  bed rails  -AG      Recorded by [AG] Sandy Covington, PT 06/10/19 1851      Row Name 06/10/19 1846             Transfer Assessment/Treatment    Transfer Assessment/Treatment  sit-stand transfer;stand-sit transfer;stand pivot/stand step transfer  -AG      Recorded by [AG] Sandy Covington, PT 06/10/19 1851      Row Name 06/10/19 1846             Sit-Stand Transfer    Sit-Stand [place] (Transfers)  verbal cues;nonverbal cues (demo/gesture);minimum assist (75% patient effort)  -AG       Assistive Device (Sit-Stand Transfers)  walker, front-wheeled  -AG      Recorded by [AG] Sandy Covington, PT 06/10/19 1851      Row Name 06/10/19 1846             Stand-Sit Transfer    Stand-Sit [place] (Transfers)  verbal cues;nonverbal cues (demo/gesture);minimum assist (75% patient effort)  -AG      Assistive Device (Stand-Sit Transfers)  walker, front-wheeled  -AG      Recorded by [AG] Sandy Covington, PT 06/10/19 1851      Row Name 06/10/19 1846             Stand Pivot/Stand Step Transfer    Stand Pivot/Stand Step [place]  verbal cues;nonverbal cues (demo/gesture);minimum assist (75% patient effort)  -AG      Assistive Device (Stand Pivot Stand Step Transfer)  walker, front-wheeled  -AG      Recorded by [AG] Sandy Covington, PT 06/10/19 1851      Row Name 06/10/19 1846             Gait/Stairs Assessment/Training    Gait/Stairs Assessment/Training  gait/ambulation independence;gait/ambulation assistive device;distance ambulated;gait pattern;gait deviations  -AG      [place] Level (Gait)  verbal cues;nonverbal cues (demo/gesture);minimum assist (75% patient effort)  -AG      Assistive Device (Gait)  walker, front-wheeled  -AG      Distance in Feet (Gait)  30  -AG      Pattern (Gait)  step-through  -AG      Deviations/Abnormal Patterns (Gait)  antalgic;stride length decreased  -AG      Bilateral Gait Deviations  forward flexed posture;heel strike decreased  -AG      Recorded by [AG] Sandy Covington, PT 06/10/19 1851      Row Name 06/10/19 1846             Therapeutic Exercise    Therapeutic Exercise  seated, lower extremities  -AG      Additional Documentation  Therapeutic Exercise (Row)  -AG      Recorded by [AG] Sandy Covington, PT 06/10/19 1851      Row Name 06/10/19 1846             Lower Extremity Seated Therapeutic Exercise    Performed, Seated Lower Extremity (Therapeutic Exercise)  hip abduction/adduction;ankle dorsiflexion/plantarflexion;LAQ (long arc quad), knee extension  -AG      Exercise  Type, Seated Lower Extremity (Therapeutic Exercise)  AAROM (active assistive range of motion);AROM (active range of motion)  -AG      Sets/Reps Detail, Seated Lower Extremity (Therapeutic Exercise)  1 x 20  -AG      Transfers Skills, Training to Functional Activity, Seated Lower Extremity (Therapeutic Exercise)  able to transfer skills from training to functional activity  -AG      Recorded by [AG] Sandy Cvoington, PT 06/10/19 1851      Row Name 06/10/19 1846             Positioning and Restraints    Pre-Treatment Position  in bed  -AG      Post Treatment Position  chair  -AG      In Chair  notified nsg;sitting;call light within reach;encouraged to call for assist;with family/caregiver  -AG      Recorded by [AG] Sandy Covington, PT 06/10/19 1851      Row Name 06/10/19 1846             Pain Scale: Numbers Pre/Post-Treatment    Pain Location - Side  Left  -AG      Pain Location  hip  -AG      Pain Intervention(s)  Repositioned;Ambulation/increased activity  -AG      Recorded by [AG] Sandy Covington, PT 06/10/19 1851      Row Name 06/10/19 1846             Pain Scale: FACES Pre/Post-Treatment    Pain: FACES Scale, Pretreatment  4-->hurts little more  -AG      Pain: FACES Scale, Post-Treatment  4-->hurts little more  -AG      Recorded by [AG] Sandy Covington, PT 06/10/19 1851      Row Name                Wound 06/05/19 1838 Left hip incision    Wound - Properties Group Date first assessed: 06/05/19 [KH] Time first assessed: 1838 [KH] Side: Left [KH] Location: hip [KH] Type: incision [KH] Recorded by:  [KH] Ary Sylvester RN 06/05/19 1838    Row Name 06/10/19 1846             Coping    Observed Emotional State  accepting;calm;cooperative;pleasant  -AG      Verbalized Emotional State  acceptance  -AG      Recorded by [AG] Sandy Covington, PT 06/10/19 1851      Row Name 06/10/19 1846             Plan of Care Review    Plan of Care Reviewed With  patient  -AG      Recorded by [AG] Sandy Covington, PT 06/10/19 1851      Row  Name 06/10/19 1846             Outcome Summary/Treatment Plan (PT)    Daily Summary of Progress (PT)  progress towards functional goals is fair  -AG      Plan for Continued Treatment (PT)  continue POC  -AG      Recorded by [AG] Sandy Covington, PT 06/10/19 1858        User Key  (r) = Recorded By, (t) = Taken By, (c) = Cosigned By    Initials Name Effective Dates Discipline     Ary Sylvester RN 06/16/16 -  Nurse    AG Sandy Covington, PT 04/03/18 -  PT          Wound 06/05/19 1838 Left hip incision (Active)   Dressing Appearance dry;intact 6/10/2019  9:00 AM   Closure Approximated;Liquid skin adhesive 6/9/2019  8:53 PM   Base dry 6/10/2019  9:00 AM   Periwound intact;dry 6/9/2019  8:53 PM   Periwound Temperature warm 6/9/2019  8:53 PM   Periwound Skin Turgor firm 6/9/2019  8:53 PM   Edges other (see comments) 6/9/2019  8:53 PM   Drainage Amount none 6/9/2019  8:53 PM           Physical Therapy Education     Title: PT OT SLP Therapies (Done)     Topic: Physical Therapy (Done)     Point: Mobility training (Done)     Learning Progress Summary           Patient Acceptance, E, VU by CL at 6/9/2019 10:07 AM    Acceptance, E, NR by CL at 6/8/2019 10:46 AM    Acceptance, E,TB, VU by AD at 6/8/2019 10:09 AM    Acceptance, E, VU,NR by LL at 6/7/2019  4:05 PM    Acceptance, E,TB, VU by TT at 6/7/2019 12:26 AM    Acceptance, H, VU,NR by AG at 6/6/2019  1:28 PM    Comment:  Reviewed written JANET precautions    Acceptance, E,D, VU,NR by AG at 6/6/2019  1:13 PM    Comment:  PT reviewed and instructed in HEP, JANET precautions.   Family Acceptance, H, VU,NR by AG at 6/6/2019  1:28 PM    Comment:  Reviewed written JANET precautions                   Point: Home exercise program (Done)     Learning Progress Summary           Patient Acceptance, E, VU by CL at 6/9/2019 10:07 AM    Acceptance, E, NR by CL at 6/8/2019 10:46 AM    Acceptance, E,TB, VU by AD at 6/8/2019 10:09 AM    JEANIE Dominguez VU, NR by ZECHARIAH at 6/7/2019  4:05 PM     Acceptance, E,TB, VU by TT at 6/7/2019 12:26 AM    Acceptance, H, VU,NR by AG at 6/6/2019  1:28 PM    Comment:  Reviewed written JANET precautions    Acceptance, E,D, VU,NR by AG at 6/6/2019  1:13 PM    Comment:  PT reviewed and instructed in HEP, JANET precautions.   Family Acceptance, H, VU,NR by AG at 6/6/2019  1:28 PM    Comment:  Reviewed written JANET precautions                   Point: Body mechanics (Done)     Learning Progress Summary           Patient Acceptance, E, VU by CL at 6/9/2019 10:07 AM    Acceptance, E, NR by CL at 6/8/2019 10:46 AM    Acceptance, E,TB, VU by AD at 6/8/2019 10:09 AM    Acceptance, E, VU,NR by LL at 6/7/2019  4:05 PM    Acceptance, E,TB, VU by TT at 6/7/2019 12:26 AM    Acceptance, H, VU,NR by AG at 6/6/2019  1:28 PM    Comment:  Reviewed written JANET precautions    Acceptance, E,D, VU,NR by AG at 6/6/2019  1:13 PM    Comment:  PT reviewed and instructed in HEP, JANET precautions.   Family Acceptance, H, VU,NR by AG at 6/6/2019  1:28 PM    Comment:  Reviewed written JANET precautions                   Point: Precautions (Done)     Learning Progress Summary           Patient Acceptance, E, VU by CL at 6/9/2019 10:07 AM    Acceptance, E, NR by CL at 6/8/2019 10:46 AM    Acceptance, E,TB, VU by AD at 6/8/2019 10:09 AM    Acceptance, E, VU,NR by LL at 6/7/2019  4:05 PM    Acceptance, E,TB, VU by TT at 6/7/2019 12:26 AM    Acceptance, H, VU,NR by AG at 6/6/2019  1:28 PM    Comment:  Reviewed written JANET precautions    Acceptance, E,D, VU,NR by AG at 6/6/2019  1:13 PM    Comment:  PT reviewed and instructed in HEP, JANET precautions.   Family Acceptance, H, VU,NR by AG at 6/6/2019  1:28 PM    Comment:  Reviewed written JANET precautions                               User Key     Initials Effective Dates Name Provider Type Discipline    CL 06/16/16 -  Yarely Mercado, RN Registered Nurse Nurse    AD 04/03/18 -  Dalton, Ashley Claudene, PT Physical Therapist PT    TT 06/08/18 -  Frida Nugent,  RN Registered Nurse Nurse    AG 04/03/18 -  Sandy Covington, PT Physical Therapist PT     05/02/16 -  Loyda Vega PTA Physical Therapy Assistant PT                PT Recommendation and Plan  Anticipated Discharge Disposition (PT): inpatient rehabilitation facility  Planned Therapy Interventions (PT Eval): balance training, bed mobility training, gait training, home exercise program, patient/family education, postural re-education, ROM (range of motion), strengthening, transfer training  Therapy Frequency (PT Clinical Impression): other (see comments)(6 days/ week; 1-2 times/ day)  Outcome Summary/Treatment Plan (PT)  Daily Summary of Progress (PT): progress towards functional goals is fair  Plan for Continued Treatment (PT): continue POC  Anticipated Equipment Needs at Discharge (PT): raised toilet seat, front wheeled walker  Anticipated Discharge Disposition (PT): inpatient rehabilitation facility  Plan of Care Reviewed With: patient  Outcome Measures     Row Name 06/08/19 1000             How much help from another person do you currently need...    Turning from your back to your side while in flat bed without using bedrails?  3  -AD      Moving from lying on back to sitting on the side of a flat bed without bedrails?  3  -AD      Moving to and from a bed to a chair (including a wheelchair)?  3  -AD      Standing up from a chair using your arms (e.g., wheelchair, bedside chair)?  3  -AD      Climbing 3-5 steps with a railing?  2  -AD      To walk in hospital room?  3  -AD      AM-PAC 6 Clicks Score  17  -AD         Functional Assessment    Outcome Measure Options  AM-PAC 6 Clicks Basic Mobility (PT)  -AD        User Key  (r) = Recorded By, (t) = Taken By, (c) = Cosigned By    Initials Name Provider Type    AD Dalton, Ashley Claudene, PT Physical Therapist         Time Calculation:   PT Charges     Row Name 06/10/19 2259             Time Calculation    PT Received On  06/10/19  -      PT - Next Appointment   06/11/19  -      PT Goal Re-Cert Due Date  06/20/19  -         Time Calculation- PT    TCU Minutes- PT  25 min  -AG        User Key  (r) = Recorded By, (t) = Taken By, (c) = Cosigned By    Initials Name Provider Type    Sandy Slona, PT Physical Therapist        Therapy Charges for Today     Code Description Service Date Service Provider Modifiers Qty    14248288871 HC GAIT TRAINING EA 15 MIN 6/10/2019 Sandy Covington, PT GP 1    [id number]  PT THER PROC EA 15 MIN 6/10/2019 Sandy Covington, PT GP 1          PT G-Codes  Outcome Measure Options: AM-PAC 6 Clicks Basic Mobility (PT)  AM-PAC 6 Clicks Score: 17  Score: 11    Sandy Covington PT  6/10/2019

## 2019-06-10 NOTE — PLAN OF CARE
Problem: Patient Care Overview  Goal: Plan of Care Review  Outcome: Ongoing (interventions implemented as appropriate)   06/10/19 0149   Coping/Psychosocial   Plan of Care Reviewed With patient   Plan of Care Review   Progress improving       Problem: Fall Risk (Adult)  Goal: Absence of Fall  Outcome: Ongoing (interventions implemented as appropriate)   06/10/19 0149   Fall Risk (Adult)   Absence of Fall making progress toward outcome       Problem: Skin Injury Risk (Adult)  Goal: Skin Health and Integrity  Outcome: Ongoing (interventions implemented as appropriate)   06/10/19 0149   Skin Injury Risk (Adult)   Skin Health and Integrity making progress toward outcome       Problem: Fracture Orthopaedic (Adult)  Goal: Signs and Symptoms of Listed Potential Problems Will be Absent, Minimized or Managed (Fracture Orthopaedic)  Outcome: Ongoing (interventions implemented as appropriate)   06/09/19 1005 06/10/19 0149   Goal/Outcome Evaluation   Problems Assessed (Orthopaedic Fracture) --  all   Problems Present (Orthopaedic Fracture) functional deficit/self-care deficit;pain;situational response --        Problem: Pain, Acute (Adult)  Goal: Acceptable Pain Control/Comfort Level  Outcome: Ongoing (interventions implemented as appropriate)   06/10/19 0149   Pain, Acute (Adult)   Acceptable Pain Control/Comfort Level making progress toward outcome       Problem: Self-Care Deficit (Adult,Obstetrics,Pediatric)  Goal: Improved Ability to Perform BADL and IADL  Outcome: Ongoing (interventions implemented as appropriate)   06/10/19 0149   Self-Care Deficit (Adult,Obstetrics,Pediatric)   Improved Ability to Perform BADL and IADL making progress toward outcome

## 2019-06-11 LAB
ANION GAP SERPL CALCULATED.3IONS-SCNC: 11.3 MMOL/L
BUN BLD-MCNC: 19 MG/DL (ref 8–23)
BUN/CREAT SERPL: 20 (ref 7–25)
CALCIUM SPEC-SCNC: 8.4 MG/DL (ref 8.6–10.5)
CHLORIDE SERPL-SCNC: 101 MMOL/L (ref 98–107)
CO2 SERPL-SCNC: 25.7 MMOL/L (ref 22–29)
CREAT BLD-MCNC: 0.95 MG/DL (ref 0.57–1)
DEPRECATED RDW RBC AUTO: 42.8 FL (ref 37–54)
ERYTHROCYTE [DISTWIDTH] IN BLOOD BY AUTOMATED COUNT: 12.7 % (ref 12.3–15.4)
GFR SERPL CREATININE-BSD FRML MDRD: 56 ML/MIN/1.73
GLUCOSE BLD-MCNC: 105 MG/DL (ref 65–99)
HCT VFR BLD AUTO: 34.8 % (ref 34–46.6)
HGB BLD-MCNC: 10.9 G/DL (ref 12–15.9)
MAGNESIUM SERPL-MCNC: 2 MG/DL (ref 1.6–2.4)
MCH RBC QN AUTO: 30.2 PG (ref 26.6–33)
MCHC RBC AUTO-ENTMCNC: 31.3 G/DL (ref 31.5–35.7)
MCV RBC AUTO: 96.4 FL (ref 79–97)
PLATELET # BLD AUTO: 423 10*3/MM3 (ref 140–450)
PMV BLD AUTO: 10.5 FL (ref 6–12)
POTASSIUM BLD-SCNC: 3.9 MMOL/L (ref 3.5–5.2)
RBC # BLD AUTO: 3.61 10*6/MM3 (ref 3.77–5.28)
SODIUM BLD-SCNC: 138 MMOL/L (ref 136–145)
WBC NRBC COR # BLD: 11.82 10*3/MM3 (ref 3.4–10.8)

## 2019-06-11 PROCEDURE — 97116 GAIT TRAINING THERAPY: CPT

## 2019-06-11 PROCEDURE — 94799 UNLISTED PULMONARY SVC/PX: CPT

## 2019-06-11 PROCEDURE — 97110 THERAPEUTIC EXERCISES: CPT

## 2019-06-11 PROCEDURE — 99232 SBSQ HOSP IP/OBS MODERATE 35: CPT | Performed by: PHYSICIAN ASSISTANT

## 2019-06-11 PROCEDURE — 97530 THERAPEUTIC ACTIVITIES: CPT | Performed by: OCCUPATIONAL THERAPIST

## 2019-06-11 PROCEDURE — 85027 COMPLETE CBC AUTOMATED: CPT | Performed by: PHYSICIAN ASSISTANT

## 2019-06-11 PROCEDURE — 83735 ASSAY OF MAGNESIUM: CPT | Performed by: PHYSICIAN ASSISTANT

## 2019-06-11 PROCEDURE — 80048 BASIC METABOLIC PNL TOTAL CA: CPT | Performed by: PHYSICIAN ASSISTANT

## 2019-06-11 PROCEDURE — 97530 THERAPEUTIC ACTIVITIES: CPT

## 2019-06-11 RX ADMIN — APIXABAN 2.5 MG: 2.5 TABLET, FILM COATED ORAL at 09:08

## 2019-06-11 RX ADMIN — ATORVASTATIN CALCIUM 40 MG: 40 TABLET, FILM COATED ORAL at 20:44

## 2019-06-11 RX ADMIN — APIXABAN 2.5 MG: 2.5 TABLET, FILM COATED ORAL at 20:44

## 2019-06-11 RX ADMIN — ACETAMINOPHEN 650 MG: 325 TABLET ORAL at 01:27

## 2019-06-11 RX ADMIN — PANTOPRAZOLE SODIUM 40 MG: 40 TABLET, DELAYED RELEASE ORAL at 05:20

## 2019-06-11 RX ADMIN — SODIUM CHLORIDE, PRESERVATIVE FREE 3 ML: 5 INJECTION INTRAVENOUS at 20:45

## 2019-06-11 RX ADMIN — BUSPIRONE HYDROCHLORIDE 10 MG: 10 TABLET ORAL at 09:09

## 2019-06-11 RX ADMIN — CARVEDILOL 12.5 MG: 6.25 TABLET, FILM COATED ORAL at 09:08

## 2019-06-11 RX ADMIN — CARVEDILOL 12.5 MG: 6.25 TABLET, FILM COATED ORAL at 17:23

## 2019-06-11 RX ADMIN — BUSPIRONE HYDROCHLORIDE 10 MG: 10 TABLET ORAL at 20:44

## 2019-06-11 RX ADMIN — SODIUM CHLORIDE, PRESERVATIVE FREE 3 ML: 5 INJECTION INTRAVENOUS at 09:10

## 2019-06-11 RX ADMIN — ACETAMINOPHEN 650 MG: 325 TABLET ORAL at 17:22

## 2019-06-11 RX ADMIN — LOSARTAN POTASSIUM 100 MG: 50 TABLET, FILM COATED ORAL at 09:08

## 2019-06-11 NOTE — PLAN OF CARE
Problem: Patient Care Overview  Goal: Plan of Care Review  Outcome: Ongoing (interventions implemented as appropriate)   06/11/19 1205   Coping/Psychosocial   Plan of Care Reviewed With patient   Plan of Care Review   Progress improving   OTHER   Outcome Summary Patient continues to improve with ongoing therapy ambulating further distance & requiring less assistance. Contnue w/ current POC.

## 2019-06-11 NOTE — PLAN OF CARE
Problem: Fall Risk (Adult)  Goal: Identify Related Risk Factors and Signs and Symptoms  Outcome: Ongoing (interventions implemented as appropriate)    Goal: Absence of Fall  Outcome: Ongoing (interventions implemented as appropriate)      Problem: Skin Injury Risk (Adult)  Goal: Identify Related Risk Factors and Signs and Symptoms  Outcome: Ongoing (interventions implemented as appropriate)    Goal: Skin Health and Integrity  Outcome: Ongoing (interventions implemented as appropriate)      Problem: Fracture Orthopaedic (Adult)  Goal: Signs and Symptoms of Listed Potential Problems Will be Absent, Minimized or Managed (Fracture Orthopaedic)  Outcome: Ongoing (interventions implemented as appropriate)      Problem: Pain, Acute (Adult)  Goal: Identify Related Risk Factors and Signs and Symptoms  Outcome: Ongoing (interventions implemented as appropriate)    Goal: Acceptable Pain Control/Comfort Level  Outcome: Ongoing (interventions implemented as appropriate)      Problem: Self-Care Deficit (Adult,Obstetrics,Pediatric)  Goal: Improved Ability to Perform BADL and IADL  Outcome: Ongoing (interventions implemented as appropriate)

## 2019-06-11 NOTE — PLAN OF CARE
Problem: Patient Care Overview  Goal: Plan of Care Review  Outcome: Ongoing (interventions implemented as appropriate)   06/11/19 1143   Coping/Psychosocial   Plan of Care Reviewed With patient   Plan of Care Review   Progress improving   OTHER   Outcome Summary Pt tolerated treatment well with rest breaks. OT to continue with POC.

## 2019-06-11 NOTE — THERAPY TREATMENT NOTE
Acute Care - Physical Therapy Treatment Note   Umer     Patient Name: Abbi Mendez  : 1935  MRN: [id number]  Today's Date: 2019  Onset of Illness/Injury or Date of Surgery: 19  Date of Referral to PT: 19  Referring Physician: Dr. Jain    Admit Date: 2019    Visit Dx:    ICD-10-CM ICD-9-CM   1. Left displaced femoral neck fracture (CMS/HCC) S72.002A 820.8   2. Strain of right shoulder, initial encounter S46.911A 840.9   3. Hypokalemia E87.6 276.8     Patient Active Problem List   Diagnosis   • Atrial fibrillation (CMS/HCC)   • Coronary artery disease involving native coronary artery of native heart without angina pectoris   • RADHA (obstructive sleep apnea)   • Essential hypertension   • Hyperlipidemia LDL goal <70   • Iron deficiency anemia   • Left bundle branch block   • Syncope and collapse   • Severe sinus bradycardia   • Third degree AV block (CMS/HCC)   • Syncope   • Compression deformity of vertebra   • Radiculopathy   • Pericardial effusion without cardiac tamponade   • Hypercalcemia   • Abnormal serum protein electrophoresis   • Weight loss, unintentional   • Primary hyperparathyroidism (CMS/HCC)   • Hypercalcemia   • Pacemaker   • Hiatal hernia   • Elevated serum immunoglobulin free light chains   • Left displaced femoral neck fracture (CMS/HCC)       Therapy Treatment    Rehabilitation Treatment Summary     Row Name 19 1608 19 1126          Treatment Time/Intention    Discipline  physical therapy assistant  -LL  occupational therapist  -BF     Document Type  therapy note (daily note) BID treatment  -LL  therapy note (daily note)  -BF     Subjective Information  no complaints  -LL  no complaints  -BF     Mode of Treatment  individual therapy;physical therapy  -LL  occupational therapy  -BF     Patient/Family Observations  Patient in bed prior to both session under no distress  -LL  Pt sitting in chair, agreeable to OT  -BF     Patient Effort  --  good  -BF      Existing Precautions/Restrictions  fall;hip;left  -LL  fall;hip;left;other (see comments) R hand fx splint  -BF     Patient Response to Treatment  Patient tolerated BID PT session well with adequate rest breaks  -LL  Pt demonstrated improved RUE shoulder AROM  -BF     Recorded by [LL] Loyda Vega, [facility] 06/11/19 1613 [BF] Katia Enriquez, OT 06/11/19 1142     Row Name 06/11/19 1608 06/11/19 1126          Cognitive Assessment/Intervention- PT/OT    Orientation Status (Cognition)  oriented x 3  -LL  oriented x 3  -BF     Follows Commands (Cognition)  WFL;follows one step commands  -LL  WFL;follows one step commands  -BF     Recorded by [LL] Loyda Vega, ALFREDO 06/11/19 1613 [BF] Katia Enriquez, OT 06/11/19 1142     Row Name 06/11/19 1608             Mobility Assessment/Intervention    Extremity Weight-bearing Status  left lower extremity;right lower extremity  -LL      Left Lower Extremity (Weight-bearing Status)  weight-bearing as tolerated (WBAT)  -LL      Right Lower Extremity (Weight-bearing Status)  weight-bearing as tolerated (WBAT)  -LL      Recorded by [LL] Loyda Vega, [facility] 06/11/19 1613      Row Name 06/11/19 1608             Bed Mobility Assessment/Treatment    Bed Mobility Assessment/Treatment  scooting/bridging;supine-sit  -LL      Scooting/Bridging [place] (Bed Mobility)  verbal cues;nonverbal cues (demo/gesture);minimum assist (75% patient effort)  -LL      Supine-Sit [place] (Bed Mobility)  verbal cues;nonverbal cues (demo/gesture);minimum assist (75% patient effort)  -LL      Bed Mobility, Safety Issues  decreased use of legs for bridging/pushing  -LL      Assistive Device (Bed Mobility)  bed rails  -LL      Recorded by [LL] Loyda Vega, PTA 06/11/19 1613      Row Name 06/11/19 1608             Transfer Assessment/Treatment    Transfer Assessment/Treatment  sit-stand transfer;stand-sit transfer;stand pivot/stand step transfer  -LL      Recorded by [LL] Gary  Loyda, \[facility]\"" 06/11/19 1613      Row Name 06/11/19 1608             Sit-Stand Transfer    Sit-Stand [place] (Transfers)  verbal cues;nonverbal cues (demo/gesture);minimum assist (75% patient effort)  -LL      Assistive Device (Sit-Stand Transfers)  walker, front-wheeled  -LL      Recorded by [LL] Loyda Vega, \[facility]\"" 06/11/19 1613      Row Name 06/11/19 1608             Stand-Sit Transfer    Stand-Sit [place] (Transfers)  verbal cues;nonverbal cues (demo/gesture);minimum assist (75% patient effort)  -LL      Assistive Device (Stand-Sit Transfers)  walker, front-wheeled  -LL      Recorded by [LL] Loyda Vega, \[facility]\"" 06/11/19 1613      Row Name 06/11/19 1608             Stand Pivot/Stand Step Transfer    Stand Pivot/Stand Step [place]  verbal cues;nonverbal cues (demo/gesture);minimum assist (75% patient effort)  -      Assistive Device (Stand Pivot Stand Step Transfer)  walker, front-wheeled  -LL      Recorded by [LL] Loyda Vega, \[facility]\"" 06/11/19 1613      Row Name 06/11/19 1608             Toilet Transfer    Type (Toilet Transfer)  stand pivot/stand step  -LL      [place] Level (Toilet Transfer)  minimum assist (75% patient effort);verbal cues  -      Assistive Device (Toilet Transfer)  commode, bedside without drop arms  -LL      Recorded by [LL] Loyda Vega, \[facility]\"" 06/11/19 1613      Row Name 06/11/19 1608             Gait/Stairs Assessment/Training    Gait/Stairs Assessment/Training  gait/ambulation independence;gait/ambulation assistive device;distance ambulated;gait pattern;gait deviations  -LL      [place] Level (Gait)  verbal cues;nonverbal cues (demo/gesture);minimum assist (75% patient effort)  -LL      Assistive Device (Gait)  walker, front-wheeled  -LL      Distance in Feet (Gait)  90 in am; 75 in pm  -LL      Pattern (Gait)  step-through  -LL      Deviations/Abnormal Patterns (Gait)  antalgic;stride length decreased  -LL      Bilateral Gait Deviations  forward flexed  posture;heel strike decreased  -LL      Recorded by [LL] Loyda Vega, PTA 06/11/19 1613      Row Name 06/11/19 1126             Therapeutic Exercise    Therapeutic Exercise  seated, upper extremities  -BF      Recorded by [BF] Katia Enriquez, OT 06/11/19 1142      Row Name 06/11/19 1126             Upper Extremity Seated Therapeutic Exercise    Performed, Seated Upper Extremity (Therapeutic Exercise)  shoulder flexion/extension;elbow flexion/extension;scapular protraction/retraction;digit flexion/extension  -BF      Exercise Type, Seated Upper Extremity (Therapeutic Exercise)  AROM (active range of motion) BUE GMC therex; light strengthening w/ flexbar, handgripper  -BF      Expected Outcomes, Seated Upper Extremity (Therapeutic Exercise)  improve functional tolerance, self-care activity;improve performance, BADLs;improve performance, transfer skills;strengthen, facilitate independent active range of motion  -BF      Recorded by [BF] Katia Enriquez, OT 06/11/19 1142      Row Name 06/11/19 1608             Lower Extremity Seated Therapeutic Exercise    Performed, Seated Lower Extremity (Therapeutic Exercise)  hip abduction/adduction;ankle dorsiflexion/plantarflexion;LAQ (long arc quad), knee extension GS  -LL      Exercise Type, Seated Lower Extremity (Therapeutic Exercise)  AROM (active range of motion);AAROM (active assistive range of motion)  -LL      Restrictions, Seated Lower Extremity (Therapeutic Exercise)  L JANET precautions  -LL      Sets/Reps Detail, Seated Lower Extremity (Therapeutic Exercise)  1 x 20  -LL      Transfers Skills, Training to Functional Activity, Seated Lower Extremity (Therapeutic Exercise)  able to transfer skills from training to functional activity  -LL      Recorded by [LL] Loyda Vega, [facility] 06/11/19 1613      Row Name 06/11/19 1608             Lower Extremity Supine Therapeutic Exercise    Performed, Supine Lower Extremity (Therapeutic Exercise)  hip  abduction/adduction;heel slides;quadriceps sets  -LL      Exercise Type, Supine Lower Extremity (Therapeutic Exercise)  AROM (active range of motion)  -LL      Expected Outcomes, Supine Lower Extremity (Therapeutic Exercise)  improve functional tolerance, community activity;improve functional tolerance, household activity;improve functional tolerance, self-care activity;improve performance, gait skills;improve performance, transfer skills;strengthen normal movement patterns  -LL      Restrictions, Supine Lower Extremity (Therapeutic Exercise)  L KENRICK precautions  -LL      Sets/Reps Detail, Supine Lower Extremity (Therapeutic Exercise)  1 x 15  -LL      Transfers Skills, Training to Functional Activity, Supine Lower Extremity (Therapeutic Exercise)  able to transfer skills from training to functional activity  -LL      Recorded by [LL] Loyda Vega, [facility] 06/11/19 1613      Row Name 06/11/19 1608 06/11/19 1126          Positioning and Restraints    Pre-Treatment Position  in bed in am & pm  -LL  --     Post Treatment Position  chair  -LL  --     In Chair  sitting;call light within reach;encouraged to call for assist;notified nsg with tray table in front of her in am & pm  -LL  sitting;call light within reach;encouraged to call for assist  -BF     Recorded by [LL] Loyda Vega, PTA 06/11/19 1613 [BF] Katia Enriquez, OT 06/11/19 1142     Row Name 06/11/19 1608             Pain Scale: Numbers Pre/Post-Treatment    Pain Location - Side  Left  -LL      Pain Location  hip  -LL      Recorded by [LL] Loyda Vega, PTA 06/11/19 1613      Row Name 06/11/19 1608             Pain Scale: FACES Pre/Post-Treatment    Pain: FACES Scale, Pretreatment  4-->hurts little more  -LL      Pain: FACES Scale, Post-Treatment  4-->hurts little more  -LL      Recorded by [LL] Loyda Vega, PTA 06/11/19 1613      Row Name                Wound 06/05/19 1838 Left hip incision    Wound - Properties Group Date first assessed:  06/05/19 [KH] Time first assessed: 1838 [KH] Side: Left [KH] Location: hip [KH] Type: incision [KH] Recorded by:  [KH] Ary Sylvester RN 06/05/19 1838    Row Name 06/11/19 1608             Coping    Observed Emotional State  accepting;calm;cooperative;pleasant  -LL      Verbalized Emotional State  acceptance  -LL      Recorded by [LL] Loyda Vega, ALFREDO 06/11/19 1613        User Key  (r) = Recorded By, (t) = Taken By, (c) = Cosigned By    Initials Name Effective Dates Discipline     Ary Sylvester RN 06/16/16 -  Nurse    BF Katia Enriquez OT 04/03/18 -  OT    LL Loyda Vega, ALFREDO 05/02/16 -  PT          Wound 06/05/19 1838 Left hip incision (Active)   Dressing Appearance dry;intact 6/11/2019  9:00 AM   Closure Approximated;Liquid skin adhesive 6/10/2019  7:05 PM   Base dry 6/10/2019  7:05 PM   Periwound intact;dry 6/10/2019  7:05 PM   Periwound Temperature warm 6/10/2019  7:05 PM   Periwound Skin Turgor firm 6/10/2019  7:05 PM   Edges other (see comments) 6/10/2019  7:05 PM   Drainage Amount none 6/10/2019  7:05 PM   Dressing Care, Wound other (see comments) 6/10/2019  7:05 PM           Physical Therapy Education     Title: PT OT SLP Therapies (Done)     Topic: Physical Therapy (Done)     Point: Mobility training (Done)     Learning Progress Summary           Patient Acceptance, E,D, VU,NR by LL at 6/11/2019  4:13 PM    Acceptance, E, VU by CL at 6/9/2019 10:07 AM    Acceptance, E, NR by CL at 6/8/2019 10:46 AM    Acceptance, E,TB, VU by AD at 6/8/2019 10:09 AM    Acceptance, E, VU,NR by LL at 6/7/2019  4:05 PM    Acceptance, E,TB, VU by TT at 6/7/2019 12:26 AM    Acceptance, H, VU,NR by AG at 6/6/2019  1:28 PM    Comment:  Reviewed written JANET precautions    Acceptance, E,D, VU,NR by AG at 6/6/2019  1:13 PM    Comment:  PT reviewed and instructed in HEP, JANET precautions.   Family Acceptance, H, VU,NR by AG at 6/6/2019  1:28 PM    Comment:  Reviewed written JANET precautions                    Point: Home exercise program (Done)     Learning Progress Summary           Patient Acceptance, E,D, VU,NR by LL at 6/11/2019  4:13 PM    Acceptance, E, VU by CL at 6/9/2019 10:07 AM    Acceptance, E, NR by CL at 6/8/2019 10:46 AM    Acceptance, E,TB, VU by AD at 6/8/2019 10:09 AM    Acceptance, E, VU,NR by LL at 6/7/2019  4:05 PM    Acceptance, E,TB, VU by TT at 6/7/2019 12:26 AM    Acceptance, H, VU,NR by AG at 6/6/2019  1:28 PM    Comment:  Reviewed written JANET precautions    Acceptance, E,D, VU,NR by AG at 6/6/2019  1:13 PM    Comment:  PT reviewed and instructed in HEP, JANET precautions.   Family Acceptance, H, VU,NR by AG at 6/6/2019  1:28 PM    Comment:  Reviewed written JANET precautions                   Point: Body mechanics (Done)     Learning Progress Summary           Patient Acceptance, E,D, VU,NR by LL at 6/11/2019  4:13 PM    Acceptance, E, VU by CL at 6/9/2019 10:07 AM    Acceptance, E, NR by CL at 6/8/2019 10:46 AM    Acceptance, E,TB, VU by AD at 6/8/2019 10:09 AM    Acceptance, E, VU,NR by LL at 6/7/2019  4:05 PM    Acceptance, E,TB, VU by TT at 6/7/2019 12:26 AM    Acceptance, H, VU,NR by AG at 6/6/2019  1:28 PM    Comment:  Reviewed written JANET precautions    Acceptance, E,D, VU,NR by AG at 6/6/2019  1:13 PM    Comment:  PT reviewed and instructed in HEP, JANET precautions.   Family Acceptance, H, VU,NR by AG at 6/6/2019  1:28 PM    Comment:  Reviewed written JANET precautions                   Point: Precautions (Done)     Learning Progress Summary           Patient Acceptance, E,D, VU,NR by LL at 6/11/2019  4:13 PM    Acceptance, E, VU by CL at 6/9/2019 10:07 AM    Acceptance, E, NR by CL at 6/8/2019 10:46 AM    Acceptance, E,TB, VU by AD at 6/8/2019 10:09 AM    Acceptance, E, VU,NR by LL at 6/7/2019  4:05 PM    Acceptance, GEREMIAS WARE VU by TT at 6/7/2019 12:26 AM    Acceptance, FABRICIO COOK NR by AG at 6/6/2019  1:28 PM    Comment:  Reviewed written JANET precautions    Acceptance, MAXX WARE VU,NR by AG at  6/6/2019  1:13 PM    Comment:  PT reviewed and instructed in HEP, JANET precautions.   Family Acceptance, H, VU,NR by  at 6/6/2019  1:28 PM    Comment:  Reviewed written JANET precautions                               User Key     Initials Effective Dates Name Provider Type Discipline    CL 06/16/16 -  Yarely Mercado, RN Registered Nurse Nurse    AD 04/03/18 -  Dalton, Ashley Claudene, PT Physical Therapist PT    TT 06/08/18 -  Frida Nugent, RN Registered Nurse Nurse    AG 04/03/18 -  Sandy Covington, PT Physical Therapist PT    LL 05/02/16 -  Loyda Vega PTA Physical Therapy Assistant PT                PT Recommendation and Plan  Anticipated Discharge Disposition (PT): inpatient rehabilitation facility  Therapy Frequency (PT Clinical Impression): other (see comments)(6 days/ week; 1-2 times/ day)  Outcome Summary/Treatment Plan (PT)  Anticipated Equipment Needs at Discharge (PT): raised toilet seat, front wheeled walker  Anticipated Discharge Disposition (PT): inpatient rehabilitation facility  Plan of Care Reviewed With: patient  Progress: improving  Outcome Summary: Patient continues to improve with ongoing therapy ambulating further distance & requiring less assistance.  Contnue w/ current POC.  Outcome Measures     Row Name 06/11/19 1600             How much help from another person do you currently need...    Turning from your back to your side while in flat bed without using bedrails?  3  -LL      Moving from lying on back to sitting on the side of a flat bed without bedrails?  3  -LL      Moving to and from a bed to a chair (including a wheelchair)?  3  -LL      Standing up from a chair using your arms (e.g., wheelchair, bedside chair)?  3  -LL      Climbing 3-5 steps with a railing?  2  -LL      To walk in hospital room?  3  -LL      AM-PAC 6 Clicks Score  17  -LL         Functional Assessment    Outcome Measure Options  AM-PAC 6 Clicks Basic Mobility (PT)  -LL        User Key  (r) = Recorded  By, (t) = Taken By, (c) = Cosigned By    Initials Name Provider Type    LL Loyda Vega, ALFREDO Physical Therapy Assistant         Time Calculation:   PT Charges     Row Name 06/11/19 1616 06/11/19 1615          Time Calculation    PT Received On  --  06/11/19  -LL        Time Calculation- PT    Total Timed Code Minutes- PT  30 minute(s)  -LL  45 minute(s)  -LL       User Key  (r) = Recorded By, (t) = Taken By, (c) = Cosigned By    Initials Name Provider Type    LL Loyda Vega PTA Physical Therapy Assistant        Therapy Charges for Today     Code Description Service Date Service Provider Modifiers Qty    47225769274 HC GAIT TRAINING EA 15 MIN 6/11/2019 Loyda Vega, ALFREDO GP 2    86776595456 HC PT THERAPEUTIC ACT EA 15 MIN 6/11/2019 Loyda Vega, ALFREDO GP 1    28700161341 HC PT THER PROC EA 15 MIN 6/11/2019 Loyda Vega, ALFREDO GP 2    84775733879 HC PT THER SUPP EA 15 MIN 6/11/2019 Loyda Vega, ALFREDO GP 1          PT G-Codes  Outcome Measure Options: AM-PAC 6 Clicks Basic Mobility (PT)  AM-PAC 6 Clicks Score: 17  Score: 11    Justina Vega PTA  6/11/2019

## 2019-06-11 NOTE — PAYOR COMM NOTE
"[facility]  Swing Bed Program  Phone:[phone]  Fax: [phone]    Ref#:[id number]    Please see additional requested clinicals including MD notes from last 24hrs, latest therapy notes.    Carmen was DC on 06/07/19      This is the guide to the therapy notes  4-independent  3-min assist  2-mod assist  1- dependent       Abbi Mendez (83 y.o. Female)     Date of Birth Social Security Number Address Home Phone MRN    1935  [id number]  [place] [address] [phone] [id number]    L[employer]-day Marital Status          [employer] Single       Admission Date Admission Type Admitting Provider Attending Provider Department, Room/Bed    6/4/19 Emergency Lenora Kwan DO Troxell, Christopher A, DO [address], 3348/1S    Discharge Date Discharge Disposition Discharge Destination                       Attending Provider:  Jeffrey Parsons DO    Allergies:  Darvon [Propoxyphene], Penicillins    Isolation:  None   Infection:  None   Code Status:  CPR    Ht:  167.6 cm (65.98\")   Wt:  76.2 kg (168 lb 0.2 oz)    Admission Cmt:  None   Principal Problem:  None                Active Insurance as of 6/4/2019     Primary Coverage     Payor Plan Insurance Group Employer/Plan Group    HUMANA MEDICARE REPLACEMENT HUMANA MEDICARE REPL [id number]     Payor Plan Address Payor Plan Phone Number Payor Plan Fax Number Effective Dates    [address] [address] [phone]  1/1/2018 - None Entered    [facility] [id number]       Subscriber Name Subscriber Birth Date Member ID       ABBI MENDEZ 1935 [id number]                 Emergency Contacts      (Rel.) Home Phone Work Phone Mobile Phone    Shruthi Gama (Daughter) -- -- [phone]    OrtizSuze pereira (Daughter) -- -- [phone]    ASTRID YOUSIF (Daughter) [phone] -- --               Physician Progress Notes (last 24 hours) (Notes from 6/10/2019 12:50 PM through 6/11/2019 12:50 PM)      Jaqueline, " FRANCO Romero at 2019 12:10 PM          .                            [facility] Medicine Services  PROGRESS NOTE     Patient Identification:  Name:  Abbi Mendez  Age:  83 y.o.  Sex:  female  :  1935  MRN:  [id number]  Visit Number:  [id number]  Primary Care Provider:  Eric Driver MD    Length of stay:  6    ----------------------------------------------------------------------------------------------------------------------  Subjective     Chief Complaint:  Follow up for hip fracture, UTI    History of Presenting Illness:  Patient is an 83-year-old female with past medical history significant for paroxysmal atrial fibrillation, coronary artery disease status post stenting, essential hypertension, hyperlipidemia primary hyperparathyroidism, third-degree AV block status post permanent pacemaker implantation, stage III CKD, and advanced age was admitted on 2014 still with mechanical fall resulting in mild hip fracture.  Patient underwent left hip replacement: Tolerated procedure well.  Insurance denied inpatient rehab, pending possible swing bed admission.     Subjective:  Today, the patient was resting in bed per my evaluation this morning. She denies any new complaints, no overnight events reported per nursing staff. Pending SB admission. Pain controlled. Denies any paraesthesias, no numbness or tingling. She is working with PT, tolerating well.  She denies any chest pain or pressure, no dyspnea.  No cough.  Denies any fevers or chills.  Denies any abdominal pain, nausea, vomiting or diarrhea.  No urinary symptoms.    Present during exam: n/a  ----------------------------------------------------------------------------------------------------------------------  Objective     Consults:  · Orthopedic surgery     Procedures:  · 2019: Rowell catheter insertion -- Activity restriction due to hip fracture   · 2019: Left hip bipolar replacement -- Dr. Jain, orthopedic  surgery   · 6/6/2019: Roewll catheter removal     Current Hospital Meds:    apixaban 2.5 mg Oral Q12H   atorvastatin 40 mg Oral Nightly   busPIRone 10 mg Oral BID   carvedilol 12.5 mg Oral BID With Meals   cholecalciferol 50,000 Units Oral Weekly   losartan 100 mg Oral Daily   pantoprazole 40 mg Oral QAM   sodium chloride 3 mL Intravenous Q12H        ----------------------------------------------------------------------------------------------------------------------  Vital Signs:  Temp:  [98.1 °F (36.7 °C)-99.1 °F (37.3 °C)] 98.1 °F (36.7 °C)  Heart Rate:  [73-81] 73  Resp:  [18-20] 18  BP: (145-163)/(60-84) 145/66    SpO2 Percentage    06/11/19 0049 06/11/19 0240 06/11/19 0625   SpO2: 96% 96% 96%     SpO2:  [96 %] 96 %  on   ;   Device (Oxygen Therapy): room air    Body mass index is 27.13 kg/m².  Wt Readings from Last 3 Encounters:   06/05/19 76.2 kg (168 lb 0.2 oz)   05/28/19 78 kg (172 lb)   02/01/19 75.8 kg (167 lb)        Intake/Output Summary (Last 24 hours) at 6/11/2019 1211  Last data filed at 6/11/2019 0900  Gross per 24 hour   Intake 600 ml   Output --   Net 600 ml     Diet Regular; Cardiac  ----------------------------------------------------------------------------------------------------------------------  Physical exam:  Physical Exam   Constitutional: She is oriented to person, place, and time. She appears well-developed and well-nourished.  Non-toxic appearance. No distress.   Pleasant, no acute distress   HENT:   Head: Normocephalic and atraumatic.   Right Ear: External ear normal.   Left Ear: External ear normal.   Nose: Nose normal.   Mouth/Throat: Oropharynx is clear and moist and mucous membranes are normal. No oropharyngeal exudate.   Eyes: Conjunctivae and EOM are normal. Pupils are equal, round, and reactive to light. No scleral icterus.   Neck: Trachea normal and normal range of motion. Neck supple. No JVD present. No muscular tenderness present. Carotid bruit is not present. No tracheal  deviation present. No thyromegaly present.   Cardiovascular: Normal rate, regular rhythm, normal heart sounds and intact distal pulses. Exam reveals no gallop and no friction rub.   No murmur heard.  Pulmonary/Chest: Effort normal and breath sounds normal. No respiratory distress. She has no wheezes. She has no rhonchi. She has no rales. She exhibits no tenderness.   Abdominal: Soft. Bowel sounds are normal. She exhibits no distension and no mass. There is no hepatosplenomegaly. There is no tenderness. There is no rebound and no guarding. No hernia.   Genitourinary:   Genitourinary Comments: No Rowell catheter in place, making urine   Musculoskeletal: She exhibits no edema or deformity.        Left hip: She exhibits swelling. She exhibits no tenderness.        Right lower leg: She exhibits swelling (mild).        Left lower leg: She exhibits swelling (mild).   Left hip incision, clean dry and intact.  Mild edema.  Mild tenderness to palpation.  Good range of motion.  Distal pulses intact.  Neurovascularly intact bilaterally.  Sensation intact.  Cap refill less than 3 seconds.  Intact bilaterally.     Vascular Status -  Her right foot exhibits normal foot vasculature  and no edema. Her left foot exhibits normal foot vasculature  and no edema.  Neurological: She is alert and oriented to person, place, and time. She has normal strength. No cranial nerve deficit or sensory deficit.   Skin: Skin is warm and dry. Capillary refill takes less than 2 seconds. No rash noted. No erythema. No pallor.   Psychiatric: She has a normal mood and affect. Her speech is normal and behavior is normal. Judgment and thought content normal.   Nursing note and vitals reviewed.     ----------------------------------------------------------------------------------------------------------------------  Tele:    Paced 70s    I have personally reviewed the EKG/Telemetry strips    ----------------------------------------------------------------------------------------------------------------------            Results from last 7 days   Lab Units 06/11/19  0456 06/10/19  0149 06/09/19  0455 06/04/19  2305   WBC 10*3/mm3 11.82* 12.15* 11.91*   < > 17.80*   HEMOGLOBIN g/dL 10.9* 10.8* 10.5*   < > 11.7*   HEMATOCRIT % 34.8 35.3 32.7*   < > 36.5   MCV fL 96.4 96.2 94.5   < > 94.1   MCHC g/dL 31.3* 30.6* 32.1   < > 32.1   PLATELETS 10*3/mm3 423 396 380   < > 354   INR   --   --   --   --  1.19*    < > = values in this interval not displayed.     Results from last 7 days   Lab Units 06/11/19  0456 06/10/19  0136 06/09/19 0455 06/05/19  0408 06/04/19  2305   SODIUM mmol/L 138 138 138   < > 140 139   POTASSIUM mmol/L 3.9 4.1 3.7   < > 3.1* 3.2*   MAGNESIUM mg/dL 2.0 1.6 2.1   < >  --   --    CHLORIDE mmol/L 101 102 103   < > 100 98   CO2 mmol/L 25.7 24.8 23.7   < > 24.6 25.4   BUN mg/dL 19 19 17   < > 16 17   CREATININE mg/dL 0.95 1.01* 0.91   < > 1.06* 1.05*   EGFR IF NONAFRICN AM mL/min/1.73 56* 52* 59*   < > 50* 50*   CALCIUM mg/dL 8.4* 7.9* 7.6*   < > 7.9* 8.2*   GLUCOSE mg/dL 105* 114* 108*   < > 130* 155*   ALBUMIN g/dL  --   --   --   --  3.16* 3.17*   BILIRUBIN mg/dL  --   --   --   --  0.5 0.5   ALK PHOS U/L  --   --   --   --  74 75   AST (SGOT) U/L  --   --   --   --  22 28   ALT (SGPT) U/L  --   --   --   --  18 20    < > = values in this interval not displayed.   Estimated Creatinine Clearance: 46.8 mL/min (by C-G formula based on SCr of 0.95 mg/dL).    I have personally reviewed the above laboratory results.   ----------------------------------------------------------------------------------------------------------------------  Imaging Results (last 24 hours)     Procedure Component Value Units Date/Time    XR Pelvis 1 or 2 View [[id number]] Collected:  06/06/19 0711     Updated:  06/06/19 0713    Narrative:       FINDINGS:       BONES: No acute fracture. No blastic or lytic  lesions.       JOINT: TOTAL LEFT HIP ARTHROPLASTY.       SOFT TISSUES:  No soft tissue mass.    Impression:       No acute or destructive bony abnormality.     This report was finalized on 6/6/2019 7:11 AM by Dr. Maxime Treviño MD.      I have personally reviewed the above radiology results.   ----------------------------------------------------------------------------------------------------------------------  Assessment/Plan     · Acute, traumatic, closed left hip fracture status post mechanical fall: Pt s/p left hip replacement per ortho surgery. Elizabeth procedure well. Cont post op orders and recommendations per ortho, assistance is appreciated. PT, tolerating well. PRN pain meds with holding parameters. Pending possible swing bed admission.   · Leukocytosis: Stable. Likely stress-induced post fracture/operation.  Patient afebrile, no active signs or symptoms of infection.  Monitor closely with antibiotic therapy.  Repeat CBC in a.m.  · Urinary tract infection: Patient has been on IV Rocephin empirically. Urine culture finalized with mixed markus, less than 25,000 CFU per mL.  Will discontinue Rocephin.  Patient afebrile.  Denies any urinary symptoms.  Rowell catheter has been removed previously.    · Acute hypokalemia: Resolved with supplementation. Continue to replace per protocol as necessary.  Repeat chemistry panel in the morning.  · Hypomagnesemia: Resolved with supplementation. Continue to replace per protocol.  Repeat magnesium level in the morning.    · Hypophosphatemia: Resolved with supplementation. Replace per protocol.  Repeat phosphorus level in the morning.  · History of coronary artery disease status post stenting in the past: No chest pain or symptoms of ACS.  Closely monitor on telemetry. Continue medical therapy.   · Paroxysmal atrial fibrillation: Cont Coreg for rate control, she remains paced. Cont telemetry monitoring. Eliquis for anticoagulation.   · Essential hypertension: BP moderately  controlled, higher end of normal. Cont Coreg and home losartan, she was increased to her home dose 100 mg recently. Will hold off on adding any agents today, if still above goal tomorrow with start low dose Norvasc. Closely monitor and titrate medications as necessary.   · Hyperlipidemia: Continue statin therapy.   · History of primary hyperparathyroidism: Monitor. Calcium low but stable.   · History of third-degree AV block status post permanent pacemaker implantation: Telemetry monitoring.   · Hyperglycemia: A1C 5.70. Monitor with daily chemistry, no indication for SSI at this time.   · Stage III CKD: Appears stable.  Avoid nephrotoxins. Monitor closely, repeat chem panel in AM.   · Vitamin D deficiency: Cholecalciferol supplementation.   · Mild hypoalbuminemia: Likely multifactorial. Encourage PO intake. Monitor closely.   · Anxiety: Continue Buspar.   · Microscopic hematuria/proteinuria: Protein/creatinine ratio elevated, likely hypertensive nephropathy. Closely monitor, recommend outpatient follow up once stabilized from surgery standpoint. Treat HTN as previously mentioned.   · Macrocytic anemia: Folate and vitamin B12 levels pending.  Supplemented patient found to be deficient.  Hemoglobin stable.  Likely ABLA postoperatively.  Closely monitor H&H, transfuse should hemoglobin drop below 7.0.  Repeat CBC in a.m.  · Vitamin D deficiency: Cholecalciferol 50,000 units weekly x8 weeks.  · F/E/N: No IV fluids. Replace electrolytes per protocol as necessary. Cardiac diet.     --------------------------------------------------  DVT Prophylaxis:  Eliquis to serve   GI Prophylaxis: Protonix   Activity: Up with assistance, PT    The patient is high risk due to the following diagnoses/reasons:  Hip fracture, advanced age, CAD, UTI, electrolyte abnormalities, afib, anticoagulation    I have discussed the patient's assessment and plan with the patient and nursing staff.     Disposition: Pending possible swing bed  admission, insurance denied inpatient rehab admission previously.     Alize Parsons PA-C  Hospitalist Service -- [facility]   Pager: [phone]    06/11/19  12:11 PM    Attending Physician: Jeffrey Parsons, *    ----------------------------------------------------------------------------------------------------------------------        Electronically signed by Alize Parsons PA at 6/11/2019 12:20 PM       Consult Notes (last 24 hours) (Notes from 6/10/2019 12:50 PM through 6/11/2019 12:50 PM)     No notes of this type exist for this encounter.                     How much help from another person do you currently need...     Turning from your back to your side while in flat bed without using bedrails?  3  -AD  min asst       Moving from lying on back to sitting on the side of a flat bed without bedrails?  3  -AD  min asst       Moving to and from a bed to a chair (including a wheelchair)?  3  -AD  min asst       Standing up from a chair using your arms (e.g., wheelchair, bedside chair)?  3  -AD  min asst       Climbing 3-5 steps with a railing?  2  -AD  max asst       To walk in hospital room?  3  -AD  min asst        AM-PAC 6 Clicks Score  17  -AD                     Functional Assessment     Outcome Measure Options  AM-PAC 6 Clicks Basic Mobility (PT)  -AD                           User Key  (r) = Recorded By, (t) = Taken By, (c) = Cosigned By     Initials Name Provider Type     AD Dalton, Ashley Claudene, PT Physical Therapist              Time Calculation:              PT Charges                 Row Name 06/10/19 1846                           Time Calculation     PT Received On  06/10/19  -AG         PT - Next Appointment  06/11/19  -AG         PT Goal Re-Cert Due Date  06/20/19  -AG                     Time Calculation- PT     TCU Minutes- PT  25 min  -AG                           User Key  (r) = Recorded By, (t) = Taken By, (c) = Cosigned By     Initials Name Provider Type     AG  Sandy Covington, PT Physical Therapist                    Therapy Charges for Today      Code Description Service Date Service Provider Modifiers Qty     44307585181 HC GAIT TRAINING EA 15 MIN 6/10/2019 Sandy Covington, PT GP 1     [id number] HC PT THER PROC EA 15 MIN 6/10/2019 Sandy Covington, PT GP 1             PT G-Codes  Outcome Measure Options: AM-PAC 6 Clicks Basic Mobility (PT)  AM-PAC 6 Clicks Score: 17  Score: 11     Sandy Covington PT                6/10/2019                                                                       How much help from another person do you currently need...     Turning from your back to your side while in flat bed without using bedrails?  3  -AD  min asst       Moving from lying on back to sitting on the side of a flat bed without bedrails?  3  -AD  min asst       Moving to and from a bed to a chair (including a wheelchair)?  3  -AD  min asst       Standing up from a chair using your arms (e.g., wheelchair, bedside chair)?  3  -AD  min asst       Climbing 3-5 steps with a railing?  2  -AD  mod asst        To walk in hospital room?  3  -AD  min asst        AM-PAC 6 Clicks Score  17  -AD                     Functional Assessment     Outcome Measure Options  AM-PAC 6 Clicks Basic Mobility (PT)  -AD                           User Key  (r) = Recorded By, (t) = Taken By, (c) = Cosigned By     Initials Name Provider Type     AD Dalton, Ashley Claudene, PT Physical Therapist              Time Calculation:              PT Charges                 Row Name 06/10/19 1846                           Time Calculation     PT Received On  06/10/19  -         PT - Next Appointment  06/11/19  -         PT Goal Re-Cert Due Date  06/20/19  -                     Time Calculation- PT     TCU Minutes- PT  25 min  -                           User Key  (r) = Recorded By, (t) = Taken By, (c) = Cosigned By     Initials Name Provider Type     Sandy Sloan, PT Physical Therapist                     Therapy Charges for Today      Code Description Service Date Service Provider Modifiers Qty     82882647929  GAIT TRAINING EA 15 MIN 6/10/2019 Sandy Covington, PT GP 1     [id number]  PT THER PROC EA 15 MIN 6/10/2019 Sandy Covington, PT GP 1             PT G-Codes  Outcome Measure Options: AM-PAC 6 Clicks Basic Mobility (PT)  AM-PAC 6 Clicks Score: 17  Score: 11     Sandy Covington, PT                6/10/2019                                                          Acute Care - Physical Therapy Treatment Note   [facility]     Patient Name: Abbi Mendez                  : 1935                        MRN: [id number]  Today's Date: 6/10/2019                   Onset of Illness/Injury or Date of Surgery: 19  Date of Referral to PT: 19                  Referring Physician: Dr. Jain                      Admit Date: 2019     Visit Dx:      ICD-10-CM ICD-9-CM   1. Left displaced femoral neck fracture (CMS/HCC) S72.002A 820.8   2. Strain of right shoulder, initial encounter S46.911A 840.9   3. Hypokalemia E87.6 276.8          Patient Active Problem List   Diagnosis   • Atrial fibrillation (CMS/HCC)   • Coronary artery disease involving native coronary artery of native heart without angina pectoris   • RADHA (obstructive sleep apnea)   • Essential hypertension   • Hyperlipidemia LDL goal <70   • Iron deficiency anemia   • Left bundle branch block   • Syncope and collapse   • Severe sinus bradycardia   • Third degree AV block (CMS/HCC)   • Syncope   • Compression deformity of vertebra   • Radiculopathy   • Pericardial effusion without cardiac tamponade   • Hypercalcemia   • Abnormal serum protein electrophoresis   • Weight loss, unintentional   • Primary hyperparathyroidism (CMS/HCC)   • Hypercalcemia   • Pacemaker   • Hiatal hernia   • Elevated serum immunoglobulin free light chains   • Left displaced femoral neck fracture (CMS/HCC)         Therapy Treatment                  Rehabilitation Treatment  Summary                      Row Name 06/10/19 1846                               Treatment Time/Intention       Discipline  physical therapist  -AG           Document Type  therapy note (daily note)  -AG           Subjective Information  complains of;pain  -AG           Mode of Treatment  individual therapy;physical therapy  -AG           Patient/Family Observations  pt. supine; pleasant and cooperative  -AG           Existing Precautions/Restrictions  fall;hip;left  -AG           Patient Response to Treatment  pt. continues to progress with functional mobility; has dificulty repeating JANET precautions.   -AG           Recorded by [AG] Sandy Covington, PT 06/10/19 1851                        Row Name 06/10/19 1846                               Cognitive Assessment/Intervention- PT/OT       Orientation Status (Cognition)  oriented x 3  -AG           Follows Commands (Cognition)  WFL;follows one step commands  -AG           Recorded by [AG] Sandy Covington, PT 06/10/19 1851                        Row Name 06/10/19 1846                               Safety Issues, Functional Mobility       Safety Issues Affecting Function (Mobility)  safety precaution awareness;safety precautions follow-through/compliance  -AG           Recorded by [AG] Sandy Covington, PT 06/10/19 1851                        Row Name 06/10/19 1846                               Mobility Assessment/Intervention       Extremity Weight-bearing Status  left lower extremity;right lower extremity  -AG           Left Lower Extremity (Weight-bearing Status)  weight-bearing as tolerated (WBAT)  -AG           Right Lower Extremity (Weight-bearing Status)  weight-bearing as tolerated (WBAT)  -AG           Recorded by [AG] Sandy Covington, PT 06/10/19 1851                        Row Name 06/10/19 1846                               Bed Mobility Assessment/Treatment       Bed Mobility Assessment/Treatment  scooting/bridging;supine-sit  -AG           Scooting/Bridging  [place] (Bed Mobility)  verbal cues;nonverbal cues (demo/gesture);minimum assist (75% patient effort)  -AG           Supine-Sit [place] (Bed Mobility)  verbal cues;nonverbal cues (demo/gesture);minimum assist (75% patient effort)  -AG           Bed Mobility, Safety Issues  decreased use of legs for bridging/pushing  -AG           Assistive Device (Bed Mobility)  bed rails  -AG           Recorded by [AG] Sandy Covington, PT 06/10/19 1851                        Row Name 06/10/19 1846                               Transfer Assessment/Treatment       Transfer Assessment/Treatment  sit-stand transfer;stand-sit transfer;stand pivot/stand step transfer  -AG           Recorded by [AG] Sandy Covington, PT 06/10/19 1851                        Row Name 06/10/19 1846                               Sit-Stand Transfer       Sit-Stand [place] (Transfers)  verbal cues;nonverbal cues (demo/gesture);minimum assist (75% patient effort)  -AG           Assistive Device (Sit-Stand Transfers)  walker, front-wheeled  -AG           Recorded by [AG] Sandy Covington, PT 06/10/19 1851                        Row Name 06/10/19 1846                               Stand-Sit Transfer       Stand-Sit [place] (Transfers)  verbal cues;nonverbal cues (demo/gesture);minimum assist (75% patient effort)  -AG           Assistive Device (Stand-Sit Transfers)  walker, front-wheeled  -AG           Recorded by [AG] Sandy Covington, PT 06/10/19 1851                        Row Name 06/10/19 1846                               Stand Pivot/Stand Step Transfer       Stand Pivot/Stand Step [place]  verbal cues;nonverbal cues (demo/gesture);minimum assist (75% patient effort)  -AG           Assistive Device (Stand Pivot Stand Step Transfer)  walker, front-wheeled  -AG           Recorded by [AG] Sandy Covington, PT 06/10/19 1851                        Row Name 06/10/19 1846                               Gait/Stairs Assessment/Training        Gait/Stairs Assessment/Training  gait/ambulation independence;gait/ambulation assistive device;distance ambulated;gait pattern;gait deviations  -AG           [place] Level (Gait)  verbal cues;nonverbal cues (demo/gesture);minimum assist (75% patient effort)  -AG           Assistive Device (Gait)  walker, front-wheeled  -AG           Distance in Feet (Gait)  30  -AG           Pattern (Gait)  step-through  -AG           Deviations/Abnormal Patterns (Gait)  antalgic;stride length decreased  -AG           Bilateral Gait Deviations  forward flexed posture;heel strike decreased  -AG           Recorded by [AG] Sandy Covington, PT 06/10/19 1851                        Row Name 06/10/19 1846                               Therapeutic Exercise       Therapeutic Exercise  seated, lower extremities  -AG           Additional Documentation  Therapeutic Exercise (Row)  -AG           Recorded by [AG] Sandy Covington, PT 06/10/19 1851                        Row Name 06/10/19 1846                               Lower Extremity Seated Therapeutic Exercise       Performed, Seated Lower Extremity (Therapeutic Exercise)  hip abduction/adduction;ankle dorsiflexion/plantarflexion;LAQ (long arc quad), knee extension  -AG           Exercise Type, Seated Lower Extremity (Therapeutic Exercise)  AAROM (active assistive range of motion);AROM (active range of motion)  -AG           Sets/Reps Detail, Seated Lower Extremity (Therapeutic Exercise)  1 x 20  -AG           Transfers Skills, Training to Functional Activity, Seated Lower Extremity (Therapeutic Exercise)  able to transfer skills from training to functional activity  -AG           Recorded by [AG] Sandy Covington, PT 06/10/19 1851                        Row Name 06/10/19 1846                               Positioning and Restraints       Pre-Treatment Position  in bed  -AG           Post Treatment Position  chair  -AG           In Chair  notified nsg;sitting;call light within  reach;encouraged to call for assist;with family/caregiver  -AG           Recorded by [AG] Sandy Covington, PT 06/10/19 1851                        Row Name 06/10/19 1846                               Pain Scale: Numbers Pre/Post-Treatment       Pain Location - Side  Left  -AG           Pain Location  hip  -AG           Pain Intervention(s)  Repositioned;Ambulation/increased activity  -AG           Recorded by [AG] Sandy Covington, PT 06/10/19 1851                        Row Name 06/10/19 1846                               Pain Scale: FACES Pre/Post-Treatment       Pain: FACES Scale, Pretreatment  4-->hurts little more  -AG           Pain: FACES Scale, Post-Treatment  4-->hurts little more  -AG           Recorded by [AG] Sandy Covington, PT 06/10/19 1851                        Row Name                                    Wound 06/05/19 1838 Left hip incision       Wound - Properties Group Date first assessed: 06/05/19 [KH] Time first assessed: 1838 [KH] Side: Left [KH] Location: hip [KH] Type: incision [KH] Recorded by:  [KH] Ary Sylvester RN 06/05/19 1838                    Row Name 06/10/19 1846                               Coping       Observed Emotional State  accepting;calm;cooperative;pleasant  -AG           Verbalized Emotional State  acceptance  -AG           Recorded by [AG] Sandy Covington, PT 06/10/19 1851                        Row Name 06/10/19 1846                               Plan of Care Review       Plan of Care Reviewed With  patient  -AG           Recorded by [AG] Sandy Covington, PT 06/10/19 1851                        Row Name 06/10/19 1846                               Outcome Summary/Treatment Plan (PT)       Daily Summary of Progress (PT)  progress towards functional goals is fair  -AG           Plan for Continued Treatment (PT)  continue POC  -AG           Recorded by [AG] Sandy Covington, PT 06/10/19 1851                               User Key  (r) = Recorded By, (t) = Taken By, (c) =  Cosigned By     Initials Name Effective Dates Discipline      Ary Sylvester, RN 16 -  Nurse     Sandy Sloan, PT 18 -  PT                     Wound 19 1838 Left hip incision (Active)   Dressing Appearance dry;intact 6/10/2019  9:00 AM   Closure Approximated;Liquid skin adhesive 2019  8:53 PM   Base dry 6/10/2019  9:00 AM   Periwound intact;dry 2019  8:53 PM   Periwound Temperature warm 2019  8:53 PM   Periwound Skin Turgor firm 2019  8:53 PM   Edges other (see comments) 2019  8:53 PM   Drainage Amount none 2019  8:53 PM      Acute Care - Occupational Therapy Treatment Note  [facility]     Patient Name: Abbi Mendez                  : 1935                        MRN: [id number]  Today's Date: 2019                   Onset of Illness/Injury or Date of Surgery: 19  Date of Referral to OT: 19                  Referring Physician: Dr. Jain                      Admit Date: 2019         ICD-10-CM ICD-9-CM   1. Left displaced femoral neck fracture (CMS/HCC) S72.002A 820.8   2. Strain of right shoulder, initial encounter S46.911A 840.9   3. Hypokalemia E87.6 276.8          Patient Active Problem List   Diagnosis   • Atrial fibrillation (CMS/HCC)   • Coronary artery disease involving native coronary artery of native heart without angina pectoris   • RADHA (obstructive sleep apnea)   • Essential hypertension   • Hyperlipidemia LDL goal <70   • Iron deficiency anemia   • Left bundle branch block   • Syncope and collapse   • Severe sinus bradycardia   • Third degree AV block (CMS/HCC)   • Syncope   • Compression deformity of vertebra   • Radiculopathy   • Pericardial effusion without cardiac tamponade   • Hypercalcemia   • Abnormal serum protein electrophoresis   • Weight loss, unintentional   • Primary hyperparathyroidism (CMS/HCC)   • Hypercalcemia   • Pacemaker   • Hiatal hernia   • Elevated serum immunoglobulin free light chains   • Left  displaced femoral neck fracture (CMS/HCC)      Medical History        Past Medical History:   Diagnosis Date   • Anxiety     • Arthritis     • Atrial fibrillation (CMS/HCC) 11/14/2016   • Gastric ulcer     • Hiatal hernia     • Hypertension     • Iron deficiency anemia     • RADHA (obstructive sleep apnea) 11/14/2016         Surgical History         Past Surgical History:   Procedure Laterality Date   • CARDIAC ELECTROPHYSIOLOGY PROCEDURE N/A 1/18/2018     Procedure: Pacemaker DC new;  Surgeon: Soraya Darling MD;  Location:  [facility];  Service:    • HIP BIPOLAR REPLACEMENT Left 6/5/2019     Procedure: HIP BIPOLAR REPLACEMENT;  Surgeon: Gerardo Jain MD;  Location:  [facility];  Service: Orthopedics   • TOTAL KNEE ARTHROPLASTY Right     • TUBAL ABDOMINAL LIGATION                Therapy Treatment                  Rehabilitation Treatment Summary                      Row Name 06/11/19 1126                               Treatment Time/Intention       Discipline  occupational therapist  -BF           Document Type  therapy note (daily note)  -BF           Subjective Information  no complaints  -BF           Mode of Treatment  occupational therapy  -BF           Patient/Family Observations  Pt sitting in chair, agreeable to OT  -BF           Patient Effort  good  -BF           Existing Precautions/Restrictions  fall;hip;left;other (see comments) R hand fx splint  -BF           Patient Response to Treatment  Pt demonstrated improved RUE shoulder AROM  -BF           Recorded by [BF] Katia Enriquez OT 06/11/19 1142                        Row Name 06/11/19 1126                               Cognitive Assessment/Intervention- PT/OT       Orientation Status (Cognition)  oriented x 3  -BF           Follows Commands (Cognition)  WFL;follows one step commands  -BF           Recorded by [BF] Katia Enriquez OT 06/11/19 1142                        Row Name 06/11/19 1126                                Therapeutic Exercise       Therapeutic Exercise  seated, upper extremities  -BF           Recorded by [BF] Katia Enriquez, OT 06/11/19 1142                        Row Name 06/11/19 1126                               Upper Extremity Seated Therapeutic Exercise       Performed, Seated Upper Extremity (Therapeutic Exercise)  shoulder flexion/extension;elbow flexion/extension;scapular protraction/retraction;digit flexion/extension  -BF           Exercise Type, Seated Upper Extremity (Therapeutic Exercise)  AROM (active range of motion) BUE GMC therex; light strengthening w/ flexbar, handgripper  -BF           Expected Outcomes, Seated Upper Extremity (Therapeutic Exercise)  improve functional tolerance, self-care activity;improve performance, BADLs;improve performance, transfer skills;strengthen, facilitate independent active range of motion  -BF           Recorded by [BF] Katia Enriquez, DEEP 06/11/19 1142                        Row Name 06/11/19 1126                               Positioning and Restraints       In Chair  sitting;call light within reach;encouraged to call for assist  -BF           Recorded by [BF] Katia Enriquez, OT 06/11/19 1142                        Row Name                                    Wound 06/05/19 1838 Left hip incision       Wound - Properties Group Date first assessed: 06/05/19 [KH] Time first assessed: 1838 [KH] Side: Left [KH] Location: hip [KH] Type: incision [KH] Recorded by:  [KH] Ary Sylvester RN 06/05/19 1838                           User Key  (r) = Recorded By, (t) = Taken By, (c) = Cosigned By     Initials Name Effective Dates Discipline      Ary Sylvester RN 06/16/16 -  Nurse      Katia Enriquez OT 04/03/18 -  OT                  Wound 06/05/19 1838 Left hip incision (Active)   Dressing Appearance dry;intact 6/11/2019  9:00 AM   Closure Approximated;Liquid skin adhesive 6/10/2019  7:05 PM   Base dry 6/10/2019  7:05 PM    Periwound intact;dry 6/10/2019  7:05 PM   Periwound Temperature warm 6/10/2019  7:05 PM   Periwound Skin Turgor firm 6/10/2019  7:05 PM   Edges other (see comments) 6/10/2019  7:05 PM   Drainage Amount none 6/10/2019  7:05 PM   Dressing Care, Wound other (see comments) 6/10/2019  7:05 PM                     OT Recommendation and Plan  Outcome Summary/Treatment Plan (OT)  Anticipated Equipment Needs at Discharge (OT): (TBD)  Anticipated Discharge Disposition (OT): inpatient rehabilitation facility  Planned Therapy Interventions (OT Eval): activity tolerance training, adaptive equipment training, BADL retraining, ROM/therapeutic exercise, strengthening exercise, transfer/mobility retraining, passive ROM/stretching  Therapy Frequency (OT Eval): 3 times/wk  Plan of Care Review  Plan of Care Reviewed With: patient, daughter  Plan of Care Reviewed With: patient, daughter              Time Calculation:              Time Calculation- OT                 Row Name 06/11/19 1142                           Time Calculation- OT     Total Timed Code Minutes- OT  30 minute(s)  -BF                           User Key  (r) = Recorded By, (t) = Taken By, (c) = Cosigned By     Initials Name Provider Type     BF Katia Enriquez OT Occupational Therapist                    Therapy Charges for Today      Code Description Service Date Service Provider Modifiers Qty     [id number]  OT THERAPEUTIC ACT EA 15 MIN 6/11/2019 Katia Enriquez OT GO 2                Katia Enriquez OT                   6/11/2019

## 2019-06-11 NOTE — PROGRESS NOTES
.                            [facility] Medicine Services  PROGRESS NOTE     Patient Identification:  Name:  Abbi Mendez  Age:  83 y.o.  Sex:  female  :  1935  MRN:  [id number]  Visit Number:  [id number]  Primary Care Provider:  Eric Driver MD    Length of stay:  6    ----------------------------------------------------------------------------------------------------------------------  Subjective     Chief Complaint:  Follow up for hip fracture, UTI    History of Presenting Illness:  Patient is an 83-year-old female with past medical history significant for paroxysmal atrial fibrillation, coronary artery disease status post stenting, essential hypertension, hyperlipidemia primary hyperparathyroidism, third-degree AV block status post permanent pacemaker implantation, stage III CKD, and advanced age was admitted on 2014 still with mechanical fall resulting in mild hip fracture.  Patient underwent left hip replacement: Tolerated procedure well.  Insurance denied inpatient rehab, pending possible swing bed admission.     Subjective:  Today, the patient was resting in bed per my evaluation this morning. She denies any new complaints, no overnight events reported per nursing staff. Pending SB admission. Pain controlled. Denies any paraesthesias, no numbness or tingling. She is working with PT, tolerating well.  She denies any chest pain or pressure, no dyspnea.  No cough.  Denies any fevers or chills.  Denies any abdominal pain, nausea, vomiting or diarrhea.  No urinary symptoms.    Present during exam: n/a  ----------------------------------------------------------------------------------------------------------------------  Objective     Consults:  · Orthopedic surgery     Procedures:  · 2019: Rowell catheter insertion -- Activity restriction due to hip fracture   · 2019: Left hip bipolar replacement -- Dr. Jain, orthopedic surgery   · 2019: Rowell catheter removal      Current Hospital Meds:    apixaban 2.5 mg Oral Q12H   atorvastatin 40 mg Oral Nightly   busPIRone 10 mg Oral BID   carvedilol 12.5 mg Oral BID With Meals   cholecalciferol 50,000 Units Oral Weekly   losartan 100 mg Oral Daily   pantoprazole 40 mg Oral QAM   sodium chloride 3 mL Intravenous Q12H        ----------------------------------------------------------------------------------------------------------------------  Vital Signs:  Temp:  [98.1 °F (36.7 °C)-99.1 °F (37.3 °C)] 98.1 °F (36.7 °C)  Heart Rate:  [73-81] 73  Resp:  [18-20] 18  BP: (145-163)/(60-84) 145/66    SpO2 Percentage    06/11/19 0049 06/11/19 0240 06/11/19 0625   SpO2: 96% 96% 96%     SpO2:  [96 %] 96 %  on   ;   Device (Oxygen Therapy): room air    Body mass index is 27.13 kg/m².  Wt Readings from Last 3 Encounters:   06/05/19 76.2 kg (168 lb 0.2 oz)   05/28/19 78 kg (172 lb)   02/01/19 75.8 kg (167 lb)        Intake/Output Summary (Last 24 hours) at 6/11/2019 1211  Last data filed at 6/11/2019 0900  Gross per 24 hour   Intake 600 ml   Output --   Net 600 ml     Diet Regular; Cardiac  ----------------------------------------------------------------------------------------------------------------------  Physical exam:  Physical Exam   Constitutional: She is oriented to person, place, and time. She appears well-developed and well-nourished.  Non-toxic appearance. No distress.   Pleasant, no acute distress   HENT:   Head: Normocephalic and atraumatic.   Right Ear: External ear normal.   Left Ear: External ear normal.   Nose: Nose normal.   Mouth/Throat: Oropharynx is clear and moist and mucous membranes are normal. No oropharyngeal exudate.   Eyes: Conjunctivae and EOM are normal. Pupils are equal, round, and reactive to light. No scleral icterus.   Neck: Trachea normal and normal range of motion. Neck supple. No JVD present. No muscular tenderness present. Carotid bruit is not present. No tracheal deviation present. No thyromegaly present.    Cardiovascular: Normal rate, regular rhythm, normal heart sounds and intact distal pulses. Exam reveals no gallop and no friction rub.   No murmur heard.  Pulmonary/Chest: Effort normal and breath sounds normal. No respiratory distress. She has no wheezes. She has no rhonchi. She has no rales. She exhibits no tenderness.   Abdominal: Soft. Bowel sounds are normal. She exhibits no distension and no mass. There is no hepatosplenomegaly. There is no tenderness. There is no rebound and no guarding. No hernia.   Genitourinary:   Genitourinary Comments: No Rowell catheter in place, making urine   Musculoskeletal: She exhibits no edema or deformity.        Left hip: She exhibits swelling. She exhibits no tenderness.        Right lower leg: She exhibits swelling (mild).        Left lower leg: She exhibits swelling (mild).   Left hip incision, clean dry and intact.  Mild edema.  Mild tenderness to palpation.  Good range of motion.  Distal pulses intact.  Neurovascularly intact bilaterally.  Sensation intact.  Cap refill less than 3 seconds.  Intact bilaterally.     Vascular Status -  Her right foot exhibits normal foot vasculature  and no edema. Her left foot exhibits normal foot vasculature  and no edema.  Neurological: She is alert and oriented to person, place, and time. She has normal strength. No cranial nerve deficit or sensory deficit.   Skin: Skin is warm and dry. Capillary refill takes less than 2 seconds. No rash noted. No erythema. No pallor.   Psychiatric: She has a normal mood and affect. Her speech is normal and behavior is normal. Judgment and thought content normal.   Nursing note and vitals reviewed.     ----------------------------------------------------------------------------------------------------------------------  Tele:    Paced 70s    I have personally reviewed the EKG/Telemetry strips    ----------------------------------------------------------------------------------------------------------------------            Results from last 7 days   Lab Units 06/11/19  0456 06/10/19  0149 06/09/19  0455 06/04/19  2305   WBC 10*3/mm3 11.82* 12.15* 11.91*   < > 17.80*   HEMOGLOBIN g/dL 10.9* 10.8* 10.5*   < > 11.7*   HEMATOCRIT % 34.8 35.3 32.7*   < > 36.5   MCV fL 96.4 96.2 94.5   < > 94.1   MCHC g/dL 31.3* 30.6* 32.1   < > 32.1   PLATELETS 10*3/mm3 423 396 380   < > 354   INR   --   --   --   --  1.19*    < > = values in this interval not displayed.     Results from last 7 days   Lab Units 06/11/19  0456 06/10/19  0136 06/09/19 0455 06/05/19  0408 06/04/19  2305   SODIUM mmol/L 138 138 138   < > 140 139   POTASSIUM mmol/L 3.9 4.1 3.7   < > 3.1* 3.2*   MAGNESIUM mg/dL 2.0 1.6 2.1   < >  --   --    CHLORIDE mmol/L 101 102 103   < > 100 98   CO2 mmol/L 25.7 24.8 23.7   < > 24.6 25.4   BUN mg/dL 19 19 17   < > 16 17   CREATININE mg/dL 0.95 1.01* 0.91   < > 1.06* 1.05*   EGFR IF NONAFRICN AM mL/min/1.73 56* 52* 59*   < > 50* 50*   CALCIUM mg/dL 8.4* 7.9* 7.6*   < > 7.9* 8.2*   GLUCOSE mg/dL 105* 114* 108*   < > 130* 155*   ALBUMIN g/dL  --   --   --   --  3.16* 3.17*   BILIRUBIN mg/dL  --   --   --   --  0.5 0.5   ALK PHOS U/L  --   --   --   --  74 75   AST (SGOT) U/L  --   --   --   --  22 28   ALT (SGPT) U/L  --   --   --   --  18 20    < > = values in this interval not displayed.   Estimated Creatinine Clearance: 46.8 mL/min (by C-G formula based on SCr of 0.95 mg/dL).    I have personally reviewed the above laboratory results.   ----------------------------------------------------------------------------------------------------------------------  Imaging Results (last 24 hours)     Procedure Component Value Units Date/Time    XR Pelvis 1 or 2 View [[id number]] Collected:  06/06/19 0711     Updated:  06/06/19 0713    Narrative:       FINDINGS:       BONES: No acute fracture. No blastic or lytic  lesions.       JOINT: TOTAL LEFT HIP ARTHROPLASTY.       SOFT TISSUES:  No soft tissue mass.    Impression:       No acute or destructive bony abnormality.     This report was finalized on 6/6/2019 7:11 AM by Dr. Maxime Treviño MD.      I have personally reviewed the above radiology results.   ----------------------------------------------------------------------------------------------------------------------  Assessment/Plan     · Acute, traumatic, closed left hip fracture status post mechanical fall: Pt s/p left hip replacement per ortho surgery. Elizabeth procedure well. Cont post op orders and recommendations per ortho, assistance is appreciated. PT, tolerating well. PRN pain meds with holding parameters. Pending possible swing bed admission.   · Leukocytosis: Stable. Likely stress-induced post fracture/operation.  Patient afebrile, no active signs or symptoms of infection.  Monitor closely with antibiotic therapy.  Repeat CBC in a.m.  · Urinary tract infection: Patient has been on IV Rocephin empirically. Urine culture finalized with mixed markus, less than 25,000 CFU per mL.  Will discontinue Rocephin.  Patient afebrile.  Denies any urinary symptoms.  Rowell catheter has been removed previously.    · Acute hypokalemia: Resolved with supplementation. Continue to replace per protocol as necessary.  Repeat chemistry panel in the morning.  · Hypomagnesemia: Resolved with supplementation. Continue to replace per protocol.  Repeat magnesium level in the morning.    · Hypophosphatemia: Resolved with supplementation. Replace per protocol.  Repeat phosphorus level in the morning.  · History of coronary artery disease status post stenting in the past: No chest pain or symptoms of ACS.  Closely monitor on telemetry. Continue medical therapy.   · Paroxysmal atrial fibrillation: Cont Coreg for rate control, she remains paced. Cont telemetry monitoring. Eliquis for anticoagulation.   · Essential hypertension: BP moderately  controlled, higher end of normal. Cont Coreg and home losartan, she was increased to her home dose 100 mg recently. Will hold off on adding any agents today, if still above goal tomorrow with start low dose Norvasc. Closely monitor and titrate medications as necessary.   · Hyperlipidemia: Continue statin therapy.   · History of primary hyperparathyroidism: Monitor. Calcium low but stable.   · History of third-degree AV block status post permanent pacemaker implantation: Telemetry monitoring.   · Hyperglycemia: A1C 5.70. Monitor with daily chemistry, no indication for SSI at this time.   · Stage III CKD: Appears stable.  Avoid nephrotoxins. Monitor closely, repeat chem panel in AM.   · Vitamin D deficiency: Cholecalciferol supplementation.   · Mild hypoalbuminemia: Likely multifactorial. Encourage PO intake. Monitor closely.   · Anxiety: Continue Buspar.   · Microscopic hematuria/proteinuria: Protein/creatinine ratio elevated, likely hypertensive nephropathy. Closely monitor, recommend outpatient follow up once stabilized from surgery standpoint. Treat HTN as previously mentioned.   · Macrocytic anemia: Folate and vitamin B12 levels pending.  Supplemented patient found to be deficient.  Hemoglobin stable.  Likely ABLA postoperatively.  Closely monitor H&H, transfuse should hemoglobin drop below 7.0.  Repeat CBC in a.m.  · Vitamin D deficiency: Cholecalciferol 50,000 units weekly x8 weeks.  · F/E/N: No IV fluids. Replace electrolytes per protocol as necessary. Cardiac diet.     --------------------------------------------------  DVT Prophylaxis:  Eliquis to serve   GI Prophylaxis: Protonix   Activity: Up with assistance, PT    The patient is high risk due to the following diagnoses/reasons:  Hip fracture, advanced age, CAD, UTI, electrolyte abnormalities, afib, anticoagulation    I have discussed the patient's assessment and plan with the patient and nursing staff.     Disposition: Pending possible swing bed  admission, insurance denied inpatient rehab admission previously.     Alize Parsons PA-C  Hospitalist Service -- [facility]   Pager: [phone]    06/11/19  12:11 PM    Attending Physician: Jeffrey Parsons, *    ----------------------------------------------------------------------------------------------------------------------

## 2019-06-11 NOTE — THERAPY TREATMENT NOTE
Acute Care - Occupational Therapy Treatment Note  [facility]     Patient Name: Abbi Mendez  : 1935  MRN: [id number]  Today's Date: 2019  Onset of Illness/Injury or Date of Surgery: 19  Date of Referral to OT: 19  Referring Physician: Dr. Jain    Admit Date: 2019       ICD-10-CM ICD-9-CM   1. Left displaced femoral neck fracture (CMS/HCC) S72.002A 820.8   2. Strain of right shoulder, initial encounter S46.911A 840.9   3. Hypokalemia E87.6 276.8     Patient Active Problem List   Diagnosis   • Atrial fibrillation (CMS/HCC)   • Coronary artery disease involving native coronary artery of native heart without angina pectoris   • RADHA (obstructive sleep apnea)   • Essential hypertension   • Hyperlipidemia LDL goal <70   • Iron deficiency anemia   • Left bundle branch block   • Syncope and collapse   • Severe sinus bradycardia   • Third degree AV block (CMS/HCC)   • Syncope   • Compression deformity of vertebra   • Radiculopathy   • Pericardial effusion without cardiac tamponade   • Hypercalcemia   • Abnormal serum protein electrophoresis   • Weight loss, unintentional   • Primary hyperparathyroidism (CMS/HCC)   • Hypercalcemia   • Pacemaker   • Hiatal hernia   • Elevated serum immunoglobulin free light chains   • Left displaced femoral neck fracture (CMS/HCC)     Past Medical History:   Diagnosis Date   • Anxiety    • Arthritis    • Atrial fibrillation (CMS/HCC) 2016   • Gastric ulcer    • Hiatal hernia    • Hypertension    • Iron deficiency anemia    • RADHA (obstructive sleep apnea) 2016     Past Surgical History:   Procedure Laterality Date   • CARDIAC ELECTROPHYSIOLOGY PROCEDURE N/A 2018    Procedure: Pacemaker DC new;  Surgeon: Soraya Darling MD;  Location: [facility] INVASIVE LOCATION;  Service:    • HIP BIPOLAR REPLACEMENT Left 2019    Procedure: HIP BIPOLAR REPLACEMENT;  Surgeon: Gerardo Jain MD;  Location: [facility];  Service: Orthopedics   • TOTAL KNEE  ARTHROPLASTY Right    • TUBAL ABDOMINAL LIGATION         Therapy Treatment    Rehabilitation Treatment Summary     Row Name 06/11/19 1126             Treatment Time/Intention    Discipline  occupational therapist  -BF      Document Type  therapy note (daily note)  -BF      Subjective Information  no complaints  -BF      Mode of Treatment  occupational therapy  -BF      Patient/Family Observations  Pt sitting in chair, agreeable to OT  -BF      Patient Effort  good  -BF      Existing Precautions/Restrictions  fall;hip;left;other (see comments) R hand fx splint  -BF      Patient Response to Treatment  Pt demonstrated improved RUE shoulder AROM  -BF      Recorded by [BF] Katia Enriquez, OT 06/11/19 1142      Row Name 06/11/19 1126             Cognitive Assessment/Intervention- PT/OT    Orientation Status (Cognition)  oriented x 3  -BF      Follows Commands (Cognition)  WFL;follows one step commands  -BF      Recorded by [BF] Katia Enriquez OT 06/11/19 1142      Row Name 06/11/19 1126             Therapeutic Exercise    Therapeutic Exercise  seated, upper extremities  -BF      Recorded by [BF] Katia Enriquez OT 06/11/19 1142      Row Name 06/11/19 1126             Upper Extremity Seated Therapeutic Exercise    Performed, Seated Upper Extremity (Therapeutic Exercise)  shoulder flexion/extension;elbow flexion/extension;scapular protraction/retraction;digit flexion/extension  -BF      Exercise Type, Seated Upper Extremity (Therapeutic Exercise)  AROM (active range of motion) BUE GMC therex; light strengthening w/ flexbar, handgripper  -BF      Expected Outcomes, Seated Upper Extremity (Therapeutic Exercise)  improve functional tolerance, self-care activity;improve performance, BADLs;improve performance, transfer skills;strengthen, facilitate independent active range of motion  -BF      Recorded by [BF] Katia Enriquez OT 06/11/19 1142      Row Name 06/11/19 1126             Positioning and  Restraints    In Chair  sitting;call light within reach;encouraged to call for assist  -BF      Recorded by [BF] Katia Enriquez, DEEP 06/11/19 1142      Row Name                Wound 06/05/19 1838 Left hip incision    Wound - Properties Group Date first assessed: 06/05/19 [KH] Time first assessed: 1838 [KH] Side: Left [KH] Location: hip [KH] Type: incision [KH] Recorded by:  [KH] Ary Sylvester RN 06/05/19 1838      User Key  (r) = Recorded By, (t) = Taken By, (c) = Cosigned By    Initials Name Effective Dates Discipline     Ary Sylvester RN 06/16/16 -  Nurse    Katia Menjivar, DEEP 04/03/18 -  OT        Wound 06/05/19 1838 Left hip incision (Active)   Dressing Appearance dry;intact 6/11/2019  9:00 AM   Closure Approximated;Liquid skin adhesive 6/10/2019  7:05 PM   Base dry 6/10/2019  7:05 PM   Periwound intact;dry 6/10/2019  7:05 PM   Periwound Temperature warm 6/10/2019  7:05 PM   Periwound Skin Turgor firm 6/10/2019  7:05 PM   Edges other (see comments) 6/10/2019  7:05 PM   Drainage Amount none 6/10/2019  7:05 PM   Dressing Care, Wound other (see comments) 6/10/2019  7:05 PM           OT Recommendation and Plan  Outcome Summary/Treatment Plan (OT)  Anticipated Equipment Needs at Discharge (OT): (TBD)  Anticipated Discharge Disposition (OT): inpatient rehabilitation facility  Planned Therapy Interventions (OT Eval): activity tolerance training, adaptive equipment training, BADL retraining, ROM/therapeutic exercise, strengthening exercise, transfer/mobility retraining, passive ROM/stretching  Therapy Frequency (OT Eval): 3 times/wk  Plan of Care Review  Plan of Care Reviewed With: patient, daughter  Plan of Care Reviewed With: patient, daughter       Time Calculation:   Time Calculation- OT     Row Name 06/11/19 1142             Time Calculation- OT    Total Timed Code Minutes- OT  30 minute(s)  -BF        User Key  (r) = Recorded By, (t) = Taken By, (c) = Cosigned By    Initials  Name Provider Type     Katia Enriquez OT Occupational Therapist        Therapy Charges for Today     Code Description Service Date Service Provider Modifiers Qty    [id number] HC OT THERAPEUTIC ACT EA 15 MIN 6/11/2019 Katia Enriquez OT GO 2               Katia Enriquez OT  6/11/2019

## 2019-06-11 NOTE — PLAN OF CARE
Problem: Patient Care Overview  Goal: Plan of Care Review  Outcome: Ongoing (interventions implemented as appropriate)   06/08/19 1009 06/10/19 0149 06/10/19 1846   Coping/Psychosocial   Plan of Care Reviewed With --  --  patient   Plan of Care Review   Progress --  improving --    OTHER   Outcome Summary Pt tolerated session well, including gait training and therapeutic exercises. She will be progressed as tolerated. --  --      Goal: Individualization and Mutuality  Outcome: Ongoing (interventions implemented as appropriate)   06/05/19 1841   Mutuality/Individual Preferences   What Information Would Help Us Give You More Personalized Care? none   Individualization   Patient Specific Preferences none       Problem: Fall Risk (Adult)  Goal: Identify Related Risk Factors and Signs and Symptoms  Outcome: Ongoing (interventions implemented as appropriate)   06/10/19 1043   Fall Risk (Adult)   Related Risk Factors (Fall Risk) gait/mobility problems   Signs and Symptoms (Fall Risk) presence of risk factors     Goal: Absence of Fall  Outcome: Ongoing (interventions implemented as appropriate)   06/10/19 1043   Fall Risk (Adult)   Absence of Fall making progress toward outcome       Problem: Skin Injury Risk (Adult)  Goal: Identify Related Risk Factors and Signs and Symptoms  Outcome: Outcome(s) achieved Date Met: 06/10/19   06/10/19 1043   Skin Injury Risk (Adult)   Related Risk Factors (Skin Injury Risk) advanced age;mobility impaired     Goal: Skin Health and Integrity  Outcome: Ongoing (interventions implemented as appropriate)   06/10/19 1043   Skin Injury Risk (Adult)   Skin Health and Integrity making progress toward outcome       Problem: Fracture Orthopaedic (Adult)  Goal: Signs and Symptoms of Listed Potential Problems Will be Absent, Minimized or Managed (Fracture Orthopaedic)  Outcome: Ongoing (interventions implemented as appropriate)   06/10/19 1043   Goal/Outcome Evaluation   Problems Assessed (Orthopaedic  Fracture) all   Problems Present (Orthopaedic Fracture) functional deficit/self-care deficit       Problem: Pain, Acute (Adult)  Goal: Identify Related Risk Factors and Signs and Symptoms  Outcome: Outcome(s) achieved Date Met: 06/10/19   06/10/19 1043   Pain, Acute (Adult)   Related Risk Factors (Acute Pain) surgery;persistent pain   Signs and Symptoms (Acute Pain) verbalization of pain descriptors     Goal: Acceptable Pain Control/Comfort Level  Outcome: Ongoing (interventions implemented as appropriate)   06/10/19 1043   Pain, Acute (Adult)   Acceptable Pain Control/Comfort Level making progress toward outcome       Problem: Self-Care Deficit (Adult,Obstetrics,Pediatric)  Goal: Improved Ability to Perform BADL and IADL  Outcome: Ongoing (interventions implemented as appropriate)   06/10/19 0149   Self-Care Deficit (Adult,Obstetrics,Pediatric)   Improved Ability to Perform BADL and IADL making progress toward outcome

## 2019-06-12 LAB
ANION GAP SERPL CALCULATED.3IONS-SCNC: 10.8 MMOL/L
BUN BLD-MCNC: 18 MG/DL (ref 8–23)
BUN/CREAT SERPL: 20.5 (ref 7–25)
CALCIUM SPEC-SCNC: 8.4 MG/DL (ref 8.6–10.5)
CHLORIDE SERPL-SCNC: 101 MMOL/L (ref 98–107)
CO2 SERPL-SCNC: 27.2 MMOL/L (ref 22–29)
CREAT BLD-MCNC: 0.88 MG/DL (ref 0.57–1)
DEPRECATED RDW RBC AUTO: 42.7 FL (ref 37–54)
ERYTHROCYTE [DISTWIDTH] IN BLOOD BY AUTOMATED COUNT: 12.5 % (ref 12.3–15.4)
GFR SERPL CREATININE-BSD FRML MDRD: 61 ML/MIN/1.73
GLUCOSE BLD-MCNC: 104 MG/DL (ref 65–99)
HCT VFR BLD AUTO: 33.8 % (ref 34–46.6)
HGB BLD-MCNC: 10.3 G/DL (ref 12–15.9)
MAGNESIUM SERPL-MCNC: 1.6 MG/DL (ref 1.6–2.4)
MCH RBC QN AUTO: 29.6 PG (ref 26.6–33)
MCHC RBC AUTO-ENTMCNC: 30.5 G/DL (ref 31.5–35.7)
MCV RBC AUTO: 97.1 FL (ref 79–97)
PHOSPHATE SERPL-MCNC: 3.5 MG/DL (ref 2.5–4.5)
PLATELET # BLD AUTO: 411 10*3/MM3 (ref 140–450)
PMV BLD AUTO: 10.2 FL (ref 6–12)
POTASSIUM BLD-SCNC: 3.8 MMOL/L (ref 3.5–5.2)
RBC # BLD AUTO: 3.48 10*6/MM3 (ref 3.77–5.28)
SODIUM BLD-SCNC: 139 MMOL/L (ref 136–145)
WBC NRBC COR # BLD: 11.29 10*3/MM3 (ref 3.4–10.8)

## 2019-06-12 PROCEDURE — 97110 THERAPEUTIC EXERCISES: CPT

## 2019-06-12 PROCEDURE — 25010000002 FUROSEMIDE PER 20 MG: Performed by: INTERNAL MEDICINE

## 2019-06-12 PROCEDURE — 99232 SBSQ HOSP IP/OBS MODERATE 35: CPT | Performed by: INTERNAL MEDICINE

## 2019-06-12 PROCEDURE — 97116 GAIT TRAINING THERAPY: CPT

## 2019-06-12 PROCEDURE — 84100 ASSAY OF PHOSPHORUS: CPT | Performed by: PHYSICIAN ASSISTANT

## 2019-06-12 PROCEDURE — 83735 ASSAY OF MAGNESIUM: CPT | Performed by: PHYSICIAN ASSISTANT

## 2019-06-12 PROCEDURE — 80048 BASIC METABOLIC PNL TOTAL CA: CPT | Performed by: PHYSICIAN ASSISTANT

## 2019-06-12 PROCEDURE — 85027 COMPLETE CBC AUTOMATED: CPT | Performed by: PHYSICIAN ASSISTANT

## 2019-06-12 PROCEDURE — 97530 THERAPEUTIC ACTIVITIES: CPT

## 2019-06-12 RX ORDER — FUROSEMIDE 10 MG/ML
40 INJECTION INTRAMUSCULAR; INTRAVENOUS ONCE
Status: COMPLETED | OUTPATIENT
Start: 2019-06-12 | End: 2019-06-12

## 2019-06-12 RX ORDER — MAGNESIUM SULFATE HEPTAHYDRATE 40 MG/ML
4 INJECTION, SOLUTION INTRAVENOUS ONCE
Status: COMPLETED | OUTPATIENT
Start: 2019-06-12 | End: 2019-06-12

## 2019-06-12 RX ADMIN — APIXABAN 2.5 MG: 2.5 TABLET, FILM COATED ORAL at 22:32

## 2019-06-12 RX ADMIN — FUROSEMIDE 40 MG: 10 INJECTION, SOLUTION INTRAMUSCULAR; INTRAVENOUS at 14:38

## 2019-06-12 RX ADMIN — MAGNESIUM SULFATE HEPTAHYDRATE 4 G: 40 INJECTION, SOLUTION INTRAVENOUS at 08:50

## 2019-06-12 RX ADMIN — BUSPIRONE HYDROCHLORIDE 10 MG: 10 TABLET ORAL at 22:32

## 2019-06-12 RX ADMIN — APIXABAN 2.5 MG: 2.5 TABLET, FILM COATED ORAL at 08:50

## 2019-06-12 RX ADMIN — LOSARTAN POTASSIUM 100 MG: 50 TABLET, FILM COATED ORAL at 08:51

## 2019-06-12 RX ADMIN — BUSPIRONE HYDROCHLORIDE 10 MG: 10 TABLET ORAL at 08:50

## 2019-06-12 RX ADMIN — CARVEDILOL 12.5 MG: 6.25 TABLET, FILM COATED ORAL at 17:17

## 2019-06-12 RX ADMIN — ATORVASTATIN CALCIUM 40 MG: 40 TABLET, FILM COATED ORAL at 22:32

## 2019-06-12 RX ADMIN — CARVEDILOL 12.5 MG: 6.25 TABLET, FILM COATED ORAL at 08:50

## 2019-06-12 RX ADMIN — SODIUM CHLORIDE, PRESERVATIVE FREE 3 ML: 5 INJECTION INTRAVENOUS at 08:51

## 2019-06-12 RX ADMIN — PANTOPRAZOLE SODIUM 40 MG: 40 TABLET, DELAYED RELEASE ORAL at 05:47

## 2019-06-12 RX ADMIN — ACETAMINOPHEN 650 MG: 325 TABLET ORAL at 07:44

## 2019-06-12 NOTE — PROGRESS NOTES
Discharge Planning Assessment   [facility]     Patient Name: Abbi Mendez  MRN: [id number]  Today's Date: 6/12/2019    Admit Date: 6/4/2019    Discharge Plan     Row Name 06/12/19 1451       Plan    Plan  SS spoke with swing bed nurse, Candy who states we are still awaiting the pre-auth from [facility]. SS will follow.      Marleen Benitez

## 2019-06-12 NOTE — THERAPY TREATMENT NOTE
Acute Care - Physical Therapy Treatment Note   Umer     Patient Name: Abbi Mendez  : 1935  MRN: [id number]  Today's Date: 2019  Onset of Illness/Injury or Date of Surgery: 19  Date of Referral to PT: 19  Referring Physician: Dr. Jain    Admit Date: 2019    Visit Dx:    ICD-10-CM ICD-9-CM   1. Left displaced femoral neck fracture (CMS/HCC) S72.002A 820.8   2. Strain of right shoulder, initial encounter S46.911A 840.9   3. Hypokalemia E87.6 276.8     Patient Active Problem List   Diagnosis   • Atrial fibrillation (CMS/HCC)   • Coronary artery disease involving native coronary artery of native heart without angina pectoris   • RADHA (obstructive sleep apnea)   • Essential hypertension   • Hyperlipidemia LDL goal <70   • Iron deficiency anemia   • Left bundle branch block   • Syncope and collapse   • Severe sinus bradycardia   • Third degree AV block (CMS/HCC)   • Syncope   • Compression deformity of vertebra   • Radiculopathy   • Pericardial effusion without cardiac tamponade   • Hypercalcemia   • Abnormal serum protein electrophoresis   • Weight loss, unintentional   • Primary hyperparathyroidism (CMS/HCC)   • Hypercalcemia   • Pacemaker   • Hiatal hernia   • Elevated serum immunoglobulin free light chains   • Left displaced femoral neck fracture (CMS/HCC)       Therapy Treatment    Rehabilitation Treatment Summary     Row Name 19 1552             Treatment Time/Intention    Discipline  physical therapy assistant  -      Document Type  therapy note (daily note) BID treatment  -LL      Subjective Information  no complaints  -LL      Mode of Treatment  individual therapy;physical therapy  -LL      Patient/Family Observations  Patient in bed in am & in chair in pm  -LL      Patient Effort  good  -LL      Existing Precautions/Restrictions  fall;hip;left  -LL      Patient Response to Treatment  Patient tolerated BID PT session well with adequate rest breaks & is ambulating further  distance.  -LL      Recorded by [LL] Loyda Vega, [facility] 06/12/19 1603      Row Name 06/12/19 1552             Cognitive Assessment/Intervention- PT/OT    Orientation Status (Cognition)  oriented x 3  -LL      Follows Commands (Cognition)  WFL;follows one step commands  -LL      Recorded by [LL] Loyda Vega, [facility] 06/12/19 1603      Row Name 06/12/19 1552             Mobility Assessment/Intervention    Extremity Weight-bearing Status  left lower extremity;right lower extremity  -LL      Left Lower Extremity (Weight-bearing Status)  weight-bearing as tolerated (WBAT)  -LL      Right Lower Extremity (Weight-bearing Status)  weight-bearing as tolerated (WBAT)  -LL      Recorded by [LL] Loyda Vega [facility] 06/12/19 1603      Row Name 06/12/19 1552             Bed Mobility Assessment/Treatment    Bed Mobility Assessment/Treatment  scooting/bridging;supine-sit  -LL      Scooting/Bridging [place] (Bed Mobility)  verbal cues;nonverbal cues (demo/gesture);minimum assist (75% patient effort)  -LL      Supine-Sit [place] (Bed Mobility)  verbal cues;nonverbal cues (demo/gesture);minimum assist (75% patient effort)  -LL      Bed Mobility, Safety Issues  decreased use of legs for bridging/pushing  -LL      Assistive Device (Bed Mobility)  bed rails  -LL      Recorded by [LL] Loyda Vega [facility] 06/12/19 1603      Row Name 06/12/19 1552             Transfer Assessment/Treatment    Transfer Assessment/Treatment  sit-stand transfer;stand-sit transfer;stand pivot/stand step transfer  -LL      Recorded by [LL] Loyda Vega [facility] 06/12/19 1603      Row Name 06/12/19 1552             Sit-Stand Transfer    Sit-Stand [place] (Transfers)  verbal cues;nonverbal cues (demo/gesture);minimum assist (75% patient effort)  -LL      Assistive Device (Sit-Stand Transfers)  walker, front-wheeled  -LL      Recorded by [LL] Loyda Vega [facility] 06/12/19 1603      Row Name 06/12/19 1552             Stand-Sit Transfer    Stand-Sit  [place] (Transfers)  verbal cues;nonverbal cues (demo/gesture);minimum assist (75% patient effort)  -LL      Assistive Device (Stand-Sit Transfers)  walker, front-wheeled  -LL      Recorded by [LL] Loyda Vega, [facility] 06/12/19 1603      Row Name 06/12/19 1552             Stand Pivot/Stand Step Transfer    Stand Pivot/Stand Step [place]  verbal cues;nonverbal cues (demo/gesture);minimum assist (75% patient effort)  -LL      Assistive Device (Stand Pivot Stand Step Transfer)  walker, front-wheeled  -LL      Recorded by [LL] Loyda Vega, [facility] 06/12/19 1603      Row Name 06/12/19 1552             Toilet Transfer    Type (Toilet Transfer)  sit-stand;stand-sit  -LL      [place] Level (Toilet Transfer)  minimum assist (75% patient effort);verbal cues  -LL      Assistive Device (Toilet Transfer)  commode, bedside without drop arms;walker, front-wheeled  -LL      Recorded by [LL] Loyda Vega, [facility] 06/12/19 1603      Row Name 06/12/19 1552             Gait/Stairs Assessment/Training    Gait/Stairs Assessment/Training  gait/ambulation independence;gait/ambulation assistive device;distance ambulated;gait pattern;gait deviations  -LL      [place] Level (Gait)  verbal cues;nonverbal cues (demo/gesture);minimum assist (75% patient effort);contact guard  -LL      Assistive Device (Gait)  walker, front-wheeled  -LL      Distance in Feet (Gait)  105 in am & pm  -LL      Pattern (Gait)  step-through  -LL      Deviations/Abnormal Patterns (Gait)  antalgic;stride length decreased  -LL      Bilateral Gait Deviations  forward flexed posture;heel strike decreased  -LL      Recorded by [LL] Loyda Vega, [facility] 06/12/19 1603      Row Name 06/12/19 1552             Lower Extremity Seated Therapeutic Exercise    Performed, Seated Lower Extremity (Therapeutic Exercise)  hip abduction/adduction;ankle dorsiflexion/plantarflexion;LAQ (long arc quad), knee extension GS  -LL      Exercise Type, Seated Lower Extremity  (Therapeutic Exercise)  AROM (active range of motion)  -LL      Restrictions, Seated Lower Extremity (Therapeutic Exercise)  L JANET precautions  -LL      Sets/Reps Detail, Seated Lower Extremity (Therapeutic Exercise)  1 x 25  -LL      Transfers Skills, Training to Functional Activity, Seated Lower Extremity (Therapeutic Exercise)  able to transfer skills from training to functional activity  -LL      Recorded by [LL] Loyda Vega [facility] 06/12/19 1603      Row Name 06/12/19 1552             Lower Extremity Supine Therapeutic Exercise    Performed, Supine Lower Extremity (Therapeutic Exercise)  hip abduction/adduction;quadriceps sets;gluteal sets;ankle pumps;heel slides  -LL      Exercise Type, Supine Lower Extremity (Therapeutic Exercise)  AROM (active range of motion)  -LL      Restrictions, Supine Lower Extremity (Therapeutic Exercise)  L JANET precautions  -LL      Sets/Reps Detail, Supine Lower Extremity (Therapeutic Exercise)  1 x 20  -LL      Transfers Skills, Training to Functional Activity, Supine Lower Extremity (Therapeutic Exercise)  able to transfer skills from training to functional activity  -LL      Recorded by [LL] Loyda Vega [facility] 06/12/19 1603      Row Name 06/12/19 1552             Positioning and Restraints    Pre-Treatment Position  in bed in am; chair in pm  -LL      Post Treatment Position  chair  -LL      In Chair  sitting;call light within reach;encouraged to call for assist;notified nsg tray table in front of her  -LL      Recorded by [LL] Loyda Vega [facility] 06/12/19 1603      Row Name 06/12/19 1552             Pain Scale: Numbers Pre/Post-Treatment    Pain Location - Side  Left  -LL      Pain Location  hip  -LL      Recorded by [LL] Loyda Vega [facility] 06/12/19 1603      Row Name 06/12/19 1552             Pain Scale: FACES Pre/Post-Treatment    Pain: FACES Scale, Pretreatment  4-->hurts little more  -LL      Pain: FACES Scale, Post-Treatment  4-->hurts little more  -LL      Recorded  by [LL] Loyda Vega, PTA 06/12/19 1603      Row Name                Wound 06/05/19 1838 Left hip incision    Wound - Properties Group Date first assessed: 06/05/19 [KH] Time first assessed: 1838 [KH] Side: Left [KH] Location: hip [KH] Type: incision [KH] Recorded by:  [KH] Ary Sylvester RN 06/05/19 1838    Row Name 06/12/19 1552             Coping    Observed Emotional State  accepting;calm;cooperative;pleasant  -LL      Verbalized Emotional State  acceptance  -LL      Recorded by [LL] Loyda Vega, PTA 06/12/19 1603        User Key  (r) = Recorded By, (t) = Taken By, (c) = Cosigned By    Initials Name Effective Dates Discipline     Ary Sylvester RN 06/16/16 -  Nurse    LL Loyda Vega, PTA 05/02/16 -  PT          Wound 06/05/19 1838 Left hip incision (Active)   Dressing Appearance intact;dry 6/12/2019  8:50 AM   Closure Approximated 6/12/2019  8:50 AM   Base dry 6/12/2019  8:50 AM   Periwound dry;intact 6/12/2019  8:50 AM   Periwound Temperature warm 6/12/2019  8:50 AM   Periwound Skin Turgor soft 6/12/2019  8:50 AM   Drainage Amount none 6/12/2019  8:50 AM           Physical Therapy Education     Title: PT OT SLP Therapies (Done)     Topic: Physical Therapy (Done)     Point: Mobility training (Done)     Learning Progress Summary           Patient Acceptance, E,D, VU,NR by LL at 6/12/2019  4:03 PM    Acceptance, E,D, VU,NR by LL at 6/11/2019  4:13 PM    Acceptance, E, VU by CL at 6/9/2019 10:07 AM    Acceptance, E, NR by CL at 6/8/2019 10:46 AM    Acceptance, E,TB, VU by AD at 6/8/2019 10:09 AM    Acceptance, E, VU,NR by LL at 6/7/2019  4:05 PM    Acceptance, E,TB, VU by TT at 6/7/2019 12:26 AM    Acceptance, H, VU,NR by AG at 6/6/2019  1:28 PM    Comment:  Reviewed written JANET precautions    Acceptance, E,D, VU,NR by AG at 6/6/2019  1:13 PM    Comment:  PT reviewed and instructed in HEP, JANET precautions.   Family Acceptance, H, FABRICIO,NR by AG at 6/6/2019  1:28 PM    Comment:  Reviewed  written JANET precautions                   Point: Home exercise program (Done)     Learning Progress Summary           Patient Acceptance, E,D, VU,NR by LL at 6/12/2019  4:03 PM    Acceptance, E,D, VU,NR by LL at 6/11/2019  4:13 PM    Acceptance, E, VU by CL at 6/9/2019 10:07 AM    Acceptance, E, NR by CL at 6/8/2019 10:46 AM    Acceptance, E,TB, VU by AD at 6/8/2019 10:09 AM    Acceptance, E, VU,NR by LL at 6/7/2019  4:05 PM    Acceptance, E,TB, VU by TT at 6/7/2019 12:26 AM    Acceptance, H, VU,NR by AG at 6/6/2019  1:28 PM    Comment:  Reviewed written JANET precautions    Acceptance, E,D, VU,NR by AG at 6/6/2019  1:13 PM    Comment:  PT reviewed and instructed in HEP, JANET precautions.   Family Acceptance, H, VU,NR by AG at 6/6/2019  1:28 PM    Comment:  Reviewed written JANET precautions                   Point: Body mechanics (Done)     Learning Progress Summary           Patient Acceptance, E,D, VU,NR by LL at 6/12/2019  4:03 PM    Acceptance, E,D, VU,NR by LL at 6/11/2019  4:13 PM    Acceptance, E, VU by CL at 6/9/2019 10:07 AM    Acceptance, E, NR by CL at 6/8/2019 10:46 AM    Acceptance, E,TB, VU by AD at 6/8/2019 10:09 AM    Acceptance, E, VU,NR by LL at 6/7/2019  4:05 PM    Acceptance, E,TB, VU by TT at 6/7/2019 12:26 AM    Acceptance, H, VU,NR by AG at 6/6/2019  1:28 PM    Comment:  Reviewed written JANET precautions    Acceptance, E,D, VU,NR by AG at 6/6/2019  1:13 PM    Comment:  PT reviewed and instructed in HEP, JANET precautions.   Family Acceptance, H, VU,NR by AG at 6/6/2019  1:28 PM    Comment:  Reviewed written JANET precautions                   Point: Precautions (Done)     Learning Progress Summary           Patient Acceptance, E,D, VU,NR by LL at 6/12/2019  4:03 PM    Acceptance, E,D, VU,NR by LL at 6/11/2019  4:13 PM    Acceptance, E, VU by CL at 6/9/2019 10:07 AM    Acceptance, E, NR by CL at 6/8/2019 10:46 AM    Acceptance, E,TB, VU by AD at 6/8/2019 10:09 AM    Acceptance, E, VU,NR by LL at  6/7/2019  4:05 PM    Acceptance, E,TB, VU by TT at 6/7/2019 12:26 AM    Acceptance, H, VU,NR by AG at 6/6/2019  1:28 PM    Comment:  Reviewed written JANET precautions    Acceptance, E,D, VU,NR by AG at 6/6/2019  1:13 PM    Comment:  PT reviewed and instructed in HEP, JANET precautions.   Family Acceptance, H, VU,NR by AG at 6/6/2019  1:28 PM    Comment:  Reviewed written JANET precautions                               User Key     Initials Effective Dates Name Provider Type Discipline    CL 06/16/16 -  Yarely Mercado, RN Registered Nurse Nurse    AD 04/03/18 -  Dalton, Ashley Claudene, PT Physical Therapist PT    TT 06/08/18 -  Frida Nugent, RN Registered Nurse Nurse    AG 04/03/18 -  Sandy Covington, PT Physical Therapist PT    LL 05/02/16 -  Loyda Vega, ALFREDO Physical Therapy Assistant PT                PT Recommendation and Plan  Anticipated Discharge Disposition (PT): inpatient rehabilitation facility  Therapy Frequency (PT Clinical Impression): other (see comments)(6 days/ week; 1-2 times/ day)  Outcome Summary/Treatment Plan (PT)  Anticipated Equipment Needs at Discharge (PT): raised toilet seat, front wheeled walker  Anticipated Discharge Disposition (PT): inpatient rehabilitation facility  Plan of Care Reviewed With: patient  Progress: improving  Outcome Summary: Patient continues to improve with ongoing therapy ambulating further distance & requiring less assistance.  Contnue w/ current POC.  Outcome Measures     Row Name 06/12/19 1600 06/11/19 1600          How much help from another person do you currently need...    Turning from your back to your side while in flat bed without using bedrails?  3  -LL  3  -LL     Moving from lying on back to sitting on the side of a flat bed without bedrails?  3  -LL  3  -LL     Moving to and from a bed to a chair (including a wheelchair)?  3  -LL  3  -LL     Standing up from a chair using your arms (e.g., wheelchair, bedside chair)?  3  -LL  3  -LL     Climbing  3-5 steps with a railing?  2  -LL  2  -LL     To walk in hospital room?  3  -LL  3  -LL     AM-PAC 6 Clicks Score  17  -LL  17  -LL        Functional Assessment    Outcome Measure Options  AM-PAC 6 Clicks Basic Mobility (PT)  -LL  AM-PAC 6 Clicks Basic Mobility (PT)  -LL       User Key  (r) = Recorded By, (t) = Taken By, (c) = Cosigned By    Initials Name Provider Type    LL Loyda Vega, ALFREDO Physical Therapy Assistant         Time Calculation:   PT Charges     Row Name 06/12/19 1604 06/12/19 1603          Time Calculation    PT Received On  --  06/12/19  -LL        Time Calculation- PT    Total Timed Code Minutes- PT  30 minute(s)  -LL  45 minute(s)  -LL       User Key  (r) = Recorded By, (t) = Taken By, (c) = Cosigned By    Initials Name Provider Type    LL Loyda Vega PTA Physical Therapy Assistant        Therapy Charges for Today     Code Description Service Date Service Provider Modifiers Qty    19535766743 HC GAIT TRAINING EA 15 MIN 6/11/2019 Stephanie Vegaette, PTA GP 2    93951875409 HC PT THERAPEUTIC ACT EA 15 MIN 6/11/2019 Stephanie Vegaette, PTA GP 1    69415728881 HC PT THER PROC EA 15 MIN 6/11/2019 VegaStephanie traceyette, PTA GP 2    23406205644 HC PT THER SUPP EA 15 MIN 6/11/2019 Stephanie Vegaette, PTA GP 1    74591011741 HC GAIT TRAINING EA 15 MIN 6/12/2019 Vega, Loyda, PTA GP 2    91328936716 HC PT THERAPEUTIC ACT EA 15 MIN 6/12/2019 Stephanie Vegaette, PTA GP 1    18468258593 HC PT THER PROC EA 15 MIN 6/12/2019 Vega, Loyda, PTA GP 2          PT G-Codes  Outcome Measure Options: AM-PAC 6 Clicks Basic Mobility (PT)  AM-PAC 6 Clicks Score: 17  Score: 11    Loyda. ALFREDO Vega  6/12/2019

## 2019-06-12 NOTE — PLAN OF CARE
Problem: Patient Care Overview  Goal: Plan of Care Review  Outcome: Ongoing (interventions implemented as appropriate)   06/11/19 1614 06/11/19 2047   Coping/Psychosocial   Plan of Care Reviewed With --  patient   Plan of Care Review   Progress improving --        Problem: Fall Risk (Adult)  Goal: Identify Related Risk Factors and Signs and Symptoms  Outcome: Ongoing (interventions implemented as appropriate)   06/11/19 0719   Fall Risk (Adult)   Related Risk Factors (Fall Risk) gait/mobility problems;history of falls;fear of falling   Signs and Symptoms (Fall Risk) presence of risk factors     Goal: Absence of Fall  Outcome: Ongoing (interventions implemented as appropriate)   06/11/19 2059   Fall Risk (Adult)   Absence of Fall making progress toward outcome       Problem: Skin Injury Risk (Adult)  Goal: Identify Related Risk Factors and Signs and Symptoms  Outcome: Ongoing (interventions implemented as appropriate)   06/11/19 0719   Skin Injury Risk (Adult)   Related Risk Factors (Skin Injury Risk) mobility impaired;advanced age     Goal: Skin Health and Integrity  Outcome: Ongoing (interventions implemented as appropriate)   06/11/19 2059   Skin Injury Risk (Adult)   Skin Health and Integrity making progress toward outcome       Problem: Pain, Acute (Adult)  Goal: Acceptable Pain Control/Comfort Level  Outcome: Ongoing (interventions implemented as appropriate)   06/11/19 2059   Pain, Acute (Adult)   Acceptable Pain Control/Comfort Level making progress toward outcome       Problem: Self-Care Deficit (Adult,Obstetrics,Pediatric)  Goal: Improved Ability to Perform BADL and IADL  Outcome: Ongoing (interventions implemented as appropriate)   06/11/19 2059   Self-Care Deficit (Adult,Obstetrics,Pediatric)   Improved Ability to Perform BADL and IADL making progress toward outcome

## 2019-06-12 NOTE — PLAN OF CARE
Problem: Patient Care Overview  Goal: Plan of Care Review  Outcome: Ongoing (interventions implemented as appropriate)   06/12/19 1243   Coping/Psychosocial   Plan of Care Reviewed With patient   Plan of Care Review   Progress no change       Problem: Skin Injury Risk (Adult)  Goal: Skin Health and Integrity  Outcome: Ongoing (interventions implemented as appropriate)   06/12/19 1243   Skin Injury Risk (Adult)   Skin Health and Integrity making progress toward outcome       Problem: Fracture Orthopaedic (Adult)  Goal: Signs and Symptoms of Listed Potential Problems Will be Absent, Minimized or Managed (Fracture Orthopaedic)  Outcome: Ongoing (interventions implemented as appropriate)   06/11/19 0719   Goal/Outcome Evaluation   Problems Assessed (Orthopaedic Fracture) skin integrity impairment;pain   Problems Present (Orthopaedic Fracture) functional deficit/self-care deficit       Problem: Pain, Acute (Adult)  Goal: Acceptable Pain Control/Comfort Level  Outcome: Ongoing (interventions implemented as appropriate)   06/12/19 1243   Pain, Acute (Adult)   Acceptable Pain Control/Comfort Level making progress toward outcome       Problem: Self-Care Deficit (Adult,Obstetrics,Pediatric)  Goal: Improved Ability to Perform BADL and IADL  Outcome: Ongoing (interventions implemented as appropriate)   06/12/19 1243   Self-Care Deficit (Adult,Obstetrics,Pediatric)   Improved Ability to Perform BADL and IADL making progress toward outcome

## 2019-06-13 ENCOUNTER — HOSPITAL ENCOUNTER (INPATIENT)
Facility: HOSPITAL | Age: 84
LOS: 8 days | Discharge: HOME HEALTH | End: 2019-06-21
Attending: INTERNAL MEDICINE | Admitting: FAMILY MEDICINE

## 2019-06-13 VITALS
WEIGHT: 168.01 LBS | DIASTOLIC BLOOD PRESSURE: 49 MMHG | RESPIRATION RATE: 20 BRPM | TEMPERATURE: 98.5 F | HEIGHT: 66 IN | OXYGEN SATURATION: 98 % | HEART RATE: 79 BPM | SYSTOLIC BLOOD PRESSURE: 101 MMHG | BODY MASS INDEX: 27 KG/M2

## 2019-06-13 DIAGNOSIS — I48.0 PAROXYSMAL ATRIAL FIBRILLATION (HCC): ICD-10-CM

## 2019-06-13 DIAGNOSIS — S72.002D CLOSED FRACTURE OF LEFT HIP WITH ROUTINE HEALING, SUBSEQUENT ENCOUNTER: Primary | ICD-10-CM

## 2019-06-13 PROBLEM — S72.009A HIP FRACTURE: Status: ACTIVE | Noted: 2019-06-13

## 2019-06-13 LAB
ANION GAP SERPL CALCULATED.3IONS-SCNC: 11.6 MMOL/L
BASOPHILS # BLD AUTO: 0.07 10*3/MM3 (ref 0–0.2)
BASOPHILS NFR BLD AUTO: 0.7 % (ref 0–1.5)
BUN BLD-MCNC: 23 MG/DL (ref 8–23)
BUN/CREAT SERPL: 21.3 (ref 7–25)
CALCIUM SPEC-SCNC: 8.5 MG/DL (ref 8.6–10.5)
CHLORIDE SERPL-SCNC: 98 MMOL/L (ref 98–107)
CO2 SERPL-SCNC: 27.4 MMOL/L (ref 22–29)
CREAT BLD-MCNC: 1.08 MG/DL (ref 0.57–1)
DEPRECATED RDW RBC AUTO: 43.1 FL (ref 37–54)
EOSINOPHIL # BLD AUTO: 0.47 10*3/MM3 (ref 0–0.4)
EOSINOPHIL NFR BLD AUTO: 4.5 % (ref 0.3–6.2)
ERYTHROCYTE [DISTWIDTH] IN BLOOD BY AUTOMATED COUNT: 12.8 % (ref 12.3–15.4)
GFR SERPL CREATININE-BSD FRML MDRD: 48 ML/MIN/1.73
GLUCOSE BLD-MCNC: 103 MG/DL (ref 65–99)
HCT VFR BLD AUTO: 33.1 % (ref 34–46.6)
HGB BLD-MCNC: 10.2 G/DL (ref 12–15.9)
IMM GRANULOCYTES # BLD AUTO: 0.07 10*3/MM3 (ref 0–0.05)
IMM GRANULOCYTES NFR BLD AUTO: 0.7 % (ref 0–0.5)
LYMPHOCYTES # BLD AUTO: 1.83 10*3/MM3 (ref 0.7–3.1)
LYMPHOCYTES NFR BLD AUTO: 17.6 % (ref 19.6–45.3)
MAGNESIUM SERPL-MCNC: 1.9 MG/DL (ref 1.6–2.4)
MAGNESIUM SERPL-MCNC: 1.9 MG/DL (ref 1.6–2.4)
MCH RBC QN AUTO: 29.7 PG (ref 26.6–33)
MCHC RBC AUTO-ENTMCNC: 30.8 G/DL (ref 31.5–35.7)
MCV RBC AUTO: 96.2 FL (ref 79–97)
MONOCYTES # BLD AUTO: 1.04 10*3/MM3 (ref 0.1–0.9)
MONOCYTES NFR BLD AUTO: 10 % (ref 5–12)
NEUTROPHILS # BLD AUTO: 6.91 10*3/MM3 (ref 1.7–7)
NEUTROPHILS NFR BLD AUTO: 66.5 % (ref 42.7–76)
PLATELET # BLD AUTO: 414 10*3/MM3 (ref 140–450)
PMV BLD AUTO: 10 FL (ref 6–12)
POTASSIUM BLD-SCNC: 3.7 MMOL/L (ref 3.5–5.2)
RBC # BLD AUTO: 3.44 10*6/MM3 (ref 3.77–5.28)
SODIUM BLD-SCNC: 137 MMOL/L (ref 136–145)
WBC NRBC COR # BLD: 10.39 10*3/MM3 (ref 3.4–10.8)

## 2019-06-13 PROCEDURE — 83735 ASSAY OF MAGNESIUM: CPT | Performed by: INTERNAL MEDICINE

## 2019-06-13 PROCEDURE — 97530 THERAPEUTIC ACTIVITIES: CPT

## 2019-06-13 PROCEDURE — 97110 THERAPEUTIC EXERCISES: CPT

## 2019-06-13 PROCEDURE — 99305 1ST NF CARE MODERATE MDM 35: CPT | Performed by: INTERNAL MEDICINE

## 2019-06-13 PROCEDURE — 85025 COMPLETE CBC W/AUTO DIFF WBC: CPT | Performed by: INTERNAL MEDICINE

## 2019-06-13 PROCEDURE — 80048 BASIC METABOLIC PNL TOTAL CA: CPT | Performed by: INTERNAL MEDICINE

## 2019-06-13 PROCEDURE — 97116 GAIT TRAINING THERAPY: CPT

## 2019-06-13 PROCEDURE — 97162 PT EVAL MOD COMPLEX 30 MIN: CPT

## 2019-06-13 PROCEDURE — 99239 HOSP IP/OBS DSCHRG MGMT >30: CPT | Performed by: PHYSICIAN ASSISTANT

## 2019-06-13 PROCEDURE — 97166 OT EVAL MOD COMPLEX 45 MIN: CPT

## 2019-06-13 PROCEDURE — 94799 UNLISTED PULMONARY SVC/PX: CPT

## 2019-06-13 RX ORDER — DOCUSATE SODIUM 100 MG/1
100 CAPSULE, LIQUID FILLED ORAL 2 TIMES DAILY PRN
Status: DISCONTINUED | OUTPATIENT
Start: 2019-06-13 | End: 2019-06-21 | Stop reason: HOSPADM

## 2019-06-13 RX ORDER — ACETAMINOPHEN 650 MG/1
650 SUPPOSITORY RECTAL EVERY 4 HOURS PRN
Status: CANCELLED | OUTPATIENT
Start: 2019-06-13

## 2019-06-13 RX ORDER — ACETAMINOPHEN 325 MG/1
650 TABLET ORAL EVERY 4 HOURS PRN
Status: DISCONTINUED | OUTPATIENT
Start: 2019-06-13 | End: 2019-06-21 | Stop reason: HOSPADM

## 2019-06-13 RX ORDER — BISACODYL 5 MG/1
10 TABLET, DELAYED RELEASE ORAL DAILY PRN
Status: CANCELLED | OUTPATIENT
Start: 2019-06-13

## 2019-06-13 RX ORDER — BUSPIRONE HYDROCHLORIDE 10 MG/1
10 TABLET ORAL 2 TIMES DAILY
Status: DISCONTINUED | OUTPATIENT
Start: 2019-06-13 | End: 2019-06-21 | Stop reason: HOSPADM

## 2019-06-13 RX ORDER — PANTOPRAZOLE SODIUM 40 MG/1
40 TABLET, DELAYED RELEASE ORAL EVERY MORNING
Status: CANCELLED | OUTPATIENT
Start: 2019-06-14

## 2019-06-13 RX ORDER — CARVEDILOL 6.25 MG/1
12.5 TABLET ORAL 2 TIMES DAILY WITH MEALS
Status: DISCONTINUED | OUTPATIENT
Start: 2019-06-13 | End: 2019-06-21 | Stop reason: HOSPADM

## 2019-06-13 RX ORDER — BUSPIRONE HYDROCHLORIDE 10 MG/1
10 TABLET ORAL 2 TIMES DAILY
Status: CANCELLED | OUTPATIENT
Start: 2019-06-13

## 2019-06-13 RX ORDER — OXYCODONE HYDROCHLORIDE AND ACETAMINOPHEN 5; 325 MG/1; MG/1
1 TABLET ORAL EVERY 4 HOURS PRN
Status: DISPENSED | OUTPATIENT
Start: 2019-06-13 | End: 2019-06-15

## 2019-06-13 RX ORDER — MAGNESIUM SULFATE HEPTAHYDRATE 40 MG/ML
4 INJECTION, SOLUTION INTRAVENOUS ONCE
Status: COMPLETED | OUTPATIENT
Start: 2019-06-13 | End: 2019-06-13

## 2019-06-13 RX ORDER — PANTOPRAZOLE SODIUM 40 MG/1
40 TABLET, DELAYED RELEASE ORAL EVERY MORNING
Status: DISCONTINUED | OUTPATIENT
Start: 2019-06-14 | End: 2019-06-21 | Stop reason: HOSPADM

## 2019-06-13 RX ORDER — ATORVASTATIN CALCIUM 40 MG/1
40 TABLET, FILM COATED ORAL NIGHTLY
Status: DISCONTINUED | OUTPATIENT
Start: 2019-06-13 | End: 2019-06-21 | Stop reason: HOSPADM

## 2019-06-13 RX ORDER — ACETAMINOPHEN 650 MG/1
650 SUPPOSITORY RECTAL EVERY 4 HOURS PRN
Status: DISCONTINUED | OUTPATIENT
Start: 2019-06-13 | End: 2019-06-21 | Stop reason: HOSPADM

## 2019-06-13 RX ORDER — DOCUSATE SODIUM 100 MG/1
100 CAPSULE, LIQUID FILLED ORAL 2 TIMES DAILY PRN
Status: CANCELLED | OUTPATIENT
Start: 2019-06-13

## 2019-06-13 RX ORDER — OXYCODONE HYDROCHLORIDE AND ACETAMINOPHEN 5; 325 MG/1; MG/1
1 TABLET ORAL EVERY 4 HOURS PRN
Status: CANCELLED | OUTPATIENT
Start: 2019-06-13 | End: 2019-06-15

## 2019-06-13 RX ORDER — ACETAMINOPHEN 325 MG/1
650 TABLET ORAL EVERY 4 HOURS PRN
Status: CANCELLED | OUTPATIENT
Start: 2019-06-13

## 2019-06-13 RX ORDER — ACETAMINOPHEN 160 MG/5ML
650 SOLUTION ORAL EVERY 4 HOURS PRN
Status: CANCELLED | OUTPATIENT
Start: 2019-06-13

## 2019-06-13 RX ORDER — BISACODYL 5 MG/1
10 TABLET, DELAYED RELEASE ORAL DAILY PRN
Status: DISCONTINUED | OUTPATIENT
Start: 2019-06-13 | End: 2019-06-21 | Stop reason: HOSPADM

## 2019-06-13 RX ORDER — ACETAMINOPHEN 160 MG/5ML
650 SOLUTION ORAL EVERY 4 HOURS PRN
Status: DISCONTINUED | OUTPATIENT
Start: 2019-06-13 | End: 2019-06-21 | Stop reason: HOSPADM

## 2019-06-13 RX ORDER — CARVEDILOL 6.25 MG/1
12.5 TABLET ORAL 2 TIMES DAILY WITH MEALS
Status: CANCELLED | OUTPATIENT
Start: 2019-06-13

## 2019-06-13 RX ORDER — ATORVASTATIN CALCIUM 40 MG/1
40 TABLET, FILM COATED ORAL NIGHTLY
Status: CANCELLED | OUTPATIENT
Start: 2019-06-13

## 2019-06-13 RX ADMIN — SODIUM CHLORIDE 250 ML: 9 INJECTION, SOLUTION INTRAVENOUS at 10:23

## 2019-06-13 RX ADMIN — LOSARTAN POTASSIUM 100 MG: 50 TABLET, FILM COATED ORAL at 08:23

## 2019-06-13 RX ADMIN — TUBERCULIN PURIFIED PROTEIN DERIVATIVE 5 UNITS: 5 INJECTION, SOLUTION INTRADERMAL at 17:22

## 2019-06-13 RX ADMIN — PANTOPRAZOLE SODIUM 40 MG: 40 TABLET, DELAYED RELEASE ORAL at 08:23

## 2019-06-13 RX ADMIN — BUSPIRONE HYDROCHLORIDE 10 MG: 10 TABLET ORAL at 08:23

## 2019-06-13 RX ADMIN — APIXABAN 2.5 MG: 2.5 TABLET, FILM COATED ORAL at 08:23

## 2019-06-13 RX ADMIN — ACETAMINOPHEN 650 MG: 325 TABLET ORAL at 12:05

## 2019-06-13 RX ADMIN — OFLOXACIN 50000 UNITS: 300 TABLET, COATED ORAL at 08:23

## 2019-06-13 RX ADMIN — ATORVASTATIN CALCIUM 40 MG: 40 TABLET, FILM COATED ORAL at 20:52

## 2019-06-13 RX ADMIN — MAGNESIUM SULFATE HEPTAHYDRATE 4 G: 40 INJECTION, SOLUTION INTRAVENOUS at 09:50

## 2019-06-13 RX ADMIN — CARVEDILOL 12.5 MG: 6.25 TABLET, FILM COATED ORAL at 08:23

## 2019-06-13 RX ADMIN — BUSPIRONE HYDROCHLORIDE 10 MG: 10 TABLET ORAL at 20:52

## 2019-06-13 RX ADMIN — APIXABAN 2.5 MG: 2.5 TABLET, FILM COATED ORAL at 20:52

## 2019-06-13 NOTE — PLAN OF CARE
Problem: Patient Care Overview  Goal: Plan of Care Review  Outcome: Ongoing (interventions implemented as appropriate)   06/13/19 8214   Coping/Psychosocial   Plan of Care Reviewed With patient;daughter   Coping/Psychosocial   Patient Agreement with Plan of Care agrees   Plan of Care Review   Progress improving   OTHER   Outcome Summary Evaluation completed for swing bed admission. Pt. cooperative and motivated to improve fxl performance. She will participate in OT as tolerated for strength/endurance training and ADL retraining to promote safety and independence in home environment. Pt. expressed interest in sewing/yarn work activities for recreational activity performance in home. Materials will be provided as desired during hospitalization to promote leisure activies as appropriate. Continue OT POC.

## 2019-06-13 NOTE — PLAN OF CARE
Problem: Patient Care Overview  Goal: Plan of Care Review  Outcome: Ongoing (interventions implemented as appropriate)      Problem: Pain, Acute (Adult)  Goal: Acceptable Pain Control/Comfort Level  Outcome: Ongoing (interventions implemented as appropriate)   06/13/19 1506   Pain, Acute (Adult)   Acceptable Pain Control/Comfort Level making progress toward outcome       Problem: Skin Injury Risk (Adult)  Goal: Skin Health and Integrity  Outcome: Ongoing (interventions implemented as appropriate)   06/13/19 1506   Skin Injury Risk (Adult)   Skin Health and Integrity making progress toward outcome       Problem: Fall Risk (Adult)  Goal: Identify Related Risk Factors and Signs and Symptoms  Outcome: Ongoing (interventions implemented as appropriate)    Goal: Absence of Fall  Outcome: Ongoing (interventions implemented as appropriate)   06/13/19 1506   Fall Risk (Adult)   Absence of Fall making progress toward outcome

## 2019-06-13 NOTE — DISCHARGE SUMMARY
[facility] Medicine Services  DISCHARGE SUMMARY    Date of Admission: 6/4/2019    Date of Discharge:  6/13/2019    PCP: Eric Driver MD    Discharging Provider: Amy Jurado PA-C  Attending Physician on day of DC: Dr. Jeffrey Parsons     Admission Diagnosis:   Please see admission H&P    Discharge Diagnosis:   · Acute traumatic left hip fracture status post hip replacement  · Leukocytosis, improved, felt to possibly be related to hip fracture and surgical repair  · Mild hypotension in setting of Essential hypertension  · Mildly elevated creatinine  · History of third-degree AV block status post permanent pacemaker placement  · History of primary hyperparathyroidism  · Coronary artery disease status post prior stent placement  · Vitamin D deficiency  · Hypokalemia, resolved  · Hypomagnesemia, resolved  · Hypophosphatemia, resolved      Procedures:  · Left hip bipolar replacement on 6/5/2019 by Dr. Jain     Consults:   · Orthopedics    HPI     History of Present Illness:  Abbi Mendez is a 83 y.o. 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  She was found to have acute traumatic closed left hip fracture and admitted to the medical surgical floor with telemetry.       Hospital Course     Hospital Course  Abbi Mendez was admitted as outlined in above HPI.  Orthopedics were consulted and she was taken to the operating room on the day of June 5, 2019 where she underwent left hip replacement as outlined within procedures within this document.    Mrs. Mendez did well with her surgical procedure and remained hospitalized for physical  therapy and Occupational Therapy as she awaited inpatient rehabilitation.  Her insurance did deny inpatient rehabilitation during her stay.     Early in her stay there was concern for possible urinary tract infection in the setting of also leukocytosis.  She was started empirically on IV Rocephin; however, when urine culture finalized with mixed markus less than 25,000 colony counts the Rocephin was discontinued.    Mrs. Mendez did also have some electrolyte abnormalities during her stay that were resolved per protocol supplementation.    Throughout the early hospital stay her creatinine did remain stable.  She did receive IV Lasix on 6/12/2019 due to concern for possible development of fluid overload.  Following diuresis, her creatinine did rise mildly with lower blood pressures on morning of 6/13/2019.  She did receive small fluid bolus with improvement in blood pressures.  It was felt that mild rising creatinine and mild hypotension was likely secondary to overdiuresis.  Her home losartan was stopped on 6/13/2019 prior to swing bed admission due to elevated creatinine.    Leukocytosis did improve.     Pertinent Laboratory and Radiology Results     Pertinent Test Results:          Results from last 7 days   Lab Units 06/13/19  0515 06/12/19  0523 06/11/19  0456 06/10/19  0149 06/10/19  0136 06/09/19  0455 06/08/19  0604 06/07/19  2155 06/07/19  0817   WBC 10*3/mm3 10.39 11.29* 11.82* 12.15*  --  11.91* 12.61*  --  15.11*   HEMOGLOBIN g/dL 10.2* 10.3* 10.9* 10.8*  --  10.5* 10.6*  --  10.7*   HEMATOCRIT % 33.1* 33.8* 34.8 35.3  --  32.7* 33.6*  --  35.3   PLATELETS 10*3/mm3 414 411 423 396  --  380 361  --  358   MCV fL 96.2 97.1* 96.4 96.2  --  94.5 96.0  --  98.3*   SODIUM mmol/L 137 139 138  --  138 138 138  --  136   POTASSIUM mmol/L 3.7 3.8 3.9  --  4.1 3.7 4.6 4.4 3.6   CHLORIDE mmol/L 98 101 101  --  102 103 104  --  103   CO2 mmol/L 27.4 27.2 25.7  --  24.8 23.7 24.0  --  20.5*   BUN mg/dL 23 18 19  --   19 17 16  --  23   CREATININE mg/dL 1.08* 0.88 0.95  --  1.01* 0.91 0.83  --  1.03*   GLUCOSE mg/dL 103* 104* 105*  --  114* 108* 104*  --  144*   CALCIUM mg/dL 8.5* 8.4* 8.4*  --  7.9* 7.6* 7.4*  --  7.4*          Results from last 7 days   Lab Units 06/13/19  0515 06/12/19  0523 06/11/19  0456 06/10/19  0149 06/09/19  0455 06/08/19  0604 06/07/19  0817   WBC 10*3/mm3 10.39 11.29* 11.82* 12.15* 11.91* 12.61* 15.11*           Invalid input(s): PROT, LABALBU        Invalid input(s): TG, LDLCALC, LDLREALC      Brief Urine Lab Results  (Last result in the past 365 days)      Color   Clarity   Blood   Leuk Est   Nitrite   Protein   CREAT   Urine HCG        06/05/19 0437             195.3                      Results for orders placed during the hospital encounter of 01/25/19   Adult Transthoracic Echo Complete W/ Cont if Necessary Per Protocol    Narrative · Right ventricular cavity is borderline dilated.  · Left ventricular wall thickness is consistent with concentric   hypertrophy.  · Left ventricular diastolic dysfunction (grade I) consistent with   impaired relaxation.  · Left ventricular systolic function is normal. Estimated EF = 60%.  · Mild aortic valve stenosis is present. Aortic valve mean pressure   gradient is 10.9 mmHg.  · Mild aortic valve regurgitation is present.  · Mild-to-moderate mitral valve regurgitation is present  · Mild to moderate tricuspid valve regurgitation is present.  · Calculated right ventricular systolic pressure from tricuspid   regurgitation is 29 mmHg.  · No significant pericardial effusion is noted.                ----------------------------------------------------------------------------------------------------------------------  Xr Shoulder 2+ View Right    Result Date: 6/5/2019  No acute or destructive bony abnormality.  COMMUNICATION: Per this written report.  This report was finalized on 6/5/2019 7:11 AM by Dr. Maxime Treviño MD.      Xr Wrist 3+ View Right    Result Date:  6/5/2019  No acute or destructive bony abnormality.  COMMUNICATION: Per this written report.  This report was finalized on 6/5/2019 7:11 AM by Dr. Maxime Treviño MD.      Xr Hand 3+ View Right    Result Date: 6/5/2019  No acute or destructive bony abnormality.  COMMUNICATION: Per this written report.  This report was finalized on 6/5/2019 7:11 AM by Dr. Maxime Treviño MD.      Xr Femur 2 View Left    Result Date: 6/5/2019  MODERATELY DISPLACED SUBCAPITAL FEMORAL NECK FRACTURE.  COMMUNICATION: Per this written report.  This report was finalized on 6/5/2019 7:11 AM by Dr. Maxime Treviño MD.      Xr Chest 1 View    Result Date: 6/5/2019  AS ABOVE.  This report was finalized on 6/5/2019 7:10 AM by Dr. Maxime Treviño MD.      Xr Pelvis 1 Or 2 View    Result Date: 6/6/2019  No acute or destructive bony abnormality.  COMMUNICATION: Per this written report.  This report was finalized on 6/6/2019 7:11 AM by Dr. Maxime Treviño MD.      Xr Hip With Or Without Pelvis 2 - 3 View Left    Result Date: 6/5/2019  AS ABOVE.  COMMUNICATION: Per this written report.  This report was finalized on 6/5/2019 7:12 AM by Dr. Maxime Treviño MD.          Microbiology Results (last 10 days)     Procedure Component Value - Date/Time    Urine Culture - Urine, Urine, Catheter [[id number]] Collected:  06/05/19 0058    Lab Status:  Final result Specimen:  Urine, Catheter Updated:  06/06/19 1542     Urine Culture <25,000 CFU/mL Mixed Markus Isolated    Narrative:       Specimen contains mixed organisms of questionable pathogenicity which indicates contamination with commensal markus.  Further identification is unlikely to provide clinically useful information.  Suggest recollection.            Discharge Vitals and Physical Examination       Vital Signs  Temp:  [98 °F (36.7 °C)-100.1 °F (37.8 °C)] 98 °F (36.7 °C)  Heart Rate:  [68-82] 76  Resp:  [18-20] 18  BP: ()/(40-70) 120/47     Physical Exam:  General:    Awake, alert, in no acute distress   Heart:       Normal S1 and S2. Regular rate and rhythm. No significant murmur, rubs or gallops appreciated.   Lungs:     Respirations regular, even and unlabored. Lungs clear to auscultation B/L. No wheezes, rales or rhonchi.   Abdomen:   Soft and nontender. No guarding, rebound tenderness or  organomegaly noted. Bowel sounds present x 4.   Extremities:  No clubbing, cyanosis or edema noted. Moves UE and LE equally B/L.         Discharge Disposition, Discharge Medications, and Discharge Appointments     Discharge Disposition:   Swing-bed    Condition on Discharge:  Fair    Discharge Medications:    Inter-facility transfer medications (From admission, onward)            apixaban (ELIQUIS) tablet 2.5 mg  (Apixaban - VTE Prophylaxis (Start Tomorrow))  Every 12 Hours Scheduled,   Status:  Canceled          atorvastatin (LIPITOR) tablet 40 mg  Nightly,   Status:  Canceled          busPIRone (BUSPAR) tablet 10 mg  2 Times Daily,   Status:  Canceled          carvedilol (COREG) tablet 12.5 mg  2 Times Daily With Meals,   Status:  Canceled          cholecalciferol (VITAMIN D3) capsule 50,000 Units  Weekly,   Status:  Canceled          pantoprazole (PROTONIX) EC tablet 40 mg  Every Morning,   Status:  Canceled          acetaminophen (TYLENOL) tablet 650 mg  (acetaminophen (TYLENOL) oral/rectal)  Every 4 Hours PRN,   Status:  Canceled          acetaminophen (TYLENOL) 160 MG/5ML solution 650 mg  (acetaminophen (TYLENOL) oral/rectal)  Every 4 Hours PRN,   Status:  Canceled          acetaminophen (TYLENOL) suppository 650 mg  (acetaminophen (TYLENOL) oral/rectal)  Every 4 Hours PRN,   Status:  Canceled          bisacodyl (DULCOLAX) EC tablet 10 mg  Daily PRN,   Status:  Canceled          docusate sodium (COLACE) capsule 100 mg  2 Times Daily PRN,   Status:  Canceled          oxyCODONE-acetaminophen (PERCOCET) 5-325 MG per tablet 1 tablet  (Analgesics for Moderate Pain - Oral (COR / LAG / LISBETH / KAREN / MAD / PAD / ASHER))  Every 4 Hours PRN,    Status:  Canceled          tuberculin injection 5 Units  (PPD Administration & Reading)  Once,   Status:  Canceled          tuberculin injection 5 Units  (PPD Administration & Reading)  Once,   Status:  Canceled                 Discharged medication regimen discussed with attending physician prior to discharge.     Discharge Diet:         Activity at Discharge:  activity as tolerated         Discharge Disposition:    Swing Bed        Follow-up Appointments:  Your Scheduled Appointments    Jun 25, 2019 11:00 AM EDT  Follow Up with FRANCO Rodriguez  [facility] ORTHOPEDICS (--) [address]  [facility] [id number] [phone]   Arrive 15 minutes prior to appointment.   Aug 02, 2019 10:45 AM EDT  FOLLOW UP with Kyler Jamil MD  [facility] HEMATOLOGY  AND ONCOLOGY ([place]) [address], [address]  [facility] [address] [phone]   Aug 20, 2019 11:00 AM EDT  Follow Up with Soraya Darling MD  [facility] CARDIOLOGY (--) [address] [facility] [address] [phone]   Arrive 15 minutes prior to appointment.           Contact information for follow-up providers     Gerardo Jain MD Follow up in 2 week(s).    Specialty:  Orthopedic Surgery  Contact information:  [address] [place] [address] [phone]             Eric Driver MD .    Specialty:  Gastroenterology  Contact information:  [address] [place] [address] [address]  [facility] [address]  [phone]             Soraya Darling MD .    Specialties:  Cardiology, Interventional Cardiology  Contact information:  [address]    [facility] [address] [phone]                   Contact information for after-discharge care     Destination     [facility] .    Service:  Skilled Nursing  Contact information:  [address] [facility] [address] [phone]                                 Test Results Pending at Discharge:            Amy Jurado PA-C  [facility] Medicine Team  06/13/19  4:24 PM      Time: Greater than 30 minutes spent on this discharge.

## 2019-06-13 NOTE — PROGRESS NOTES
Discharge Planning Assessment   [place]     Patient Name: Abbi Mendez  MRN: [id number]  Today's Date: 6/13/2019    Admit Date: 6/4/2019    Discharge Plan     Row Name 06/13/19 1105       Plan    Plan SS spoke with swing bed nurse, Candy who states [employer] approved pt to be admitted to swing bed. Dr. Parsons is aware. SS will follow.    Marleen Benitez

## 2019-06-13 NOTE — THERAPY TREATMENT NOTE
Acute Care - Physical Therapy Treatment Note   Umer     Patient Name: Abbi Mendez  : 1935  MRN: [id number]  Today's Date: 2019  Onset of Illness/Injury or Date of Surgery: 19  Date of Referral to PT: 19  Referring Physician: Dr. Jain    Admit Date: 2019    Visit Dx:    ICD-10-CM ICD-9-CM   1. Left displaced femoral neck fracture (CMS/HCC) S72.002A 820.8   2. Strain of right shoulder, initial encounter S46.911A 840.9   3. Hypokalemia E87.6 276.8     Patient Active Problem List   Diagnosis   • Atrial fibrillation (CMS/HCC)   • Coronary artery disease involving native coronary artery of native heart without angina pectoris   • RADHA (obstructive sleep apnea)   • Essential hypertension   • Hyperlipidemia LDL goal <70   • Iron deficiency anemia   • Left bundle branch block   • Syncope and collapse   • Severe sinus bradycardia   • Third degree AV block (CMS/HCC)   • Syncope   • Compression deformity of vertebra   • Radiculopathy   • Pericardial effusion without cardiac tamponade   • Hypercalcemia   • Abnormal serum protein electrophoresis   • Weight loss, unintentional   • Primary hyperparathyroidism (CMS/HCC)   • Hypercalcemia   • Pacemaker   • Hiatal hernia   • Elevated serum immunoglobulin free light chains   • Left displaced femoral neck fracture (CMS/HCC)   • Hip fracture (CMS/HCC)       Therapy Treatment    Rehabilitation Treatment Summary     Row Name 19 1700             Treatment Time/Intention    Discipline  physical therapy assistant  -LL      Document Type  therapy note (daily note)  -LL      Subjective Information  -- Stated her BP dropped this am & c/o weakness  -LL      Mode of Treatment  individual therapy;physical therapy  -LL      Patient/Family Observations  Patient in chair prior to session  -LL      Patient Effort  good  -LL      Existing Precautions/Restrictions  fall;hip;left  -LL      Recorded by [LL] Loyda Vega, ALFREDO 19 3632      Row Name 19  1700             Cognitive Assessment/Intervention- PT/OT    Orientation Status (Cognition)  oriented x 3  -LL      Follows Commands (Cognition)  WFL;follows one step commands  -LL      Recorded by [LL] Loyda Vega, [facility] 06/13/19 1749      Row Name 06/13/19 1700             Mobility Assessment/Intervention    Extremity Weight-bearing Status  left lower extremity;right lower extremity  -LL      Left Lower Extremity (Weight-bearing Status)  weight-bearing as tolerated (WBAT)  -LL      Right Lower Extremity (Weight-bearing Status)  weight-bearing as tolerated (WBAT)  -LL      Recorded by [LL] Loyda Vega, [facility] 06/13/19 1749      Row Name 06/13/19 1700             Transfer Assessment/Treatment    Transfer Assessment/Treatment  sit-stand transfer;stand-sit transfer;stand pivot/stand step transfer  -LL      Recorded by [LL] Loyda Vega, [facility] 06/13/19 1749      Row Name 06/13/19 1700             Sit-Stand Transfer    Sit-Stand [place] (Transfers)  verbal cues;nonverbal cues (demo/gesture);minimum assist (75% patient effort)  -LL      Assistive Device (Sit-Stand Transfers)  walker, front-wheeled  -LL      Recorded by [LL] Loyda Vega, [facility] 06/13/19 1749      Row Name 06/13/19 1700             Stand-Sit Transfer    Stand-Sit [place] (Transfers)  verbal cues;nonverbal cues (demo/gesture);minimum assist (75% patient effort)  -LL      Assistive Device (Stand-Sit Transfers)  walker, front-wheeled  -LL      Recorded by [LL] Loyda Vega [facility] 06/13/19 1749      Row Name 06/13/19 1700             Stand Pivot/Stand Step Transfer    Stand Pivot/Stand Step [place]  verbal cues;nonverbal cues (demo/gesture);minimum assist (75% patient effort)  -LL      Assistive Device (Stand Pivot Stand Step Transfer)  walker, front-wheeled  -LL      Recorded by [LL] Loyda Vega [facility] 06/13/19 1749      Row Name 06/13/19 1700             Toilet Transfer    Type (Toilet Transfer)  sit-stand;stand-sit  -LL       [place] (Toilet Transfer)  minimum assist (75% patient effort);verbal cues  -LL      Assistive Device (Toilet Transfer)  commode, bedside without drop arms;walker, front-wheeled  -LL      Recorded by [LL] Loyda Vega \[facility]\"" 06/13/19 1749      Row Name 06/13/19 1700             Gait/Stairs Assessment/Training    Comment (Gait/Stairs)  No ambulation during session secondary to low BP & receiving intervention at this time.  -LL      Recorded by [LL] Loyda Vega PTA 06/13/19 1749      Row Name 06/13/19 1700             Lower Extremity Seated Therapeutic Exercise    Performed, Seated Lower Extremity (Therapeutic Exercise)  hip abduction/adduction;ankle dorsiflexion/plantarflexion;LAQ (long arc quad), knee extension Pillow squeeze, GS  -LL      Exercise Type, Seated Lower Extremity (Therapeutic Exercise)  AROM (active range of motion)  -LL      Restrictions, Seated Lower Extremity (Therapeutic Exercise)  L JANET precautions  -LL      Sets/Reps Detail, Seated Lower Extremity (Therapeutic Exercise)  1 x 25  -LL      Transfers Skills, Training to Functional Activity, Seated Lower Extremity (Therapeutic Exercise)  able to transfer skills from training to functional activity  -LL      Recorded by [LL] Loyda Vega \[facility]\"" 06/13/19 1749      Row Name 06/13/19 1700             Positioning and Restraints    Pre-Treatment Position  sitting in chair/recliner  -LL      Post Treatment Position  chair  -LL      In Chair  sitting;call light within reach;encouraged to call for assist;notified nsg with tray table in front of her  -LL      Recorded by [LL] Loyda Vega \[facility]\"" 06/13/19 1749      Row Name 06/13/19 1700             Pain Scale: Numbers Pre/Post-Treatment    Pain Location - Side  Left  -LL      Pain Location  hip  -LL      Recorded by [LL] Loyda Vega \[facility]\"" 06/13/19 1749      Row Name 06/13/19 1700             Pain Scale: FACES Pre/Post-Treatment    Pain: FACES Scale, Pretreatment  4-->hurts little more   -LL      Pain: FACES Scale, Post-Treatment  4-->hurts little more  -LL      Recorded by [LL] Loyda Vega, PTA 06/13/19 1749      Row Name                Wound 06/05/19 1838 Left hip incision    Wound - Properties Group Date first assessed: 06/05/19 [KH] Time first assessed: 1838 [KH] Side: Left [KH] Location: hip [KH] Type: incision [KH] Recorded by:  [KH] Ary Sylvester RN 06/05/19 1838    Row Name 06/13/19 1700             Coping    Observed Emotional State  accepting;calm;cooperative;pleasant  -LL      Verbalized Emotional State  acceptance  -LL      Recorded by [LL] Loyda Vega, PTA 06/13/19 1749        User Key  (r) = Recorded By, (t) = Taken By, (c) = Cosigned By    Initials Name Effective Dates Discipline     Ary Sylvester RN 06/16/16 -  Nurse    LL Loyda Vega, ALFREDO 05/02/16 -  PT          Wound 06/05/19 1838 Left hip incision (Active)   Dressing Appearance intact;dry 6/13/2019  3:00 PM   Closure Approximated;Liquid skin adhesive 6/13/2019  8:15 AM   Base dry;granulating;red 6/13/2019  8:15 AM   Periwound dry;intact 6/13/2019  8:15 AM   Periwound Temperature warm 6/13/2019  8:15 AM   Periwound Skin Turgor soft 6/13/2019  8:15 AM   Edges other (see comments) 6/12/2019 10:34 PM   Drainage Amount none 6/12/2019 10:34 PM       Rehab Goal Summary     Row Name 06/13/19 1610             Occupational Therapy Goals    ROM Goal Selection (OT)  ROM, OT goal 1  -KR         Dressing Goal 1 (OT)    Activity/Assistive Device (Dressing Goal 1, OT)  dressing skills, all;reacher  -KR      [place]/Cues Needed (Dressing Goal 1, OT)  minimum assist (75% or more patient effort)  -KR         ROM Goal 1 (OT)    ROM Goal 1 (OT)  R hand AROM WFL to enhance fxl task performance with self care skills  -KR      Time Frame (ROM Goal 1, OT)  by discharge  -KR         Strength Goal 1 (OT)    Strength Goal 1 (OT)  BUE increase x 1 to enhance self care skills  -KR      Time Frame (Strength Goal 1, OT)  by  discharge  -KR        User Key  (r) = Recorded By, (t) = Taken By, (c) = Cosigned By    Initials Name Provider Type Discipline    Brannon Wang, OT Occupational Therapist OT          Physical Therapy Education     Title: PT OT SLP Therapies (Done)     Topic: Physical Therapy (Done)     Point: Mobility training (Done)     Learning Progress Summary           Patient Acceptance, E,D, VU,NR by LL at 6/13/2019  5:49 PM    Acceptance, E,D, VU,NR by LL at 6/12/2019  4:03 PM    Acceptance, E,D, VU,NR by LL at 6/11/2019  4:13 PM    Acceptance, E, VU by CL at 6/9/2019 10:07 AM    Acceptance, E, NR by CL at 6/8/2019 10:46 AM    Acceptance, E,TB, VU by AD at 6/8/2019 10:09 AM    Acceptance, E, VU,NR by LL at 6/7/2019  4:05 PM    Acceptance, E,TB, VU by TT at 6/7/2019 12:26 AM    Acceptance, H, VU,NR by AG at 6/6/2019  1:28 PM    Comment:  Reviewed written JANET precautions    Acceptance, E,D, VU,NR by AG at 6/6/2019  1:13 PM    Comment:  PT reviewed and instructed in HEP, JANET precautions.   Family Acceptance, H, VU,NR by AG at 6/6/2019  1:28 PM    Comment:  Reviewed written JANET precautions                   Point: Home exercise program (Done)     Learning Progress Summary           Patient Acceptance, E,D, VU,NR by LL at 6/13/2019  5:49 PM    Acceptance, E,D, VU,NR by LL at 6/12/2019  4:03 PM    Acceptance, E,D, VU,NR by LL at 6/11/2019  4:13 PM    Acceptance, E, VU by CL at 6/9/2019 10:07 AM    Acceptance, E, NR by CL at 6/8/2019 10:46 AM    Acceptance, E,TB, VU by AD at 6/8/2019 10:09 AM    Acceptance, E, VU,NR by LL at 6/7/2019  4:05 PM    Acceptance, E,TB, VU by TT at 6/7/2019 12:26 AM    Acceptance, H, VU,NR by AG at 6/6/2019  1:28 PM    Comment:  Reviewed written JANET precautions    Acceptance, E,D, VU,NR by AG at 6/6/2019  1:13 PM    Comment:  PT reviewed and instructed in HEP, JANET precautions.   Family Acceptance, FABRICIO COOK NR by AG at 6/6/2019  1:28 PM    Comment:  Reviewed written JANET precautions                   Point:  Body mechanics (Done)     Learning Progress Summary           Patient Acceptance, E,D, VU,NR by LL at 6/13/2019  5:49 PM    Acceptance, E,D, VU,NR by LL at 6/12/2019  4:03 PM    Acceptance, E,D, VU,NR by LL at 6/11/2019  4:13 PM    Acceptance, E, VU by CL at 6/9/2019 10:07 AM    Acceptance, E, NR by CL at 6/8/2019 10:46 AM    Acceptance, E,TB, VU by AD at 6/8/2019 10:09 AM    Acceptance, E, VU,NR by LL at 6/7/2019  4:05 PM    Acceptance, E,TB, VU by TT at 6/7/2019 12:26 AM    Acceptance, H, VU,NR by AG at 6/6/2019  1:28 PM    Comment:  Reviewed written JANET precautions    Acceptance, E,D, VU,NR by AG at 6/6/2019  1:13 PM    Comment:  PT reviewed and instructed in HEP, JANET precautions.   Family Acceptance, H, VU,NR by AG at 6/6/2019  1:28 PM    Comment:  Reviewed written JANET precautions                   Point: Precautions (Done)     Learning Progress Summary           Patient Acceptance, E,D, VU,NR by LL at 6/13/2019  5:49 PM    Acceptance, E,D, VU,NR by LL at 6/12/2019  4:03 PM    Acceptance, E,D, VU,NR by LL at 6/11/2019  4:13 PM    Acceptance, E, VU by CL at 6/9/2019 10:07 AM    Acceptance, E, NR by CL at 6/8/2019 10:46 AM    Acceptance, E,TB, VU by AD at 6/8/2019 10:09 AM    Acceptance, E, VU,NR by LL at 6/7/2019  4:05 PM    Acceptance, E,TB, VU by TT at 6/7/2019 12:26 AM    Acceptance, H, VU,NR by AG at 6/6/2019  1:28 PM    Comment:  Reviewed written JANET precautions    Acceptance, E,D, VU,NR by AG at 6/6/2019  1:13 PM    Comment:  PT reviewed and instructed in HEP, JANET precautions.   Family Acceptance, H, VU,NR by AG at 6/6/2019  1:28 PM    Comment:  Reviewed written JANET precautions                               User Key     Initials Effective Dates Name Provider Type Discipline    CL 06/16/16 -  Yarely Mercado, RN Registered Nurse Nurse    AD 04/03/18 -  Dalton, Ashley Claudene, PT Physical Therapist PT    TT 06/08/18 -  Frida Nugent, RN Registered Nurse Nurse    AG 04/03/18 -  Sandy Covington, PT  Physical Therapist PT    LL 05/02/16 -  Loyda Vega PTA Physical Therapy Assistant PT                PT Recommendation and Plan  Anticipated Discharge Disposition (PT): inpatient rehabilitation facility  Therapy Frequency (PT Clinical Impression): other (see comments)(6 days/ week; 1-2 times/ day)  Outcome Summary/Treatment Plan (PT)  Anticipated Equipment Needs at Discharge (PT): raised toilet seat, front wheeled walker  Anticipated Discharge Disposition (PT): inpatient rehabilitation facility  Plan of Care Reviewed With: patient  Progress: improving  Outcome Summary: Patient continues to improve with ongoing therapy ambulating further distance & requiring less assistance.  Contnue w/ current POC.  Outcome Measures     Row Name 06/13/19 1748 06/13/19 1623 06/12/19 1600       How much help from another person do you currently need...    Turning from your back to your side while in flat bed without using bedrails?  4  -AG  --  3  -LL    Moving from lying on back to sitting on the side of a flat bed without bedrails?  3  -AG  --  3  -LL    Moving to and from a bed to a chair (including a wheelchair)?  3  -AG  --  3  -LL    Standing up from a chair using your arms (e.g., wheelchair, bedside chair)?  3  -AG  --  3  -LL    Climbing 3-5 steps with a railing?  3  -AG  --  2  -LL    To walk in hospital room?  3  -AG  --  3  -LL    AM-PAC 6 Clicks Score  19  -AG  --  17  -LL       How much help from another is currently needed...    Putting on and taking off regular lower body clothing?  --  2  -KR  --    Bathing (including washing, rinsing, and drying)  --  2  -KR  --    Toileting (which includes using toilet bed pan or urinal)  --  3  -KR  --    Putting on and taking off regular upper body clothing  --  3  -KR  --    Taking care of personal grooming (such as brushing teeth)  --  3  -KR  --    Eating meals  --  3  -KR  --    Score  --  16  -KR  --       Functional Assessment    Outcome Measure Options  AM-PAC 6 Clicks  Basic Mobility (PT)  -AG  --  AM-PAC 6 Clicks Basic Mobility (PT)  -LL    Row Name 06/11/19 1600             How much help from another person do you currently need...    Turning from your back to your side while in flat bed without using bedrails?  3  -LL      Moving from lying on back to sitting on the side of a flat bed without bedrails?  3  -LL      Moving to and from a bed to a chair (including a wheelchair)?  3  -LL      Standing up from a chair using your arms (e.g., wheelchair, bedside chair)?  3  -LL      Climbing 3-5 steps with a railing?  2  -LL      To walk in hospital room?  3  -LL      AM-PAC 6 Clicks Score  17  -LL         Functional Assessment    Outcome Measure Options  AM-PAC 6 Clicks Basic Mobility (PT)  -LL        User Key  (r) = Recorded By, (t) = Taken By, (c) = Cosigned By    Initials Name Provider Type    Sandy Sloan, PT Physical Therapist    Brannon Wang, OT Occupational Therapist    LL Loyda Vega PTA Physical Therapy Assistant         Time Calculation:   PT Charges     Row Name 06/13/19 1749 06/13/19 1746          Time Calculation    PT Received On  --  06/13/19  -     PT - Next Appointment  --  06/14/19  -     PT Goal Re-Cert Due Date  --  06/27/19  -        Time Calculation- PT    Total Timed Code Minutes- PT  30 minute(s)  -LL  --     TCU Minutes- PT  --  38 min  -AG       User Key  (r) = Recorded By, (t) = Taken By, (c) = Cosigned By    Initials Name Provider Type    Sandy Sloan, PT Physical Therapist    LL Loyda Vega PTA Physical Therapy Assistant        Therapy Charges for Today     Code Description Service Date Service Provider Modifiers Qty    90832286980 HC GAIT TRAINING EA 15 MIN 6/12/2019 Loyda Vega PTA GP 2    41440976954 HC PT THERAPEUTIC ACT EA 15 MIN 6/12/2019 Loyda Vega PTA GP 1    47804601366 HC PT THER PROC EA 15 MIN 6/12/2019 Loyda Vega PTA GP 2    53002134265 HC PT THERAPEUTIC ACT EA 15 MIN 6/13/2019 Loyda Vega  PTA GP 1    00371700785 HC PT THER PROC EA 15 MIN 6/13/2019 Loyda Vega, PTA GP 1          PT G-Codes  Outcome Measure Options: AM-PAC 6 Clicks Basic Mobility (PT)  AM-PAC 6 Clicks Score: 17  Score: 11    Loyda. ALFREDO Vega  6/13/2019

## 2019-06-13 NOTE — H&P
[facility] Emergency Room Medicine Services  History & Physical    Patient Identification:  Name:  Abbi Mendez  Age:  83 y.o.  Sex:  female  :  1935  MRN:  [id number]   Visit Number:  [id number]  Primary Care Physician:  Eric Driver MD    I have seen the patient in conjunction with Amy Jurado PA-C and I agree with the following statements. I have made any necessary changes below to reflect my findings.     Subjective     Chief complaint: Left hip fracture    History of presenting illness:      Abbi Mendez is a 83 y.o. 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  She remained hospitalized receiving therapies until approved for swing bed.    On this date, she has been admitted to swing bed status.  She denies chest pain, dyspnea, palpitations.  She did feel slightly fatigued this morning when blood pressures were lower but this did resolve following a small fluid bolus.  She is eating and drinking well.  She reports she feels she is making progress since her surgical repair.    Issa: Pt seen and examined with FANNY Sutton. Awake and alert. States she felt fatigued this AM with hypotension likely contributing. She feels improved after fluid bolus and improvement in her hypotension. Pain is controlled. States she slept well last PM. Denies fever or chills. Denies nausea or vomiting. Denies CP, dyspnea or palpitations. Reports good UOP with dose of Lasix yesterday and states she feels less swollen.     ---------------------------------------------------------------------------------------------------------------------   Review of Systems    Constitutional: Negative for activity change, appetite change, chills and fever.   HENT: Negative for congestion and drooling.    Eyes: Negative for pain and discharge.   Respiratory: Negative for apnea, shortness of breath and wheezing.    Cardiovascular: Negative for chest pain and leg swelling.   Gastrointestinal: Negative for diarrhea and nausea.   Endocrine: Negative for cold intolerance and heat intolerance.   Genitourinary: Negative for difficulty urinating and dysuria.   Musculoskeletal: Positive for arthralgias.   Skin: Negative for color change and pallor.   Allergic/Immunologic: Negative for environmental allergies and food allergies.   Neurological: Negative for dizziness and headaches.   Psychiatric/Behavioral: Negative for agitation and confusion.      ---------------------------------------------------------------------------------------------------------------------   Past Medical History:   Diagnosis Date   • Anxiety    • Arthritis    • Atrial fibrillation (CMS/HCC) 11/14/2016   • Gastric ulcer    • Hiatal hernia    • Hypertension    • Iron deficiency anemia    • RADHA (obstructive sleep apnea) 11/14/2016     Past Surgical History:   Procedure Laterality Date   • CARDIAC ELECTROPHYSIOLOGY PROCEDURE N/A 1/18/2018    Procedure: Pacemaker DC new;  Surgeon: Soraya Darling MD;  Location: [facility] CATH INVASIVE LOCATION;  Service:    • HIP BIPOLAR REPLACEMENT Left 6/5/2019    Procedure: HIP BIPOLAR REPLACEMENT;  Surgeon: Gerardo Jain MD;  Location: [facility] OR;  Service: Orthopedics   • TOTAL KNEE ARTHROPLASTY Right    • TUBAL ABDOMINAL LIGATION       Family History   Problem Relation Age of Onset   • Heart attack Mother 69   • Heart disease Mother    • Heart disease Sister    • Heart attack Brother 60   • Heart attack Brother 45   • Lymphoma Brother    • Heart disease Daughter    • Diabetes Son    • Heart disease Daughter    • Heart disease Daughter    • Diabetes Daughter      Social History      Socioeconomic History   • Marital status: Single     Spouse name: Not on file   • Number of children: Not on file   • Years of education: Not on file   • Highest education level: Not on file   Tobacco Use   • Smoking status: Never Smoker   • Smokeless tobacco: Never Used   Substance and Sexual Activity   • Alcohol use: No   • Drug use: No   • Sexual activity: Not Currently     Partners: Male   Social History Narrative         seven children      ---------------------------------------------------------------------------------------------------------------------   Allergies:  Darvon [propoxyphene] and Penicillins  ---------------------------------------------------------------------------------------------------------------------   Home medications:    Medications below are reported home medications pulling from within the system; at this time, these medications have not been reconciled unless otherwise specified and are in the verification process for further verifcation as current home medications.  Medications Prior to Admission   Medication Sig Dispense Refill Last Dose   • aspirin 81 MG EC tablet Take 81 mg by mouth Daily.   Unknown at Unknown time   • atorvastatin (LIPITOR) 40 MG tablet Take 40 mg by mouth Every Night.   Unknown at Unknown time   • busPIRone (BUSPAR) 10 MG tablet Take 10 mg by mouth 2 (Two) Times a Day.   Unknown at Unknown time   • carvedilol (COREG) 12.5 MG tablet Take 1 tablet by mouth 2 (Two) Times a Day With Meals. 180 tablet 3 Unknown at Unknown time   • esomeprazole (nexIUM) 40 MG capsule Take 40 mg by mouth Every Morning Before Breakfast.   Unknown at Unknown time   • losartan (COZAAR) 100 MG tablet Take 100 mg by mouth Daily.  1 Unknown at Unknown time       Hospital Scheduled Meds:      No current facility-administered medications for this encounter.     Current listed hospital scheduled medications may not yet reflect those currently placed in orders that are signed and  held awaiting patient's arrival to floor.   ---------------------------------------------------------------------------------------------------------------------     Objective     Vital Signs:  Temp:  [98 °F (36.7 °C)-100.1 °F (37.8 °C)] 98 °F (36.7 °C)  Heart Rate:  [68-82] 76  Resp:  [18-20] 18  BP: ()/(40-70) 120/47      06/04/19  2104 06/05/19  0305   Weight: 76.2 kg (168 lb) 76.2 kg (168 lb 0.2 oz)     Body mass index is 27.13 kg/m².  ---------------------------------------------------------------------------------------------------------------------         Physical Exam:    Constitutional: Awake, alert, well-nourished, well-developed, nontoxic ( No acute distress)  HEENT: Normocephalic, atraumatic. PERRLA, EOMI, sclerae anicteric, conjunctivae without injection, mucous membranes moist, no oropharyngeal erythema appreciated.    Neck: Supple. No JVD appreciated.  Pulmonary: Clear to auscultation bilaterally, nonlabored respirations, no wheezing appreciated.   CV: Normal rate, regular rhythm. Normal s1/s2 with no murmur appreciated.   Abdominal: Soft, No distension or tenderness appreciated. Bowel sounds appreciated in all four quadrants, no guarding or rebound tenderness. No organomegaly.   Musculoskeletal: No erythema or swelling to joints of upper and lower extremities   Extremities: No clubbing, cyanosis, or edema (Mild edema in LLE but improved)  Vascular: 2+ DP/PT bilaterally, warm extremities  Skin: Skin is warm and dry. No truncal or extremity rash on limited exam  Neuro: Alert and oriented to person, place, and time. Strength symmetric in all extremities, Cranial Nerves grossly intact to confrontation, speech clear, sensation intact to fine touch throughout.  No slurred speech.  No facial droop.    Psych: Appropriate mood and affect.  Judgement and though content appropriate.        ---------------------------------------------------------------------------------------------------------------------  EKG:              ---------------------------------------------------------------------------------------------------------------------   Results from last 7 days   Lab Units 06/13/19 0515 06/12/19 0523 06/11/19  0456   WBC 10*3/mm3 10.39 11.29* 11.82*   HEMOGLOBIN g/dL 10.2* 10.3* 10.9*   HEMATOCRIT % 33.1* 33.8* 34.8   MCV fL 96.2 97.1* 96.4   MCHC g/dL 30.8* 30.5* 31.3*   PLATELETS 10*3/mm3 414 411 423         Results from last 7 days   Lab Units 06/13/19 0515 06/12/19 0523 06/11/19  0456   SODIUM mmol/L 137 139 138   POTASSIUM mmol/L 3.7 3.8 3.9   MAGNESIUM mg/dL 1.9 1.6 2.0   CHLORIDE mmol/L 98 101 101   CO2 mmol/L 27.4 27.2 25.7   BUN mg/dL 23 18 19   CREATININE mg/dL 1.08* 0.88 0.95   EGFR IF NONAFRICN AM mL/min/1.73 48* 61 56*   CALCIUM mg/dL 8.5* 8.4* 8.4*   GLUCOSE mg/dL 103* 104* 105*   Estimated Creatinine Clearance: 41.2 mL/min (A) (by C-G formula based on SCr of 1.08 mg/dL (H)).  No results found for: AMMONIA          Lab Results   Component Value Date    HGBA1C 5.70 (H) 06/05/2019     No results found for: TSH, FREET4  No results found for: PREGTESTUR, PREGSERUM, HCG, HCGQUANT  Pain Management Panel     Pain Management Panel Latest Ref Rng & Units 6/5/2019    CREATININE UR mg/dL 195.3                        ---------------------------------------------------------------------------------------------------------------------  Imaging Results (last 7 days)     ** No results found for the last 168 hours. **               Last echocardiogram:  Results for orders placed during the hospital encounter of 01/25/19   Adult Transthoracic Echo Complete W/ Cont if Necessary Per Protocol    Narrative · Right ventricular cavity is borderline dilated.  · Left ventricular wall thickness is consistent with concentric   hypertrophy.  · Left ventricular diastolic dysfunction (grade I)  consistent with   impaired relaxation.  · Left ventricular systolic function is normal. Estimated EF = 60%.  · Mild aortic valve stenosis is present. Aortic valve mean pressure   gradient is 10.9 mmHg.  · Mild aortic valve regurgitation is present.  · Mild-to-moderate mitral valve regurgitation is present  · Mild to moderate tricuspid valve regurgitation is present.  · Calculated right ventricular systolic pressure from tricuspid   regurgitation is 29 mmHg.  · No significant pericardial effusion is noted.              I have personally reviewed the radiology images and read the final radiology report.  ---------------------------------------------------------------------------------------------------------------------  Assessment / Plan     Active Hospital Problems    Diagnosis POA   • Left displaced femoral neck fracture (CMS/HCC) [S72.002A] Yes       ASSESSMENT/PLAN:  · Acute traumatic left hip fracture s/p left bipolar replacement:  Continue PT/OT. Continue Eliquis daily.    · CKD 3: Continue close monitoring of creatinine.   · Essential Hypertension: Losartan 100mg discontinued prior to SB admission 2/2 lower blood pressures.  BP improved after small IV fluid bolus. Continue to monitor closely and hold further diuresis at present.   · History of SSS s/p PPM  · Paroxysmal atrial fibrillation: Continue Coreg. Currently on Eliquis s/p hip repair (this is not home anticoagulation per review of records).    · Hypokalemia, improved  · Hypomagnesemia, improved  · Leukocytosis, likely reactive to hip fracture and surgical repair, now resolved      ----------  -DVT prophylaxis: Eliquis to serve  -Activity: PT/OT  -Expected length of stay:     Amy Jurado PA-C  06/13/19  4:28 PM  Pager # [phone]  ---------------------------------------------------------------------------------------------------------------------     I have reviewed the notes, assessments, and/or procedures performed by Amy Jurado PA-C.  I concur with her/his documentation of Abbi Mendez.    Jeffrey Parsons D.O.

## 2019-06-13 NOTE — PROGRESS NOTES
Discharge Planning Assessment   [place]     Patient Name: Abbi Mendez  MRN: [id number]  Today's Date: 6/13/2019    Admit Date: 6/4/2019    Discharge Plan     Row Name 06/13/19 1353       Plan    Patient/Family in Agreement with Plan  yes    Final Discharge Disposition Code  61 - hospital-based swing bed    Final Note  Pt is being discharged to swing bed on this date. No other needs identified.      Marleen Benitez

## 2019-06-13 NOTE — NURSING NOTE
Measures to prevent pressure injuries are in place  No need for wound care involvement at this time

## 2019-06-13 NOTE — THERAPY EVALUATION
Acute Care - Occupational Therapy Initial Evaluation   [place]     Patient Name: Abbi Mendez  : 1935  MRN: [id number]  Today's Date: 2019             Admit Date: 2019     No diagnosis found.  Patient Active Problem List   Diagnosis   • Atrial fibrillation (CMS/HCC)   • Coronary artery disease involving native coronary artery of native heart without angina pectoris   • RADHA (obstructive sleep apnea)   • Essential hypertension   • Hyperlipidemia LDL goal <70   • Iron deficiency anemia   • Left bundle branch block   • Syncope and collapse   • Severe sinus bradycardia   • Third degree AV block (CMS/HCC)   • Syncope   • Compression deformity of vertebra   • Radiculopathy   • Pericardial effusion without cardiac tamponade   • Hypercalcemia   • Abnormal serum protein electrophoresis   • Weight loss, unintentional   • Primary hyperparathyroidism (CMS/HCC)   • Hypercalcemia   • Pacemaker   • Hiatal hernia   • Elevated serum immunoglobulin free light chains   • Left displaced femoral neck fracture (CMS/HCC)   • Hip fracture (CMS/HCC)     Past Medical History:   Diagnosis Date   • Anxiety    • Arthritis    • Atrial fibrillation (CMS/HCC) 2016   • Gastric ulcer    • Hiatal hernia    • Hypertension    • Iron deficiency anemia    • RADHA (obstructive sleep apnea) 2016     Past Surgical History:   Procedure Laterality Date   • CARDIAC ELECTROPHYSIOLOGY PROCEDURE N/A 2018    Procedure: Pacemaker DC new;  Surgeon: Soraya Darling MD;  Location: [facility] INVASIVE LOCATION;  Service:    • HIP BIPOLAR REPLACEMENT Left 2019    Procedure: HIP BIPOLAR REPLACEMENT;  Surgeon: Gerardo Jain MD;  Location: [facility];  Service: Orthopedics   • TOTAL KNEE ARTHROPLASTY Right    • TUBAL ABDOMINAL LIGATION            OT ASSESSMENT FLOWSHEET (last 12 hours)      Occupational Therapy Evaluation     Row Name 19 1610                   OT Evaluation Time/Intention    Document Type  evaluation  -KR         Mode of Treatment  occupational therapy  -KR        Patient Effort  good  -KR           Cognitive Assessment/Intervention- PT/OT    Orientation Status (Cognition)  oriented x 3  -KR        Follows Commands (Cognition)  WFL  -KR           Bathing Assessment/Intervention    Bathing [place] Level  bathing skills;moderate assist (50% patient effort)  -KR           Upper Body Dressing Assessment/Training    Upper Body Dressing [place] Level  upper body dressing skills;minimum assist (75% patient effort)  -KR           Lower Body Dressing Assessment/Training    Lower Body Dressing [place] Level  lower body dressing skills;maximum assist (25% patient effort)  -KR           Grooming Assessment/Training    [place] Level (Grooming)  grooming skills;minimum assist (75% patient effort)  -KR           Self-Feeding Assessment/Training    [place] Level (Feeding)  feeding skills;set up  -KR           Toileting Assessment/Training    [place] Level (Toileting)  toileting skills;moderate assist (50% patient effort)  -KR           General ROM    GENERAL ROM COMMENTS  R wrist brace applied upon evaluation. R hand 3/5 AROM, elbow/shoulder 4/5, LUE WFL  -KR           MMT (Manual Muscle Testing)    General MMT Comments  RUE deferred, LUE 3/5  -KR           Wound 06/05/19 1838 Left hip incision    Wound - Properties Group Date first assessed: 06/05/19  -KH Time first assessed: 1838  - Side: Left  - Location: hip  -KH Type: incision  -KH       Clinical Impression (OT)    Patient/Family Goals Statement (OT Eval)  Return to PLOF  -KR        Criteria for Skilled Therapeutic Interventions Met (OT Eval)  yes  -KR        Rehab Potential (OT Eval)  good, to achieve stated therapy goals  -KR        Anticipated Discharge Disposition (OT)  home with assist  -KR           OT Goals    ROM Goal Selection (OT)  ROM, OT goal 1  -KR        Additional Documentation  ROM Goal Selection (OT) (Row)  -KR           Dressing Goal  1 (OT)    Activity/Assistive Device (Dressing Goal 1, OT)  dressing skills, all;reacher  -KR        [place]/Cues Needed (Dressing Goal 1, OT)  minimum assist (75% or more patient effort)  -KR           ROM Goal 1 (OT)    ROM Goal 1 (OT)  R hand AROM WFL to enhance fxl task performance with self care skills  -KR        Time Frame (ROM Goal 1, OT)  by discharge  -KR           Strength Goal 1 (OT)    Strength Goal 1 (OT)  BUE increase x 1 to enhance self care skills  -KR        Time Frame (Strength Goal 1, OT)  by discharge  -KR          User Key  (r) = Recorded By, (t) = Taken By, (c) = Cosigned By    Initials Name Effective Dates    Ary Coyne RN 06/16/16 -     Brannon Wang, OT 04/03/18 -                OT Recommendation and Plan  Outcome Summary/Treatment Plan (OT)  Anticipated Discharge Disposition (OT): home with assist       Outcome Measures     Row Name 06/13/19 1623 06/12/19 1600 06/11/19 1600       How much difficulty does the patient currently have...    Turning from your back to your side while in flat bed without using bedrails?  --  3  -LL  3  -LL    Standing up from a chair using your arms (e.g., wheelchair, bedside chair)?  --  3  -LL  3  -LL    Moving from lying on back to sitting on the side of a flat bed without bedrails?  --  3  -LL  3  -LL       How much help from another person do you currently need...    Moving to and from a bed to a chair (including a wheelchair)?  --  3  -LL  3  -LL    Climbing 3-5 steps with a railing?  --  2  -LL  2  -LL    To walk in hospital room?  --  3  -LL  3  -LL    AM-PAC 6 Clicks Score  --  17  -LL  17  -LL       How much help from another is currently needed...    Putting on and taking off regular lower body clothing?  2  -KR  --  --    Bathing (including washing, rinsing, and drying)  2  -KR  --  --    Toileting (which includes using toilet bed pan or urinal)  3  -KR  --  --    Putting on and taking off regular upper body clothing  3  -KR  --   --    Taking care of personal grooming (such as brushing teeth)  3  -KR  --  --    Eating meals  3  -KR  --  --    Score  16  -KR  --  --       Functional Assessment    Outcome Measure Options  --  AM-PAC 6 Clicks Basic Mobility (PT)  -LL  AM-PAC 6 Clicks Basic Mobility (PT)  -LL      User Key  (r) = Recorded By, (t) = Taken By, (c) = Cosigned By    Initials Name Provider Type    Brannon Wang OT Occupational Therapist    Loyda Kraus PTA Physical Therapy Assistant          Time Calculation:   Time Calculation- OT     Row Name 06/13/19 1623             Time Calculation- OT    Total Timed Code Minutes- OT  60 minute(s)  -KR        User Key  (r) = Recorded By, (t) = Taken By, (c) = Cosigned By    Initials Name Provider Type    Brannon Wang OT Occupational Therapist        Therapy Charges for Today     Code Description Service Date Service Provider Modifiers Qty    [id number]  OT EVAL MOD COMPLEXITY 4 6/13/2019 Brannon Mondragon OT GO 1               Brannon Mondragon OT  6/13/2019

## 2019-06-13 NOTE — THERAPY EVALUATION
Acute Care - Physical Therapy Initial Evaluation   [facility]     Patient Name: Abbi Mendez  : 1935  MRN: [id number]  Today's Date: 2019   Onset of Illness/Injury or Date of Surgery: 19  Date of Referral to PT: 19  Referring Physician: Dr. Parsons      Admit Date: 2019    Visit Dx: No diagnosis found.  Patient Active Problem List   Diagnosis   • Atrial fibrillation (CMS/HCC)   • Coronary artery disease involving native coronary artery of native heart without angina pectoris   • RADHA (obstructive sleep apnea)   • Essential hypertension   • Hyperlipidemia LDL goal <70   • Iron deficiency anemia   • Left bundle branch block   • Syncope and collapse   • Severe sinus bradycardia   • Third degree AV block (CMS/HCC)   • Syncope   • Compression deformity of vertebra   • Radiculopathy   • Pericardial effusion without cardiac tamponade   • Hypercalcemia   • Abnormal serum protein electrophoresis   • Weight loss, unintentional   • Primary hyperparathyroidism (CMS/HCC)   • Hypercalcemia   • Pacemaker   • Hiatal hernia   • Elevated serum immunoglobulin free light chains   • Left displaced femoral neck fracture (CMS/HCC)   • Hip fracture (CMS/HCC)     Past Medical History:   Diagnosis Date   • Anxiety    • Arthritis    • Atrial fibrillation (CMS/HCC) 2016   • Gastric ulcer    • Hiatal hernia    • Hypertension    • Iron deficiency anemia    • RADHA (obstructive sleep apnea) 2016     Past Surgical History:   Procedure Laterality Date   • CARDIAC ELECTROPHYSIOLOGY PROCEDURE N/A 2018    Procedure: Pacemaker DC new;  Surgeon: Soraya Darling MD;  Location: [facility] INVASIVE LOCATION;  Service:    • HIP BIPOLAR REPLACEMENT Left 2019    Procedure: HIP BIPOLAR REPLACEMENT;  Surgeon: Gerardo Jain MD;  Location: [facility];  Service: Orthopedics   • TOTAL KNEE ARTHROPLASTY Right    • TUBAL ABDOMINAL LIGATION          PT ASSESSMENT (last 12 hours)      Physical Therapy Evaluation     Row  Name 06/13/19 1749          PT Evaluation Time/Intention    Subjective Information  no complaints  -AG     Document Type  evaluation  -AG     Mode of Treatment  individual therapy;physical therapy  -AG     Patient Effort  good  -AG     Symptoms Noted During/After Treatment  fatigue  -AG     Comment  family present.   -AG     Row Name 06/13/19 1749          General Information    Patient Profile Reviewed?  yes  -AG     Onset of Illness/Injury or Date of Surgery  06/13/19  -AG     Referring Physician  Dr. Parsons  -AG     Patient Observations  alert;cooperative;agree to therapy  -AG     Patient/Family Observations  pt. supine; pleasant and cooperative, in no apparent distress.  Family at bedside.  Hip incision observed, appears dry and intact, unremarkable.  R wrist splint in place.   -AG     Prior Level of Function  independent:;all household mobility ambulating with walker at times d/t hip OA pain  -AG     Pertinent History of Current Functional Problem  underwent L JANET following L displaced femoral neck fx.   -AG     Existing Precautions/Restrictions  fall;hip;left  -AG     Limitations/Impairments  safety/cognitive  -AG     Risks Reviewed  patient and family:;LOB;dizziness;increased discomfort;change in vital signs  -AG     Benefits Reviewed  patient and family:;improve function;increase independence;increase strength;increase balance;decrease risk of DVT;increase knowledge  -AG     Row Name 06/13/19 1749          Relationship/Environment    Lives With  alone  -AG     Family Caregiver if Needed  child(ananya), adult  -AG     Row Name 06/13/19 1749          Resource/Environmental Concerns    Current Living Arrangements  home/apartment/condo  -AG     Row Name 06/13/19 1749          Cognitive Assessment/Intervention- PT/OT    Orientation Status (Cognition)  oriented x 3  -AG     Follows Commands (Cognition)  WFL;follows one step commands;verbal cues/prompting required  -AG     Row Name 06/13/19 1749          Safety  Issues, Functional Mobility    Safety Issues Affecting Function (Mobility)  safety precaution awareness;safety precautions follow-through/compliance  -     Impairments Affecting Function (Mobility)  balance;endurance/activity tolerance;strength  -AG     Row Name 06/13/19 1749          Mobility Assessment/Treatment    Extremity Weight-bearing Status  left lower extremity;right lower extremity  -AG     Left Lower Extremity (Weight-bearing Status)  weight-bearing as tolerated (WBAT)  -AG     Right Lower Extremity (Weight-bearing Status)  weight-bearing as tolerated (WBAT)  -AG     Row Name 06/13/19 1749          Bed Mobility Assessment/Treatment    Bed Mobility Assessment/Treatment  scooting/bridging;supine-sit;sit-supine  -AG     Scooting/Bridging [place] (Bed Mobility)  verbal cues;nonverbal cues (demo/gesture);contact guard  -AG     Supine-Sit [place] (Bed Mobility)  verbal cues;nonverbal cues (demo/gesture);contact guard  -AG     Sit-Supine [place] (Bed Mobility)  verbal cues;nonverbal cues (demo/gesture);minimum assist (75% patient effort)  -     Bed Mobility, Safety Issues  cognitive deficits limit understanding;decreased use of legs for bridging/pushing  -AG     Assistive Device (Bed Mobility)  bed rails  -AG     Row Name 06/13/19 1749          Transfer Assessment/Treatment    Transfer Assessment/Treatment  sit-stand transfer;stand-sit transfer;stand pivot/stand step transfer  -AG     Maintains Weight-bearing Status (Transfers)  able to maintain  -AG     Sit-Stand [place] (Transfers)  verbal cues;nonverbal cues (demo/gesture);minimum assist (75% patient effort)  -     Stand-Sit [place] (Transfers)  verbal cues;nonverbal cues (demo/gesture);minimum assist (75% patient effort)  -     Row Name 06/13/19 1749          Sit-Stand Transfer    Assistive Device (Sit-Stand Transfers)  walker, front-wheeled  -     Row Name 06/13/19 1749          Stand-Sit Transfer    Assistive Device  (Stand-Sit Transfers)  walker, front-wheeled  -AG     Row Name 06/13/19 1749          Stand Pivot/Stand Step Transfer    Stand Pivot/Stand Step [place]  verbal cues;nonverbal cues (demo/gesture);minimum assist (75% patient effort)  -     Assistive Device (Stand Pivot Stand Step Transfer)  walker, front-wheeled  -AG     Row Name 06/13/19 1749          Gait/Stairs Assessment/Training    Gait/Stairs Assessment/Training  gait/ambulation independence;gait/ambulation assistive device;distance ambulated;gait pattern;gait deviations;maintains weight-bearing status  -AG     [place] Level (Gait)  verbal cues;nonverbal cues (demo/gesture);contact guard;minimum assist (75% patient effort)  -     Assistive Device (Gait)  walker, front-wheeled  -AG     Distance in Feet (Gait)  100  -AG     Pattern (Gait)  step-through  -AG     Deviations/Abnormal Patterns (Gait)  antalgic;stride length decreased  -AG     Bilateral Gait Deviations  forward flexed posture;heel strike decreased;weight shift ability decreased  -     Row Name 06/13/19 1749          MMT (Manual Muscle Testing)    General MMT Comments  B anterior tib 4/5; B quads 4/5  -     Row Name 06/13/19 1749          Motor Assessment/Intervention    Additional Documentation  Balance (Group);Balance Interventions (Group);Therapeutic Exercise (Group);Therapeutic Exercise Interventions (Group)  -     Row Name 06/13/19 1749          Therapeutic Exercise    Therapeutic Exercise  seated, lower extremities  -     Additional Documentation  Therapeutic Exercise (Row)  -[facility] Name 06/13/19 1749          Lower Extremity Seated Therapeutic Exercise    Performed, Seated Lower Extremity (Therapeutic Exercise)  hip abduction/adduction;ankle dorsiflexion/plantarflexion;LAQ (long arc quad), knee extension  -     Exercise Type, Seated Lower Extremity (Therapeutic Exercise)  AROM (active range of motion)  -     Restrictions, Seated Lower Extremity (Therapeutic Exercise)   L JANET precautions  -AG     Sets/Reps Detail, Seated Lower Extremity (Therapeutic Exercise)  1 x 20  -AG     Transfers Skills, Training to Functional Activity, Seated Lower Extremity (Therapeutic Exercise)  able to transfer skills from training to functional activity  -AG     Row Name 06/13/19 1749          Balance    Balance  static sitting balance;static standing balance;dynamic sitting balance;dynamic standing balance  -AG     Row Name 06/13/19 1749          Static Sitting Balance    Level of Deal (Unsupported Sitting, Static Balance)  conditional independence  -AG     Sitting Position (Unsupported Sitting, Static Balance)  sitting in chair;sitting on edge of bed  -     Time Able to Maintain Position (Unsupported Sitting, Static Balance)  more than 5 minutes  -AG     Row Name 06/13/19 1749          Static Standing Balance    Level of Deal (Supported Standing, Static Balance)  contact guard assist;minimal assist, 75% patient effort  -     Assistive Device Utilized (Supported Standing, Static Balance)  walker, rolling  -AG     Row Name 06/13/19 1749          Sensory Assessment/Intervention    Sensory General Assessment  no sensation deficits identified  -AG     Row Name 06/13/19 1749          Vision Assessment/Intervention    Visual Impairment/Limitations  WFL  -AG     Row Name 06/13/19 1749          Pain Assessment    Additional Documentation  Pain Scale: FACES Pre/Post-Treatment (Group)  -AG     Row Name 06/13/19 1749          Pain Scale: Numbers Pre/Post-Treatment    Pain Location - Side  Left  -AG     Pain Location  hip  -AG     Pain Intervention(s)  Repositioned;Ambulation/increased activity  -AG     Row Name 06/13/19 1749          Pain Scale: FACES Pre/Post-Treatment    Pain: FACES Scale, Pretreatment  4-->hurts little more  -AG     Pain: FACES Scale, Post-Treatment  4-->hurts little more  -AG     Row Name             Wound 06/05/19 1838 Left hip incision    Wound - Properties Group Date  first assessed: 06/05/19  -KH Time first assessed: 1838  -KH Side: Left  -KH Location: hip  -KH Type: incision  -KH    Row Name 06/13/19 1749          Coping    Observed Emotional State  calm;cooperative;pleasant  -AG     Verbalized Emotional State  acceptance  -AG     Row Name 06/13/19 1749          Plan of Care Review    Plan of Care Reviewed With  patient;daughter  -AG     Row Name 06/13/19 1749          Physical Therapy Clinical Impression    Date of Referral to PT  06/13/19  -AG     PT Diagnosis (PT Clinical Impression)  decr LE strength  -AG     Prognosis (PT Clinical Impression)  good  -AG     Functional Level at Time of Evaluation (PT Clinical Impression)  Min A/ CGA  -AG     Patient/Family Goals Statement (PT Clinical Impression)  return home  -AG     Criteria for Skilled Interventions Met (PT Clinical Impression)  yes;treatment indicated  -AG     Pathology/Pathophysiology Noted (Describe Specifically for Each System)  musculoskeletal  -AG     Impairments Found (describe specific impairments)  aerobic capacity/endurance;ergonomics and body mechanics;gait, locomotion, and balance;joint integrity and mobility  -AG     Functional Limitations in Following Categories (Describe Specific Limitations)  self-care;community/leisure  -AG     Rehab Potential (PT Clinical Summary)  good, to achieve stated therapy goals  -AG     Predicted Duration of Therapy (PT)  LOS  -AG     Care Plan Review (PT)  evaluation/treatment results reviewed;care plan/treatment goals reviewed;risks/benefits reviewed;current/potential barriers reviewed;patient/other agree to care plan  -AG     Care Plan Review, Other Participant (PT Clinical Impression)  daughter  -AG     Row Name 06/13/19 1749          Physical Therapy Goals    Bed Mobility Goal Selection (PT)  bed mobility, PT goal 1  -AG     Transfer Goal Selection (PT)  transfer, PT goal 1  -AG     Gait Training Goal Selection (PT)  gait training, PT goal 1  -AG     Row Name 06/13/19 1749           Bed Mobility Goal 1 (PT)    Activity/Assistive Device (Bed Mobility Goal 1, PT)  rolling to left;rolling to right;scooting;sit to supine/supine to sit  -AG     [place] Level/Cues Needed (Bed Mobility Goal 1, PT)  standby assist  -AG     Time Frame (Bed Mobility Goal 1, PT)  by discharge  -AG     Row Name 06/13/19 1749          Transfer Goal 1 (PT)    Activity/Assistive Device (Transfer Goal 1, PT)  sit-to-stand/stand-to-sit;bed-to-chair/chair-to-bed;walker, rolling  -AG     [place] Level/Cues Needed (Transfer Goal 1, PT)  standby assist  -AG     Time Frame (Transfer Goal 1, PT)  by discharge  -AG     Row Name 06/13/19 1749          Gait Training Goal 1 (PT)    Activity/Assistive Device (Gait Training Goal 1, PT)  gait (walking locomotion);assistive device use;decrease fall risk;diminish gait deviation;improve balance and speed;increase endurance/gait distance;walker, rolling  -AG     [place] Level (Gait Training Goal 1, PT)  contact guard assist;standby assist  -AG     Distance (Gait Goal 1, PT)  200  -AG     Time Frame (Gait Training Goal 1, PT)  by discharge  -AG     Row Name 06/13/19 1746          Positioning and Restraints    Pre-Treatment Position  in bed  -AG     Post Treatment Position  chair  -AG     In Chair  notified nsg;sitting;call light within reach;encouraged to call for assist;with family/caregiver  -AG       User Key  (r) = Recorded By, (t) = Taken By, (c) = Cosigned By    Initials Name Provider Type    Ary Coyne, RN Registered Nurse    Sandy Sloan, KEYSHAWN Physical Therapist        Physical Therapy Education     Title: PT OT SLP Therapies (Done)     Topic: Physical Therapy (Done)     Point: Mobility training (Done)     Learning Progress Summary           Patient Acceptance, E,D, VU,NR by  at 6/13/2019  5:48 PM   Family Acceptance, E,D, VU,NR by  at 6/13/2019  5:48 PM                   Point: Home exercise program (Done)     Learning Progress Summary            Patient Acceptance, E,D, VU,NR by AG at 6/13/2019  5:48 PM   Family Acceptance, E,D, VU,NR by AG at 6/13/2019  5:48 PM                   Point: Body mechanics (Done)     Learning Progress Summary           Patient Acceptance, E,D, VU,NR by AG at 6/13/2019  5:48 PM   Family Acceptance, E,D, VU,NR by AG at 6/13/2019  5:48 PM                   Point: Precautions (Done)     Learning Progress Summary           Patient Acceptance, E,D, VU,NR by AG at 6/13/2019  5:48 PM   Family Acceptance, E,D, VU,NR by AG at 6/13/2019  5:48 PM                               User Key     Initials Effective Dates Name Provider Type Discipline     04/03/18 -  Sandy Covington, PT Physical Therapist PT              PT Recommendation and Plan  Anticipated Discharge Disposition (PT): home with assist  Planned Therapy Interventions (PT Eval): balance training, bed mobility training, gait training, home exercise program, patient/family education, postural re-education, ROM (range of motion), strengthening, transfer training  Therapy Frequency (PT Clinical Impression): other (see comments)(6 days/ week )  Outcome Summary/Treatment Plan (PT)  Anticipated Equipment Needs at Discharge (PT): raised toilet seat, front wheeled walker  Anticipated Discharge Disposition (PT): home with assist  Plan of Care Reviewed With: patient, daughter  Outcome Measures     Row Name 06/13/19 1748 06/13/19 1623 06/12/19 1600       How much help from another person do you currently need...    Turning from your back to your side while in flat bed without using bedrails?  4  -AG  --  3  -LL    Moving from lying on back to sitting on the side of a flat bed without bedrails?  3  -AG  --  3  -LL    Moving to and from a bed to a chair (including a wheelchair)?  3  -AG  --  3  -LL    Standing up from a chair using your arms (e.g., wheelchair, bedside chair)?  3  -AG  --  3  -LL    Climbing 3-5 steps with a railing?  3  -AG  --  2  -LL    To walk in hospital room?  3  -AG  --   3  -LL    AM-PAC 6 Clicks Score  19  -AG  --  17  -LL       How much help from another is currently needed...    Putting on and taking off regular lower body clothing?  --  2  -KR  --    Bathing (including washing, rinsing, and drying)  --  2  -KR  --    Toileting (which includes using toilet bed pan or urinal)  --  3  -KR  --    Putting on and taking off regular upper body clothing  --  3  -KR  --    Taking care of personal grooming (such as brushing teeth)  --  3  -KR  --    Eating meals  --  3  -KR  --    Score  --  16  -KR  --       Functional Assessment    Outcome Measure Options  AM-formerly [facility] 6 Clicks Basic Mobility (PT)  -AG  --  AM-formerly [facility] 6 Clicks Basic Mobility (PT)  -LL    Row Name 06/11/19 1600             How much help from another person do you currently need...    Turning from your back to your side while in flat bed without using bedrails?  3  -LL      Moving from lying on back to sitting on the side of a flat bed without bedrails?  3  -LL      Moving to and from a bed to a chair (including a wheelchair)?  3  -LL      Standing up from a chair using your arms (e.g., wheelchair, bedside chair)?  3  -LL      Climbing 3-5 steps with a railing?  2  -LL      To walk in hospital room?  3  -LL      AM-formerly [facility] 6 Clicks Score  17  -LL         Functional Assessment    Outcome Measure Options  -formerly [facility] 6 Clicks Basic Mobility (PT)  -        User Key  (r) = Recorded By, (t) = Taken By, (c) = Cosigned By    Initials Name Provider Type     Sandy Covington, PT Physical Therapist    Brannon Wang, OT Occupational Therapist    LL Loyda Vega, ALFREDO Physical Therapy Assistant         Time Calculation:   PT Charges     Row Name 06/13/19 1749 06/13/19 1746          Time Calculation    PT Received On  --  06/13/19  -     PT - Next Appointment  --  06/14/19  -     PT Goal Re-Cert Due Date  --  06/27/19  -        Time Calculation- PT    Total Timed Code Minutes- PT  30 minute(s)  -LL  --     TCU Minutes- PT  --  38 min  -        User Key  (r) = Recorded By, (t) = Taken By, (c) = Cosigned By    Initials Name Provider Type    Sandy Sloan, PT Physical Therapist    Loyda Kraus, PTA Physical Therapy Assistant        Therapy Charges for Today     Code Description Service Date Service Provider Modifiers Qty    21589608273  GAIT TRAINING EA 15 MIN 6/13/2019 Sandy Covington, PT GP 1    35780994160 HC PT THER PROC EA 15 MIN 6/13/2019 Sandy Covington, PT GP 1    67012543147 HC PT THER SUPP EA 15 MIN 6/13/2019 Sandy Covington, PT GP 1    [id number]  PT EVAL MOD COMPLEXITY 1 6/13/2019 Sandy Covington, PT GP 1          PT G-Codes  Outcome Measure Options: AM-PAC 6 Clicks Basic Mobility (PT)  AM-PAC 6 Clicks Score: 19  Score: 16      Sandy Covington, PT  6/13/2019

## 2019-06-13 NOTE — THERAPY TREATMENT NOTE
Acute Care - Occupational Therapy Treatment Note  [facility]     Patient Name: Abbi Mendez  : 1935  MRN: [id number]  Today's Date: 2019  Onset of Illness/Injury or Date of Surgery: 19  Date of Referral to OT: 19  Referring Physician: Dr. Jain    Admit Date: 2019       ICD-10-CM ICD-9-CM   1. Left displaced femoral neck fracture (CMS/HCC) S72.002A 820.8   2. Strain of right shoulder, initial encounter S46.911A 840.9   3. Hypokalemia E87.6 276.8     Patient Active Problem List   Diagnosis   • Atrial fibrillation (CMS/HCC)   • Coronary artery disease involving native coronary artery of native heart without angina pectoris   • RADHA (obstructive sleep apnea)   • Essential hypertension   • Hyperlipidemia LDL goal <70   • Iron deficiency anemia   • Left bundle branch block   • Syncope and collapse   • Severe sinus bradycardia   • Third degree AV block (CMS/HCC)   • Syncope   • Compression deformity of vertebra   • Radiculopathy   • Pericardial effusion without cardiac tamponade   • Hypercalcemia   • Abnormal serum protein electrophoresis   • Weight loss, unintentional   • Primary hyperparathyroidism (CMS/HCC)   • Hypercalcemia   • Pacemaker   • Hiatal hernia   • Elevated serum immunoglobulin free light chains   • Left displaced femoral neck fracture (CMS/HCC)     Past Medical History:   Diagnosis Date   • Anxiety    • Arthritis    • Atrial fibrillation (CMS/HCC) 2016   • Gastric ulcer    • Hiatal hernia    • Hypertension    • Iron deficiency anemia    • RADHA (obstructive sleep apnea) 2016     Past Surgical History:   Procedure Laterality Date   • CARDIAC ELECTROPHYSIOLOGY PROCEDURE N/A 2018    Procedure: Pacemaker DC new;  Surgeon: Soraya Darling MD;  Location: [facility] INVASIVE LOCATION;  Service:    • HIP BIPOLAR REPLACEMENT Left 2019    Procedure: HIP BIPOLAR REPLACEMENT;  Surgeon: Gerardo Jain MD;  [facility];  Service: Orthopedics   • TOTAL KNEE  ARTHROPLASTY Right    • TUBAL ABDOMINAL LIGATION         Therapy Treatment    Rehabilitation Treatment Summary     Row Name                Wound 06/05/19 1838 Left hip incision    Wound - Properties Group Date first assessed: 06/05/19 [KH] Time first assessed: 1838 [KH] Side: Left [KH] Location: hip [KH] Type: incision [KH] Recorded by:  [OUSMANE] Ary Sylvester RN 06/05/19 1838      User Key  (r) = Recorded By, (t) = Taken By, (c) = Cosigned By    Initials Name Effective Dates Discipline    KH Ary Sylvester RN 06/16/16 -  Nurse        Wound 06/05/19 1838 Left hip incision (Active)   Dressing Appearance intact;dry 6/12/2019 10:34 PM   Closure Approximated 6/12/2019 10:34 PM   Base dry;granulating;red 6/12/2019 10:34 PM   Periwound dry;intact 6/12/2019 10:34 PM   Periwound Temperature warm 6/12/2019 10:34 PM   Periwound Skin Turgor soft 6/12/2019 10:34 PM   Edges other (see comments) 6/12/2019 10:34 PM   Drainage Amount none 6/12/2019 10:34 PM           OT Recommendation and Plan        Outcome Measures     Row Name 06/12/19 1600 06/11/19 1600          How much help from another person do you currently need...    Turning from your back to your side while in flat bed without using bedrails?  3  -LL  3  -LL     Moving from lying on back to sitting on the side of a flat bed without bedrails?  3  -LL  3  -LL     Moving to and from a bed to a chair (including a wheelchair)?  3  -LL  3  -LL     Standing up from a chair using your arms (e.g., wheelchair, bedside chair)?  3  -LL  3  -LL     Climbing 3-5 steps with a railing?  2  -LL  2  -LL     To walk in hospital room?  3  -LL  3  -LL     AM-PAC 6 Clicks Score  17  -LL  17  -LL        Functional Assessment    Outcome Measure Options  AM-PAC 6 Clicks Basic Mobility (PT)  -LL  AM-PAC 6 Clicks Basic Mobility (PT)  -LL       User Key  (r) = Recorded By, (t) = Taken By, (c) = Cosigned By    Initials Name Provider Type    Loyda Kraus PTA Physical Therapy  Assistant           Time Calculation:     Therapy Charges for Today     Code Description Service Date Service Provider Modifiers Qty    93331527712 HC OT THER PROC EA 15 MIN 6/12/2019 Brannon Mondragon, OT GO 2               Brannon Mondragon OT  6/13/2019

## 2019-06-13 NOTE — PLAN OF CARE
Problem: Patient Care Overview  Goal: Plan of Care Review  Outcome: Ongoing (interventions implemented as appropriate)      Problem: Fall Risk (Adult)  Goal: Absence of Fall  Outcome: Ongoing (interventions implemented as appropriate)      Problem: Skin Injury Risk (Adult)  Goal: Skin Health and Integrity  Outcome: Ongoing (interventions implemented as appropriate)   06/13/19 0922   Skin Injury Risk (Adult)   Skin Health and Integrity making progress toward outcome       Problem: Pain, Acute (Adult)  Goal: Acceptable Pain Control/Comfort Level  Outcome: Ongoing (interventions implemented as appropriate)   06/13/19 0922   Pain, Acute (Adult)   Acceptable Pain Control/Comfort Level making progress toward outcome       Problem: Self-Care Deficit (Adult,Obstetrics,Pediatric)  Goal: Improved Ability to Perform BADL and IADL  Outcome: Ongoing (interventions implemented as appropriate)

## 2019-06-13 NOTE — PLAN OF CARE
Problem: Patient Care Overview  Goal: Plan of Care Review  Outcome: Ongoing (interventions implemented as appropriate)   06/13/19 0225   Coping/Psychosocial   Plan of Care Reviewed With patient   Plan of Care Review   Progress improving       Problem: Fall Risk (Adult)  Goal: Identify Related Risk Factors and Signs and Symptoms  Outcome: Ongoing (interventions implemented as appropriate)   06/11/19 0719   Fall Risk (Adult)   Related Risk Factors (Fall Risk) gait/mobility problems;history of falls;fear of falling   Signs and Symptoms (Fall Risk) presence of risk factors     Goal: Absence of Fall  Outcome: Ongoing (interventions implemented as appropriate)   06/13/19 0225   Fall Risk (Adult)   Absence of Fall making progress toward outcome       Problem: Skin Injury Risk (Adult)  Goal: Identify Related Risk Factors and Signs and Symptoms  Outcome: Ongoing (interventions implemented as appropriate)   06/11/19 0719   Skin Injury Risk (Adult)   Related Risk Factors (Skin Injury Risk) mobility impaired;advanced age     Goal: Skin Health and Integrity  Outcome: Ongoing (interventions implemented as appropriate)   06/13/19 0225   Skin Injury Risk (Adult)   Skin Health and Integrity making progress toward outcome       Problem: Fracture Orthopaedic (Adult)  Goal: Signs and Symptoms of Listed Potential Problems Will be Absent, Minimized or Managed (Fracture Orthopaedic)  Outcome: Ongoing (interventions implemented as appropriate)   06/11/19 0719   Goal/Outcome Evaluation   Problems Assessed (Orthopaedic Fracture) skin integrity impairment;pain   Problems Present (Orthopaedic Fracture) functional deficit/self-care deficit       Problem: Pain, Acute (Adult)  Goal: Identify Related Risk Factors and Signs and Symptoms  Outcome: Ongoing (interventions implemented as appropriate)   06/11/19 0719   Pain, Acute (Adult)   Related Risk Factors (Acute Pain) surgery;persistent pain   Signs and Symptoms (Acute Pain) verbalization of pain  descriptors     Goal: Acceptable Pain Control/Comfort Level  Outcome: Ongoing (interventions implemented as appropriate)   06/13/19 0225   Pain, Acute (Adult)   Acceptable Pain Control/Comfort Level making progress toward outcome       Problem: Self-Care Deficit (Adult,Obstetrics,Pediatric)  Goal: Improved Ability to Perform BADL and IADL  Outcome: Ongoing (interventions implemented as appropriate)   06/13/19 0225   Self-Care Deficit (Adult,Obstetrics,Pediatric)   Improved Ability to Perform BADL and IADL making progress toward outcome

## 2019-06-14 LAB
ALBUMIN SERPL-MCNC: 2.24 G/DL (ref 3.5–5.2)
ALBUMIN/GLOB SERPL: 0.6 G/DL
ALP SERPL-CCNC: 69 U/L (ref 39–117)
ALT SERPL W P-5'-P-CCNC: 56 U/L (ref 1–33)
ANION GAP SERPL CALCULATED.3IONS-SCNC: 12.2 MMOL/L
AST SERPL-CCNC: 41 U/L (ref 1–32)
BASOPHILS # BLD AUTO: 0.04 10*3/MM3 (ref 0–0.2)
BASOPHILS NFR BLD AUTO: 0.4 % (ref 0–1.5)
BILIRUB SERPL-MCNC: 0.3 MG/DL (ref 0.2–1.2)
BUN BLD-MCNC: 26 MG/DL (ref 8–23)
BUN/CREAT SERPL: 22.4 (ref 7–25)
CALCIUM SPEC-SCNC: 8.3 MG/DL (ref 8.6–10.5)
CHLORIDE SERPL-SCNC: 101 MMOL/L (ref 98–107)
CO2 SERPL-SCNC: 24.8 MMOL/L (ref 22–29)
CREAT BLD-MCNC: 1.16 MG/DL (ref 0.57–1)
DEPRECATED RDW RBC AUTO: 43 FL (ref 37–54)
EOSINOPHIL # BLD AUTO: 0.38 10*3/MM3 (ref 0–0.4)
EOSINOPHIL NFR BLD AUTO: 3.4 % (ref 0.3–6.2)
ERYTHROCYTE [DISTWIDTH] IN BLOOD BY AUTOMATED COUNT: 12.7 % (ref 12.3–15.4)
GFR SERPL CREATININE-BSD FRML MDRD: 45 ML/MIN/1.73
GLOBULIN UR ELPH-MCNC: 3.5 GM/DL
GLUCOSE BLD-MCNC: 104 MG/DL (ref 65–99)
HCT VFR BLD AUTO: 33.4 % (ref 34–46.6)
HGB BLD-MCNC: 10.1 G/DL (ref 12–15.9)
IMM GRANULOCYTES # BLD AUTO: 0.05 10*3/MM3 (ref 0–0.05)
IMM GRANULOCYTES NFR BLD AUTO: 0.5 % (ref 0–0.5)
LYMPHOCYTES # BLD AUTO: 2.02 10*3/MM3 (ref 0.7–3.1)
LYMPHOCYTES NFR BLD AUTO: 18.2 % (ref 19.6–45.3)
MCH RBC QN AUTO: 29.4 PG (ref 26.6–33)
MCHC RBC AUTO-ENTMCNC: 30.2 G/DL (ref 31.5–35.7)
MCV RBC AUTO: 97.1 FL (ref 79–97)
MONOCYTES # BLD AUTO: 0.89 10*3/MM3 (ref 0.1–0.9)
MONOCYTES NFR BLD AUTO: 8 % (ref 5–12)
NEUTROPHILS # BLD AUTO: 7.72 10*3/MM3 (ref 1.7–7)
NEUTROPHILS NFR BLD AUTO: 69.5 % (ref 42.7–76)
PHOSPHATE SERPL-MCNC: 3.7 MG/DL (ref 2.5–4.5)
PLATELET # BLD AUTO: 399 10*3/MM3 (ref 140–450)
PMV BLD AUTO: 10.1 FL (ref 6–12)
POTASSIUM BLD-SCNC: 3.6 MMOL/L (ref 3.5–5.2)
PROT SERPL-MCNC: 5.7 G/DL (ref 6–8.5)
RBC # BLD AUTO: 3.44 10*6/MM3 (ref 3.77–5.28)
SODIUM BLD-SCNC: 138 MMOL/L (ref 136–145)
WBC NRBC COR # BLD: 11.1 10*3/MM3 (ref 3.4–10.8)

## 2019-06-14 PROCEDURE — 80053 COMPREHEN METABOLIC PANEL: CPT | Performed by: INTERNAL MEDICINE

## 2019-06-14 PROCEDURE — 85025 COMPLETE CBC W/AUTO DIFF WBC: CPT | Performed by: INTERNAL MEDICINE

## 2019-06-14 PROCEDURE — 84100 ASSAY OF PHOSPHORUS: CPT | Performed by: INTERNAL MEDICINE

## 2019-06-14 PROCEDURE — 97530 THERAPEUTIC ACTIVITIES: CPT | Performed by: OCCUPATIONAL THERAPIST

## 2019-06-14 PROCEDURE — 94799 UNLISTED PULMONARY SVC/PX: CPT

## 2019-06-14 PROCEDURE — 97116 GAIT TRAINING THERAPY: CPT

## 2019-06-14 PROCEDURE — 97110 THERAPEUTIC EXERCISES: CPT

## 2019-06-14 RX ADMIN — BUSPIRONE HYDROCHLORIDE 10 MG: 10 TABLET ORAL at 08:26

## 2019-06-14 RX ADMIN — PANTOPRAZOLE SODIUM 40 MG: 40 TABLET, DELAYED RELEASE ORAL at 05:57

## 2019-06-14 RX ADMIN — APIXABAN 2.5 MG: 2.5 TABLET, FILM COATED ORAL at 22:25

## 2019-06-14 RX ADMIN — CARVEDILOL 12.5 MG: 6.25 TABLET, FILM COATED ORAL at 17:33

## 2019-06-14 RX ADMIN — APIXABAN 2.5 MG: 2.5 TABLET, FILM COATED ORAL at 08:26

## 2019-06-14 RX ADMIN — ACETAMINOPHEN 650 MG: 325 TABLET ORAL at 16:43

## 2019-06-14 RX ADMIN — ACETAMINOPHEN 650 MG: 325 TABLET ORAL at 03:24

## 2019-06-14 RX ADMIN — BUSPIRONE HYDROCHLORIDE 10 MG: 10 TABLET ORAL at 22:26

## 2019-06-14 RX ADMIN — CARVEDILOL 12.5 MG: 6.25 TABLET, FILM COATED ORAL at 08:26

## 2019-06-14 RX ADMIN — ATORVASTATIN CALCIUM 40 MG: 40 TABLET, FILM COATED ORAL at 22:26

## 2019-06-14 NOTE — THERAPY TREATMENT NOTE
Acute Care - Physical Therapy Treatment Note   [facility]     Patient Name: Abbi Mendez  : 1935  MRN: [id number]  Today's Date: 2019  Onset of Illness/Injury or Date of Surgery: 19  Date of Referral to PT: 19  Referring Physician: Dr. Parsons    Admit Date: 2019    Visit Dx:  No diagnosis found.  Patient Active Problem List   Diagnosis   • Atrial fibrillation (CMS/HCC)   • Coronary artery disease involving native coronary artery of native heart without angina pectoris   • RADHA (obstructive sleep apnea)   • Essential hypertension   • Hyperlipidemia LDL goal <70   • Iron deficiency anemia   • Left bundle branch block   • Syncope and collapse   • Severe sinus bradycardia   • Third degree AV block (CMS/HCC)   • Syncope   • Compression deformity of vertebra   • Radiculopathy   • Pericardial effusion without cardiac tamponade   • Hypercalcemia   • Abnormal serum protein electrophoresis   • Weight loss, unintentional   • Primary hyperparathyroidism (CMS/HCC)   • Hypercalcemia   • Pacemaker   • Hiatal hernia   • Elevated serum immunoglobulin free light chains   • Left displaced femoral neck fracture (CMS/HCC)   • Hip fracture (CMS/HCC)       Therapy Treatment    Rehabilitation Treatment Summary     Row Name 19 1619 19 1254          Treatment Time/Intention    Discipline  physical therapy assistant  -LL  occupational therapist  -BF     Document Type  therapy note (daily note)  -LL  therapy note (daily note)  -BF     Subjective Information  no complaints  -LL  no complaints  -BF     Mode of Treatment  individual therapy;physical therapy  -LL  occupational therapy  -BF     Patient/Family Observations  Patient agreeable to PT in rehab gym this date.  -LL  Pt agreeable to OT in rehab gym  -BF     Therapy Frequency (PT Clinical Impression)  other (see comments) 6 days/ week   -LL  --     Patient Effort  good  -LL  good  -BF     Existing Precautions/Restrictions  fall;hip;left R wrist  splint  -LL  fall;hip;left;other (see comments) R hand fx splint  -BF     Patient Response to Treatment  Patient tolerated 55 min of PT in rehab gym  -LL  --     Recorded by [LL] Loyda Vega, [facility] 06/14/19 1637 [BF] Katia Enriquez, OT 06/14/19 1257     Row Name 06/14/19 1619 06/14/19 1254          Cognitive Assessment/Intervention- PT/OT    Orientation Status (Cognition)  oriented x 3  -LL  oriented x 3  -BF     Follows Commands (Cognition)  WFL;follows one step commands;verbal cues/prompting required  -LL  follows one step commands;verbal cues/prompting required  -BF     Recorded by [LL] Loyda Vega, [facility] 06/14/19 1637 [BF] Katia Enriquez, OT 06/14/19 1257     Row Name 06/14/19 1619             Safety Issues, Functional Mobility    Impairments Affecting Function (Mobility)  balance;endurance/activity tolerance;strength  -LL      Recorded by [LL] Loyda Vega [facility] 06/14/19 1637      Row Name 06/14/19 1619             Mobility Assessment/Intervention    Extremity Weight-bearing Status  left lower extremity;right lower extremity  -LL      Left Lower Extremity (Weight-bearing Status)  weight-bearing as tolerated (WBAT)  -LL      Right Lower Extremity (Weight-bearing Status)  weight-bearing as tolerated (WBAT)  -LL      Recorded by [LL] Loyda Vega, [facility] 06/14/19 1637      Row Name 06/14/19 1619             Bed Mobility Assessment/Treatment    Bed Mobility Assessment/Treatment  --  -LL      Scooting/Bridging [place] (Bed Mobility)  --  -LL      Supine-Sit [place] (Bed Mobility)  --  -LL      Sit-Supine [place] (Bed Mobility)  --  -LL      Bed Mobility, Safety Issues  --  -LL      Assistive Device (Bed Mobility)  --  -LL      Recorded by [LL] Loyda Vega, [facility] 06/14/19 1637      Row Name 06/14/19 1619             Transfer Assessment/Treatment    Transfer Assessment/Treatment  sit-stand transfer;stand-sit transfer;stand pivot/stand step transfer  -LL      Recorded by [LL]  Loyda Vega, [facility] 06/14/19 1637      Row Name 06/14/19 1619             Sit-Stand Transfer    Sit-Stand [place] (Transfers)  verbal cues;nonverbal cues (demo/gesture);minimum assist (75% patient effort)  -LL      Assistive Device (Sit-Stand Transfers)  walker, front-wheeled  -LL      Recorded by [LL] Loyda Vega, [facility] 06/14/19 1637      Row Name 06/14/19 1619             Stand-Sit Transfer    Stand-Sit [place] (Transfers)  verbal cues;nonverbal cues (demo/gesture);minimum assist (75% patient effort)  -LL      Assistive Device (Stand-Sit Transfers)  walker, front-wheeled  -LL      Recorded by [LL] Loyda Vega, [facility] 06/14/19 1637      Row Name 06/14/19 1619             Stand Pivot/Stand Step Transfer    Stand Pivot/Stand Step [place]  verbal cues;nonverbal cues (demo/gesture);minimum assist (75% patient effort)  -LL      Assistive Device (Stand Pivot Stand Step Transfer)  walker, front-wheeled  -LL      Recorded by [LL] Loyda Vega, [facility] 06/14/19 1637      Row Name 06/14/19 1619             Gait/Stairs Assessment/Training    Gait/Stairs Assessment/Training  gait/ambulation independence;gait/ambulation assistive device;distance ambulated;gait pattern;gait deviations;maintains weight-bearing status  -LL      [place] Level (Gait)  verbal cues;nonverbal cues (demo/gesture);contact guard;minimum assist (75% patient effort)  -LL      Assistive Device (Gait)  walker, front-wheeled  -LL      Distance in Feet (Gait)  100, 60  -LL      Pattern (Gait)  step-through  -LL      Deviations/Abnormal Patterns (Gait)  antalgic;stride length decreased  -LL      Bilateral Gait Deviations  forward flexed posture;heel strike decreased;weight shift ability decreased  -LL      Recorded by [LL] Loyda Vega, [facility] 06/14/19 1637      Row Name 06/14/19 1619 06/14/19 1254          Therapeutic Exercise    Therapeutic Exercise  standing, lower extremities  -LL  seated, upper extremities  -BF     Recorded by [LL]  Loyda Vega, [facility] 06/14/19 1637 [BF] Katia Enriquez, OT 06/14/19 1257     Row Name 06/14/19 1254             Upper Extremity Seated Therapeutic Exercise    Exercise Type, Seated Upper Extremity (Therapeutic Exercise)  AROM (active range of motion);other (see comments) BUE GMC/FMC therex; reaching; fxal activity tolerance therex  -BF      Expected Outcomes, Seated Upper Extremity (Therapeutic Exercise)  improve functional tolerance, self-care activity;improve performance, BADLs;improve performance, transfer skills;strengthen, facilitate independent active range of motion;improve performance, reaching for objects  -BF      Recorded by [BF] Katia Enriquez, OT 06/14/19 1257      Row Name 06/14/19 1619             Lower Extremity Standing Therapeutic Exercise    Performed, Standing Lower Extremity (Therapeutic Exercise)  heel raises;hip/knee flexion/extension  -LL      Device, Standing Lower Extremity (Therapeutic Exercise)  parallel bars  -LL      Exercise Type, Standing Lower Extremity (Therapeutic Exercise)  AROM (active range of motion)  -LL      Expected Outcomes, Standing Lower Extremity (Therapeutic Exercise)  improve functional tolerance, household activity;improve functional tolerance, self-care activity;improve performance, gait skills;improve performance, transfer skills;strengthen normal movement patterns  -LL      Sets/Reps Detail, Standing Lower Extremity (Therapeutic Exercise)  1 x 15  -LL      Transfers Skills, Training to Functional Activity, Standing Lower Extremity (Therapeutic Exercise)  transfers skills to functional activity most of the time  -LL      Recorded by [LL] Loyda Vega, [facility] 06/14/19 1637      Row Name 06/14/19 1619             Lower Extremity Seated Therapeutic Exercise    Performed, Seated Lower Extremity (Therapeutic Exercise)  hip abduction/adduction;ankle dorsiflexion/plantarflexion;LAQ (long arc quad), knee extension Ball squeeze, GS  -LL      Exercise Type,  Seated Lower Extremity (Therapeutic Exercise)  AROM (active range of motion)  -LL      Restrictions, Seated Lower Extremity (Therapeutic Exercise)  L JANET precautions  -LL      Sets/Reps Detail, Seated Lower Extremity (Therapeutic Exercise)  1 x 20  -LL      Transfers Skills, Training to Functional Activity, Seated Lower Extremity (Therapeutic Exercise)  able to transfer skills from training to functional activity  -LL      Recorded by [LL] Loyda Vega, [facility] 06/14/19 1637      Row Name 06/14/19 1619 06/14/19 1254          Positioning and Restraints    Pre-Treatment Position  -- Transport chair with OT  -LL  --     Post Treatment Position  wheelchair  -LL  wheelchair  -BF     In Wheelchair  sitting;with other staff;legs elevated with activities  -LL  sitting;with PT  -BF     Recorded by [LL] Loyda Vega, [facility] 06/14/19 1637 [BF] Katia Enriquez, OT 06/14/19 1257     Row Name 06/14/19 1619             Pain Scale: Numbers Pre/Post-Treatment    Pain Location - Side  Left  -LL      Pain Location  hip  -LL      Recorded by [LL] Loyda Vega, [facility] 06/14/19 1637      Row Name 06/14/19 1619             Pain Scale: FACES Pre/Post-Treatment    Pain: FACES Scale, Pretreatment  4-->hurts little more  -LL      Pain: FACES Scale, Post-Treatment  4-->hurts little more  -LL      Recorded by [LL] Loyda Vega, [facility] 06/14/19 1637      Row Name 06/14/19 1619             Sensory Assessment/Intervention    Sensory General Assessment  no sensation deficits identified  -LL      Recorded by [LL] Loyda Vega, [facility] 06/14/19 1637      Row Name 06/14/19 1619             Vision Assessment/Intervention    Visual Impairment/Limitations  WFL  -LL      Recorded by [LL] Loyda Vega, [facility] 06/14/19 1637      Row Name                Wound 06/05/19 1838 Left hip incision    Wound - Properties Group Date first assessed: 06/05/19 [KH] Time first assessed: 1838 [KH] Side: Left [KH] Location: hip [KH] Type: incision [KH] Recorded by:   [KH] Ary Sylvester RN 06/05/19 1838    Row Name 06/14/19 1619             Coping    Observed Emotional State  calm;cooperative;pleasant  -LL      Verbalized Emotional State  acceptance  -LL      Recorded by [LL] Loyda Vega, ALFREDO 06/14/19 1637      Row Name 06/14/19 1619             Outcome Summary/Treatment Plan (PT)    Anticipated Equipment Needs at Discharge (PT)  raised toilet seat;front wheeled walker  -LL      Anticipated Discharge Disposition (PT)  home with assist  -LL      Recorded by [LL] Loyda Vega, ALFREDO 06/14/19 1637        User Key  (r) = Recorded By, (t) = Taken By, (c) = Cosigned By    Initials Name Effective Dates Discipline    Ary Coyne RN 06/16/16 -  Nurse    Katia Menjivar, OT 04/03/18 -  OT    LL Loyda Vega, ALFREDO 05/02/16 -  PT          Wound 06/05/19 1838 Left hip incision (Active)   Dressing Appearance dry;intact 6/14/2019  8:25 AM   Closure Approximated 6/13/2019  9:30 PM   Base dry;granulating;red 6/13/2019  9:30 PM   Periwound dry;intact 6/14/2019  8:25 AM   Periwound Temperature warm 6/14/2019  8:25 AM   Periwound Skin Turgor soft 6/14/2019  8:25 AM   Edges other (see comments) 6/13/2019  9:30 PM   Drainage Amount none 6/13/2019  9:30 PM           Physical Therapy Education     Title: PT OT SLP Therapies (Done)     Topic: Physical Therapy (Done)     Point: Mobility training (Done)     Learning Progress Summary           Patient Acceptance, E,D, VU,NR by LL at 6/14/2019  4:37 PM    Acceptance, E,D, VU,NR by AG at 6/13/2019  5:48 PM   Family Acceptance, E,D, VU,NR by AG at 6/13/2019  5:48 PM                   Point: Home exercise program (Done)     Learning Progress Summary           Patient Acceptance, E,D, VU,NR by LL at 6/14/2019  4:37 PM    Acceptance, E,D, VU,NR by AG at 6/13/2019  5:48 PM   Family Acceptance, E,D, VU,NR by AG at 6/13/2019  5:48 PM                   Point: Body mechanics (Done)     Learning Progress Summary           Patient  Acceptance, E,D, VU,NR by  at 6/14/2019  4:37 PM    Acceptance, E,D, VU,NR by  at 6/13/2019  5:48 PM   Family Acceptance, E,D, VU,NR by  at 6/13/2019  5:48 PM                   Point: Precautions (Done)     Learning Progress Summary           Patient Acceptance, E,D, VU,NR by  at 6/14/2019  4:37 PM    Acceptance, E,D, VU,NR by  at 6/13/2019  5:48 PM   Family Acceptance, E,D, VU,NR by  at 6/13/2019  5:48 PM                               User Key     Initials Effective Dates Name Provider Type Discipline     04/03/18 -  Sandy Covington, PT Physical Therapist PT     05/02/16 -  Loyda Vega PTA Physical Therapy Assistant PT                PT Recommendation and Plan  Anticipated Discharge Disposition (PT): home with assist  Therapy Frequency (PT Clinical Impression): other (see comments)(6 days/ week )  Outcome Summary/Treatment Plan (PT)  Anticipated Equipment Needs at Discharge (PT): raised toilet seat, front wheeled walker  Anticipated Discharge Disposition (PT): home with assist  Outcome Measures     Row Name 06/14/19 1600 06/13/19 1748 06/13/19 1623       How much help from another person do you currently need...    Turning from your back to your side while in flat bed without using bedrails?  4  -LL  4  -AG  --    Moving from lying on back to sitting on the side of a flat bed without bedrails?  3  -LL  3  -AG  --    Moving to and from a bed to a chair (including a wheelchair)?  3  -LL  3  -AG  --    Standing up from a chair using your arms (e.g., wheelchair, bedside chair)?  3  -LL  3  -AG  --    Climbing 3-5 steps with a railing?  3  -LL  3  -AG  --    To walk in hospital room?  3  -LL  3  -AG  --    AM-PAC 6 Clicks Score  19  -LL  19  -AG  --       How much help from another is currently needed...    Putting on and taking off regular lower body clothing?  --  --  2  -KR    Bathing (including washing, rinsing, and drying)  --  --  2  -KR    Toileting (which includes using toilet bed pan or  urinal)  --  --  3  -KR    Putting on and taking off regular upper body clothing  --  --  3  -KR    Taking care of personal grooming (such as brushing teeth)  --  --  3  -KR    Eating meals  --  --  3  -KR    Score  --  --  16  -KR       Functional Assessment    Outcome Measure Options  AM-PAC 6 Clicks Basic Mobility (PT)  -LL  AM-PAC 6 Clicks Basic Mobility (PT)  -  --    Row Name 06/12/19 1600             How much help from another person do you currently need...    Turning from your back to your side while in flat bed without using bedrails?  3  -LL      Moving from lying on back to sitting on the side of a flat bed without bedrails?  3  -LL      Moving to and from a bed to a chair (including a wheelchair)?  3  -LL      Standing up from a chair using your arms (e.g., wheelchair, bedside chair)?  3  -LL      Climbing 3-5 steps with a railing?  2  -LL      To walk in hospital room?  3  -LL      AM-PAC 6 Clicks Score  17  -LL         Functional Assessment    Outcome Measure Options  AM-PAC 6 Clicks Basic Mobility (PT)  -LL        User Key  (r) = Recorded By, (t) = Taken By, (c) = Cosigned By    Initials Name Provider Type    AG Sandy Covington, PT Physical Therapist    Brannon Wang, OT Occupational Therapist    Loyda Kraus PTA Physical Therapy Assistant         Time Calculation:   PT Charges     Row Name 06/14/19 1638             Time Calculation    Start Time  1010  -LL      Stop Time  1105  -LL      Time Calculation (min)  55 min  -LL      PT Received On  06/14/19  -LL         Time Calculation- PT    Total Timed Code Minutes- PT  55 minute(s)  -LL        User Key  (r) = Recorded By, (t) = Taken By, (c) = Cosigned By    Initials Name Provider Type    LL Loyda Vega PTA Physical Therapy Assistant        Therapy Charges for Today     Code Description Service Date Service Provider Modifiers Qty    58124656401  GAIT TRAINING EA 15 MIN 6/14/2019 Loyda Vega PTA GP 1    12117793877 HC PT THER  PROC EA 15 MIN 6/14/2019 Loyda Vega, ALFREDO GP 3    74597723449 HC PT THER SUPP EA 15 MIN 6/14/2019 Loyda Vega PTA GP 1          PT G-Codes  Outcome Measure Options: AM-PAC 6 Clicks Basic Mobility (PT)  AM-PAC 6 Clicks Score: 19  Score: 16    Loyda. ALFREDO Vega  6/14/2019

## 2019-06-14 NOTE — PLAN OF CARE
Problem: Patient Care Overview  Goal: Plan of Care Review  Outcome: Ongoing (interventions implemented as appropriate)   06/14/19 1257   Coping/Psychosocial   Plan of Care Reviewed With patient   Plan of Care Review   Progress improving   OTHER   Outcome Summary Pt tolerated treatment well with rest breaks. OT to continue with POC.

## 2019-06-14 NOTE — PAYOR COMM NOTE
"[facility]  NPI:[id number]    Utilization Review  Contact: Gifty Grimes RN  Phone: [phone]  Fax:[phone]    DISCHARGE NOTIFICATION  [facility] AUTH # [id number]    Abbi Mendez (83 y.o. Female)     Date of Birth Social Security Number Address Home Phone MRN    [id number]  [id number]  [place] [address] [phone] [id number]    Synagogue Marital Status          Mormon Single       Admission Date Admission Type Admitting Provider Attending Provider Department, Room/Bed    6/5/19 Emergency Lenora Kwan [address], 3348/1S    Discharge Date Discharge Disposition Discharge Destination        6/13/2019 Swing Bed              Attending Provider:  (none)   Allergies:  Darvon [Propoxyphene], Penicillins    Isolation:  None   Infection:  None   Code Status:  CPR    Ht:  167.6 cm (65.98\")   Wt:  76.2 kg (168 lb 0.2 oz)    Admission Cmt:  None   Principal Problem:  None                Active Insurance as of 6/4/2019     Primary Coverage     Payor Plan Insurance Group Employer/Plan Group    HUMANA MEDICARE REPLACEMENT HUMANA MEDICARE REPL [id number]     Payor Plan Address Payor Plan Phone Number Payor Plan Fax Number Effective Dates    [address] [address] [phone]  1/1/2018 - None Entered    [facility] [address]       Subscriber Name Subscriber Birth Date Member ID       ABBI MENDEZ 1935 [id number]                 Emergency Contacts      (Rel.) Home Phone Work Phone Mobile Phone    Shruthi Gama (Daughter) -- -- [phone]    Suze Ortiz (Daughter) -- -- [phone]    BENJAASTRID SEGURA (Daughter) [phone] -- --               Discharge Summary      Amy Jurado PA-C at 6/13/2019  1:09 PM              [facility] Medicine Services  DISCHARGE SUMMARY    Date of Admission: 6/4/2019    Date of Discharge:  6/13/2019    PCP: Eric Driver MD    Discharging Provider: Amy" Abbi Jurado PA-C  Attending Physician on day of DC: Dr. Jeffrey Parsons     Admission Diagnosis:   Please see admission H&P    Discharge Diagnosis:   · Acute traumatic left hip fracture status post hip replacement  · Leukocytosis, improved, felt to possibly be related to hip fracture and surgical repair  · Mild hypotension in setting of Essential hypertension  · Mildly elevated creatinine  · History of third-degree AV block status post permanent pacemaker placement  · History of primary hyperparathyroidism  · Coronary artery disease status post prior stent placement  · Vitamin D deficiency  · Hypokalemia, resolved  · Hypomagnesemia, resolved  · Hypophosphatemia, resolved      Procedures:  · Left hip bipolar replacement on 6/5/2019 by Dr. Jain     Consults:   · Orthopedics    HPI     History of Present Illness:  Abbi Mendez is a 83 y.o. 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  She was found to have acute traumatic closed left hip fracture and admitted to the medical surgical floor with telemetry.       Hospital Course     Hospital Course  Abbi Mendez was admitted as outlined in above HPI.  Orthopedics were consulted and she was taken to the operating room on the day of June 5, 2019 where she underwent left hip replacement as outlined within procedures within this document.    Mrs. Mendez did well with her surgical procedure and remained hospitalized for physical therapy and Occupational Therapy as she awaited inpatient rehabilitation.  Her insurance did deny inpatient rehabilitation during her stay.     Early in her stay there was concern for possible  urinary tract infection in the setting of also leukocytosis.  She was started empirically on IV Rocephin; however, when urine culture finalized with mixed markus less than 25,000 colony counts the Rocephin was discontinued.    Mrs. Mendez did also have some electrolyte abnormalities during her stay that were resolved per protocol supplementation.    Throughout the early hospital stay her creatinine did remain stable.  She did receive IV Lasix on 6/12/2019 due to concern for possible development of fluid overload.  Following diuresis, her creatinine did rise mildly with lower blood pressures on morning of 6/13/2019.  She did receive small fluid bolus with improvement in blood pressures.  It was felt that mild rising creatinine and mild hypotension was likely secondary to overdiuresis.  Her home losartan was stopped on 6/13/2019 prior to swing bed admission due to elevated creatinine.    Leukocytosis did improve.     Pertinent Laboratory and Radiology Results     Pertinent Test Results:          Results from last 7 days   Lab Units 06/13/19  0515 06/12/19  0523 06/11/19  0456 06/10/19  0149 06/10/19  0136 06/09/19  0455 06/08/19  0604 06/07/19  2155 06/07/19  0817   WBC 10*3/mm3 10.39 11.29* 11.82* 12.15*  --  11.91* 12.61*  --  15.11*   HEMOGLOBIN g/dL 10.2* 10.3* 10.9* 10.8*  --  10.5* 10.6*  --  10.7*   HEMATOCRIT % 33.1* 33.8* 34.8 35.3  --  32.7* 33.6*  --  35.3   PLATELETS 10*3/mm3 414 411 423 396  --  380 361  --  358   MCV fL 96.2 97.1* 96.4 96.2  --  94.5 96.0  --  98.3*   SODIUM mmol/L 137 139 138  --  138 138 138  --  136   POTASSIUM mmol/L 3.7 3.8 3.9  --  4.1 3.7 4.6 4.4 3.6   CHLORIDE mmol/L 98 101 101  --  102 103 104  --  103   CO2 mmol/L 27.4 27.2 25.7  --  24.8 23.7 24.0  --  20.5*   BUN mg/dL 23 18 19  --  19 17 16  --  23   CREATININE mg/dL 1.08* 0.88 0.95  --  1.01* 0.91 0.83  --  1.03*   GLUCOSE mg/dL 103* 104* 105*  --  114* 108* 104*  --  144*   CALCIUM mg/dL 8.5* 8.4* 8.4*  --  7.9* 7.6*  7.4*  --  7.4*          Results from last 7 days   Lab Units 06/13/19  0515 06/12/19  0523 06/11/19  0456 06/10/19  0149 06/09/19  0455 06/08/19  0604 06/07/19  0817   WBC 10*3/mm3 10.39 11.29* 11.82* 12.15* 11.91* 12.61* 15.11*           Invalid input(s): PROT, LABALBU        Invalid input(s): TG, LDLCALC, LDLREALC      Brief Urine Lab Results  (Last result in the past 365 days)      Color   Clarity   Blood   Leuk Est   Nitrite   Protein   CREAT   Urine HCG        06/05/19 0437             195.3                      Results for orders placed during the hospital encounter of 01/25/19   Adult Transthoracic Echo Complete W/ Cont if Necessary Per Protocol    Narrative · Right ventricular cavity is borderline dilated.  · Left ventricular wall thickness is consistent with concentric   hypertrophy.  · Left ventricular diastolic dysfunction (grade I) consistent with   impaired relaxation.  · Left ventricular systolic function is normal. Estimated EF = 60%.  · Mild aortic valve stenosis is present. Aortic valve mean pressure   gradient is 10.9 mmHg.  · Mild aortic valve regurgitation is present.  · Mild-to-moderate mitral valve regurgitation is present  · Mild to moderate tricuspid valve regurgitation is present.  · Calculated right ventricular systolic pressure from tricuspid   regurgitation is 29 mmHg.  · No significant pericardial effusion is noted.                ----------------------------------------------------------------------------------------------------------------------  Xr Shoulder 2+ View Right    Result Date: 6/5/2019  No acute or destructive bony abnormality.  COMMUNICATION: Per this written report.  This report was finalized on 6/5/2019 7:11 AM by Dr. Maxime Treviño MD.      Xr Wrist 3+ View Right    Result Date: 6/5/2019  No acute or destructive bony abnormality.  COMMUNICATION: Per this written report.  This report was finalized on 6/5/2019 7:11 AM by Dr. Maxime Treviño MD.      Xr Hand 3+ View  Right    Result Date: 6/5/2019  No acute or destructive bony abnormality.  COMMUNICATION: Per this written report.  This report was finalized on 6/5/2019 7:11 AM by Dr. Maxime Treviño MD.      Xr Femur 2 View Left    Result Date: 6/5/2019  MODERATELY DISPLACED SUBCAPITAL FEMORAL NECK FRACTURE.  COMMUNICATION: Per this written report.  This report was finalized on 6/5/2019 7:11 AM by Dr. Maxime Treviño MD.      Xr Chest 1 View    Result Date: 6/5/2019  AS ABOVE.  This report was finalized on 6/5/2019 7:10 AM by Dr. Maxime Treviño MD.      Xr Pelvis 1 Or 2 View    Result Date: 6/6/2019  No acute or destructive bony abnormality.  COMMUNICATION: Per this written report.  This report was finalized on 6/6/2019 7:11 AM by Dr. Maxime Treviño MD.      Xr Hip With Or Without Pelvis 2 - 3 View Left    Result Date: 6/5/2019  AS ABOVE.  COMMUNICATION: Per this written report.  This report was finalized on 6/5/2019 7:12 AM by Dr. Maxime Treviño MD.          Microbiology Results (last 10 days)     Procedure Component Value - Date/Time    Urine Culture - Urine, Urine, Catheter [[id number]] Collected:  06/05/19 0058    Lab Status:  Final result Specimen:  Urine, Catheter Updated:  06/06/19 1542     Urine Culture <25,000 CFU/mL Mixed Markus Isolated    Narrative:       Specimen contains mixed organisms of questionable pathogenicity which indicates contamination with commensal markus.  Further identification is unlikely to provide clinically useful information.  Suggest recollection.            Discharge Vitals and Physical Examination       Vital Signs  Temp:  [98 °F (36.7 °C)-100.1 °F (37.8 °C)] 98 °F (36.7 °C)  Heart Rate:  [68-82] 76  Resp:  [18-20] 18  BP: ()/(40-70) 120/47     Physical Exam:  General:    Awake, alert, in no acute distress   Heart:      Normal S1 and S2. Regular rate and rhythm. No significant murmur, rubs or gallops appreciated.   Lungs:     Respirations regular, even and unlabored. Lungs clear to auscultation  B/L. No wheezes, rales or rhonchi.   Abdomen:   Soft and nontender. No guarding, rebound tenderness or  organomegaly noted. Bowel sounds present x 4.   Extremities:  No clubbing, cyanosis or edema noted. Moves UE and LE equally B/L.         Discharge Disposition, Discharge Medications, and Discharge Appointments     Discharge Disposition:   Swing-bed    Condition on Discharge:  Fair    Discharge Medications:    Inter-facility transfer medications (From admission, onward)            apixaban (ELIQUIS) tablet 2.5 mg  (Apixaban - VTE Prophylaxis (Start Tomorrow))  Every 12 Hours Scheduled,   Status:  Canceled          atorvastatin (LIPITOR) tablet 40 mg  Nightly,   Status:  Canceled          busPIRone (BUSPAR) tablet 10 mg  2 Times Daily,   Status:  Canceled          carvedilol (COREG) tablet 12.5 mg  2 Times Daily With Meals,   Status:  Canceled          cholecalciferol (VITAMIN D3) capsule 50,000 Units  Weekly,   Status:  Canceled          pantoprazole (PROTONIX) EC tablet 40 mg  Every Morning,   Status:  Canceled          acetaminophen (TYLENOL) tablet 650 mg  (acetaminophen (TYLENOL) oral/rectal)  Every 4 Hours PRN,   Status:  Canceled          acetaminophen (TYLENOL) 160 MG/5ML solution 650 mg  (acetaminophen (TYLENOL) oral/rectal)  Every 4 Hours PRN,   Status:  Canceled          acetaminophen (TYLENOL) suppository 650 mg  (acetaminophen (TYLENOL) oral/rectal)  Every 4 Hours PRN,   Status:  Canceled          bisacodyl (DULCOLAX) EC tablet 10 mg  Daily PRN,   Status:  Canceled          docusate sodium (COLACE) capsule 100 mg  2 Times Daily PRN,   Status:  Canceled          oxyCODONE-acetaminophen (PERCOCET) 5-325 MG per tablet 1 tablet  (Analgesics for Moderate Pain - Oral (COR / LAG / LISBETH / KAREN / MAD / PAD / ASEHR))  Every 4 Hours PRN,   Status:  Canceled          tuberculin injection 5 Units  (PPD Administration & Reading)  Once,   Status:  Canceled          tuberculin injection 5 Units  (PPD Administration &  Reading)  Once,   Status:  Canceled                 Discharged medication regimen discussed with attending physician prior to discharge.     Discharge Diet:         Activity at Discharge:  activity as tolerated         Discharge Disposition:    Swing Bed        Follow-up Appointments:  Your Scheduled Appointments    Jun 25, 2019 11:00 AM EDT  Follow Up with FRANCO Rodriguez  [facility] ORTHOPEDICS (--) [address]  [facility] [id number] [phone]   Arrive 15 minutes prior to appointment.   Aug 02, 2019 10:45 AM EDT  FOLLOW UP with Kyler Jamil MD  [facility] HEMATOLOGY  AND ONCOLOGY ([place]) [address] , [address]  [facility] [address] [phone]   Aug 20, 2019 11:00 AM EDT  Follow Up with Soraya Darling MD  [facility] CARDIOLOGY (--) [address]  [facility] [address] [phone]   Arrive 15 minutes prior to appointment.           Contact information for follow-up providers     Gerardo Jain MD Follow up in 2 week(s).    Specialty:  Orthopedic Surgery  Contact information:  [address] [place] [id number] [phone]             Eric Driver MD .    Specialty:  Gastroenterology  Contact information:  [address] [place] [address]   [address]  [facility] [address] [phone]             Soraya Darling MD .    Specialties:  Cardiology, Interventional Cardiology  Contact information:  [address] [facility]    [facility] [address] [phone]                   Contact information for after-discharge care     Destination     [facility] .    Service:  Skilled Nursing  Contact information:  [address] [facility] [id number] [phone]                                 Test Results Pending at Discharge:           Amy Jurado PA-C  Hospital Medicine Team  06/13/19  4:24 PM      Time: Greater than 30 minutes spent on this discharge.              Electronically signed by Adrien  Amy Go PA-C at 6/13/2019  4:25 PM

## 2019-06-14 NOTE — PROGRESS NOTES
"Discharge Planning Assessment  [facility]     Patient Name: Abbi Mendez  MRN: [id number]  Today's Date: 6/14/2019    Admit Date: 6/13/2019    Discharge Needs Assessment     Row Name 06/14/19 1149       Living Environment    Lives With  alone Pt lives at home alone and states she will be going home alone at discharge.     Current Living Arrangements  home/apartment/condo    Primary Care Provided by  self    Family Caregiver if Needed  child(ananya), adult    Quality of Family Relationships  supportive;involved;helpful Pt has good family support.     Able to Return to Prior Arrangements  yes       Transition Planning    Patient/Family Anticipates Transition to  home    Transportation Anticipated  family or friend will provide       Discharge Needs Assessment    Equipment Currently Used at Home  walker, rolling;bipap/cpap;cane, straight;commode via unknown provider.     Equipment Needed After Discharge  none    Outpatient/Agency/Support Group Needs  -- Pt does not currently utilize home health services, but may need them at discharge. Pt states she is undecided at this time if she will need them.        Discharge Plan     Row Name 06/14/19 1151       Plan    Plan  Pt was admitted to swing bed on 6/13/19. Pt lives at home alone and states she will return at discharge. Pt does not currently utilize home health services and states she is undecided at this time if she will need them. Pt has a rolling walker, cpap, cane and bed side commode via unknown provider. Pt's PCP is Dr. Driver. Pt does not have a POA or living will. Pt utilizes [employer] via mailorder for prescriptions. Pt's family to provide transportation at discharge. SS will follow and assist as needed with discharge planning.     Patient/Family in Agreement with Plan  yes     Demographic Summary     Row Name 06/14/19 1142       General Information    Admission Type  -- Swing Inpatient    Referral Source  nursing    Reason for Consult  discharge planning \"swing bed\" "    Preferred Language  English     Used During This Interaction  no     Marleen Benitez

## 2019-06-14 NOTE — PLAN OF CARE
Problem: Patient Care Overview  Goal: Plan of Care Review  Outcome: Ongoing (interventions implemented as appropriate)      Problem: Pain, Acute (Adult)  Goal: Identify Related Risk Factors and Signs and Symptoms  Outcome: Ongoing (interventions implemented as appropriate)    Goal: Acceptable Pain Control/Comfort Level  Outcome: Ongoing (interventions implemented as appropriate)      Problem: Fracture Orthopaedic (Adult)  Goal: Signs and Symptoms of Listed Potential Problems Will be Absent, Minimized or Managed (Fracture Orthopaedic)  Outcome: Ongoing (interventions implemented as appropriate)      Problem: Skin Injury Risk (Adult)  Goal: Identify Related Risk Factors and Signs and Symptoms  Outcome: Ongoing (interventions implemented as appropriate)    Goal: Skin Health and Integrity  Outcome: Ongoing (interventions implemented as appropriate)      Problem: Fall Risk (Adult)  Goal: Identify Related Risk Factors and Signs and Symptoms  Outcome: Ongoing (interventions implemented as appropriate)    Goal: Absence of Fall  Outcome: Ongoing (interventions implemented as appropriate)

## 2019-06-14 NOTE — PROGRESS NOTES
Pt not seen or examined. She was admitted to swing bed on 6/13/19 for PT/OT following recent left hip fracture s/p left bipolar replacement. I discussed her care with FANNY Sutton who does not report any acute events or issues that need to be addressed. BP improved today. Repeat AM labs to monitor renal function. Cont PT/OT.     Jeffrey Parsons,   06/14/19  5:09 PM

## 2019-06-14 NOTE — PLAN OF CARE
Problem: Pain, Acute (Adult)  Goal: Acceptable Pain Control/Comfort Level  Outcome: Ongoing (interventions implemented as appropriate)      Problem: Skin Injury Risk (Adult)  Goal: Skin Health and Integrity  Outcome: Ongoing (interventions implemented as appropriate)   06/14/19 1015   Skin Injury Risk (Adult)   Skin Health and Integrity making progress toward outcome       Problem: Fall Risk (Adult)  Goal: Absence of Fall  Outcome: Ongoing (interventions implemented as appropriate)      Problem: Patient Care Overview  Goal: Plan of Care Review  Outcome: Ongoing (interventions implemented as appropriate)

## 2019-06-14 NOTE — THERAPY TREATMENT NOTE
Acute Care - Occupational Therapy Treatment Note  [facility]     Patient Name: Abbi Mendez  : 1935  MRN: [id number]  Today's Date: 2019  Onset of Illness/Injury or Date of Surgery: 19     Referring Physician: Dr. Parsons    Admit Date: 2019     No diagnosis found.  Patient Active Problem List   Diagnosis   • Atrial fibrillation (CMS/HCC)   • Coronary artery disease involving native coronary artery of native heart without angina pectoris   • RADHA (obstructive sleep apnea)   • Essential hypertension   • Hyperlipidemia LDL goal <70   • Iron deficiency anemia   • Left bundle branch block   • Syncope and collapse   • Severe sinus bradycardia   • Third degree AV block (CMS/HCC)   • Syncope   • Compression deformity of vertebra   • Radiculopathy   • Pericardial effusion without cardiac tamponade   • Hypercalcemia   • Abnormal serum protein electrophoresis   • Weight loss, unintentional   • Primary hyperparathyroidism (CMS/HCC)   • Hypercalcemia   • Pacemaker   • Hiatal hernia   • Elevated serum immunoglobulin free light chains   • Left displaced femoral neck fracture (CMS/HCC)   • Hip fracture (CMS/HCC)     Past Medical History:   Diagnosis Date   • Anxiety    • Arthritis    • Atrial fibrillation (CMS/HCC) 2016   • Gastric ulcer    • Hiatal hernia    • Hypertension    • Iron deficiency anemia    • RADHA (obstructive sleep apnea) 2016     Past Surgical History:   Procedure Laterality Date   • CARDIAC ELECTROPHYSIOLOGY PROCEDURE N/A 2018    Procedure: Pacemaker DC new;  Surgeon: Soraya Darling MD;  Location: [facility] INVASIVE LOCATION;  Service:    • HIP BIPOLAR REPLACEMENT Left 2019    Procedure: HIP BIPOLAR REPLACEMENT;  Surgeon: Gerardo Jain MD;  Location: [facility];  Service: Orthopedics   • TOTAL KNEE ARTHROPLASTY Right    • TUBAL ABDOMINAL LIGATION         Therapy Treatment    Rehabilitation Treatment Summary     Row Name 19 1254             Treatment Time/Intention     Discipline  occupational therapist  -BF      Document Type  therapy note (daily note)  -BF      Subjective Information  no complaints  -BF      Mode of Treatment  occupational therapy  -BF      Patient/Family Observations  Pt agreeable to OT in rehab gym  -BF      Patient Effort  good  -BF      Existing Precautions/Restrictions  fall;hip;left;other (see comments) R hand fx splint  -BF      Recorded by [BF] Katia Enriquez, OT 06/14/19 1257      Row Name 06/14/19 1254             Cognitive Assessment/Intervention- PT/OT    Orientation Status (Cognition)  oriented x 3  -BF      Follows Commands (Cognition)  follows one step commands;verbal cues/prompting required  -BF      Recorded by [BF] Katia Enriquez OT 06/14/19 1257      Row Name 06/14/19 1254             Therapeutic Exercise    Therapeutic Exercise  seated, upper extremities  -BF      Recorded by [BF] Katia Enriquez OT 06/14/19 1257      Row Name 06/14/19 1254             Upper Extremity Seated Therapeutic Exercise    Exercise Type, Seated Upper Extremity (Therapeutic Exercise)  AROM (active range of motion);other (see comments) BUE GMC/FMC therex; reaching; fxal activity tolerance therex  -BF      Expected Outcomes, Seated Upper Extremity (Therapeutic Exercise)  improve functional tolerance, self-care activity;improve performance, BADLs;improve performance, transfer skills;strengthen, facilitate independent active range of motion;improve performance, reaching for objects  -BF      Recorded by [BF] Katia Enriquez OT 06/14/19 1257      Row Name 06/14/19 1254             Positioning and Restraints    Post Treatment Position  wheelchair  -BF      In Wheelchair  sitting;with PT  -BF      Recorded by [BF] Katia Enriquez OT 06/14/19 1257      Row Name                Wound 06/05/19 1838 Left hip incision    Wound - Properties Group Date first assessed: 06/05/19 [KH] Time first assessed: 1838 [KH] Side: Left [KH] Location:  hip [KH] Type: incision [KH] Recorded by:  [OUSMANE] Ary Sylvester RN 06/05/19 1838      User Key  (r) = Recorded By, (t) = Taken By, (c) = Cosigned By    Initials Name Effective Dates Discipline    Ary Coyne RN 06/16/16 -  Nurse    Katia Menjivar, OT 04/03/18 -  OT        Wound 06/05/19 1838 Left hip incision (Active)   Dressing Appearance dry;intact 6/14/2019  8:25 AM   Closure Approximated 6/13/2019  9:30 PM   Base dry;granulating;red 6/13/2019  9:30 PM   Periwound dry;intact 6/14/2019  8:25 AM   Periwound Temperature warm 6/14/2019  8:25 AM   Periwound Skin Turgor soft 6/14/2019  8:25 AM   Edges other (see comments) 6/13/2019  9:30 PM   Drainage Amount none 6/13/2019  9:30 PM           OT Recommendation and Plan     Plan of Care Review  Plan of Care Reviewed With: patient  Plan of Care Reviewed With: patient  Outcome Summary: Pt tolerated treatment well with rest breaks. OT to continue with POC.   Outcome Measures     Row Name 06/13/19 1748 06/13/19 1623 06/12/19 1600       How much help from another person do you currently need...    Turning from your back to your side while in flat bed without using bedrails?  4  -AG  --  3  -LL    Moving from lying on back to sitting on the side of a flat bed without bedrails?  3  -AG  --  3  -LL    Moving to and from a bed to a chair (including a wheelchair)?  3  -AG  --  3  -LL    Standing up from a chair using your arms (e.g., wheelchair, bedside chair)?  3  -AG  --  3  -LL    Climbing 3-5 steps with a railing?  3  -AG  --  2  -LL    To walk in hospital room?  3  -AG  --  3  -LL    AM-PAC 6 Clicks Score  19  -AG  --  17  -LL       How much help from another is currently needed...    Putting on and taking off regular lower body clothing?  --  2  -KR  --    Bathing (including washing, rinsing, and drying)  --  2  -KR  --    Toileting (which includes using toilet bed pan or urinal)  --  3  -KR  --    Putting on and taking off regular upper body  clothing  --  3  -KR  --    Taking care of personal grooming (such as brushing teeth)  --  3  -KR  --    Eating meals  --  3  -KR  --    Score  --  16  -KR  --       Functional Assessment    Outcome Measure Options  AM-PAC 6 Clicks Basic Mobility (PT)  -AG  --  AM-PAC 6 Clicks Basic Mobility (PT)  -LL    Row Name 06/11/19 1600             How much help from another person do you currently need...    Turning from your back to your side while in flat bed without using bedrails?  3  -LL      Moving from lying on back to sitting on the side of a flat bed without bedrails?  3  -LL      Moving to and from a bed to a chair (including a wheelchair)?  3  -LL      Standing up from a chair using your arms (e.g., wheelchair, bedside chair)?  3  -LL      Climbing 3-5 steps with a railing?  2  -LL      To walk in hospital room?  3  -LL      AM-PAC 6 Clicks Score  17  -LL         Functional Assessment    Outcome Measure Options  AM-PAC 6 Clicks Basic Mobility (PT)  -LL        User Key  (r) = Recorded By, (t) = Taken By, (c) = Cosigned By    Initials Name Provider Type    AG Sandy Covington, PT Physical Therapist    KR Brannon Mondragon, OT Occupational Therapist    LL Loyda Vega, PTA Physical Therapy Assistant           Time Calculation:   Time Calculation- OT     Row Name 06/14/19 1258             Time Calculation- OT    Total Timed Code Minutes- OT  55 minute(s)  -BF        User Key  (r) = Recorded By, (t) = Taken By, (c) = Cosigned By    Initials Name Provider Type    BF Katia Enriquez OT Occupational Therapist        Therapy Charges for Today     Code Description Service Date Service Provider Modifiers Qty    [id number]  OT THERAPEUTIC ACT EA 15 MIN 6/14/2019 Katia Enriquez OT GO 4               Katia Enriquez OT  6/14/2019

## 2019-06-15 LAB
ALBUMIN SERPL-MCNC: 2.29 G/DL (ref 3.5–5.2)
ALBUMIN/GLOB SERPL: 0.6 G/DL
ALP SERPL-CCNC: 71 U/L (ref 39–117)
ALT SERPL W P-5'-P-CCNC: 58 U/L (ref 1–33)
ANION GAP SERPL CALCULATED.3IONS-SCNC: 11.1 MMOL/L
AST SERPL-CCNC: 47 U/L (ref 1–32)
BASOPHILS # BLD AUTO: 0.04 10*3/MM3 (ref 0–0.2)
BASOPHILS NFR BLD AUTO: 0.4 % (ref 0–1.5)
BILIRUB SERPL-MCNC: 0.3 MG/DL (ref 0.2–1.2)
BUN BLD-MCNC: 24 MG/DL (ref 8–23)
BUN/CREAT SERPL: 22.9 (ref 7–25)
CALCIUM SPEC-SCNC: 8.5 MG/DL (ref 8.6–10.5)
CHLORIDE SERPL-SCNC: 103 MMOL/L (ref 98–107)
CO2 SERPL-SCNC: 26.9 MMOL/L (ref 22–29)
CREAT BLD-MCNC: 1.05 MG/DL (ref 0.57–1)
DEPRECATED RDW RBC AUTO: 45.8 FL (ref 37–54)
EOSINOPHIL # BLD AUTO: 0.4 10*3/MM3 (ref 0–0.4)
EOSINOPHIL NFR BLD AUTO: 4.3 % (ref 0.3–6.2)
ERYTHROCYTE [DISTWIDTH] IN BLOOD BY AUTOMATED COUNT: 13 % (ref 12.3–15.4)
GFR SERPL CREATININE-BSD FRML MDRD: 50 ML/MIN/1.73
GLOBULIN UR ELPH-MCNC: 3.7 GM/DL
GLUCOSE BLD-MCNC: 92 MG/DL (ref 65–99)
HCT VFR BLD AUTO: 32.1 % (ref 34–46.6)
HGB BLD-MCNC: 10 G/DL (ref 12–15.9)
IMM GRANULOCYTES # BLD AUTO: 0.04 10*3/MM3 (ref 0–0.05)
IMM GRANULOCYTES NFR BLD AUTO: 0.4 % (ref 0–0.5)
LYMPHOCYTES # BLD AUTO: 1.85 10*3/MM3 (ref 0.7–3.1)
LYMPHOCYTES NFR BLD AUTO: 19.9 % (ref 19.6–45.3)
MCH RBC QN AUTO: 29.8 PG (ref 26.6–33)
MCHC RBC AUTO-ENTMCNC: 31.2 G/DL (ref 31.5–35.7)
MCV RBC AUTO: 95.5 FL (ref 79–97)
MONOCYTES # BLD AUTO: 0.92 10*3/MM3 (ref 0.1–0.9)
MONOCYTES NFR BLD AUTO: 9.9 % (ref 5–12)
NEUTROPHILS # BLD AUTO: 6.04 10*3/MM3 (ref 1.7–7)
NEUTROPHILS NFR BLD AUTO: 65.1 % (ref 42.7–76)
PLATELET # BLD AUTO: 403 10*3/MM3 (ref 140–450)
PMV BLD AUTO: 10.3 FL (ref 6–12)
POTASSIUM BLD-SCNC: 4.3 MMOL/L (ref 3.5–5.2)
PROT SERPL-MCNC: 6 G/DL (ref 6–8.5)
RBC # BLD AUTO: 3.36 10*6/MM3 (ref 3.77–5.28)
SODIUM BLD-SCNC: 141 MMOL/L (ref 136–145)
WBC NRBC COR # BLD: 9.29 10*3/MM3 (ref 3.4–10.8)

## 2019-06-15 PROCEDURE — 97530 THERAPEUTIC ACTIVITIES: CPT

## 2019-06-15 PROCEDURE — 97110 THERAPEUTIC EXERCISES: CPT

## 2019-06-15 PROCEDURE — 97116 GAIT TRAINING THERAPY: CPT

## 2019-06-15 PROCEDURE — 85025 COMPLETE CBC W/AUTO DIFF WBC: CPT | Performed by: INTERNAL MEDICINE

## 2019-06-15 PROCEDURE — 80053 COMPREHEN METABOLIC PANEL: CPT | Performed by: INTERNAL MEDICINE

## 2019-06-15 RX ADMIN — APIXABAN 2.5 MG: 2.5 TABLET, FILM COATED ORAL at 20:11

## 2019-06-15 RX ADMIN — APIXABAN 2.5 MG: 2.5 TABLET, FILM COATED ORAL at 08:17

## 2019-06-15 RX ADMIN — CARVEDILOL 12.5 MG: 6.25 TABLET, FILM COATED ORAL at 08:16

## 2019-06-15 RX ADMIN — BUSPIRONE HYDROCHLORIDE 10 MG: 10 TABLET ORAL at 20:12

## 2019-06-15 RX ADMIN — CARVEDILOL 12.5 MG: 6.25 TABLET, FILM COATED ORAL at 20:11

## 2019-06-15 RX ADMIN — ACETAMINOPHEN 650 MG: 325 TABLET ORAL at 15:47

## 2019-06-15 RX ADMIN — ATORVASTATIN CALCIUM 40 MG: 40 TABLET, FILM COATED ORAL at 20:12

## 2019-06-15 RX ADMIN — BUSPIRONE HYDROCHLORIDE 10 MG: 10 TABLET ORAL at 08:17

## 2019-06-15 RX ADMIN — PANTOPRAZOLE SODIUM 40 MG: 40 TABLET, DELAYED RELEASE ORAL at 06:19

## 2019-06-15 NOTE — PLAN OF CARE
Problem: Patient Care Overview  Goal: Plan of Care Review  Outcome: Ongoing (interventions implemented as appropriate)   06/15/19 1040   Coping/Psychosocial   Plan of Care Reviewed With patient   Coping/Psychosocial   Patient Agreement with Plan of Care agrees   Plan of Care Review   Progress improving   OTHER   Outcome Summary Continue POC

## 2019-06-15 NOTE — PLAN OF CARE
Problem: Patient Care Overview  Goal: Plan of Care Review  Outcome: Ongoing (interventions implemented as appropriate)   06/15/19 1040   Coping/Psychosocial   Plan of Care Reviewed With patient   Coping/Psychosocial   Patient Agreement with Plan of Care agrees   Plan of Care Review   Progress improving   OTHER   Outcome Summary Continue POC     Goal: Individualization and Mutuality  Outcome: Ongoing (interventions implemented as appropriate)    Goal: Discharge Needs Assessment  Outcome: Ongoing (interventions implemented as appropriate)   06/13/19 1500 06/14/19 1149   Discharge Needs Assessment   Patient/Family Anticipates Transition to --  home   Patient/Family Anticipated Services at Transition  --    Transportation Concerns car, none --    Transportation Anticipated --  family or friend will provide     Goal: Interprofessional Rounds/Family Conf  Outcome: Ongoing (interventions implemented as appropriate)      Problem: Pain, Acute (Adult)  Goal: Identify Related Risk Factors and Signs and Symptoms  Outcome: Ongoing (interventions implemented as appropriate)   06/15/19 0116   Pain, Acute (Adult)   Related Risk Factors (Acute Pain) positioning;surgery;procedure/treatment   Signs and Symptoms (Acute Pain) verbalization of pain descriptors     Goal: Acceptable Pain Control/Comfort Level  Outcome: Ongoing (interventions implemented as appropriate)   06/14/19 1015   Pain, Acute (Adult)   Acceptable Pain Control/Comfort Level making progress toward outcome       Problem: Fracture Orthopaedic (Adult)  Goal: Signs and Symptoms of Listed Potential Problems Will be Absent, Minimized or Managed (Fracture Orthopaedic)  Outcome: Ongoing (interventions implemented as appropriate)   06/14/19 0208   Goal/Outcome Evaluation   Problems Assessed (Orthopaedic Fracture) all   Problems Present (Orthopaedic Fracture) none       Problem: Skin Injury Risk (Adult)  Goal: Identify Related Risk Factors and Signs and  Symptoms  Outcome: Ongoing (interventions implemented as appropriate)   06/14/19 0208   Skin Injury Risk (Adult)   Related Risk Factors (Skin Injury Risk) advanced age;infection     Goal: Skin Health and Integrity  Outcome: Ongoing (interventions implemented as appropriate)   06/15/19 0116   Skin Injury Risk (Adult)   Skin Health and Integrity making progress toward outcome       Problem: Fall Risk (Adult)  Goal: Identify Related Risk Factors and Signs and Symptoms  Outcome: Ongoing (interventions implemented as appropriate)   06/13/19 1506   Fall Risk (Adult)   Related Risk Factors (Fall Risk) history of falls   Signs and Symptoms (Fall Risk) presence of risk factors     Goal: Absence of Fall  Outcome: Ongoing (interventions implemented as appropriate)   06/15/19 0116   Fall Risk (Adult)   Absence of Fall making progress toward outcome       Problem: Patient Care Overview  Goal: Plan of Care Review  Outcome: Ongoing (interventions implemented as appropriate)   06/15/19 1040   Coping/Psychosocial   Plan of Care Reviewed With patient   Plan of Care Review   Progress improving   OTHER   Outcome Summary Continue POC

## 2019-06-15 NOTE — THERAPY TREATMENT NOTE
Acute Care - Occupational Therapy Treatment Note  [facility]     Patient Name: Abbi Mendez  : 1935  MRN: [id number]  Today's Date: 6/15/2019  Onset of Illness/Injury or Date of Surgery: 19     Referring Physician: Dr. Parsons    Admit Date: 2019     No diagnosis found.  Patient Active Problem List   Diagnosis   • Atrial fibrillation (CMS/HCC)   • Coronary artery disease involving native coronary artery of native heart without angina pectoris   • RADHA (obstructive sleep apnea)   • Essential hypertension   • Hyperlipidemia LDL goal <70   • Iron deficiency anemia   • Left bundle branch block   • Syncope and collapse   • Severe sinus bradycardia   • Third degree AV block (CMS/HCC)   • Syncope   • Compression deformity of vertebra   • Radiculopathy   • Pericardial effusion without cardiac tamponade   • Hypercalcemia   • Abnormal serum protein electrophoresis   • Weight loss, unintentional   • Primary hyperparathyroidism (CMS/HCC)   • Hypercalcemia   • Pacemaker   • Hiatal hernia   • Elevated serum immunoglobulin free light chains   • Left displaced femoral neck fracture (CMS/HCC)   • Hip fracture (CMS/HCC)     Past Medical History:   Diagnosis Date   • Anxiety    • Arthritis    • Atrial fibrillation (CMS/HCC) 2016   • Gastric ulcer    • Hiatal hernia    • Hypertension    • Iron deficiency anemia    • RADHA (obstructive sleep apnea) 2016     Past Surgical History:   Procedure Laterality Date   • CARDIAC ELECTROPHYSIOLOGY PROCEDURE N/A 2018    Procedure: Pacemaker DC new;  Surgeon: Soraya Darling MD;  Location: [facility] INVASIVE LOCATION;  Service:    • HIP BIPOLAR REPLACEMENT Left 2019    Procedure: HIP BIPOLAR REPLACEMENT;  Surgeon: Gerardo Jain MD;  Location: [facility];  Service: Orthopedics   • TOTAL KNEE ARTHROPLASTY Right    • TUBAL ABDOMINAL LIGATION         Therapy Treatment    Rehabilitation Treatment Summary     Row Name 06/15/19 1037             Treatment Time/Intention     Discipline  occupational therapist  -KR      Document Type  therapy note (daily note)  -KR      Patient Effort  good  -KR      Recorded by [KR] Brannon Mondragon, DEEP 06/15/19 1039      Row Name 06/15/19 1037             Motor Skills Assessment/Interventions    Additional Documentation  Therapeutic Exercise (Group)  -KR      Recorded by [BHUMI] Brannon Mondragon, DEEP 06/15/19 1039      Row Name 06/15/19 1037             Therapeutic Exercise    Upper Extremity (Therapeutic Exercise)  wand exercises  -KR      Upper Extremity Range of Motion (Therapeutic Exercise)  shoulder flexion/extension, bilateral;elbow flexion/extension, bilateral  -KR      Exercise Type (Therapeutic Exercise)  AROM (active range of motion)  -KR      Position (Therapeutic Exercise)  seated  -KR      Equipment (Therapeutic Exercise)  dowel alma/wand  -KR      Expected Outcome (Therapeutic Exercise)  improve motor control;improve functional tolerance, self-care activity  -KR      Recorded by [KR] Brannon Mondragon, DEEP 06/15/19 1039      Row Name                Wound 06/05/19 1838 Left hip incision    Wound - Properties Group Date first assessed: 06/05/19 [KH] Time first assessed: 1838 [KH] Side: Left [KH] Location: hip [KH] Type: incision [KH] Recorded by:  [OUSMANE] Ary Sylvester RN 06/05/19 1838      User Key  (r) = Recorded By, (t) = Taken By, (c) = Cosigned By    Initials Name Effective Dates Discipline    Ary Coyne RN 06/16/16 -  Nurse    Brannon Wang, OT 04/03/18 -  OT        Wound 06/05/19 1838 Left hip incision (Active)   Dressing Appearance dry;intact 6/14/2019 11:00 PM   Closure Approximated 6/14/2019 11:00 PM   Base dry;granulating;red 6/14/2019 11:00 PM   Periwound dry;intact 6/14/2019 11:00 PM   Periwound Temperature warm 6/14/2019 11:00 PM   Periwound Skin Turgor soft 6/14/2019 11:00 PM   Drainage Amount none 6/14/2019 11:00 PM           OT Recommendation and Plan  Outcome Summary/Treatment Plan (OT)  Anticipated Discharge  Disposition (OT): home with assist  Plan of Care Review  Plan of Care Reviewed With: patient, daughter  Plan of Care Reviewed With: patient, daughter  Outcome Summary: Evaluation completed for swing bed admission. Pt. cooperative and motivated to improve fxl performance. She will participate in OT as tolerated for strength/endurance training and ADL retraining to promote safety and independence in home environment. Pt. expressed interest in sewing/yarn work activities for recreational activity performance in home. Materials will be provided as desired during hospitalization to promote leisure activies as appropriate. Continue OT POC.   Outcome Measures     Row Name 06/14/19 1600 06/13/19 1748 06/13/19 1623       How much help from another person do you currently need...    Turning from your back to your side while in flat bed without using bedrails?  4  -LL  4  -AG  --    Moving from lying on back to sitting on the side of a flat bed without bedrails?  3  -LL  3  -AG  --    Moving to and from a bed to a chair (including a wheelchair)?  3  -LL  3  -AG  --    Standing up from a chair using your arms (e.g., wheelchair, bedside chair)?  3  -LL  3  -AG  --    Climbing 3-5 steps with a railing?  3  -LL  3  -AG  --    To walk in hospital room?  3  -LL  3  -AG  --    AM-PAC 6 Clicks Score  19  -LL  19  -AG  --       How much help from another is currently needed...    Putting on and taking off regular lower body clothing?  --  --  2  -KR    Bathing (including washing, rinsing, and drying)  --  --  2  -KR    Toileting (which includes using toilet bed pan or urinal)  --  --  3  -KR    Putting on and taking off regular upper body clothing  --  --  3  -KR    Taking care of personal grooming (such as brushing teeth)  --  --  3  -KR    Eating meals  --  --  3  -KR    Score  --  --  16  -KR       Functional Assessment    Outcome Measure Options  AM-PAC 6 Clicks Basic Mobility (PT)  -LL  AM-PAC 6 Clicks Basic Mobility (PT)  -AG  --     Row Name 06/12/19 1600             How much help from another person do you currently need...    Turning from your back to your side while in flat bed without using bedrails?  3  -LL      Moving from lying on back to sitting on the side of a flat bed without bedrails?  3  -LL      Moving to and from a bed to a chair (including a wheelchair)?  3  -LL      Standing up from a chair using your arms (e.g., wheelchair, bedside chair)?  3  -LL      Climbing 3-5 steps with a railing?  2  -LL      To walk in hospital room?  3  -LL      AM-PAC 6 Clicks Score  17  -LL         Functional Assessment    Outcome Measure Options  AM-PAC 6 Clicks Basic Mobility (PT)  -LL        User Key  (r) = Recorded By, (t) = Taken By, (c) = Cosigned By    Initials Name Provider Type    Sandy Sloan, PT Physical Therapist    Brannon Wang, OT Occupational Therapist    LL Loyda Vega, PTA Physical Therapy Assistant           Time Calculation:   Time Calculation- OT     Row Name 06/15/19 1039             Time Calculation- OT    Total Timed Code Minutes- OT  23 minute(s)  -BHUMI        User Key  (r) = Recorded By, (t) = Taken By, (c) = Cosigned By    Initials Name Provider Type    Brannon Wang OT Occupational Therapist        Therapy Charges for Today     Code Description Service Date Service Provider Modifiers Qty    83258525762  OT THER PROC EA 15 MIN 6/15/2019 Brannon Mondragon OT GO 2               Brannon Mondragon OT  6/15/2019

## 2019-06-15 NOTE — PROGRESS NOTES
Pt not seen or examined. She was admitted to swing bed on 6/13/19 for PT/OT following recent left hip fracture s/p left bipolar replacement. I discussed her care with FANNY Negron who does not report any acute events or issues that need to be addressed. BP stable and controlled today. Renal function improved on AM labs. Cont PT/OT.     Jeffrey Parsons,   06/15/19  5:09 PM

## 2019-06-15 NOTE — THERAPY TREATMENT NOTE
Acute Care - Physical Therapy Treatment Note  [facility]     Patient Name: Abbi Mendez  : 1935  MRN: [id number]  Today's Date: 6/15/2019  Onset of Illness/Injury or Date of Surgery: 19  Date of Referral to PT: 19  Referring Physician: Dr. Parsons    Admit Date: 2019    Visit Dx:  No diagnosis found.  Patient Active Problem List   Diagnosis   • Atrial fibrillation (CMS/HCC)   • Coronary artery disease involving native coronary artery of native heart without angina pectoris   • RADHA (obstructive sleep apnea)   • Essential hypertension   • Hyperlipidemia LDL goal <70   • Iron deficiency anemia   • Left bundle branch block   • Syncope and collapse   • Severe sinus bradycardia   • Third degree AV block (CMS/HCC)   • Syncope   • Compression deformity of vertebra   • Radiculopathy   • Pericardial effusion without cardiac tamponade   • Hypercalcemia   • Abnormal serum protein electrophoresis   • Weight loss, unintentional   • Primary hyperparathyroidism (CMS/HCC)   • Hypercalcemia   • Pacemaker   • Hiatal hernia   • Elevated serum immunoglobulin free light chains   • Left displaced femoral neck fracture (CMS/HCC)   • Hip fracture (CMS/HCC)       Therapy Treatment    Rehabilitation Treatment Summary     Row Name 06/15/19 1400 06/15/19 1037          Treatment Time/Intention    Discipline  physical therapist  -BC  occupational therapist  -KR     Document Type  therapy note (daily note)  -BC  therapy note (daily note)  -KR     Subjective Information  no complaints  -BC  --     Mode of Treatment  physical therapy;individual therapy  -BC  --     Patient Effort  good  -BC  good  -KR     Recorded by [BC] Fatou Doll, PT 06/15/19 1453 [KR] Brannon Mondragon, OT 06/15/19 1039     Row Name 06/15/19 1400             Cognitive Assessment/Intervention- PT/OT    Orientation Status (Cognition)  oriented x 3  -BC      Recorded by [BC] Fatou Doll, PT 06/15/19 1453      Row Name 06/15/19 1400              Mobility Assessment/Intervention    Extremity Weight-bearing Status  left lower extremity;right lower extremity  -BC      Left Lower Extremity (Weight-bearing Status)  weight-bearing as tolerated (WBAT)  -BC      Right Lower Extremity (Weight-bearing Status)  weight-bearing as tolerated (WBAT)  -BC      Recorded by [BC] Fatou Doll, PT 06/15/19 1453      Row Name 06/15/19 1400             Bed Mobility Assessment/Treatment    Bed Mobility Assessment/Treatment  bed mobility (all) activities  -BC      Supine-Sit [place] (Bed Mobility)  minimum assist (75% patient effort)  -BC      Sit-Supine [place] (Bed Mobility)  minimum assist (75% patient effort)  -BC      Assistive Device (Bed Mobility)  bed rails  -BC      Recorded by [BC] Fatou Doll, PT 06/15/19 1453      Row Name 06/15/19 1400             Transfer Assessment/Treatment    Transfer Assessment/Treatment  sit-stand transfer;stand-sit transfer  -BC      Maintains Weight-bearing Status (Transfers)  able to maintain  -BC      Recorded by [BC] Fatou Doll, PT 06/15/19 1453      Row Name 06/15/19 1400             Sit-Stand Transfer    Sit-Stand [place] (Transfers)  minimum assist (75% patient effort)  -BC      Assistive Device (Sit-Stand Transfers)  walker, front-wheeled  -BC      Recorded by [BC] Fatou Doll, PT 06/15/19 1453      Row Name 06/15/19 1400             Stand-Sit Transfer    Stand-Sit [place] (Transfers)  minimum assist (75% patient effort)  -BC      Assistive Device (Stand-Sit Transfers)  walker, front-wheeled  -BC      Recorded by [BC] Fatou Doll, PT 06/15/19 1453      Row Name 06/15/19 1400             Gait/Stairs Assessment/Training    Gait/Stairs Assessment/Training  gait/ambulation independence  -BC      [place] Level (Gait)  minimum assist (75% patient effort)  -BC      Assistive Device (Gait)  walker, front-wheeled  -BC      Distance in Feet (Gait)  70  -BC      Recorded by [BC] Lavon  Fatou RODRIGUEZ, PT 06/15/19 1453      Row Name 06/15/19 1037             Motor Skills Assessment/Interventions    Additional Documentation  Therapeutic Exercise (Group)  -KR      Recorded by [KR] Brannon Mondragon, OT 06/15/19 1039      Row Name 06/15/19 1037             Therapeutic Exercise    Upper Extremity (Therapeutic Exercise)  wand exercises  -KR      Upper Extremity Range of Motion (Therapeutic Exercise)  shoulder flexion/extension, bilateral;elbow flexion/extension, bilateral  -KR      Exercise Type (Therapeutic Exercise)  AROM (active range of motion)  -KR      Position (Therapeutic Exercise)  seated  -KR      Equipment (Therapeutic Exercise)  dowel alma/wand  -KR      Expected Outcome (Therapeutic Exercise)  improve motor control;improve functional tolerance, self-care activity  -KR      Recorded by [KR] Brannon Mondragon, OT 06/15/19 1039      Row Name 06/15/19 1400             Positioning and Restraints    Pre-Treatment Position  sitting in chair/recliner  -BC      Post Treatment Position  chair  -BC      In Chair  notified nsg;sitting;call light within reach;encouraged to call for assist;with family/caregiver  -BC      Recorded by [BC] Fatou Doll, PT 06/15/19 1453      Row Name                Wound 06/05/19 1838 Left hip incision    Wound - Properties Group Date first assessed: 06/05/19 [KH] Time first assessed: 1838 [KH] Side: Left [KH] Location: hip [KH] Type: incision [KH] Recorded by:  [OUSMANE] Ary Sylvester RN 06/05/19 1838      User Key  (r) = Recorded By, (t) = Taken By, (c) = Cosigned By    Initials Name Effective Dates Discipline     Ary Sylvester RN 06/16/16 -  Nurse    Fatou Walls, PT 03/14/16 -  PT    Brannon Wang, OT 04/03/18 -  OT          Wound 06/05/19 1838 Left hip incision (Active)   Dressing Appearance dry;intact 6/15/2019  7:50 AM   Closure Approximated 6/14/2019 11:00 PM   Base dry;granulating;red 6/14/2019 11:00 PM   Periwound dry;intact 6/15/2019  7:50 AM    Periwound Temperature warm 6/15/2019  7:50 AM   Periwound Skin Turgor soft 6/15/2019  7:50 AM   Drainage Amount none 6/14/2019 11:00 PM           Physical Therapy Education     Title: PT OT SLP Therapies (Done)     Topic: Physical Therapy (Done)     Point: Mobility training (Done)     Learning Progress Summary           Patient Acceptance, E, VU by BC at 6/15/2019  2:53 PM    Acceptance, E,TB, VU by DM at 6/15/2019  2:48 PM    Acceptance, E,D, VU,NR by LL at 6/14/2019  4:37 PM    Acceptance, E,D, VU,NR by AG at 6/13/2019  5:48 PM   Family Acceptance, E,D, VU,NR by AG at 6/13/2019  5:48 PM                   Point: Home exercise program (Done)     Learning Progress Summary           Patient Acceptance, E, VU by BC at 6/15/2019  2:53 PM    Acceptance, E,TB, VU by DM at 6/15/2019  2:48 PM    Acceptance, E,D, VU,NR by LL at 6/14/2019  4:37 PM    Acceptance, E,D, VU,NR by AG at 6/13/2019  5:48 PM   Family Acceptance, E,D, VU,NR by AG at 6/13/2019  5:48 PM                   Point: Body mechanics (Done)     Learning Progress Summary           Patient Acceptance, E, VU by BC at 6/15/2019  2:53 PM    Acceptance, E,TB, VU by DM at 6/15/2019  2:48 PM    Acceptance, E,D, VU,NR by LL at 6/14/2019  4:37 PM    Acceptance, E,D, VU,NR by AG at 6/13/2019  5:48 PM   Family Acceptance, E,D, VU,NR by AG at 6/13/2019  5:48 PM                   Point: Precautions (Done)     Learning Progress Summary           Patient Acceptance, E, VU by BC at 6/15/2019  2:53 PM    Acceptance, E,TB, VU by DM at 6/15/2019  2:48 PM    Acceptance, E,D, VU,NR by LL at 6/14/2019  4:37 PM    Acceptance, E,D, VU,NR by AG at 6/13/2019  5:48 PM   Family Acceptance, E,D, VU,NR by AG at 6/13/2019  5:48 PM                               User Key     Initials Effective Dates Name Provider Type Discipline    DM 06/16/16 -  Migdalia Haney, RN Registered Nurse Nurse    BC 03/14/16 -  Fatou Doll, PT Physical Therapist PT    AG 04/03/18 -  Sandy Covington, PT  Physical Therapist PT    LL 05/02/16 -  Loyda Vega PTA Physical Therapy Assistant PT                PT Recommendation and Plan     Plan of Care Reviewed With: patient  Progress: improving  Outcome Measures     Row Name 06/15/19 1400 06/14/19 1600 06/13/19 1748       How much help from another person do you currently need...    Turning from your back to your side while in flat bed without using bedrails?  4  -BC  4  -LL  4  -AG    Moving from lying on back to sitting on the side of a flat bed without bedrails?  3  -BC  3  -LL  3  -AG    Moving to and from a bed to a chair (including a wheelchair)?  3  -BC  3  -LL  3  -AG    Standing up from a chair using your arms (e.g., wheelchair, bedside chair)?  3  -BC  3  -LL  3  -AG    Climbing 3-5 steps with a railing?  3  -BC  3  -LL  3  -AG    To walk in hospital room?  3  -BC  3  -LL  3  -AG    AM-PAC 6 Clicks Score  19  -BC  19  -LL  19  -AG       Functional Assessment    Outcome Measure Options  AM-PAC 6 Clicks Basic Mobility (PT)  -BC  AM-PAC 6 Clicks Basic Mobility (PT)  -LL  AM-PAC 6 Clicks Basic Mobility (PT)  -AG    Row Name 06/13/19 1623 06/12/19 1600          How much help from another person do you currently need...    Turning from your back to your side while in flat bed without using bedrails?  --  3  -LL     Moving from lying on back to sitting on the side of a flat bed without bedrails?  --  3  -LL     Moving to and from a bed to a chair (including a wheelchair)?  --  3  -LL     Standing up from a chair using your arms (e.g., wheelchair, bedside chair)?  --  3  -LL     Climbing 3-5 steps with a railing?  --  2  -LL     To walk in hospital room?  --  3  -LL     AM-PAC 6 Clicks Score  --  17  -LL        How much help from another is currently needed...    Putting on and taking off regular lower body clothing?  2  -KR  --     Bathing (including washing, rinsing, and drying)  2  -KR  --     Toileting (which includes using toilet bed pan or urinal)  3  -KR   --     Putting on and taking off regular upper body clothing  3  -KR  --     Taking care of personal grooming (such as brushing teeth)  3  -KR  --     Eating meals  3  -KR  --     Score  16  -KR  --        Functional Assessment    Outcome Measure Options  --  AM-PAC 6 Clicks Basic Mobility (PT)  -LL       User Key  (r) = Recorded By, (t) = Taken By, (c) = Cosigned By    Initials Name Provider Type    BC Fatou Doll, PT Physical Therapist    Sandy Sloan, PT Physical Therapist    Brannon Wang, OT Occupational Therapist    LL Loyda Vega, PTA Physical Therapy Assistant         Time Calculation:   PT Charges     Row Name 06/15/19 4165             Time Calculation    PT Received On  06/15/19  -BC         Time Calculation- PT    Total Timed Code Minutes- PT  25 minute(s)  -BC         Timed Charges    65954 - Gait Training Minutes   15  -BC      17351 - PT Therapeutic Activity Minutes  10  -BC        User Key  (r) = Recorded By, (t) = Taken By, (c) = Cosigned By    Initials Name Provider Type    BC Fatou Doll, PT Physical Therapist        Therapy Charges for Today     Code Description Service Date Service Provider Modifiers Qty    83987690681 HC PT THERAPEUTIC ACT EA 15 MIN 6/15/2019 Fatou Doll, PT GP 1    12138874153 HC PT THER SUPP EA 15 MIN 6/15/2019 Fatou Doll, PT GP 1    37293287424 HC GAIT TRAINING EA 15 MIN 6/15/2019 Fatou Doll, PT GP 1          PT G-Codes  Outcome Measure Options: AM-PAC 6 Clicks Basic Mobility (PT)  AM-PAC 6 Clicks Score: 19  Score: 16    Fatou Doll PT  6/15/2019

## 2019-06-15 NOTE — PLAN OF CARE
Problem: Patient Care Overview  Goal: Plan of Care Review  Outcome: Ongoing (interventions implemented as appropriate)   06/15/19 0116   Coping/Psychosocial   Plan of Care Reviewed With patient   Plan of Care Review   Progress improving       Problem: Pain, Acute (Adult)  Goal: Identify Related Risk Factors and Signs and Symptoms  Outcome: Ongoing (interventions implemented as appropriate)   06/15/19 0116   Pain, Acute (Adult)   Related Risk Factors (Acute Pain) positioning;surgery;procedure/treatment   Signs and Symptoms (Acute Pain) verbalization of pain descriptors     Goal: Acceptable Pain Control/Comfort Level  Outcome: Ongoing (interventions implemented as appropriate)   06/14/19 1015   Pain, Acute (Adult)   Acceptable Pain Control/Comfort Level making progress toward outcome       Problem: Fracture Orthopaedic (Adult)  Goal: Signs and Symptoms of Listed Potential Problems Will be Absent, Minimized or Managed (Fracture Orthopaedic)  Outcome: Ongoing (interventions implemented as appropriate)   06/14/19 0208   Goal/Outcome Evaluation   Problems Assessed (Orthopaedic Fracture) all   Problems Present (Orthopaedic Fracture) none       Problem: Skin Injury Risk (Adult)  Goal: Identify Related Risk Factors and Signs and Symptoms  Outcome: Ongoing (interventions implemented as appropriate)   06/14/19 0208   Skin Injury Risk (Adult)   Related Risk Factors (Skin Injury Risk) advanced age;infection     Goal: Skin Health and Integrity  Outcome: Ongoing (interventions implemented as appropriate)   06/15/19 0116   Skin Injury Risk (Adult)   Skin Health and Integrity making progress toward outcome       Problem: Fall Risk (Adult)  Goal: Identify Related Risk Factors and Signs and Symptoms  Outcome: Ongoing (interventions implemented as appropriate)   06/13/19 1506   Fall Risk (Adult)   Related Risk Factors (Fall Risk) history of falls   Signs and Symptoms (Fall Risk) presence of risk factors     Goal: Absence of  Fall  Outcome: Ongoing (interventions implemented as appropriate)   06/15/19 0116   Fall Risk (Adult)   Absence of Fall making progress toward outcome

## 2019-06-15 NOTE — PLAN OF CARE
Problem: Patient Care Overview  Goal: Plan of Care Review  Outcome: Ongoing (interventions implemented as appropriate)   06/15/19 2131   Coping/Psychosocial   Plan of Care Reviewed With patient   Plan of Care Review   Progress improving

## 2019-06-16 PROCEDURE — 94799 UNLISTED PULMONARY SVC/PX: CPT

## 2019-06-16 RX ADMIN — CARVEDILOL 12.5 MG: 6.25 TABLET, FILM COATED ORAL at 19:29

## 2019-06-16 RX ADMIN — ACETAMINOPHEN 650 MG: 325 TABLET ORAL at 12:50

## 2019-06-16 RX ADMIN — PANTOPRAZOLE SODIUM 40 MG: 40 TABLET, DELAYED RELEASE ORAL at 06:01

## 2019-06-16 RX ADMIN — BUSPIRONE HYDROCHLORIDE 10 MG: 10 TABLET ORAL at 08:37

## 2019-06-16 RX ADMIN — ATORVASTATIN CALCIUM 40 MG: 40 TABLET, FILM COATED ORAL at 19:28

## 2019-06-16 RX ADMIN — CARVEDILOL 12.5 MG: 6.25 TABLET, FILM COATED ORAL at 08:37

## 2019-06-16 RX ADMIN — APIXABAN 2.5 MG: 2.5 TABLET, FILM COATED ORAL at 19:29

## 2019-06-16 RX ADMIN — BUSPIRONE HYDROCHLORIDE 10 MG: 10 TABLET ORAL at 19:28

## 2019-06-16 RX ADMIN — APIXABAN 2.5 MG: 2.5 TABLET, FILM COATED ORAL at 08:37

## 2019-06-16 NOTE — PROGRESS NOTES
Pt not seen or examined. She was admitted to swing bed on 6/13/19 for PT/OT following recent left hip fracture s/p left bipolar replacement. I discussed her care with FANNY Negron who does not report any acute events or issues that need to be addressed. BP stable and controlled today. Repeat labs in the AM. Cont PT/OT.     Jeffrey Parsons,   06/16/19  5:09 PM

## 2019-06-16 NOTE — PLAN OF CARE
Problem: Patient Care Overview  Goal: Plan of Care Review  Outcome: Ongoing (interventions implemented as appropriate)   06/15/19 2000   Coping/Psychosocial   Plan of Care Reviewed With patient       Problem: Pain, Acute (Adult)  Goal: Identify Related Risk Factors and Signs and Symptoms  Outcome: Ongoing (interventions implemented as appropriate)    Goal: Acceptable Pain Control/Comfort Level  Outcome: Ongoing (interventions implemented as appropriate)      Problem: Fracture Orthopaedic (Adult)  Goal: Signs and Symptoms of Listed Potential Problems Will be Absent, Minimized or Managed (Fracture Orthopaedic)  Outcome: Ongoing (interventions implemented as appropriate)      Problem: Skin Injury Risk (Adult)  Goal: Identify Related Risk Factors and Signs and Symptoms  Outcome: Ongoing (interventions implemented as appropriate)    Goal: Skin Health and Integrity  Outcome: Ongoing (interventions implemented as appropriate)      Problem: Fall Risk (Adult)  Goal: Identify Related Risk Factors and Signs and Symptoms  Outcome: Ongoing (interventions implemented as appropriate)    Goal: Absence of Fall  Outcome: Ongoing (interventions implemented as appropriate)      Problem: Patient Care Overview  Goal: Plan of Care Review  Outcome: Ongoing (interventions implemented as appropriate)

## 2019-06-16 NOTE — PLAN OF CARE
Problem: Pain, Acute (Adult)  Intervention: Support/Optimize Psychosocial Response to Acute Pain   06/15/19 2000   Coping/Psychosocial Interventions   Supportive Measures active listening utilized;verbalization of feelings encouraged;self-care encouraged;relaxation techniques promoted   Interventions   Trust Relationship/Rapport care explained;thoughts/feelings acknowledged;questions encouraged   Psychosocial Support   Diversional Activities television   Family/Support System Care self-care encouraged;support provided;caregiver stress acknowledged         Problem: Fracture Orthopaedic (Adult)  Goal: Signs and Symptoms of Listed Potential Problems Will be Absent, Minimized or Managed (Fracture Orthopaedic)  Outcome: Ongoing (interventions implemented as appropriate)   06/14/19 0208   Goal/Outcome Evaluation   Problems Assessed (Orthopaedic Fracture) all   Problems Present (Orthopaedic Fracture) none       Problem: Skin Injury Risk (Adult)  Goal: Identify Related Risk Factors and Signs and Symptoms  Outcome: Ongoing (interventions implemented as appropriate)   06/14/19 0208   Skin Injury Risk (Adult)   Related Risk Factors (Skin Injury Risk) advanced age;infection     Goal: Skin Health and Integrity  Outcome: Ongoing (interventions implemented as appropriate)   06/16/19 0020   Skin Injury Risk (Adult)   Skin Health and Integrity making progress toward outcome       Problem: Fall Risk (Adult)  Goal: Identify Related Risk Factors and Signs and Symptoms  Outcome: Ongoing (interventions implemented as appropriate)   06/13/19 1506   Fall Risk (Adult)   Related Risk Factors (Fall Risk) history of falls   Signs and Symptoms (Fall Risk) presence of risk factors     Goal: Absence of Fall  Outcome: Ongoing (interventions implemented as appropriate)   06/16/19 0020   Fall Risk (Adult)   Absence of Fall making progress toward outcome       Problem: Patient Care Overview  Goal: Plan of Care Review  Outcome: Ongoing (interventions  implemented as appropriate)   06/15/19 1040 06/15/19 1453 06/16/19 0720   Coping/Psychosocial   Plan of Care Reviewed With --  --  patient   Plan of Care Review   Progress --  improving --    OTHER   Outcome Summary Continue POC --  --      Goal: Individualization and Mutuality  Outcome: Ongoing (interventions implemented as appropriate)    Goal: Discharge Needs Assessment  Outcome: Ongoing (interventions implemented as appropriate)    Goal: Interprofessional Rounds/Family Conf  Outcome: Ongoing (interventions implemented as appropriate)

## 2019-06-17 LAB
ALBUMIN SERPL-MCNC: 2.51 G/DL (ref 3.5–5.2)
ALBUMIN/GLOB SERPL: 0.7 G/DL
ALP SERPL-CCNC: 76 U/L (ref 39–117)
ALT SERPL W P-5'-P-CCNC: 44 U/L (ref 1–33)
ANION GAP SERPL CALCULATED.3IONS-SCNC: 11.9 MMOL/L
AST SERPL-CCNC: 25 U/L (ref 1–32)
BASOPHILS # BLD AUTO: 0.04 10*3/MM3 (ref 0–0.2)
BASOPHILS NFR BLD AUTO: 0.4 % (ref 0–1.5)
BILIRUB SERPL-MCNC: 0.2 MG/DL (ref 0.2–1.2)
BUN BLD-MCNC: 22 MG/DL (ref 8–23)
BUN/CREAT SERPL: 22.9 (ref 7–25)
CALCIUM SPEC-SCNC: 8.7 MG/DL (ref 8.6–10.5)
CHLORIDE SERPL-SCNC: 103 MMOL/L (ref 98–107)
CO2 SERPL-SCNC: 27.1 MMOL/L (ref 22–29)
CREAT BLD-MCNC: 0.96 MG/DL (ref 0.57–1)
DEPRECATED RDW RBC AUTO: 41.9 FL (ref 37–54)
EOSINOPHIL # BLD AUTO: 0.38 10*3/MM3 (ref 0–0.4)
EOSINOPHIL NFR BLD AUTO: 4.2 % (ref 0.3–6.2)
ERYTHROCYTE [DISTWIDTH] IN BLOOD BY AUTOMATED COUNT: 12.4 % (ref 12.3–15.4)
GFR SERPL CREATININE-BSD FRML MDRD: 56 ML/MIN/1.73
GLOBULIN UR ELPH-MCNC: 3.5 GM/DL
GLUCOSE BLD-MCNC: 109 MG/DL (ref 65–99)
HAV IGM SERPL QL IA: NORMAL
HBV CORE IGM SERPL QL IA: NORMAL
HBV SURFACE AG SERPL QL IA: NORMAL
HCT VFR BLD AUTO: 32.8 % (ref 34–46.6)
HCV AB SER DONR QL: NORMAL
HGB BLD-MCNC: 10 G/DL (ref 12–15.9)
IMM GRANULOCYTES # BLD AUTO: 0.03 10*3/MM3 (ref 0–0.05)
IMM GRANULOCYTES NFR BLD AUTO: 0.3 % (ref 0–0.5)
LYMPHOCYTES # BLD AUTO: 1.9 10*3/MM3 (ref 0.7–3.1)
LYMPHOCYTES NFR BLD AUTO: 21.1 % (ref 19.6–45.3)
MCH RBC QN AUTO: 29.3 PG (ref 26.6–33)
MCHC RBC AUTO-ENTMCNC: 30.5 G/DL (ref 31.5–35.7)
MCV RBC AUTO: 96.2 FL (ref 79–97)
MONOCYTES # BLD AUTO: 0.83 10*3/MM3 (ref 0.1–0.9)
MONOCYTES NFR BLD AUTO: 9.2 % (ref 5–12)
NEUTROPHILS # BLD AUTO: 5.84 10*3/MM3 (ref 1.7–7)
NEUTROPHILS NFR BLD AUTO: 64.8 % (ref 42.7–76)
PLATELET # BLD AUTO: 425 10*3/MM3 (ref 140–450)
PMV BLD AUTO: 10.1 FL (ref 6–12)
POTASSIUM BLD-SCNC: 4 MMOL/L (ref 3.5–5.2)
PROT SERPL-MCNC: 6 G/DL (ref 6–8.5)
RBC # BLD AUTO: 3.41 10*6/MM3 (ref 3.77–5.28)
SODIUM BLD-SCNC: 142 MMOL/L (ref 136–145)
WBC NRBC COR # BLD: 9.02 10*3/MM3 (ref 3.4–10.8)

## 2019-06-17 PROCEDURE — 97116 GAIT TRAINING THERAPY: CPT

## 2019-06-17 PROCEDURE — 80074 ACUTE HEPATITIS PANEL: CPT | Performed by: INTERNAL MEDICINE

## 2019-06-17 PROCEDURE — 85025 COMPLETE CBC W/AUTO DIFF WBC: CPT | Performed by: INTERNAL MEDICINE

## 2019-06-17 PROCEDURE — 80053 COMPREHEN METABOLIC PANEL: CPT | Performed by: INTERNAL MEDICINE

## 2019-06-17 PROCEDURE — 97530 THERAPEUTIC ACTIVITIES: CPT

## 2019-06-17 PROCEDURE — 94799 UNLISTED PULMONARY SVC/PX: CPT

## 2019-06-17 PROCEDURE — 97110 THERAPEUTIC EXERCISES: CPT

## 2019-06-17 RX ADMIN — BUSPIRONE HYDROCHLORIDE 10 MG: 10 TABLET ORAL at 08:16

## 2019-06-17 RX ADMIN — CARVEDILOL 12.5 MG: 6.25 TABLET, FILM COATED ORAL at 17:41

## 2019-06-17 RX ADMIN — ACETAMINOPHEN 650 MG: 325 TABLET ORAL at 11:36

## 2019-06-17 RX ADMIN — APIXABAN 2.5 MG: 2.5 TABLET, FILM COATED ORAL at 08:16

## 2019-06-17 RX ADMIN — BUSPIRONE HYDROCHLORIDE 10 MG: 10 TABLET ORAL at 21:06

## 2019-06-17 RX ADMIN — ATORVASTATIN CALCIUM 40 MG: 40 TABLET, FILM COATED ORAL at 21:06

## 2019-06-17 RX ADMIN — PANTOPRAZOLE SODIUM 40 MG: 40 TABLET, DELAYED RELEASE ORAL at 08:16

## 2019-06-17 RX ADMIN — APIXABAN 2.5 MG: 2.5 TABLET, FILM COATED ORAL at 21:06

## 2019-06-17 RX ADMIN — CARVEDILOL 12.5 MG: 6.25 TABLET, FILM COATED ORAL at 08:16

## 2019-06-17 NOTE — PAYOR COMM NOTE
"[facility]  Swing Bed Program  Phone:[phone]  Fax: [phone]    Auth# [id number]    Please see attached update clinicals for additional days. I tried to type in the amount of assistance beside the   Therapists score. The current plan is for pt to go home with family. Please let me know if you need anything further.       Abbi Mendez (83 y.o. Female)     Date of Birth Social Security Number Address Home Phone MRN    1935  [facility] [place] [address]  [place] [address] [phone] [id number]    Catholic Marital Status          Buddhism Single       Admission Date Admission Type Admitting Provider Attending Provider Department, Room/Bed    6/13/19 Urgent Jeffrey Parsons DO Hays, Ray G, MD [address], 3326/1P    Discharge Date Discharge Disposition Discharge Destination                       Attending Provider:  Tristan Melara MD    Allergies:  Darvon [Propoxyphene], Penicillins    Isolation:  None   Infection:  None   Code Status:  CPR    Ht:  162.6 cm (64\")   Wt:  74.5 kg (164 lb 4.8 oz)    Admission Cmt:  None   Principal Problem:  None                Active Insurance as of 6/13/2019     Primary Coverage     Payor Plan Insurance Group Employer/Plan Group    HUMANA MEDICARE REPLACEMENT HUMANA MEDICARE REPL [id number]     Payor Plan Address Payor Plan Phone Number Payor Plan Fax Number Effective Dates    [address] [address] [phone]  1/1/2018 - None Entered    [facility] [id number]       Subscriber Name Subscriber Birth Date Member ID       ABBI MENDEZ 1935 [id number]                 Emergency Contacts      (Rel.) Home Phone Work Phone Mobile Phone    Shruthi Gama (Daughter) -- -- [phone]    OrtizSuze pereira (Daughter) -- -- [phone]    ASTRID YOUSIF (Daughter) [phone] -- --               Physician Progress Notes (last 48 hours) (Notes from 6/15/2019 10:13 AM through 6/17/2019 10:13 AM)      Alize Parsons PA at " 6/17/2019  9:34 AM            [facility] Medicine Services  SWING BED BRIEF PROGRESS NOTE    SUBJECTIVE:  Patient's not seen chart, labs, and vitals reviewed. Patient in swing bed s/p hip fracture s/p left bipolar hip replacement for prolonged therapy. No overnight events reported, no new issues. Last PT note pt ambulated 70 ft with minimum assistance.     MEDICATION RECOMMENDATIONS/CHANGES:    · Continue PT/OT   · BP controlled, cont current regimen. Closely monitor BP per hospital protocol, titrate meds as necessary   · Repeat labs in AM.       Alize Parsons PA-C  Hospitalist Service -- [facility]   Pager: [phone]    06/17/19  9:35 AM    Attending Physician: Tristan Melara MD            Electronically signed by Alize Parsons PA at 6/17/2019  9:45 AM     Jeffrey Parsons DO at 6/16/2019 10:58 AM        Pt not seen or examined. She was admitted to swing bed on 6/13/19 for PT/OT following recent left hip fracture s/p left bipolar replacement. I discussed her care with FANNY Negron who does not report any acute events or issues that need to be addressed. BP stable and controlled today. Repeat labs in the AM. Cont PT/OT.     Jeffrey Parsons DO  06/16/19  5:09 PM                Electronically signed by Jeffrey Parsons DO at 6/16/2019 10:58 AM     Jeffrey Parsons DO at 6/15/2019 10:39 AM        Pt not seen or examined. She was admitted to swing [facility] on 6/13/19 for PT/OT following recent left hip fracture s/p left bipolar replacement. I discussed her care with FANNY Negron who does not report any acute events or issues that need to be addressed. BP stable and controlled today. Renal function improved on AM labs. Cont PT/OT.     Jeffrey Parsons DO  06/15/19  5:09 PM                Electronically signed by Jeffrey Parsons DO at 6/15/2019 10:40 AM       Consult Notes (last 24 hours) (Notes from 6/16/2019 10:13 AM through 6/17/2019 10:13 AM)      No notes of this type exist for this encounter.      Acute Care - Occupational Therapy Treatment Note   [facility]     Patient Name: Abbi Mendez                  : 1935                        MRN: [id number]  Today's Date: 6/15/2019                   Onset of Illness/Injury or Date of Surgery: 19                          Referring Physician: Dr. Parsons                    Admit Date: 2019     No diagnosis found.      Patient Active Problem List   Diagnosis   • Atrial fibrillation (CMS/HCC)   • Coronary artery disease involving native coronary artery of native heart without angina pectoris   • RADHA (obstructive sleep apnea)   • Essential hypertension   • Hyperlipidemia LDL goal <70   • Iron deficiency anemia   • Left bundle branch block   • Syncope and collapse   • Severe sinus bradycardia   • Third degree AV block (CMS/HCC)   • Syncope   • Compression deformity of vertebra   • Radiculopathy   • Pericardial effusion without cardiac tamponade   • Hypercalcemia   • Abnormal serum protein electrophoresis   • Weight loss, unintentional   • Primary hyperparathyroidism (CMS/HCC)   • Hypercalcemia   • Pacemaker   • Hiatal hernia   • Elevated serum immunoglobulin free light chains   • Left displaced femoral neck fracture (CMS/HCC)   • Hip fracture (CMS/HCC)      Medical History        Past Medical History:   Diagnosis Date   • Anxiety     • Arthritis     • Atrial fibrillation (CMS/HCC) 2016   • Gastric ulcer     • Hiatal hernia     • Hypertension     • Iron deficiency anemia     • RADHA (obstructive sleep apnea) 2016         Surgical History         Past Surgical History:   Procedure Laterality Date   • CARDIAC ELECTROPHYSIOLOGY PROCEDURE N/A 2018     Procedure: Pacemaker DC new;  Surgeon: Soraya Darling MD;  Location: [facility] INVASIVE LOCATION;  Service:    • HIP BIPOLAR REPLACEMENT Left 2019     Procedure: HIP BIPOLAR REPLACEMENT;  Surgeon: Gerardo Jain MD;  Location: [facility];   Service: Orthopedics   • TOTAL KNEE ARTHROPLASTY Right     • TUBAL ABDOMINAL LIGATION                Therapy Treatment                  Rehabilitation Treatment Summary                      Row Name 06/15/19 1037                               Treatment Time/Intention       Discipline  occupational therapist  -KR           Document Type  therapy note (daily note)  -KR           Patient Effort  good  -KR           Recorded by [KR] Brannon Mondragon OT 06/15/19 1039                        Row Name 06/15/19 1037                               Motor Skills Assessment/Interventions       Additional Documentation  Therapeutic Exercise (Group)  -KR           Recorded by [KR] Brannon Mondragon OT 06/15/19 1039                        Row Name 06/15/19 1037                               Therapeutic Exercise       Upper Extremity (Therapeutic Exercise)  wand exercises  -KR           Upper Extremity Range of Motion (Therapeutic Exercise)  shoulder flexion/extension, bilateral;elbow flexion/extension, bilateral  -KR           Exercise Type (Therapeutic Exercise)  AROM (active range of motion)  -KR           Position (Therapeutic Exercise)  seated  -KR           Equipment (Therapeutic Exercise)  dowel alma/wand  -KR           Expected Outcome (Therapeutic Exercise)  improve motor control;improve functional tolerance, self-care activity  -KR           Recorded by [KR] Brannon Mondragon OT 06/15/19 1039                        Row Name                                    Wound 06/05/19 1838 Left hip incision       Wound - Properties Group Date first assessed: 06/05/19 [KH] Time first assessed: 1838 [KH] Side: Left [KH] Location: hip [KH] Type: incision [KH] Recorded by:  [OUSMANE] Ary Sylvester RN 06/05/19 1838                           User Key  (r) = Recorded By, (t) = Taken By, (c) = Cosigned By     Initials Name Effective Dates Discipline     Ary Coyne RN 06/16/16 -  Nurse     Brannon Wang OT 04/03/18 -  OT                   Wound 06/05/19 1838 Left hip incision (Active)   Dressing Appearance dry;intact 6/14/2019 11:00 PM   Closure Approximated 6/14/2019 11:00 PM   Base dry;granulating;red 6/14/2019 11:00 PM   Periwound dry;intact 6/14/2019 11:00 PM   Periwound Temperature warm 6/14/2019 11:00 PM   Periwound Skin Turgor soft 6/14/2019 11:00 PM   Drainage Amount none 6/14/2019 11:00 PM                     OT Recommendation and Plan  Outcome Summary/Treatment Plan (OT)  Anticipated Discharge Disposition (OT): home with assist  Plan of Care Review  Plan of Care Reviewed With: patient, daughter  Plan of Care Reviewed With: patient, daughter  Outcome Summary: Evaluation completed for swing bed admission. Pt. cooperative and motivated to improve fxl performance. She will participate in OT as tolerated for strength/endurance training and ADL retraining to promote safety and independence in home environment. Pt. expressed interest in sewing/yarn work activities for recreational activity performance in home. Materials will be provided as desired during hospitalization to promote leisure activies as appropriate. Continue OT POC.              Outcome Measures                    Row Name 06/14/19 1600 06/13/19 1748 06/13/19 1623                 How much help from another person do you currently need...     Turning from your back to your side while in flat bed without using bedrails?  4  -LL  4  -AG  ind  --     Moving from lying on back to sitting on the side of a flat bed without bedrails?  3  -LL  3  -AG  Min   --     Moving to and from a bed to a chair (including a wheelchair)?  3  -LL  3  -AG  Min   --     Standing up from a chair using your arms (e.g., wheelchair, bedside chair)?  3  -LL  3  -AG  Min   --     Climbing 3-5 steps with a railing?  3  -LL  3  -AG  Min   --     To walk in hospital room?  3  -LL  3  -AG  Min   --     AM-PAC 6 Clicks Score  19  -LL  19  -AG  --                 How much help from another is currently needed...      Putting on and taking off regular lower body clothing?  --  --  2  -KR  mod     Bathing (including washing, rinsing, and drying)  --  --  2  -KR  mod     Toileting (which includes using toilet bed pan or urinal)  --  --  3  -KR  min     Putting on and taking off regular upper body clothing  --  --  3  -KR  min     Taking care of personal grooming (such as brushing teeth)  --  --  3  -KR  Min      Eating meals  --  --  3  -KR  Min      Score  --  --  16  -KR                 Functional Assessment     Outcome Measure Options  AM-PAC 6 Clicks Basic Mobility (PT)  -LL  AM-PAC 6 Clicks Basic Mobility (PT)  -AG  --                Row Name 06/12/19 1600                           How much help from another person do you currently need...     Turning from your back to your side while in flat bed without using bedrails?  3  -LL  Min asst         Moving from lying on back to sitting on the side of a flat bed without bedrails?  3  -LL  Min asst         Moving to and from a bed to a chair (including a wheelchair)?  3  -LL  Min asst         Standing up from a chair using your arms (e.g., wheelchair, bedside chair)?  3  -LL  Min asst         Climbing 3-5 steps with a railing?  2  -LL  Mod asst         To walk in hospital room?  3  -LL  Min asst         AM-PAC 6 Clicks Score  17  -LL                     Functional Assessment     Outcome Measure Options  AM-PAC 6 Clicks Basic Mobility (PT)  -LL                           User Key  (r) = Recorded By, (t) = Taken By, (c) = Cosigned By     Initials Name Provider Type     AG Sandy Covington, PT Physical Therapist     KR Brannon Mondragon, OT Occupational Therapist     LL Loyda Vega, ALFREDO Physical Therapy Assistant                Time Calculation:              Time Calculation- OT                 Row Name 06/15/19 1039                           Time Calculation- OT     Total Timed Code Minutes- OT  23 minute(s)  -KR                           User Key  (r) = Recorded By, (t) = Taken By,  (c) = Cosigned By     Initials Name Provider Type     Brannon Wang OT Occupational Therapist                    Therapy Charges for Today      Code Description Service Date Service Provider Modifiers Qty     27114894128  OT THER PROC EA 15 MIN 6/15/2019 Brannon Mondragon OT       Acute Care - Physical Therapy Treatment Note  [facility] [place]     Patient Name: Abbi Mendez                  : 1935                        MRN: [id number]  Today's Date: 6/15/2019                   Onset of Illness/Injury or Date of Surgery: 19  Date of Referral to PT: 19                  Referring Physician: Dr. Parsons                    Admit Date: 2019     Visit Dx:  No diagnosis found.      Patient Active Problem List   Diagnosis   • Atrial fibrillation (CMS/HCC)   • Coronary artery disease involving native coronary artery of native heart without angina pectoris   • RADHA (obstructive sleep apnea)   • Essential hypertension   • Hyperlipidemia LDL goal <70   • Iron deficiency anemia   • Left bundle branch block   • Syncope and collapse   • Severe sinus bradycardia   • Third degree AV block (CMS/HCC)   • Syncope   • Compression deformity of vertebra   • Radiculopathy   • Pericardial effusion without cardiac tamponade   • Hypercalcemia   • Abnormal serum protein electrophoresis   • Weight loss, unintentional   • Primary hyperparathyroidism (CMS/HCC)   • Hypercalcemia   • Pacemaker   • Hiatal hernia   • Elevated serum immunoglobulin free light chains   • Left displaced femoral neck fracture (CMS/HCC)   • Hip fracture (CMS/HCC)         Therapy Treatment                  Rehabilitation Treatment Summary                      Row Name 06/15/19 1400 06/15/19 1037                          Treatment Time/Intention       Discipline  physical therapist  -BC  occupational therapist  -KR         Document Type  therapy note (daily note)  -BC  therapy note (daily note)  -KR         Subjective Information  no complaints  -BC  --          Mode of Treatment  physical therapy;individual therapy  -BC  --         Patient Effort  good  -BC  good  -KR         Recorded by [BC] Fatou Doll, PT 06/15/19 1453 [KR] Brannon Mondragon, OT 06/15/19 1039         Row Name 06/15/19 1400                               Cognitive Assessment/Intervention- PT/OT       Orientation Status (Cognition)  oriented x 3  -BC           Recorded by [BC] Fatou Doll, PT 06/15/19 1453                        Row Name 06/15/19 1400                               Mobility Assessment/Intervention       Extremity Weight-bearing Status  left lower extremity;right lower extremity  -BC           Left Lower Extremity (Weight-bearing Status)  weight-bearing as tolerated (WBAT)  -BC           Right Lower Extremity (Weight-bearing Status)  weight-bearing as tolerated (WBAT)  -BC           Recorded by [BC] Fatou Doll, PT 06/15/19 1453                        Row Name 06/15/19 1400                               Bed Mobility Assessment/Treatment       Bed Mobility Assessment/Treatment  bed mobility (all) activities  -BC           Supine-Sit [place] (Bed Mobility)  minimum assist (75% patient effort)  -BC           Sit-Supine [place] (Bed Mobility)  minimum assist (75% patient effort)  -BC           Assistive Device (Bed Mobility)  bed rails  -BC           Recorded by [BC] Fatou Doll, PT 06/15/19 1453                        Row Name 06/15/19 1400                               Transfer Assessment/Treatment       Transfer Assessment/Treatment  sit-stand transfer;stand-sit transfer  -BC           Maintains Weight-bearing Status (Transfers)  able to maintain  -BC           Recorded by [BC] Fatou Doll, PT 06/15/19 1453                        Row Name 06/15/19 1400                               Sit-Stand Transfer       Sit-Stand [place] (Transfers)  minimum assist (75% patient effort)  -BC           Assistive Device (Sit-Stand Transfers)  walker,  front-wheeled  -BC           Recorded by [BC] Fatou Doll, PT 06/15/19 1453                        Row Name 06/15/19 1400                               Stand-Sit Transfer       Stand-Sit [place] (Transfers)  minimum assist (75% patient effort)  -BC           Assistive Device (Stand-Sit Transfers)  walker, front-wheeled  -BC           Recorded by [BC] Fatou Doll, PT 06/15/19 1453                        Row Name 06/15/19 1400                               Gait/Stairs Assessment/Training       Gait/Stairs Assessment/Training  gait/ambulation independence  -BC           [place] Level (Gait)  minimum assist (75% patient effort)  -BC           Assistive Device (Gait)  walker, front-wheeled  -BC           Distance in Feet (Gait)  70  -BC           Recorded by [BC] Fatou Doll, PT 06/15/19 1453                        Row Name 06/15/19 1037                               Motor Skills Assessment/Interventions       Additional Documentation  Therapeutic Exercise (Group)  -KR           Recorded by [KR] Brannon Mondragon, OT 06/15/19 1039                        Row Name 06/15/19 1037                               Therapeutic Exercise       Upper Extremity (Therapeutic Exercise)  wand exercises  -KR           Upper Extremity Range of Motion (Therapeutic Exercise)  shoulder flexion/extension, bilateral;elbow flexion/extension, bilateral  -KR           Exercise Type (Therapeutic Exercise)  AROM (active range of motion)  -KR           Position (Therapeutic Exercise)  seated  -KR           Equipment (Therapeutic Exercise)  dowel alma/wand  -KR           Expected Outcome (Therapeutic Exercise)  improve motor control;improve functional tolerance, self-care activity  -KR           Recorded by [KR] Brannon Mondragon, OT 06/15/19 1039                        Row Name 06/15/19 1400                               Positioning and Restraints       Pre-Treatment Position  sitting in chair/recliner  -BC           Post Treatment  Position  chair  -BC           In Chair  notified nsg;sitting;call light within reach;encouraged to call for assist;with family/caregiver  -BC           Recorded by [BC] Fatou Doll, PT 06/15/19 1453                        Row Name                                    Wound 06/05/19 1838 Left hip incision       Wound - Properties Group Date first assessed: 06/05/19 [KH] Time first assessed: 1838 [KH] Side: Left [KH] Location: hip [KH] Type: incision [KH] Recorded by:  [KH] Ary Sylvester RN 06/05/19 1838                           User Key  (r) = Recorded By, (t) = Taken By, (c) = Cosigned By     Initials Name Effective Dates Discipline      Ary Sylvester RN 06/16/16 -  Nurse     BC Fatou Doll, PT 03/14/16 -  PT     Brannon Wang OT 04/03/18 -  OT                     Wound 06/05/19 1838 Left hip incision (Active)   Dressing Appearance dry;intact 6/15/2019  7:50 AM   Closure Approximated 6/14/2019 11:00 PM   Base dry;granulating;red 6/14/2019 11:00 PM   Periwound dry;intact 6/15/2019  7:50 AM   Periwound Temperature warm 6/15/2019  7:50 AM   Periwound Skin Turgor soft 6/15/2019  7:50 AM   Drainage Amount none 6/14/2019 11:00 PM                      Physical Therapy Education              Title: PT OT SLP Therapies (Done)                       User Key               Initials Effective Dates Name Provider Type Discipline      DM 06/16/16 -  Migdalia Haney RN Registered Nurse Nurse      BC 03/14/16 -  Fatou Doll, PT Physical Therapist PT      AG 04/03/18 -  Sandy Covington, PT Physical Therapist PT      LL 05/02/16 -  Loyda Vega PTA Physical Therapy Assistant PT                        PT Recommendation and Plan  Plan of Care Reviewed With: patient  Progress: improving             Outcome Measures                    Row Name 06/15/19 1400 06/14/19 1600 06/13/19 1748                 How much help from another person do you currently need...     Turning from your back to your  side while in flat bed without using bedrails?  4  -BC  4  -LL  4  -AG  ind     Moving from lying on back to sitting on the side of a flat bed without bedrails?  3  -BC  3  -LL  3  -AG  Min asst     Moving to and from a bed to a chair (including a wheelchair)?  3  -BC  3  -LL  3  -AG  Min asst     Standing up from a chair using your arms (e.g., wheelchair, bedside chair)?  3  -BC  3  -LL  3  -AG  Min asst     Climbing 3-5 steps with a railing?  3  -BC  3  -LL  3  -AG  Min asst     To walk in hospital room?  3  -BC  3  -LL  3  -AG  Min asst     AM-PAC 6 Clicks Score  19  -BC  19  -LL  19  -AG                 Functional Assessment     Outcome Measure Options  AM-PAC 6 Clicks Basic Mobility (PT)  -BC  AM-PAC 6 Clicks Basic Mobility (PT)  -LL  AM-PAC 6 Clicks Basic Mobility (PT)  -AG                Row Name 06/13/19 1623 06/12/19 1600                      How much help from another person do you currently need...     Turning from your back to your side while in flat bed without using bedrails?  --  3  -LL  Min asst  min      Moving from lying on back to sitting on the side of a flat bed without bedrails?  --  3  -LL  Min asst       Moving to and from a bed to a chair (including a wheelchair)?  --  3  -LL  Min asst       Standing up from a chair using your arms (e.g., wheelchair, bedside chair)?  --  3  -LL  Min asst       Climbing 3-5 steps with a railing?  --  2  -LL       To walk in hospital room?  --  3  -LL       AM-PAC 6 Clicks Score  --  17  -LL                   How much help from another is currently needed...     Putting on and taking off regular lower body clothing?  2  -KR  Mod asst  --       Bathing (including washing, rinsing, and drying)  2  -KR  Mod asst  --       Toileting (which includes using toilet bed pan or urinal)  3  -KR  Min asst    --       Putting on and taking off regular upper body clothing  3  -KR  Min asst  --       Taking care of personal grooming (such as brushing teeth)  3  -KR  Min asst   --       Eating meals  3  -KR  Min asst  --       Score  16  -KR  --                   Functional Assessment     Outcome Measure Options  --  AM-PAC 6 Clicks Basic Mobility (PT)  -LL                         User Key  (r) = Recorded By, (t) = Taken By, (c) = Cosigned By     Initials Name Provider Type     BC Fatou Doll, PT Physical Therapist     Sandy Sloan, PT Physical Therapist     Brannon Wang, OT Occupational Therapist     LL Loyda Vega, PTA Physical Therapy Assistant              Time Calculation:              PT Charges                 Row Name 06/15/19 7337                           Time Calculation     PT Received On  06/15/19  -BC                     Time Calculation- PT     Total Timed Code Minutes- PT  25 minute(s)  -BC                     Timed Charges     65593 - Gait Training Minutes   15  -BC         05110 - PT Therapeutic Activity Minutes  10  -BC                           User Key  (r) = Recorded By, (t) = Taken By, (c) = Cosigned By     Initials Name Provider Type     BC Fatou Doll, PT Physical Therapist                    Therapy Charges for Today      Code Description Service Date Service Provider Modifiers Qty     31109421734 HC PT THERAPEUTIC ACT EA 15 MIN 6/15/2019 Fatou Doll, PT GP 1     69019909021 HC PT THER SUPP EA 15 MIN 6/15/2019 Fatou Doll, PT GP 1     87603191136 HC GAIT TRAINING EA 15 MIN 6/15/2019 Fatou Doll, PT GP 1             PT G-Codes  Outcome Measure Options: AM-PAC 6 Clicks Basic Mobility (PT)  AM-PAC 6 Clicks Score: 19  Score: 16     Fatou Doll PT               6/15/2019

## 2019-06-17 NOTE — PLAN OF CARE
Problem: Patient Care Overview  Goal: Individualization and Mutuality  Outcome: Ongoing (interventions implemented as appropriate)    Goal: Interprofessional Rounds/Family Conf  Outcome: Ongoing (interventions implemented as appropriate)      Problem: Patient Care Overview  Goal: Individualization and Mutuality  Outcome: Ongoing (interventions implemented as appropriate)    Goal: Discharge Needs Assessment  Outcome: Ongoing (interventions implemented as appropriate)    Goal: Interprofessional Rounds/Family Conf  Outcome: Ongoing (interventions implemented as appropriate)

## 2019-06-17 NOTE — THERAPY TREATMENT NOTE
Acute Care - Physical Therapy Treatment Note   [facility]     Patient Name: Abbi Mendez  : 1935  MRN: [id number]  Today's Date: 2019  Onset of Illness/Injury or Date of Surgery: 19  Date of Referral to PT: 19  Referring Physician: Dr. Parsons    Admit Date: 2019    Visit Dx:  No diagnosis found.  Patient Active Problem List   Diagnosis   • Atrial fibrillation (CMS/HCC)   • Coronary artery disease involving native coronary artery of native heart without angina pectoris   • RADHA (obstructive sleep apnea)   • Essential hypertension   • Hyperlipidemia LDL goal <70   • Iron deficiency anemia   • Left bundle branch block   • Syncope and collapse   • Severe sinus bradycardia   • Third degree AV block (CMS/HCC)   • Syncope   • Compression deformity of vertebra   • Radiculopathy   • Pericardial effusion without cardiac tamponade   • Hypercalcemia   • Abnormal serum protein electrophoresis   • Weight loss, unintentional   • Primary hyperparathyroidism (CMS/HCC)   • Hypercalcemia   • Pacemaker   • Hiatal hernia   • Elevated serum immunoglobulin free light chains   • Left displaced femoral neck fracture (CMS/HCC)   • Hip fracture (CMS/HCC)       Therapy Treatment    Rehabilitation Treatment Summary     Row Name 19 1523 19 1044          Treatment Time/Intention    Discipline  physical therapy assistant  -LL  occupational therapist  -JW     Document Type  therapy note (daily note)  -LL  therapy note (daily note)  -JW     Subjective Information  no complaints  -LL  no complaints  -JW     Mode of Treatment  physical therapy;individual therapy  -LL  occupational therapy  -JW     Patient/Family Observations  Patient agreeable to PT session this date.  -LL  Pt agreeable for therapy. Pt tx session completed in rehab tx gym.   -JW     Patient Effort  good  -LL  good  -JW     Existing Precautions/Restrictions  --  fall;left;hip  -JW     Patient Response to Treatment  Patient tolerated 55 min of  PT in rehab gym this date with adequate rest breaks.  -LL  --     Recorded by [LL] Loyda Vega, [facility] 06/17/19 1536 [JW] Justin Gomez, OTR 06/17/19 1053     Row Name 06/17/19 1523 06/17/19 1044          Cognitive Assessment/Intervention- PT/OT    Orientation Status (Cognition)  oriented x 3  -LL  oriented x 3  -JW     Follows Commands (Cognition)  follows one step commands;verbal cues/prompting required  -LL  follows one step commands;verbal cues/prompting required  -JW     Recorded by [LL] Loyda Vega, [facility] 06/17/19 1536 [JW] Justin Gomez, OTR 06/17/19 1053     Row Name 06/17/19 1523             Mobility Assessment/Intervention    Extremity Weight-bearing Status  left lower extremity;right lower extremity  -LL      Left Lower Extremity (Weight-bearing Status)  weight-bearing as tolerated (WBAT)  -LL      Right Lower Extremity (Weight-bearing Status)  weight-bearing as tolerated (WBAT)  -LL      Recorded by [LL] Loyda Vega, ALFREDO 06/17/19 1536      Row Name 06/17/19 1523             Bed Mobility Assessment/Treatment    Bed Mobility Assessment/Treatment  bed mobility (all) activities  -LL      Supine-Sit [place] (Bed Mobility)  contact guard  -LL      Sit-Supine [place] (Bed Mobility)  contact guard  -LL      Assistive Device (Bed Mobility)  bed rails  -LL      Recorded by [LL] Loyda Vega, [facility] 06/17/19 1536      Row Name 06/17/19 1523             Transfer Assessment/Treatment    Transfer Assessment/Treatment  sit-stand transfer;stand-sit transfer  -LL      Recorded by [LL] Loyda Vega, [facility] 06/17/19 1536      Row Name 06/17/19 1523             Sit-Stand Transfer    Sit-Stand [place] (Transfers)  minimum assist (75% patient effort);contact guard  -LL      Assistive Device (Sit-Stand Transfers)  walker, front-wheeled  -LL      Recorded by [LL] Loyda Vega, [facility] 06/17/19 1536      Row Name 06/17/19 1523             Stand-Sit Transfer    Stand-Sit [place] (Transfers)  minimum assist  (75% patient effort);contact guard  -LL      Assistive Device (Stand-Sit Transfers)  walker, front-wheeled  -LL      Recorded by [LL] Loyda Vega, [facility] 06/17/19 1536      Row Name 06/17/19 1523             Gait/Stairs Assessment/Training    Gait/Stairs Assessment/Training  gait/ambulation independence  -LL      [place] Level (Gait)  minimum assist (75% patient effort);contact guard  -LL      Assistive Device (Gait)  walker, front-wheeled  -LL      Distance in Feet (Gait)  160 x 2 with standing rest breaks throughout  -LL      Pattern (Gait)  step-through  -LL      Deviations/Abnormal Patterns (Gait)  antalgic;stride length decreased  -LL      Bilateral Gait Deviations  forward flexed posture;heel strike decreased;weight shift ability decreased  -LL      Recorded by [LL] Loyda Vega [facility] 06/17/19 1536      Row Name 06/17/19 1044             Upper Extremity Seated Therapeutic Exercise    Exercise Type, Seated Upper Extremity (Therapeutic Exercise)  AROM (active range of motion);other (see comments) BUE the[place] ex[place]act[place] [facility] [place] [place]   -      Expected Outcomes, Seated Upper Extremity (Therapeutic Exercise)  improve functional tolerance, self-care activity;improve performance, BADLs;improve performance, fine motor coordination skills;improve performance, gross motor coordination skills;improve performance, transfer skills  -JW      Recorded by [JW] Justin Gomez OTR 06/17/19 1053      Row Name 06/17/19 1523             Lower Extremity Standing Therapeutic Exercise    Performed, Standing Lower Extremity (Therapeutic Exercise)  heel raises L hip flexion, L hip abd  -LL      Device, Standing Lower Extremity (Therapeutic Exercise)  parallel bars  -LL      Exercise Type, Standing Lower Extremity (Therapeutic Exercise)  AROM (active range of motion)  -      Expected Outcomes, Standing Lower Extremity (Therapeutic Exercise)  improve functional tolerance, household activity;improve functional tolerance, self-care  activity;improve performance, gait skills;improve performance, transfer skills;strengthen normal movement patterns  -LL      Sets/Reps Detail, Standing Lower Extremity (Therapeutic Exercise)  1 x 20  -LL      Transfers Skills, Training to Functional Activity, Standing Lower Extremity (Therapeutic Exercise)  able to transfer skills from training to functional activity  -LL      Recorded by [LL] Loyda Vega PTA 06/17/19 1536      Row Name 06/17/19 1523             Lower Extremity Seated Therapeutic Exercise    Performed, Seated Lower Extremity (Therapeutic Exercise)  hip abduction/adduction;ankle dorsiflexion/plantarflexion;LAQ (long arc quad), knee extension Ball squeeze, hip flexion below 90 degrees  -LL      Device, Seated Lower Extremity (Therapeutic Exercise)  elastic bands/tubing;small ball;free weights, cuff 2.5 lbs  -LL      Exercise Type, Seated Lower Extremity (Therapeutic Exercise)  AROM (active range of motion)  -LL      Restrictions, Seated Lower Extremity (Therapeutic Exercise)  L JANET precautions  -LL      Sets/Reps Detail, Seated Lower Extremity (Therapeutic Exercise)  1 x 20  -LL      Transfers Skills, Training to Functional Activity, Seated Lower Extremity (Therapeutic Exercise)  able to transfer skills from training to functional activity  -LL      Recorded by [LL] Loyda Vega PTA 06/17/19 1536      Row Name 06/17/19 1523             Balance    Balance  standing balance activity  -LL      Recorded by [LL] Loyda Vega PTA 06/17/19 1536      Row Name 06/17/19 1523             Standing Balance Activity    Activities Performed (Standing, Balance Training)  standing reaching outside base of support Balloon tap  -LL      Support Needed for Balance (Standing, Balance Training)  uses at least one upper extremity for support  -LL      Upper Extremity Activity with Device (Standing, Balance Training)  -- Balloon  -LL      Progressive Balance Activity (Standing, Balance Training)  speed of  movements increased during activity;combined head and eye movements during activity;upper extremity activities added during activity  -LL      Transfers Skills, Training to Functional Activity (Standing, Balance Training)  transfers skills to functional activity most of the time  -LL      Recorded by [LL] Loyda Vega PTA 06/17/19 1536      Row Name 06/17/19 1523 06/17/19 1044          Positioning and Restraints    Pre-Treatment Position  -- Transport chair with OT  -LL  --     Post Treatment Position  -- Transport chair  -LL  other transport chair with PT   -JW     Other Position  -- Transport chair with PCA  -LL  --     Recorded by [LL] Loyda Vega PTA 06/17/19 1536 [JW] Justin Gomez, OTR 06/17/19 1053     Row Name                Wound 06/05/19 1838 Left hip incision    Wound - Properties Group Date first assessed: 06/05/19 [KH] Time first assessed: 1838 [KH] Side: Left [KH] Location: hip [KH] Type: incision [KH] Recorded by:  [KH] Ary Sylvester RN 06/05/19 1838      User Key  (r) = Recorded By, (t) = Taken By, (c) = Cosigned By    Initials Name Effective Dates Discipline     Ary Sylvester RN 06/16/16 -  Nurse    Loyda Kraus PTA 05/02/16 -  PT    Justin Dia, OTR 03/07/18 -  OT          Wound 06/05/19 1838 Left hip incision (Active)   Dressing Appearance dry;intact;other (see comments) 6/17/2019  8:25 AM   Closure Other (Comment) 6/17/2019  8:25 AM   Base dry 6/17/2019  8:25 AM   Periwound dry;intact 6/17/2019  8:25 AM   Periwound Temperature warm 6/17/2019  8:25 AM   Periwound Skin Turgor soft 6/17/2019  8:25 AM           Physical Therapy Education     Title: PT OT SLP Therapies (Done)     Topic: Physical Therapy (Done)     Point: Mobility training (Done)     Learning Progress Summary           Patient Acceptance, E,D, VU,NR by LL at 6/17/2019  3:36 PM    Acceptance, E,TB, VU by GUANAKO at 6/16/2019  2:00 PM    Acceptance, E, VU by BC at 6/15/2019  2:53 PM    Acceptance, E,TB, VU  by DM at 6/15/2019  2:48 PM    Acceptance, E,D, VU,NR by LL at 6/14/2019  4:37 PM    Acceptance, E,D, VU,NR by AG at 6/13/2019  5:48 PM   Family Acceptance, E,D, VU,NR by AG at 6/13/2019  5:48 PM                   Point: Home exercise program (Done)     Learning Progress Summary           Patient Acceptance, E,D, VU,NR by LL at 6/17/2019  3:36 PM    Acceptance, E,TB, VU by DM at 6/16/2019  2:00 PM    Acceptance, E, VU by BC at 6/15/2019  2:53 PM    Acceptance, E,TB, VU by DM at 6/15/2019  2:48 PM    Acceptance, E,D, VU,NR by LL at 6/14/2019  4:37 PM    Acceptance, E,D, VU,NR by AG at 6/13/2019  5:48 PM   Family Acceptance, E,D, VU,NR by AG at 6/13/2019  5:48 PM                   Point: Body mechanics (Done)     Learning Progress Summary           Patient Acceptance, E,D, VU,NR by LL at 6/17/2019  3:36 PM    Acceptance, E,TB, VU by DM at 6/16/2019  2:00 PM    Acceptance, E, VU by BC at 6/15/2019  2:53 PM    Acceptance, E,TB, VU by DM at 6/15/2019  2:48 PM    Acceptance, E,D, VU,NR by LL at 6/14/2019  4:37 PM    Acceptance, E,D, VU,NR by AG at 6/13/2019  5:48 PM   Family Acceptance, E,D, VU,NR by AG at 6/13/2019  5:48 PM                   Point: Precautions (Done)     Learning Progress Summary           Patient Acceptance, E,D, VU,NR by LL at 6/17/2019  3:36 PM    Acceptance, E,TB, VU by DM at 6/16/2019  2:00 PM    Acceptance, E, VU by BC at 6/15/2019  2:53 PM    Acceptance, E,TB, VU by DM at 6/15/2019  2:48 PM    Acceptance, E,D, VU,NR by LL at 6/14/2019  4:37 PM    Acceptance, E,D, VU,NR by AG at 6/13/2019  5:48 PM   Family Acceptance, MAXX WARE, FABRICIO,NR by  at 6/13/2019  5:48 PM                               User Key     Initials Effective Dates Name Provider Type Discipline     06/16/16 -  Migdalia Haney, RN Registered Nurse Nurse    BC 03/14/16 -  Fatou Doll, PT Physical Therapist PT    AG 04/03/18 -  Sandy Covington, PT Physical Therapist PT     05/02/16 -  Loyda Vega, PTA Physical Therapy Assistant PT                 PT Recommendation and Plan  Anticipated Discharge Disposition (PT): home with assist  Therapy Frequency (PT Clinical Impression): other (see comments)(6 days/ week )  Outcome Summary/Treatment Plan (PT)  Anticipated Equipment Needs at Discharge (PT): raised toilet seat, front wheeled walker  Anticipated Discharge Disposition (PT): home with assist  Plan of Care Reviewed With: patient  Progress: improving  Outcome Summary: Patient continues to make progress with therapy & is requiring less assistance with functional mobility.  Continue w/ current POC.  Outcome Measures     Row Name 06/17/19 1500 06/15/19 1400 06/14/19 1600       How much help from another person do you currently need...    Turning from your back to your side while in flat bed without using bedrails?  4  -LL  4  -BC  4  -LL    Moving from lying on back to sitting on the side of a flat bed without bedrails?  3  -LL  3  -BC  3  -LL    Moving to and from a bed to a chair (including a wheelchair)?  3  -LL  3  -BC  3  -LL    Standing up from a chair using your arms (e.g., wheelchair, bedside chair)?  3  -LL  3  -BC  3  -LL    Climbing 3-5 steps with a railing?  3  -LL  3  -BC  3  -LL    To walk in hospital room?  3  -LL  3  -BC  3  -LL    AM-PAC 6 Clicks Score  19  -LL  19  -BC  19  -LL       Functional Assessment    Outcome Measure Options  AM-PAC 6 Clicks Basic Mobility (PT)  -LL  AM-PAC 6 Clicks Basic Mobility (PT)  -BC  AM-PAC 6 Clicks Basic Mobility (PT)  -LL      User Key  (r) = Recorded By, (t) = Taken By, (c) = Cosigned By    Initials Name Provider Type    Fatou Walls PT Physical Therapist    LL Loyda Vega PTA Physical Therapy Assistant         Time Calculation:   PT Charges     Row Name 06/17/19 1537             Time Calculation    Start Time  1010  -LL      Stop Time  1105  -LL      Time Calculation (min)  55 min  -LL      PT Received On  06/17/19  -LL         Time Calculation- PT    Total Timed Code Minutes- PT  55  minute(s)  -LL        User Key  (r) = Recorded By, (t) = Taken By, (c) = Cosigned By    Initials Name Provider Type    LL Loyda Vega PTA Physical Therapy Assistant        Therapy Charges for Today     Code Description Service Date Service Provider Modifiers Qty    14974775560 HC GAIT TRAINING EA 15 MIN 6/17/2019 Loyda Vega PTA GP 1    84446486260 HC PT THER PROC EA 15 MIN 6/17/2019 Loyda Vega PTA GP 3    90556288263 HC PT THER SUPP EA 15 MIN 6/17/2019 Loyda Vega PTA GP 1          PT G-Codes  Outcome Measure Options: AM-PAC 6 Clicks Basic Mobility (PT)  AM-PAC 6 Clicks Score: 19  Score: 16    Loyda. ALFREDO Vega  6/17/2019

## 2019-06-17 NOTE — THERAPY TREATMENT NOTE
Acute Care - Occupational Therapy Treatment Note  [facility]     Patient Name: Abbi Mendez  : 1935  MRN: [id number]  Today's Date: 2019  Onset of Illness/Injury or Date of Surgery: 19     Referring Physician: Dr. Parsons    Admit Date: 2019     No diagnosis found.  Patient Active Problem List   Diagnosis   • Atrial fibrillation (CMS/HCC)   • Coronary artery disease involving native coronary artery of native heart without angina pectoris   • RADHA (obstructive sleep apnea)   • Essential hypertension   • Hyperlipidemia LDL goal <70   • Iron deficiency anemia   • Left bundle branch block   • Syncope and collapse   • Severe sinus bradycardia   • Third degree AV block (CMS/HCC)   • Syncope   • Compression deformity of vertebra   • Radiculopathy   • Pericardial effusion without cardiac tamponade   • Hypercalcemia   • Abnormal serum protein electrophoresis   • Weight loss, unintentional   • Primary hyperparathyroidism (CMS/HCC)   • Hypercalcemia   • Pacemaker   • Hiatal hernia   • Elevated serum immunoglobulin free light chains   • Left displaced femoral neck fracture (CMS/HCC)   • Hip fracture (CMS/HCC)     Past Medical History:   Diagnosis Date   • Anxiety    • Arthritis    • Atrial fibrillation (CMS/HCC) 2016   • Gastric ulcer    • Hiatal hernia    • Hypertension    • Iron deficiency anemia    • RADHA (obstructive sleep apnea) 2016     Past Surgical History:   Procedure Laterality Date   • CARDIAC ELECTROPHYSIOLOGY PROCEDURE N/A 2018    Procedure: Pacemaker DC new;  Surgeon: Soraya Darling MD;  Location: [facility] INVASIVE LOCATION;  Service:    • HIP BIPOLAR REPLACEMENT Left 2019    Procedure: HIP BIPOLAR REPLACEMENT;  Surgeon: Gerardo Jain MD;  Location: [facility];  Service: Orthopedics   • TOTAL KNEE ARTHROPLASTY Right    • TUBAL ABDOMINAL LIGATION         Therapy Treatment    Rehabilitation Treatment Summary     Row Name 19 1044             Treatment Time/Intention     Discipline  occupational therapist  -JW      Document Type  therapy note (daily note)  -JW      Subjective Information  no complaints  -JW      Mode of Treatment  occupational therapy  -JW      Patient/Family Observations  Pt agreeable for therapy. Pt tx session completed in rehab tx gym.   -JW      Patient Effort  good  -JW      Existing Precautions/Restrictions  fall;left;hip  -JW      Recorded by [JW] Justin Gomez OTR 06/17/19 1053      Row Name 06/17/19 1044             Cognitive Assessment/Intervention- PT/OT    Orientation Status (Cognition)  oriented x 3  -JW      Follows Commands (Cognition)  follows one step commands;verbal cues/prompting required  -JW      Recorded by [JW] Justin Gomez OTR 06/17/19 1053      Row Name 06/17/19 1044             Upper Extremity Seated Therapeutic Exercise    Exercise Type, Seated Upper Extremity (Therapeutic Exercise)  AROM (active range of motion);other (see comments) BUE ther ex/act, [facility]   -JW      Expected Outcomes, Seated Upper Extremity (Therapeutic Exercise)  improve functional tolerance, self-care activity;improve performance, BADLs;improve performance, fine motor coordination skills;improve performance, gross motor coordination skills;improve performance, transfer skills  -JW      Recorded by [JW] Justin Gomez OTR 06/17/19 1053      Row Name 06/17/19 1044             Positioning and Restraints    Post Treatment Position  other transport chair with PT   -JW      Recorded by [JW] Justin Gomez OTR 06/17/19 1053      Row Name                Wound 06/05/19 1838 Left hip incision    Wound - Properties Group Date first assessed: 06/05/19 [KH] Time first assessed: 1838 [KH] Side: Left [KH] Location: hip [KH] Type: incision [KH] Recorded by:  [OUSMANE] Ary Sylvester RN 06/05/19 1838      User Key  (r) = Recorded By, (t) = Taken By, (c) = Cosigned By    Initials Name Effective Dates Discipline    Ary Coyne RN 06/16/16 -  Nurse    Justin Dia, OTR  03/07/18 -  OT        Wound 06/05/19 1838 Left hip incision (Active)   Dressing Appearance dry;intact 6/16/2019  7:30 PM           OT Recommendation and Plan     Plan of Care Review  Plan of Care Reviewed With: patient  Plan of Care Reviewed With: patient  Outcome Summary: Pt tolerated tx session well with rest breaks provided. Continue with plan of care.   Outcome Measures     Row Name 06/15/19 1400 06/14/19 1600          How much help from another person do you currently need...    Turning from your back to your side while in flat bed without using bedrails?  4  -BC  4  -LL     Moving from lying on back to sitting on the side of a flat bed without bedrails?  3  -BC  3  -LL     Moving to and from a bed to a chair (including a wheelchair)?  3  -BC  3  -LL     Standing up from a chair using your arms (e.g., wheelchair, bedside chair)?  3  -BC  3  -LL     Climbing 3-5 steps with a railing?  3  -BC  3  -LL     To walk in hospital room?  3  -BC  3  -LL     AM-PAC 6 Clicks Score  19  -BC  19  -LL        Functional Assessment    Outcome Measure Options  AM-PAC 6 Clicks Basic Mobility (PT)  -BC  AM-PAC 6 Clicks Basic Mobility (PT)  -LL       User Key  (r) = Recorded By, (t) = Taken By, (c) = Cosigned By    Initials Name Provider Type    Fatou Walls, PT Physical Therapist    LL Loyda Vega PTA Physical Therapy Assistant           Time Calculation:   Time Calculation- OT     Row Name 06/17/19 1054             Time Calculation- OT    Total Timed Code Minutes- OT  68 minute(s)  -        User Key  (r) = Recorded By, (t) = Taken By, (c) = Cosigned By    Initials Name Provider Type    Justin Dia OTR Occupational Therapist        Therapy Charges for Today     Code Description Service Date Service Provider Modifiers Qty    55934448557  OT THERAPEUTIC ACT EA 15 MIN 6/17/2019 Justin Gomez OTR GO 5               TABATHA Granger  6/17/2019

## 2019-06-17 NOTE — PLAN OF CARE
Problem: Patient Care Overview  Goal: Plan of Care Review  Outcome: Ongoing (interventions implemented as appropriate)   06/17/19 1538   Coping/Psychosocial   Plan of Care Reviewed With patient   Coping/Psychosocial   Patient Agreement with Plan of Care agrees   Plan of Care Review   Progress improving   OTHER   Outcome Summary Patient continues to make progress with therapy & is requiring less assistance with functional mobility. Continue w/ current POC.

## 2019-06-17 NOTE — PROGRESS NOTES
Discharge Planning Assessment   [facility]     Patient Name: Abbi Mendez  MRN: [id number]  Today's Date: 6/17/2019    Admit Date: 6/13/2019      Discharge Plan     Row Name 06/17/19 1624       Plan    Plan  Pt lives at home alone and states she will return at discharge. Pt does not currently utilize home health services and states she is undecided at this time if she will need them. Pt has a rolling walker, cpap, cane and bed side commode via unknown provider. Pt has requested a wheelchair, wheelchair to be arranged with MD order. SS will follow.     Patient/Family in Agreement with Plan  yes            STACIE De Dios

## 2019-06-17 NOTE — PROGRESS NOTES
[facility]  SWING BED BRIEF PROGRESS NOTE    SUBJECTIVE:  Patient's not seen chart, labs, and vitals reviewed. Patient in swing bed s/p hip fracture s/p left bipolar hip replacement for prolonged therapy. No overnight events reported, no new issues. Last PT note pt ambulated 70 ft with minimum assistance.     MEDICATION RECOMMENDATIONS/CHANGES:    · Continue PT/OT   · BP controlled, cont current regimen. Closely monitor BP per hospital protocol, titrate meds as necessary   · Repeat labs in AM.       Alize Parsons PA-C  Hospitalist Service -- [facility]   Pager: [phone]    06/17/19  9:35 AM    Attending Physician: Tristan Melara MD

## 2019-06-17 NOTE — PROGRESS NOTES
[facility] HOSPITALIST PROGRESS NOTE     Patient Identification:  Name:  Abbi Mendez  Age:  83 y.o.  Sex:  female  :  1935  MRN:  [id number]  Visit Number:  [id number]  ROOM: [address] [place] [address]     Primary Care Provider:  Eric Driver MD    Length of stay in inpatient status:  4    Subjective     Chief Compliant:  No chief complaint on file.      History of Presenting Illness: 83-year-old female who is currently in hospital after ORIF of hip fracture.  Patient is in swing bed status this time and getting physical therapy every day.  Patient states she is doing very well    Objective     Current Hospital Meds:  apixaban 2.5 mg Oral Q12H   atorvastatin 40 mg Oral Nightly   busPIRone 10 mg Oral BID   carvedilol 12.5 mg Oral BID With Meals   [START ON 2019] cholecalciferol 50,000 Units Oral Weekly   pantoprazole 40 mg Oral QAM   [START ON 2019] tuberculin 5 Units Intradermal Once      ----------------------------------------------------------------------------------------------------------------------  Vital Signs:  Temp:  [98.3 °F (36.8 °C)-98.7 °F (37.1 °C)] 98.3 °F (36.8 °C)  Heart Rate:  [73-75] 73  Resp:  [18] 18  BP: (101-144)/(54-70) 144/70  SpO2:  [94 %-95 %] 94 %  on   ;   Device (Oxygen Therapy): room air  Body mass index is 28.2 kg/m².    Wt Readings from Last 3 Encounters:   19 74.5 kg (164 lb 4.8 oz)   19 76.2 kg (168 lb 0.2 oz)   19 78 kg (172 lb)     Intake & Output (last 3 days)       701 - 06/15 0700 06/15 07 -  07 -  -  0700    P.O. 600 1560 960 360    Total Intake(mL/kg) 600 (8.1) 1560 (20.9) 960 (12.9) 360 (4.8)    Urine (mL/kg/hr) 1200 (0.7) 850 (0.5) 2050 (1.1) 700 (1.1)    Stool 0  0     Total Output 3587 589 7170 700    Net -600 +710 -1090 -340            Urine Unmeasured Occurrence  1 x      Stool Unmeasured Occurrence 1 x  1 x         Diet Regular;  Cardiac  ----------------------------------------------------------------------------------------------------------------------  Physical exam:  Constitutional: No fever no chills  HEENT: Normocephalic atraumatic  Neck: Supple  Cardiovascular: Regular rate and rhythm  Pulmonary/Chest: Clear to auscultation  Abdominal: Positive bowel sounds soft.   Musculoskeletal: No evidence arthropathy  Neurological: Focal deficits  Skin: Rashes  Peripheral vascular:  Genitourinary:  ----------------------------------------------------------------------------------------------------------------------    Last echocardiogram:  Results for orders placed during the hospital encounter of 01/25/19   Adult Transthoracic Echo Complete W/ Cont if Necessary Per Protocol    Narrative · Right ventricular cavity is borderline dilated.  · Left ventricular wall thickness is consistent with concentric   hypertrophy.  · Left ventricular diastolic dysfunction (grade I) consistent with   impaired relaxation.  · Left ventricular systolic function is normal. Estimated EF = 60%.  · Mild aortic valve stenosis is present. Aortic valve mean pressure   gradient is 10.9 mmHg.  · Mild aortic valve regurgitation is present.  · Mild-to-moderate mitral valve regurgitation is present  · Mild to moderate tricuspid valve regurgitation is present.  · Calculated right ventricular systolic pressure from tricuspid   regurgitation is 29 mmHg.  · No significant pericardial effusion is noted.        ----------------------------------------------------------------------------------------------------------------------  Results from last 7 days   Lab Units 06/17/19  0342 06/15/19  0405 06/14/19  0411   WBC 10*3/mm3 9.02 9.29 11.10*   HEMOGLOBIN g/dL 10.0* 10.0* 10.1*   HEMATOCRIT % 32.8* 32.1* 33.4*   MCV fL 96.2 95.5 97.1*   MCHC g/dL 30.5* 31.2* 30.2*   PLATELETS 10*3/mm3 425 403 399         Results from last 7 days   Lab Units 06/17/19  0342 06/15/19  0405 06/14/19  0411  06/13/19  1620 06/13/19  0515 06/12/19  0523   SODIUM mmol/L 142 141 138  --  137 139   POTASSIUM mmol/L 4.0 4.3 3.6  --  3.7 3.8   MAGNESIUM mg/dL  --   --   --  1.9 1.9 1.6   CHLORIDE mmol/L 103 103 101  --  98 101   CO2 mmol/L 27.1 26.9 24.8  --  27.4 27.2   BUN mg/dL 22 24* 26*  --  23 18   CREATININE mg/dL 0.96 1.05* 1.16*  --  1.08* 0.88   EGFR IF NONAFRICN AM mL/min/1.73 56* 50* 45*  --  48* 61   CALCIUM mg/dL 8.7 8.5* 8.3*  --  8.5* 8.4*   PHOSPHORUS mg/dL  --   --  3.7  --   --  3.5   GLUCOSE mg/dL 109* 92 104*  --  103* 104*   ALBUMIN g/dL 2.51* 2.29* 2.24*  --   --   --    BILIRUBIN mg/dL 0.2 0.3 0.3  --   --   --    ALK PHOS U/L 76 71 69  --   --   --    AST (SGOT) U/L 25 47* 41*  --   --   --    ALT (SGPT) U/L 44* 58* 56*  --   --   --    Estimated Creatinine Clearance: 43.9 mL/min (by C-G formula based on SCr of 0.96 mg/dL).  No results found for: AMMONIA              No results found for: HGBA1C, POCGLU  No results found for: TSH, FREET4  No results found for: PREGTESTUR, PREGSERUM, HCG, HCGQUANT  Pain Management Panel     Pain Management Panel Latest Ref Rng & Units 6/5/2019    CREATININE UR mg/dL 195.3        Brief Urine Lab Results  (Last result in the past 365 days)      Color   Clarity   Blood   Leuk Est   Nitrite   Protein   CREAT   Urine HCG        06/05/19 0437             195.3                                 I have personally looked at the labs and they are summarized above.  ----------------------------------------------------------------------------------------------------------------------  Detailed radiology reports for the last 24 hours:    Imaging Results (last 24 hours)     ** No results found for the last 24 hours. **        Final impressions for the last 30 days of radiology reports:    Xr Shoulder 2+ View Right    Result Date: 6/5/2019  No acute or destructive bony abnormality.  COMMUNICATION: Per this written report.  This report was finalized on 6/5/2019 7:11 AM by Dr. Swartz  MD Kamila.      Xr Wrist 3+ View Right    Result Date: 6/5/2019  No acute or destructive bony abnormality.  COMMUNICATION: Per this written report.  This report was finalized on 6/5/2019 7:11 AM by Dr. Maxime Treviño MD.      Xr Hand 3+ View Right    Result Date: 6/5/2019  No acute or destructive bony abnormality.  COMMUNICATION: Per this written report.  This report was finalized on 6/5/2019 7:11 AM by Dr. Maxime Treviño MD.      Xr Femur 2 View Left    Result Date: 6/5/2019  MODERATELY DISPLACED SUBCAPITAL FEMORAL NECK FRACTURE.  COMMUNICATION: Per this written report.  This report was finalized on 6/5/2019 7:11 AM by Dr. Maxime Treviño MD.      Xr Chest 1 View    Result Date: 6/5/2019  AS ABOVE.  This report was finalized on 6/5/2019 7:10 AM by Dr. Maxime Treviño MD.      Xr Pelvis 1 Or 2 View    Result Date: 6/6/2019  No acute or destructive bony abnormality.  COMMUNICATION: Per this written report.  This report was finalized on 6/6/2019 7:11 AM by Dr. Maxime Treviño MD.      Xr Hip With Or Without Pelvis 2 - 3 View Left    Result Date: 6/5/2019  AS ABOVE.  COMMUNICATION: Per this written report.  This report was finalized on 6/5/2019 7:12 AM by Dr. Maxime Treviño MD.      I have personally looked at the radiology images and read the final radiology report.    Assessment & Plan      Status post ORIF of hip fracture--continue PT at this time  VTE Prophylaxis:   Mechanical Order History:     None      Pharmalogical Order History:     Ordered     Dose Route Frequency Stop    06/13/19 1508  apixaban (ELIQUIS) tablet 2.5 mg      2.5 mg PO Every 12 Hours Scheduled --              Tristan Melara MD  [facility]  06/17/19  3:31 PM

## 2019-06-17 NOTE — PLAN OF CARE
Problem: Patient Care Overview  Goal: Plan of Care Review  Outcome: Ongoing (interventions implemented as appropriate)   06/17/19 1054   Coping/Psychosocial   Plan of Care Reviewed With patient   Plan of Care Review   Progress improving   OTHER   Outcome Summary Pt tolerated tx session well with rest breaks provided. Continue with plan of care.

## 2019-06-17 NOTE — PLAN OF CARE
Problem: Pain, Acute (Adult)  Goal: Identify Related Risk Factors and Signs and Symptoms  Outcome: Ongoing (interventions implemented as appropriate)   06/17/19 1630   Pain, Acute (Adult)   Related Risk Factors (Acute Pain) positioning;surgery;procedure/treatment   Signs and Symptoms (Acute Pain) verbalization of pain descriptors       Problem: Fracture Orthopaedic (Adult)  Goal: Signs and Symptoms of Listed Potential Problems Will be Absent, Minimized or Managed (Fracture Orthopaedic)  Outcome: Ongoing (interventions implemented as appropriate)   06/17/19 1630   Goal/Outcome Evaluation   Problems Assessed (Orthopaedic Fracture) all   Problems Present (Orthopaedic Fracture) none       Problem: Skin Injury Risk (Adult)  Goal: Identify Related Risk Factors and Signs and Symptoms  Outcome: Ongoing (interventions implemented as appropriate)   06/17/19 1630   Skin Injury Risk (Adult)   Related Risk Factors (Skin Injury Risk) advanced age;infection       Problem: Fall Risk (Adult)  Goal: Identify Related Risk Factors and Signs and Symptoms  Outcome: Ongoing (interventions implemented as appropriate)   06/17/19 1630   Fall Risk (Adult)   Related Risk Factors (Fall Risk) history of falls   Signs and Symptoms (Fall Risk) presence of risk factors       Problem: Patient Care Overview  Goal: Plan of Care Review  Outcome: Ongoing (interventions implemented as appropriate)   06/17/19 1630   Coping/Psychosocial   Plan of Care Reviewed With patient   Plan of Care Review   Progress improving

## 2019-06-18 LAB
ALBUMIN SERPL-MCNC: 2.27 G/DL (ref 3.5–5.2)
ALBUMIN/GLOB SERPL: 0.6 G/DL
ALP SERPL-CCNC: 78 U/L (ref 39–117)
ALT SERPL W P-5'-P-CCNC: 35 U/L (ref 1–33)
ANION GAP SERPL CALCULATED.3IONS-SCNC: 14.8 MMOL/L
AST SERPL-CCNC: 20 U/L (ref 1–32)
BASOPHILS # BLD AUTO: 0.04 10*3/MM3 (ref 0–0.2)
BASOPHILS NFR BLD AUTO: 0.4 % (ref 0–1.5)
BILIRUB SERPL-MCNC: 0.3 MG/DL (ref 0.2–1.2)
BUN BLD-MCNC: 19 MG/DL (ref 8–23)
BUN/CREAT SERPL: 20 (ref 7–25)
CALCIUM SPEC-SCNC: 8.4 MG/DL (ref 8.6–10.5)
CHLORIDE SERPL-SCNC: 102 MMOL/L (ref 98–107)
CO2 SERPL-SCNC: 24.2 MMOL/L (ref 22–29)
CREAT BLD-MCNC: 0.95 MG/DL (ref 0.57–1)
DEPRECATED RDW RBC AUTO: 43 FL (ref 37–54)
EOSINOPHIL # BLD AUTO: 0.38 10*3/MM3 (ref 0–0.4)
EOSINOPHIL NFR BLD AUTO: 3.8 % (ref 0.3–6.2)
ERYTHROCYTE [DISTWIDTH] IN BLOOD BY AUTOMATED COUNT: 12.5 % (ref 12.3–15.4)
GFR SERPL CREATININE-BSD FRML MDRD: 56 ML/MIN/1.73
GLOBULIN UR ELPH-MCNC: 3.6 GM/DL
GLUCOSE BLD-MCNC: 100 MG/DL (ref 65–99)
HCT VFR BLD AUTO: 33.7 % (ref 34–46.6)
HGB BLD-MCNC: 10.2 G/DL (ref 12–15.9)
IMM GRANULOCYTES # BLD AUTO: 0.03 10*3/MM3 (ref 0–0.05)
IMM GRANULOCYTES NFR BLD AUTO: 0.3 % (ref 0–0.5)
LYMPHOCYTES # BLD AUTO: 2.01 10*3/MM3 (ref 0.7–3.1)
LYMPHOCYTES NFR BLD AUTO: 20.1 % (ref 19.6–45.3)
MAGNESIUM SERPL-MCNC: 1.6 MG/DL (ref 1.6–2.4)
MCH RBC QN AUTO: 29.7 PG (ref 26.6–33)
MCHC RBC AUTO-ENTMCNC: 30.3 G/DL (ref 31.5–35.7)
MCV RBC AUTO: 98 FL (ref 79–97)
MONOCYTES # BLD AUTO: 0.86 10*3/MM3 (ref 0.1–0.9)
MONOCYTES NFR BLD AUTO: 8.6 % (ref 5–12)
NEUTROPHILS # BLD AUTO: 6.67 10*3/MM3 (ref 1.7–7)
NEUTROPHILS NFR BLD AUTO: 66.8 % (ref 42.7–76)
PHOSPHATE SERPL-MCNC: 3.5 MG/DL (ref 2.5–4.5)
PLATELET # BLD AUTO: 396 10*3/MM3 (ref 140–450)
PMV BLD AUTO: 10.1 FL (ref 6–12)
POTASSIUM BLD-SCNC: 3.6 MMOL/L (ref 3.5–5.2)
PROT SERPL-MCNC: 5.9 G/DL (ref 6–8.5)
RBC # BLD AUTO: 3.44 10*6/MM3 (ref 3.77–5.28)
SODIUM BLD-SCNC: 141 MMOL/L (ref 136–145)
WBC NRBC COR # BLD: 9.99 10*3/MM3 (ref 3.4–10.8)

## 2019-06-18 PROCEDURE — 97530 THERAPEUTIC ACTIVITIES: CPT

## 2019-06-18 PROCEDURE — 83735 ASSAY OF MAGNESIUM: CPT | Performed by: PHYSICIAN ASSISTANT

## 2019-06-18 PROCEDURE — 80053 COMPREHEN METABOLIC PANEL: CPT | Performed by: PHYSICIAN ASSISTANT

## 2019-06-18 PROCEDURE — 94799 UNLISTED PULMONARY SVC/PX: CPT

## 2019-06-18 PROCEDURE — 85025 COMPLETE CBC W/AUTO DIFF WBC: CPT | Performed by: PHYSICIAN ASSISTANT

## 2019-06-18 PROCEDURE — 84100 ASSAY OF PHOSPHORUS: CPT | Performed by: PHYSICIAN ASSISTANT

## 2019-06-18 PROCEDURE — 25010000002 MAGNESIUM SULFATE IN D5W 1G/100ML (PREMIX) 1-5 GM/100ML-% SOLUTION: Performed by: PHYSICIAN ASSISTANT

## 2019-06-18 RX ORDER — MAGNESIUM SULFATE 1 G/100ML
1 INJECTION INTRAVENOUS AS NEEDED
Status: DISCONTINUED | OUTPATIENT
Start: 2019-06-18 | End: 2019-06-21 | Stop reason: HOSPADM

## 2019-06-18 RX ORDER — MAGNESIUM SULFATE 1 G/100ML
1 INJECTION INTRAVENOUS ONCE
Status: COMPLETED | OUTPATIENT
Start: 2019-06-18 | End: 2019-06-18

## 2019-06-18 RX ORDER — MAGNESIUM SULFATE HEPTAHYDRATE 40 MG/ML
2 INJECTION, SOLUTION INTRAVENOUS AS NEEDED
Status: DISCONTINUED | OUTPATIENT
Start: 2019-06-18 | End: 2019-06-21 | Stop reason: HOSPADM

## 2019-06-18 RX ADMIN — APIXABAN 2.5 MG: 2.5 TABLET, FILM COATED ORAL at 08:02

## 2019-06-18 RX ADMIN — APIXABAN 2.5 MG: 2.5 TABLET, FILM COATED ORAL at 20:57

## 2019-06-18 RX ADMIN — PANTOPRAZOLE SODIUM 40 MG: 40 TABLET, DELAYED RELEASE ORAL at 06:14

## 2019-06-18 RX ADMIN — ACETAMINOPHEN 650 MG: 325 TABLET ORAL at 16:29

## 2019-06-18 RX ADMIN — ATORVASTATIN CALCIUM 40 MG: 40 TABLET, FILM COATED ORAL at 20:57

## 2019-06-18 RX ADMIN — MAGNESIUM SULFATE IN DEXTROSE 1 G: 10 INJECTION, SOLUTION INTRAVENOUS at 13:27

## 2019-06-18 RX ADMIN — ACETAMINOPHEN 650 MG: 325 TABLET ORAL at 06:14

## 2019-06-18 RX ADMIN — BUSPIRONE HYDROCHLORIDE 10 MG: 10 TABLET ORAL at 20:57

## 2019-06-18 RX ADMIN — BUSPIRONE HYDROCHLORIDE 10 MG: 10 TABLET ORAL at 08:02

## 2019-06-18 RX ADMIN — CARVEDILOL 12.5 MG: 6.25 TABLET, FILM COATED ORAL at 17:54

## 2019-06-18 RX ADMIN — CARVEDILOL 12.5 MG: 6.25 TABLET, FILM COATED ORAL at 08:02

## 2019-06-18 NOTE — PLAN OF CARE
Problem: Patient Care Overview  Goal: Plan of Care Review   06/18/19 1201   Coping/Psychosocial   Plan of Care Reviewed With patient   Coping/Psychosocial   Patient Agreement with Plan of Care agrees   Plan of Care Review   Progress improving   OTHER   Outcome Summary Patient seen in room bedside. Patient tolerated tx session with appropriate rest breaks. Patient continues to be motivated to return to prior level of function.

## 2019-06-18 NOTE — PLAN OF CARE
Problem: Pain, Acute (Adult)  Goal: Identify Related Risk Factors and Signs and Symptoms  Outcome: Ongoing (interventions implemented as appropriate)      Problem: Fracture Orthopaedic (Adult)  Goal: Signs and Symptoms of Listed Potential Problems Will be Absent, Minimized or Managed (Fracture Orthopaedic)  Outcome: Ongoing (interventions implemented as appropriate)      Problem: Skin Injury Risk (Adult)  Goal: Identify Related Risk Factors and Signs and Symptoms  Outcome: Ongoing (interventions implemented as appropriate)      Problem: Fall Risk (Adult)  Goal: Identify Related Risk Factors and Signs and Symptoms  Outcome: Ongoing (interventions implemented as appropriate)      Problem: Patient Care Overview  Goal: Plan of Care Review  Outcome: Ongoing (interventions implemented as appropriate)

## 2019-06-18 NOTE — SIGNIFICANT NOTE
06/18/19 1653   Rehab Treatment   Reason Treatment Not Performed patient/family declined treatment  (Patient c/o increased pain & swelling in L LE & declined PT this date.)   Recommendation   PT - Next Appointment 06/18/19

## 2019-06-18 NOTE — PLAN OF CARE
Problem: Pain, Acute (Adult)  Goal: Identify Related Risk Factors and Signs and Symptoms  Outcome: Ongoing (interventions implemented as appropriate)   06/18/19 1340   Pain, Acute (Adult)   Related Risk Factors (Acute Pain) positioning;persistent pain;procedure/treatment;surgery   Signs and Symptoms (Acute Pain) fatigue/weakness;fear of reinjury;verbalization of pain descriptors     Goal: Acceptable Pain Control/Comfort Level  Outcome: Ongoing (interventions implemented as appropriate)   06/18/19 1340   Pain, Acute (Adult)   Acceptable Pain Control/Comfort Level making progress toward outcome       Problem: Fracture Orthopaedic (Adult)  Goal: Signs and Symptoms of Listed Potential Problems Will be Absent, Minimized or Managed (Fracture Orthopaedic)  Outcome: Ongoing (interventions implemented as appropriate)   06/18/19 1340   Goal/Outcome Evaluation   Problems Assessed (Orthopaedic Fracture) all   Problems Present (Orthopaedic Fracture) situational response       Problem: Skin Injury Risk (Adult)  Goal: Identify Related Risk Factors and Signs and Symptoms  Outcome: Ongoing (interventions implemented as appropriate)   06/18/19 1340   Skin Injury Risk (Adult)   Related Risk Factors (Skin Injury Risk) advanced age;edema;mobility impaired     Goal: Skin Health and Integrity  Outcome: Ongoing (interventions implemented as appropriate)   06/18/19 1340   Skin Injury Risk (Adult)   Skin Health and Integrity making progress toward outcome       Problem: Fall Risk (Adult)  Goal: Identify Related Risk Factors and Signs and Symptoms  Outcome: Ongoing (interventions implemented as appropriate)   06/18/19 1340   Fall Risk (Adult)   Related Risk Factors (Fall Risk) history of falls;fear of falling   Signs and Symptoms (Fall Risk) presence of risk factors     Goal: Absence of Fall  Outcome: Ongoing (interventions implemented as appropriate)   06/18/19 1340   Fall Risk (Adult)   Absence of Fall making progress toward outcome        Problem: Patient Care Overview  Goal: Plan of Care Review  Outcome: Ongoing (interventions implemented as appropriate)   06/18/19 1340   Coping/Psychosocial   Plan of Care Reviewed With patient   Plan of Care Review   Progress improving

## 2019-06-18 NOTE — PAYOR COMM NOTE
"[facility]  [facility]  Phone:[phone]  Fax: [phone]    Auth# [id number]      Abbi Mendez (83 y.o. Female)     Date of Birth Social Security Number Address Home Phone MRN    1935  [id number]  [place] [address] [phone] [id number]    Jew Marital Status          [place] Single       Admission Date Admission Type Admitting Provider Attending Provider Department, Room/Bed    19 Urgent Jeffrey Parsons DO Hays, Ray G, MD [address], 3326/1P    Discharge Date Discharge Disposition Discharge Destination                       Attending Provider:  Tristan Melara MD    Allergies:  Darvon [Propoxyphene], Penicillins    Isolation:  None   Infection:  None   Code Status:  CPR    Ht:  162.6 cm (64\")   Wt:  74.5 kg (164 lb 4.8 oz)    Admission Cmt:  None   Principal Problem:  None                Active Insurance as of 2019     Primary Coverage     Payor Plan Insurance Group Employer/Plan Group    HUMANA MEDICARE REPLACEMENT HUMANA MEDICARE REPL [id number]     Payor Plan Address Payor Plan Phone Number Payor Plan Fax Number Effective Dates    [address] [address] [phone]  2018 - None Entered    [facility] [id number]       Subscriber Name Subscriber Birth Date Member ID       ABBI MENDEZ 1935 [id number]                 Emergency Contacts      (Rel.) Home Phone Work Phone Mobile Phone    Shruthi Gama (Daughter) -- -- [phone]    OrtizSuze epreira (Daughter) -- -- [phone]    BENJAASTRID SEGURA (Daughter) [phone] -- --          Acute Care - Occupational Therapy Initial Evaluation  [facility]     Patient Name: Abbi Mendez                  : 1935                        MRN: [id number]  Today's Date: 2019                                                                        Admit Date: 2019     No diagnosis found.      Patient Active Problem List   Diagnosis   • Atrial fibrillation " (CMS/HCC)   • Coronary artery disease involving native coronary artery of native heart without angina pectoris   • RADHA (obstructive sleep apnea)   • Essential hypertension   • Hyperlipidemia LDL goal <70   • Iron deficiency anemia   • Left bundle branch block   • Syncope and collapse   • Severe sinus bradycardia   • Third degree AV block (CMS/HCC)   • Syncope   • Compression deformity of vertebra   • Radiculopathy   • Pericardial effusion without cardiac tamponade   • Hypercalcemia   • Abnormal serum protein electrophoresis   • Weight loss, unintentional   • Primary hyperparathyroidism (CMS/HCC)   • Hypercalcemia   • Pacemaker   • Hiatal hernia   • Elevated serum immunoglobulin free light chains   • Left displaced femoral neck fracture (CMS/HCC)   • Hip fracture (CMS/HCC)      Medical History        Past Medical History:   Diagnosis Date   • Anxiety     • Arthritis     • Atrial fibrillation (CMS/HCC) 11/14/2016   • Gastric ulcer     • Hiatal hernia     • Hypertension     • Iron deficiency anemia     • RADHA (obstructive sleep apnea) 11/14/2016         Surgical History         Past Surgical History:   Procedure Laterality Date   • CARDIAC ELECTROPHYSIOLOGY PROCEDURE N/A 1/18/2018     Procedure: Pacemaker DC new;  Surgeon: Soraya Darling MD;  Location: [facility] INVASIVE LOCATION;  Service:    • HIP BIPOLAR REPLACEMENT Left 6/5/2019     Procedure: HIP BIPOLAR REPLACEMENT;  Surgeon: Gerardo Jain MD;  Location: [facility];  Service: Orthopedics   • TOTAL KNEE ARTHROPLASTY Right     • TUBAL ABDOMINAL LIGATION                                 OT ASSESSMENT FLOWSHEET (last 12 hours)                    Occupational Therapy Evaluation                   Row Name 06/13/19 4224                                        OT Evaluation Time/Intention      Document Type  evaluation  -KR              Mode of Treatment  occupational therapy  -KR              Patient Effort  good  -KR                            Cognitive  Assessment/Intervention- PT/OT      Orientation Status (Cognition)  oriented x 3  -KR              Follows Commands (Cognition)  WFL  -KR                            Bathing Assessment/Intervention      Bathing [place] Level  bathing skills;moderate assist (50% patient effort)  -KR                            Upper Body Dressing Assessment/Training      Upper Body Dressing [place] Level  upper body dressing skills;minimum assist (75% patient effort)  -KR                            Lower Body Dressing Assessment/Training      Lower Body Dressing [place] Level  lower body dressing skills;maximum assist (25% patient effort)  -KR                            Grooming Assessment/Training      [place] Level (Grooming)  grooming skills;minimum assist (75% patient effort)  -KR                            Self-Feeding Assessment/Training      [place] Level (Feeding)  feeding skills;set up  -KR                            Toileting Assessment/Training      [place] Level (Toileting)  toileting skills;moderate assist (50% patient effort)  -KR                            General ROM      GENERAL ROM COMMENTS  R wrist brace applied upon evaluation. R hand 3/5 AROM, elbow/shoulder 4/5, LUE WFL  -KR                            MMT (Manual Muscle Testing)      General MMT Comments  RUE deferred, LUE 3/5  -KR                            Wound 06/05/19 1838 Left hip incision      Wound - Properties Group Date first assessed: 06/05/19  - Time first assessed: 1838  - Side: Left  - Location: hip  - Type: incision  -                    Clinical Impression (OT)      Patient/Family Goals Statement (OT Eval)  Return to PLOF  -KR              Criteria for Skilled Therapeutic Interventions Met (OT Eval)  yes  -KR              Rehab Potential (OT Eval)  good, to achieve stated therapy goals  -KR              Anticipated Discharge Disposition (OT)  home with assist  -KR                            OT Goals      ROM  Goal Selection (OT)  ROM, OT goal 1  -KR              Additional Documentation  ROM Goal Selection (OT) (Row)  -KR                            Dressing Goal 1 (OT)      Activity/Assistive Device (Dressing Goal 1, OT)  dressing skills, all;reacher  -KR              [place]/Cues Needed (Dressing Goal 1, OT)  minimum assist (75% or more patient effort)  -KR                            ROM Goal 1 (OT)      ROM Goal 1 (OT)  R hand AROM WFL to enhance fxl task performance with self care skills  -KR              Time Frame (ROM Goal 1, OT)  by discharge  -KR                            Strength Goal 1 (OT)      Strength Goal 1 (OT)  BUE increase x 1 to enhance self care skills  -KR              Time Frame (Strength Goal 1, OT)  by discharge  -KR                                User Key  (r) = Recorded By, (t) = Taken By, (c) = Cosigned By     Initials Name Effective Dates     Ary Coyne, RN 06/16/16 -      KR Brannon Mondragon, OT 04/03/18 -                       OT Recommendation and Plan  Outcome Summary/Treatment Plan (OT)  Anticipated Discharge Disposition (OT): home with assist                Outcome Measures                    Row Name 06/13/19 1623 06/12/19 1600 06/11/19 1600                 How much difficulty does the patient currently have...     Turning from your back to your side while in flat bed without using bedrails?  --  3  -LL  3  -LL     Standing up from a chair using your arms (e.g., wheelchair, bedside chair)?  --  3  -LL  3  -LL     Moving from lying on back to sitting on the side of a flat bed without bedrails?  --  3  -LL  3  -LL                 How much help from another person do you currently need...     Moving to and from a bed to a chair (including a wheelchair)?  --  3  -LL  3  -LL     Climbing 3-5 steps with a railing?  --  2  -LL  2  -LL     To walk in hospital room?  --  3  -LL  3  -LL     AM-PAC 6 Clicks Score  --  17  -LL  17  -LL                 How much help from another is  currently needed...     Putting on and taking off regular lower body clothing?  2  -KR  --  --     Bathing (including washing, rinsing, and drying)  2  -KR  --  --     Toileting (which includes using toilet bed pan or urinal)  3  -KR  --  --     Putting on and taking off regular upper body clothing  3  -KR  --  --     Taking care of personal grooming (such as brushing teeth)  3  -KR  --  --     Eating meals  3  -KR  --  --     Score  16  -KR  --  --                 Functional Assessment     Outcome Measure Options  --  AM-PAC 6 Clicks Basic Mobility (PT)  -LL  AM-PAC 6 Clicks Basic Mobility (PT)  -LL                       User Key  (r) = Recorded By, (t) = Taken By, (c) = Cosigned By     Initials Name Provider Type     Brannon Wang OT Occupational Therapist     Loyda Kraus, ALFREDO Physical Therapy Assistant                Time Calculation:              Time Calculation- OT                 Row Name 19 1623                           Time Calculation- OT     Total Timed Code Minutes- OT  60 minute(s)  -                           User Key  (r) = Recorded By, (t) = Taken By, (c) = Cosigned By     Initials Name Provider Type     Brannon Wang OT Occupational Therapist                    Therapy Charges for Today      Code Description Service Date Service Provider Modifiers Qty     [id number]  OT EVAL MOD COMPLEXITY 4 2019 Brannon Mondragon OT GO 1                Brannon Mondragon OT                     2019  Acute Care - Physical Therapy Initial Evaluation   [place]     Patient Name: Abbi Mendez                  : 1935                        MRN: [id number]  Today's Date: 2019                               Onset of Illness/Injury or Date of Surgery: 19  Date of Referral to PT: 19                  Referring Physician: Dr. Parsons                       Admit Date: 2019                        Visit Dx: No diagnosis found.      Patient Active Problem List    Diagnosis   • Atrial fibrillation (CMS/HCC)   • Coronary artery disease involving native coronary artery of native heart without angina pectoris   • RADHA (obstructive sleep apnea)   • Essential hypertension   • Hyperlipidemia LDL goal <70   • Iron deficiency anemia   • Left bundle branch block   • Syncope and collapse   • Severe sinus bradycardia   • Third degree AV block (CMS/HCC)   • Syncope   • Compression deformity of vertebra   • Radiculopathy   • Pericardial effusion without cardiac tamponade   • Hypercalcemia   • Abnormal serum protein electrophoresis   • Weight loss, unintentional   • Primary hyperparathyroidism (CMS/HCC)   • Hypercalcemia   • Pacemaker   • Hiatal hernia   • Elevated serum immunoglobulin free light chains   • Left displaced femoral neck fracture (CMS/HCC)   • Hip fracture (CMS/HCC)      Medical History        Past Medical History:   Diagnosis Date   • Anxiety     • Arthritis     • Atrial fibrillation (CMS/HCC) 11/14/2016   • Gastric ulcer     • Hiatal hernia     • Hypertension     • Iron deficiency anemia     • RADHA (obstructive sleep apnea) 11/14/2016         Surgical History         Past Surgical History:   Procedure Laterality Date   • CARDIAC ELECTROPHYSIOLOGY PROCEDURE N/A 1/18/2018     Procedure: Pacemaker DC new;  Surgeon: Soraya Darling MD;  Location: [facility] CATH INVASIVE LOCATION;  Service:    • HIP BIPOLAR REPLACEMENT Left 6/5/2019     Procedure: HIP BIPOLAR REPLACEMENT;  Surgeon: Gerardo Jain MD;  Location: [facility];  Service: Orthopedics   • TOTAL KNEE ARTHROPLASTY Right     • TUBAL ABDOMINAL LIGATION                            PT ASSESSMENT (last 12 hours)                     Physical Therapy Evaluation                 Row Name 06/13/19 4743                       PT Evaluation Time/Intention      Subjective Information  no complaints  -AG        Document Type  evaluation  -AG        Mode of Treatment  individual therapy;physical therapy  -AG        Patient Effort  good  -AG         Symptoms Noted During/After Treatment  fatigue  -AG        Comment  family present.   -AG                   Row Name 06/13/19 1749                       General Information      Patient Profile Reviewed?  yes  -AG        Onset of Illness/Injury or Date of Surgery  06/13/19  -        Referring Physician  Dr. Parsons  -        Patient Observations  alert;cooperative;agree to therapy  -AG        Patient/Family Observations  pt. supine; pleasant and cooperative, in no apparent distress.  Family at bedside.  Hip incision observed, appears dry and intact, unremarkable.  R wrist splint in place.   -AG        Prior Level of Function  independent:;all household mobility ambulating with walker at times d/t hip OA pain  -AG        Pertinent History of Current Functional Problem  underwent L JANET following L displaced femoral neck fx.   -AG        Existing Precautions/Restrictions  fall;hip;left  -AG        Limitations/Impairments  safety/cognitive  -AG        Risks Reviewed  patient and family:;LOB;dizziness;increased discomfort;change in vital signs  -AG        Benefits Reviewed  patient and family:;improve function;increase independence;increase strength;increase balance;decrease risk of DVT;increase knowledge  -AG                   Row Name 06/13/19 1749                       Relationship/Environment      Lives With  alone  -AG        Family Caregiver if Needed  child(ananya), adult  -AG                   Row Name 06/13/19 1749                       Resource/Environmental Concerns      Current Living Arrangements  home/apartment/condo  -AG                   Row Name 06/13/19 1749                       Cognitive Assessment/Intervention- PT/OT      Orientation Status (Cognition)  oriented x 3  -AG        Follows Commands (Cognition)  WFL;follows one step commands;verbal cues/prompting required  -AG                   Row Name 06/13/19 1749                       Safety Issues, Functional Mobility      Safety Issues  Affecting Function (Mobility)  safety precaution awareness;safety precautions follow-through/compliance  -        Impairments Affecting Function (Mobility)  balance;endurance/activity tolerance;strength  -AG                   Row Name 06/13/19 1749                       Mobility Assessment/Treatment      Extremity Weight-bearing Status  left lower extremity;right lower extremity  -AG        Left Lower Extremity (Weight-bearing Status)  weight-bearing as tolerated (WBAT)  -AG        Right Lower Extremity (Weight-bearing Status)  weight-bearing as tolerated (WBAT)  -                   Row Name 06/13/19 1749                       Bed Mobility Assessment/Treatment      Bed Mobility Assessment/Treatment  scooting/bridging;supine-sit;sit-supine  -AG        Scooting/Bridging [place] (Bed Mobility)  verbal cues;nonverbal cues (demo/gesture);contact guard  -AG        Supine-Sit [place] (Bed Mobility)  verbal cues;nonverbal cues (demo/gesture);contact guard  -AG        Sit-Supine [place] (Bed Mobility)  verbal cues;nonverbal cues (demo/gesture);minimum assist (75% patient effort)  -AG        Bed Mobility, Safety Issues  cognitive deficits limit understanding;decreased use of legs for bridging/pushing  -AG        Assistive Device (Bed Mobility)  bed rails  -AG                   Row Name 06/13/19 1749                       Transfer Assessment/Treatment      Transfer Assessment/Treatment  sit-stand transfer;stand-sit transfer;stand pivot/stand step transfer  -AG        Maintains Weight-bearing Status (Transfers)  able to maintain  -AG        Sit-Stand [place] (Transfers)  verbal cues;nonverbal cues (demo/gesture);minimum assist (75% patient effort)  -        Stand-Sit [place] (Transfers)  verbal cues;nonverbal cues (demo/gesture);minimum assist (75% patient effort)  -AG                   Row Name 06/13/19 1749                       Sit-Stand Transfer      Assistive Device (Sit-Stand Transfers)   walker, front-wheeled  -AG                   Row Name 06/13/19 1749                       Stand-Sit Transfer      Assistive Device (Stand-Sit Transfers)  walker, front-wheeled  -AG                   Row Name 06/13/19 1749                       Stand Pivot/Stand Step Transfer      Stand Pivot/Stand Step [place]  verbal cues;nonverbal cues (demo/gesture);minimum assist (75% patient effort)  -        Assistive Device (Stand Pivot Stand Step Transfer)  walker, front-wheeled  -AG                   Row Name 06/13/19 1749                       Gait/Stairs Assessment/Training      Gait/Stairs Assessment/Training  gait/ambulation independence;gait/ambulation assistive device;distance ambulated;gait pattern;gait deviations;maintains weight-bearing status  -        [place] Level (Gait)  verbal cues;nonverbal cues (demo/gesture);contact guard;minimum assist (75% patient effort)  -        Assistive Device (Gait)  walker, front-wheeled  -AG        Distance in Feet (Gait)  100  -AG        Pattern (Gait)  step-through  -AG        Deviations/Abnormal Patterns (Gait)  antalgic;stride length decreased  -        Bilateral Gait Deviations  forward flexed posture;heel strike decreased;weight shift ability decreased  -AG                   Row Name 06/13/19 1749                       MMT (Manual Muscle Testing)      General MMT Comments  B anterior tib 4/5; B quads 4/5  -                   Row Name 06/13/19 1749                       Motor Assessment/Intervention      Additional Documentation  Balance (Group);Balance Interventions (Group);Therapeutic Exercise (Group);Therapeutic Exercise Interventions (Group)  -                   Row Name 06/13/19 1749                       Therapeutic Exercise      Therapeutic Exercise  seated, lower extremities  -        Additional Documentation  Therapeutic Exercise (Row)  -                   Row Name 06/13/19 1749                       Lower Extremity Seated Therapeutic  Exercise      Performed, Seated Lower Extremity (Therapeutic Exercise)  hip abduction/adduction;ankle dorsiflexion/plantarflexion;LAQ (long arc quad), knee extension  -AG        Exercise Type, Seated Lower Extremity (Therapeutic Exercise)  AROM (active range of motion)  -AG        Restrictions, Seated Lower Extremity (Therapeutic Exercise)  L JANET precautions  -AG        Sets/Reps Detail, Seated Lower Extremity (Therapeutic Exercise)  1 x 20  -AG        Transfers Skills, Training to Functional Activity, Seated Lower Extremity (Therapeutic Exercise)  able to transfer skills from training to functional activity  -AG                   [facility] Name 06/13/19 1749                       Balance      Balance  static sitting balance;static standing balance;dynamic sitting balance;dynamic standing balance  -AG                   Row Name 06/13/19 1749                       Static Sitting Balance      Level of [place] (Unsupported Sitting, Static Balance)  conditional independence  -AG        Sitting Position (Unsupported Sitting, Static Balance)  sitting in chair;sitting on edge of bed  -AG        Time Able to Maintain Position (Unsupported Sitting, Static Balance)  more than 5 minutes  -[facility] Name 06/13/19 1749                       Static Standing Balance      Level of [place] (Supported Standing, Static Balance)  contact guard assist;minimal assist, 75% patient effort  -AG        Assistive Device Utilized (Supported Standing, Static Balance)  walker, rolling  -[facility] Name 06/13/19 1749                       Sensory Assessment/Intervention      Sensory General Assessment  no sensation deficits identified  -AG                   Row Name 06/13/19 1749                       Vision Assessment/Intervention      Visual Impairment/Limitations  WFL  -AG                   Row Name 06/13/19 1749                       Pain Assessment      Additional Documentation  Pain Scale: FACES  Pre/Post-Treatment (Group)  -AG                   Row Name 06/13/19 1749                       Pain Scale: Numbers Pre/Post-Treatment      Pain Location - Side  Left  -AG        Pain Location  hip  -AG        Pain Intervention(s)  Repositioned;Ambulation/increased activity  -AG                   Row Name 06/13/19 1749                       Pain Scale: FACES Pre/Post-Treatment      Pain: FACES Scale, Pretreatment  4-->hurts little more  -AG        Pain: FACES Scale, Post-Treatment  4-->hurts little more  -AG                   Row Name                            Wound 06/05/19 1838 Left hip incision      Wound - Properties Group Date first assessed: 06/05/19  -KH Time first assessed: 1838  -KH Side: Left  -KH Location: hip  -KH Type: incision  -KH                 Row Name 06/13/19 1749                       Coping      Observed Emotional State  calm;cooperative;pleasant  -AG        Verbalized Emotional State  acceptance  -AG                   Row Name 06/13/19 1749                       Plan of Care Review      Plan of Care Reviewed With  patient;daughter  -AG                   Row Name 06/13/19 1749                       Physical Therapy Clinical Impression      Date of Referral to PT  06/13/19  -AG        PT Diagnosis (PT Clinical Impression)  decr LE strength  -AG        Prognosis (PT Clinical Impression)  good  -AG        Functional Level at Time of Evaluation (PT Clinical Impression)  Min A/ CGA  -AG        Patient/Family Goals Statement (PT Clinical Impression)  return home  -AG        Criteria for Skilled Interventions Met (PT Clinical Impression)  yes;treatment indicated  -AG        Pathology/Pathophysiology Noted (Describe Specifically for Each System)  musculoskeletal  -AG        Impairments Found (describe specific impairments)  aerobic capacity/endurance;ergonomics and body mechanics;gait, locomotion, and balance;joint integrity and mobility  -AG        Functional Limitations in Following Categories  (Describe Specific Limitations)  self-care;community/leisure  -AG        Rehab Potential (PT Clinical Summary)  good, to achieve stated therapy goals  -AG        Predicted Duration of Therapy (PT)  LOS  -AG        Care Plan Review (PT)  evaluation/treatment results reviewed;care plan/treatment goals reviewed;risks/benefits reviewed;current/potential barriers reviewed;patient/other agree to care plan  -AG        Care Plan Review, Other Participant (PT Clinical Impression)  daughter  -AG                   Row Name 06/13/19 1746                       Physical Therapy Goals      Bed Mobility Goal Selection (PT)  bed mobility, PT goal 1  -AG        Transfer Goal Selection (PT)  transfer, PT goal 1  -AG        Gait Training Goal Selection (PT)  gait training, PT goal 1  -AG                   Row Name 06/13/19 7235                       Bed Mobility Goal 1 (PT)      Activity/Assistive Device (Bed Mobility Goal 1, PT)  rolling to left;rolling to right;scooting;sit to supine/supine to sit  -AG        [place] Level/Cues Needed (Bed Mobility Goal 1, PT)  standby assist  -AG        Time Frame (Bed Mobility Goal 1, PT)  by discharge  -AG                   Row Name 06/13/19 4072                       Transfer Goal 1 (PT)      Activity/Assistive Device (Transfer Goal 1, PT)  sit-to-stand/stand-to-sit;bed-to-chair/chair-to-bed;walker, rolling  -AG        [place] Level/Cues Needed (Transfer Goal 1, PT)  standby assist  -AG        Time Frame (Transfer Goal 1, PT)  by discharge  -AG                   Row Name 06/13/19 9292                       Gait Training Goal 1 (PT)      Activity/Assistive Device (Gait Training Goal 1, PT)  gait (walking locomotion);assistive device use;decrease fall risk;diminish gait deviation;improve balance and speed;increase endurance/gait distance;walker, rolling  -AG        [place] Level (Gait Training Goal 1, PT)  contact guard assist;standby assist  -AG        Distance (Gait Goal 1, PT)   200  -AG        Time Frame (Gait Training Goal 1, PT)  by discharge  -AG                   Row Name 06/13/19 1749                       Positioning and Restraints      Pre-Treatment Position  in bed  -AG        Post Treatment Position  chair  -AG        In Chair  notified nsg;sitting;call light within reach;encouraged to call for assist;with family/caregiver  -                          User Key  (r) = Recorded By, (t) = Taken By, (c) = Cosigned By     Initials Name Provider Type     Ary Coyne, RN Registered Nurse     Sandy Sloan, PT Physical Therapist                 Physical Therapy Education              Title: PT OT SLP Therapies (Done)                Topic: Physical Therapy (Done)                Point: Mobility training (Done)                Learning Progress Summary                 Patient Acceptance, E,D, VU,NR by  at 6/13/2019  5:48 PM   Family Acceptance, E,D, VU,NR by  at 6/13/2019  5:48 PM                                   Point: Home exercise program (Done)                Learning Progress Summary                 Patient Acceptance, E,D, VU,NR by  at 6/13/2019  5:48 PM   Family Acceptance, E,D, VU,NR by  at 6/13/2019  5:48 PM                                   Point: Body mechanics (Done)                Learning Progress Summary                 Patient Acceptance, E,D, VU,NR by  at 6/13/2019  5:48 PM   Family Acceptance, E,D, VU,NR by  at 6/13/2019  5:48 PM                                   Point: Precautions (Done)                Learning Progress Summary                 Patient Acceptance, E,D, VU,NR by  at 6/13/2019  5:48 PM   Family Acceptance, E,D, VU,NR by  at 6/13/2019  5:48 PM                                                 User Key               Initials Effective Dates Name Provider Type [facility] 04/03/18 -  Sandy Covington, PT Physical Therapist PT                     PT Recommendation and Plan  Anticipated Discharge Disposition (PT): home with  assist  Planned Therapy Interventions (PT Eval): balance training, bed mobility training, gait training, home exercise program, patient/family education, postural re-education, ROM (range of motion), strengthening, transfer training  Therapy Frequency (PT Clinical Impression): other (see comments)(6 days/ week )  Outcome Summary/Treatment Plan (PT)  Anticipated Equipment Needs at Discharge (PT): raised toilet seat, front wheeled walker  Anticipated Discharge Disposition (PT): home with assist  Plan of Care Reviewed With: patient, daughter             Outcome Measures                    Row Name 06/13/19 1748 06/13/19 1623 06/12/19 1600                 How much help from another person do you currently need...     Turning from your back to your side while in flat bed without using bedrails?  4  -AG  --  3  -LL     Moving from lying on back to sitting on the side of a flat bed without bedrails?  3  -AG  --  3  -LL     Moving to and from a bed to a chair (including a wheelchair)?  3  -AG  --  3  -LL     Standing up from a chair using your arms (e.g., wheelchair, bedside chair)?  3  -AG  --  3  -LL     Climbing 3-5 steps with a railing?  3  -AG  --  2  -LL     To walk in hospital room?  3  -AG  --  3  -LL     AM-PAC 6 Clicks Score  19  -AG  --  17  -LL                 How much help from another is currently needed...     Putting on and taking off regular lower body clothing?  --  2  -KR  --     Bathing (including washing, rinsing, and drying)  --  2  -KR  --     Toileting (which includes using toilet bed pan or urinal)  --  3  -KR  --     Putting on and taking off regular upper body clothing  --  3  -KR  --     Taking care of personal grooming (such as brushing teeth)  --  3  -KR  --     Eating meals  --  3  -KR  --     Score  --  16  -KR  --                 Functional Assessment     Outcome Measure Options  AM-PAC 6 Clicks Basic Mobility (PT)  -AG  --  AM-PAC 6 Clicks Basic Mobility (PT)  -LL                Row Name  06/11/19 1600                           How much help from another person do you currently need...     Turning from your back to your side while in flat bed without using bedrails?  3  -LL         Moving from lying on back to sitting on the side of a flat bed without bedrails?  3  -LL         Moving to and from a bed to a chair (including a wheelchair)?  3  -LL         Standing up from a chair using your arms (e.g., wheelchair, bedside chair)?  3  -LL         Climbing 3-5 steps with a railing?  2  -LL         To walk in hospital room?  3  -LL         AM-PAC 6 Clicks Score  17  -LL                     Functional Assessment     Outcome Measure Options  AM-PAC 6 Clicks Basic Mobility (PT)  -LL                           User Key  (r) = Recorded By, (t) = Taken By, (c) = Cosigned By     Initials Name Provider Type     Sandy Sloan, PT Physical Therapist     Brannon Wang, OT Occupational Therapist     LL Loyda Vega, ALFREDO Physical Therapy Assistant              Time Calculation:              PT Charges                 Row Name 06/13/19 1749 06/13/19 1746                      Time Calculation     PT Received On  --  06/13/19  -       PT - Next Appointment  --  06/14/19  -       PT Goal Re-Cert Due Date  --  06/27/19  -                   Time Calculation- PT     Total Timed Code Minutes- PT  30 minute(s)  -LL  --       TCU Minutes- PT  --  38 min  -                         User Key  (r) = Recorded By, (t) = Taken By, (c) = Cosigned By     Initials Name Provider Type      Sandy Covington, PT Physical Therapist     LL Loyda Vega, ALFREDO Physical Therapy Assistant                    Therapy Charges for Today      Code Description Service Date Service Provider Modifiers Qty     76514561491 HC GAIT TRAINING EA 15 MIN 6/13/2019 Sandy Covington, PT GP 1     25902131946 HC PT THER PROC EA 15 MIN 6/13/2019 Sandy Covington, PT GP 1     28083427411 HC PT THER SUPP EA 15 MIN 6/13/2019 Sandy Covington, PT GP 1      [id number]  PT EVAL MOD COMPLEXITY 1 6/13/2019 Sandy Covington, PT GP 1             PT G-Codes  Outcome Measure Options: AM-PAC 6 Clicks Basic Mobility (PT)  AM-PAC 6 Clicks Score: 19  Score: 16        Sandy Covington, PT                6/13/2019

## 2019-06-18 NOTE — PAYOR COMM NOTE
"[facility]  Swing Bed Program  Phone:[phone]  Fax: [phone]    Approval # [id number]      Please attached therapy evaluations   Legend for charts: 4-independent/stand by, 3 min asst, 2 mod/max asst, 1 dependent     Plan at this time is to discharge to home with son Mohinder and [facility] per family.     Abbi Mendez (83 y.o. Female)     Date of Birth Social Security Number Address Home Phone MRN    1935  [address]  [place] [address] [phone] [id number]    Voodoo Marital Status          Voodoo Single       Admission Date Admission Type Admitting Provider Attending Provider Department, Room/Bed    6/13/19 Urgent Jeffrey Parsons DO Hays, Ray G, MD [address], 3326/1P    Discharge Date Discharge Disposition Discharge Destination                       Attending Provider:  Tristan Melara MD    Allergies:  Darvon [Propoxyphene], Penicillins    Isolation:  None   Infection:  None   Code Status:  CPR    Ht:  162.6 cm (64\")   Wt:  74.5 kg (164 lb 4.8 oz)    Admission Cmt:  None   Principal Problem:  None                Active Insurance as of 6/13/2019     Primary Coverage     Payor Plan Insurance Group Employer/Plan Group    HUMANA MEDICARE REPLACEMENT HUMANA MEDICARE REPL [id number]     Payor Plan Address Payor Plan Phone Number Payor Plan Fax Number Effective Dates    [address] [address] [phone]  1/1/2018 - None Entered    [facility] [id number]       Subscriber Name Subscriber Birth Date Member ID       ABBI MENDEZ 1935 [id number]                 Emergency Contacts      (Rel.) Home Phone Work Phone Mobile Phone    Shruthi Gama (Daughter) -- -- [phone]    Suze Ortiz (Daughter) -- -- [phone]    ASTRID YOUSIF (Daughter) [phone] -- --               Physician Progress Notes (last 24 hours) (Notes from 6/17/2019  8:43 AM through 6/18/2019  8:43 AM)      Tristan Melara MD at 6/17/2019  3:31 PM     "          [facility] HOSPITALIST PROGRESS NOTE     Patient Identification:  Name:  Abbi Mendez  Age:  83 y.o.  Sex:  female  :  1935  MRN:  [id number]  Visit Number:  [id number]  ROOM: [address] [place] [address]     Primary Care Provider:  Eric Driver MD    Length of stay in inpatient status:  4    Subjective     Chief Compliant:  No chief complaint on file.      History of Presenting Illness: 83-year-old female who is currently in hospital after ORIF of hip fracture.  Patient is in swing bed status this time and getting physical therapy every day.  Patient states she is doing very well    Objective     Current Hospital Meds:  apixaban 2.5 mg Oral Q12H   atorvastatin 40 mg Oral Nightly   busPIRone 10 mg Oral BID   carvedilol 12.5 mg Oral BID With Meals   [START ON 2019] cholecalciferol 50,000 Units Oral Weekly   pantoprazole 40 mg Oral QAM   [START ON 2019] tuberculin 5 Units Intradermal Once      ----------------------------------------------------------------------------------------------------------------------  Vital Signs:  Temp:  [98.3 °F (36.8 °C)-98.7 °F (37.1 °C)] 98.3 °F (36.8 °C)  Heart Rate:  [73-75] 73  Resp:  [18] 18  BP: (101-144)/(54-70) 144/70  SpO2:  [94 %-95 %] 94 %  on   ;   Device (Oxygen Therapy): room air  Body mass index is 28.2 kg/m².    Wt Readings from Last 3 Encounters:   19 74.5 kg (164 lb 4.8 oz)   19 76.2 kg (168 lb 0.2 oz)   19 78 kg (172 lb)     Intake & Output (last 3 days)       701 - 06/15 0700 06/15 07 -  07 -  -  0700    P.O. 600 1560 960 360    Total Intake(mL/kg) 600 (8.1) 1560 (20.9) 960 (12.9) 360 (4.8)    Urine (mL/kg/hr) 1200 (0.7) 850 (0.5) 2050 (1.1) 700 (1.1)    Stool 0  0     Total Output 7135 667 6309 700    Net -600 +710 -1090 -340            Urine Unmeasured Occurrence  1 x      Stool Unmeasured Occurrence 1 x  1 x         Diet Regular;  Cardiac  ----------------------------------------------------------------------------------------------------------------------  Physical exam:  Constitutional: No fever no chills  HEENT: Normocephalic atraumatic  Neck: Supple  Cardiovascular: Regular rate and rhythm  Pulmonary/Chest: Clear to auscultation  Abdominal: Positive bowel sounds soft.   Musculoskeletal: No evidence arthropathy  Neurological: Focal deficits  Skin: Rashes  Peripheral vascular:  Genitourinary:  ----------------------------------------------------------------------------------------------------------------------    Last echocardiogram:  Results for orders placed during the hospital encounter of 01/25/19   Adult Transthoracic Echo Complete W/ Cont if Necessary Per Protocol    Narrative · Right ventricular cavity is borderline dilated.  · Left ventricular wall thickness is consistent with concentric   hypertrophy.  · Left ventricular diastolic dysfunction (grade I) consistent with   impaired relaxation.  · Left ventricular systolic function is normal. Estimated EF = 60%.  · Mild aortic valve stenosis is present. Aortic valve mean pressure   gradient is 10.9 mmHg.  · Mild aortic valve regurgitation is present.  · Mild-to-moderate mitral valve regurgitation is present  · Mild to moderate tricuspid valve regurgitation is present.  · Calculated right ventricular systolic pressure from tricuspid   regurgitation is 29 mmHg.  · No significant pericardial effusion is noted.        ----------------------------------------------------------------------------------------------------------------------  Results from last 7 days   Lab Units 06/17/19  0342 06/15/19  0405 06/14/19  0411   WBC 10*3/mm3 9.02 9.29 11.10*   HEMOGLOBIN g/dL 10.0* 10.0* 10.1*   HEMATOCRIT % 32.8* 32.1* 33.4*   MCV fL 96.2 95.5 97.1*   MCHC g/dL 30.5* 31.2* 30.2*   PLATELETS 10*3/mm3 425 403 399         Results from last 7 days   Lab Units 06/17/19  0342 06/15/19  0405 06/14/19  0411  06/13/19  1620 06/13/19  0515 06/12/19  0523   SODIUM mmol/L 142 141 138  --  137 139   POTASSIUM mmol/L 4.0 4.3 3.6  --  3.7 3.8   MAGNESIUM mg/dL  --   --   --  1.9 1.9 1.6   CHLORIDE mmol/L 103 103 101  --  98 101   CO2 mmol/L 27.1 26.9 24.8  --  27.4 27.2   BUN mg/dL 22 24* 26*  --  23 18   CREATININE mg/dL 0.96 1.05* 1.16*  --  1.08* 0.88   EGFR IF NONAFRICN AM mL/min/1.73 56* 50* 45*  --  48* 61   CALCIUM mg/dL 8.7 8.5* 8.3*  --  8.5* 8.4*   PHOSPHORUS mg/dL  --   --  3.7  --   --  3.5   GLUCOSE mg/dL 109* 92 104*  --  103* 104*   ALBUMIN g/dL 2.51* 2.29* 2.24*  --   --   --    BILIRUBIN mg/dL 0.2 0.3 0.3  --   --   --    ALK PHOS U/L 76 71 69  --   --   --    AST (SGOT) U/L 25 47* 41*  --   --   --    ALT (SGPT) U/L 44* 58* 56*  --   --   --    Estimated Creatinine Clearance: 43.9 mL/min (by C-G formula based on SCr of 0.96 mg/dL).  No results found for: AMMONIA              No results found for: HGBA1C, POCGLU  No results found for: TSH, FREET4  No results found for: PREGTESTUR, PREGSERUM, HCG, HCGQUANT  Pain Management Panel     Pain Management Panel Latest Ref Rng & Units 6/5/2019    CREATININE UR mg/dL 195.3        Brief Urine Lab Results  (Last result in the past 365 days)      Color   Clarity   Blood   Leuk Est   Nitrite   Protein   CREAT   Urine HCG        06/05/19 0437             195.3                        I have personally looked at the labs and they are summarized above.  ----------------------------------------------------------------------------------------------------------------------  Detailed radiology reports for the last 24 hours:    Imaging Results (last 24 hours)     ** No results found for the last 24 hours. **        Final impressions for the last 30 days of radiology reports:    Xr Shoulder 2+ View Right    Result Date: 6/5/2019  No acute or destructive bony abnormality.  COMMUNICATION: Per this written report.  This report was finalized on 6/5/2019 7:11 AM by Dr. Maxime Treviño MD.       Xr Wrist 3+ View Right    Result Date: 6/5/2019  No acute or destructive bony abnormality.  COMMUNICATION: Per this written report.  This report was finalized on 6/5/2019 7:11 AM by Dr. Maxime Treviño MD.      Xr Hand 3+ View Right    Result Date: 6/5/2019  No acute or destructive bony abnormality.  COMMUNICATION: Per this written report.  This report was finalized on 6/5/2019 7:11 AM by Dr. Maxime Treviño MD.      Xr Femur 2 View Left    Result Date: 6/5/2019  MODERATELY DISPLACED SUBCAPITAL FEMORAL NECK FRACTURE.  COMMUNICATION: Per this written report.  This report was finalized on 6/5/2019 7:11 AM by Dr. Maxime Treviño MD.      Xr Chest 1 View    Result Date: 6/5/2019  AS ABOVE.  This report was finalized on 6/5/2019 7:10 AM by Dr. Maxime Treviño MD.      Xr Pelvis 1 Or 2 View    Result Date: 6/6/2019  No acute or destructive bony abnormality.  COMMUNICATION: Per this written report.  This report was finalized on 6/6/2019 7:11 AM by Dr. Maxime Treviño MD.      Xr Hip With Or Without Pelvis 2 - 3 View Left    Result Date: 6/5/2019  AS ABOVE.  COMMUNICATION: Per this written report.  This report was finalized on 6/5/2019 7:12 AM by Dr. Maxime Treviño MD.      I have personally looked at the radiology images and read the final radiology report.    Assessment & Plan       Status post ORIF of hip fracture--continue PT at this time  VTE Prophylaxis:   Mechanical Order History:     None      Pharmalogical Order History:     Ordered     Dose Route Frequency Stop    06/13/19 1508  apixaban (ELIQUIS) tablet 2.5 mg      2.5 mg PO Every 12 Hours Scheduled --              Tristan Melara MD  [facility] Hospitalist  06/17/19  3:31 PM    Electronically signed by Tristan Melara MD at 6/17/2019  3:32 PM     Alize Parsons PA at 6/17/2019  9:34 AM     Attestation signed by Tristan Melara MD at 6/17/2019  6:19 PM    I have reviewed the documentation above and agree.                      [facility]  Medicine Services  SWING BED BRIEF PROGRESS NOTE    SUBJECTIVE:  Patient's not seen chart, labs, and vitals reviewed. Patient in swing bed s/p hip fracture s/p left bipolar hip replacement for prolonged therapy. No overnight events reported, no new issues. Last PT note pt ambulated 70 ft with minimum assistance.     MEDICATION RECOMMENDATIONS/CHANGES:    · Continue PT/OT   · BP controlled, cont current regimen. Closely monitor BP per hospital protocol, titrate meds as necessary   · Repeat labs in AM.       Alize Parsons PA-C  Hospitalist Service -- [facility]   Pager: [phone]    19  9:35 AM    Attending Physician: Tristan Melara MD            Electronically signed by Tristan Melara MD at 2019  6:19 PM       Consult Notes (last 24 hours) (Notes from 2019  8:43 AM through 2019  8:43 AM)     No notes of this type exist for this encounter.      Acute Care - Occupational Therapy Initial Evaluation   [place]     Patient Name: Abbi Mendez                  : 1935                        MRN: [id number]  Today's Date: 2019                                                                        Admit Date: 2019                        OT ASSESSMENT FLOWSHEET (last 12 hours)                    Occupational Therapy Evaluation                   Row Name 19 1610                                        OT Evaluation Time/Intention      Document Type  evaluation  -KR              Mode of Treatment  occupational therapy  -KR              Patient Effort  good  -KR                            Cognitive Assessment/Intervention- PT/OT      Orientation Status (Cognition)  oriented x 3  -KR              Follows Commands (Cognition)  WFL  -KR                            Bathing Assessment/Intervention      Bathing [place] Level  bathing skills;moderate assist (50% patient effort)  -KR                            Upper Body Dressing Assessment/Training      Upper Body Dressing  [place] Level  upper body dressing skills;minimum assist (75% patient effort)  -KR                            Lower Body Dressing Assessment/Training      Lower Body Dressing [place] Level  lower body dressing skills;maximum assist (25% patient effort)  -KR                            Grooming Assessment/Training      [place] Level (Grooming)  grooming skills;minimum assist (75% patient effort)  -KR                            Self-Feeding Assessment/Training      [place] Level (Feeding)  feeding skills;set up  -KR                            Toileting Assessment/Training      [place] Level (Toileting)  toileting skills;moderate assist (50% patient effort)  -KR                            General ROM      GENERAL ROM COMMENTS  R wrist brace applied upon evaluation. R hand 3/5 AROM, elbow/shoulder 4/5, LUE WFL  -KR                            MMT (Manual Muscle Testing)      General MMT Comments  RUE deferred, LUE 3/5  -KR                            Wound 06/05/19 1838 Left hip incision      Wound - Properties Group Date first assessed: 06/05/19  -KH Time first assessed: 1838  -KH Side: Left  - Location: hip  -KH Type: incision  -KH                    Clinical Impression (OT)      Patient/Family Goals Statement (OT Eval)  Return to PLOF  -KR              Criteria for Skilled Therapeutic Interventions Met (OT Eval)  yes  -KR              Rehab Potential (OT Eval)  good, to achieve stated therapy goals  -KR              Anticipated Discharge Disposition (OT)  home with assist  -KR                            OT Goals      ROM Goal Selection (OT)  ROM, OT goal 1  -KR              Additional Documentation  ROM Goal Selection (OT) (Row)  -KR                            Dressing Goal 1 (OT)      Activity/Assistive Device (Dressing Goal 1, OT)  dressing skills, all;reacher  -KR              [place]/Cues Needed (Dressing Goal 1, OT)  minimum assist (75% or more patient effort)  -KR                             ROM Goal 1 (OT)      ROM Goal 1 (OT)  R hand AROM WFL to enhance fxl task performance with self care skills  -KR              Time Frame (ROM Goal 1, OT)  by discharge  -KR                            Strength Goal 1 (OT)      Strength Goal 1 (OT)  BUE increase x 1 to enhance self care skills  -KR              Time Frame (Strength Goal 1, OT)  by discharge  -KR                                User Key  (r) = Recorded By, (t) = Taken By, (c) = Cosigned By     Initials Name Effective Dates     Ary Coyne RN 06/16/16 -      Brannon Wang, OT 04/03/18 -             OT Recommendation and Plan  Outcome Summary/Treatment Plan (OT)  Anticipated Discharge Disposition (OT): home with assist     Outcome Measures                    Row Name 06/13/19 1623 06/12/19 1600 06/11/19 1600                 How much difficulty does the patient currently have...     Turning from your back to your side while in flat bed without using bedrails?  --  3  -LL  3  -LL     Standing up from a chair using your arms (e.g., wheelchair, bedside chair)?  --  3  -LL  3  -LL     Moving from lying on back to sitting on the side of a flat bed without bedrails?  --  3  -LL  3  -LL                 How much help from another person do you currently need...     Moving to and from a bed to a chair (including a wheelchair)?  --  3  -LL  3  -LL     Climbing 3-5 steps with a railing?  --  2  -LL  2  -LL     To walk in hospital room?  --  3  -LL  3  -LL     AM-PAC 6 Clicks Score  --  17  -LL  17  -LL                 How much help from another is currently needed...     Putting on and taking off regular lower body clothing?  2  -KR  --  --     Bathing (including washing, rinsing, and drying)  2  -KR  --  --     Toileting (which includes using toilet bed pan or urinal)  3  -KR  --  --     Putting on and taking off regular upper body clothing  3  -KR  --  --     Taking care of personal grooming (such as brushing teeth)  3  -KR  --  --      Eating meals  3  -KR  --  --     Score  16  -KR  --  --                 Functional Assessment     Outcome Measure Options  --  AM-PAC 6 Clicks Basic Mobility (PT)  -LL  AM-PAC 6 Clicks Basic Mobility (PT)  -LL       User Key  (r) = Recorded By, (t) = Taken By, (c) = Cosigned By     Initials Name Provider Type     Brannon Wang, OT Occupational Therapist     Loyda Kraus PTA Physical Therapy Assistant                Time Calculation:              Time Calculation- OT                 Row Name 19 1623                           Time Calculation- OT     Total Timed Code Minutes- OT  60 minute(s)  -KR                           User Key  (r) = Recorded By, (t) = Taken By, (c) = Cosigned By     Initials Name Provider Type     Brannon Wang OT Occupational Therapist                    Therapy Charges for Today      Code Description Service Date Service Provider Modifiers Qty     [id number]  OT EVAL MOD COMPLEXITY 4 2019 Brannon Mondragon OT GO 1                Brannon Mondragon OT                     2019  Acute Care - Physical Therapy Initial Evaluation   [place]     Patient Name: Abbi Mendez                  : 1935                        MRN: [id number]  Today's Date: 2019                               Onset of Illness/Injury or Date of Surgery: 19  Date of Referral to PT: 19                  Referring Physician: Dr. Parsons                       Admit Date: 2019                        Visit Dx: No diagnosis found.                             PT ASSESSMENT (last 12 hours)                     Physical Therapy Evaluation                 Row Name 19 1749                       PT Evaluation Time/Intention      Subjective Information  no complaints  -AG        Document Type  evaluation  -AG        Mode of Treatment  individual therapy;physical therapy  -AG        Patient Effort  good  -AG        Symptoms Noted During/After Treatment  fatigue  -AG        Comment   family present.   -AG                   Row Name 06/13/19 1749                       General Information      Patient Profile Reviewed?  yes  -AG        Onset of Illness/Injury or Date of Surgery  06/13/19  -        Referring Physician  Dr. Parsons  -        Patient Observations  alert;cooperative;agree to therapy  -        Patient/Family Observations  pt. supine; pleasant and cooperative, in no apparent distress.  Family at bedside.  Hip incision observed, appears dry and intact, unremarkable.  R wrist splint in place.   -AG        Prior Level of Function  independent:;all household mobility ambulating with walker at times d/t hip OA pain  -AG        Pertinent History of Current Functional Problem  underwent L JANET following L displaced femoral neck fx.   -AG        Existing Precautions/Restrictions  fall;hip;left  -AG        Limitations/Impairments  safety/cognitive  -AG        Risks Reviewed  patient and family:;LOB;dizziness;increased discomfort;change in vital signs  -AG        Benefits Reviewed  patient and family:;improve function;increase independence;increase strength;increase balance;decrease risk of DVT;increase knowledge  -                   Row Name 06/13/19 1749                       Relationship/Environment      Lives With  alone  -AG        Family Caregiver if Needed  child(ananya), adult  -                   Row Name 06/13/19 1749                       Resource/Environmental Concerns      Current Living Arrangements  home/apartment/condo  -                   Row Name 06/13/19 1749                       Cognitive Assessment/Intervention- PT/OT      Orientation Status (Cognition)  oriented x 3  -AG        Follows Commands (Cognition)  WFL;follows one step commands;verbal cues/prompting required  -AG                   Row Name 06/13/19 1749                       Safety Issues, Functional Mobility      Safety Issues Affecting Function (Mobility)  safety precaution awareness;safety precautions  follow-through/compliance  -        Impairments Affecting Function (Mobility)  balance;endurance/activity tolerance;strength  -AG                   Row Name 06/13/19 1749                       Mobility Assessment/Treatment      Extremity Weight-bearing Status  left lower extremity;right lower extremity  -AG        Left Lower Extremity (Weight-bearing Status)  weight-bearing as tolerated (WBAT)  -AG        Right Lower Extremity (Weight-bearing Status)  weight-bearing as tolerated (WBAT)  -                   Row Name 06/13/19 1749                       Bed Mobility Assessment/Treatment      Bed Mobility Assessment/Treatment  scooting/bridging;supine-sit;sit-supine  -AG        Scooting/Bridging [place] (Bed Mobility)  verbal cues;nonverbal cues (demo/gesture);contact guard  -AG        Supine-Sit [place] (Bed Mobility)  verbal cues;nonverbal cues (demo/gesture);contact guard  -AG        Sit-Supine [place] (Bed Mobility)  verbal cues;nonverbal cues (demo/gesture);minimum assist (75% patient effort)  -        Bed Mobility, Safety Issues  cognitive deficits limit understanding;decreased use of legs for bridging/pushing  -        Assistive Device (Bed Mobility)  bed rails  -                   Row Name 06/13/19 1749                       Transfer Assessment/Treatment      Transfer Assessment/Treatment  sit-stand transfer;stand-sit transfer;stand pivot/stand step transfer  -AG        Maintains Weight-bearing Status (Transfers)  able to maintain  -AG        Sit-Stand [place] (Transfers)  verbal cues;nonverbal cues (demo/gesture);minimum assist (75% patient effort)  -        Stand-Sit [place] (Transfers)  verbal cues;nonverbal cues (demo/gesture);minimum assist (75% patient effort)  -                   Row Name 06/13/19 1749                       Sit-Stand Transfer      Assistive Device (Sit-Stand Transfers)  walker, front-wheeled  -                   Row Name 06/13/19 1749                        Stand-Sit Transfer      Assistive Device (Stand-Sit Transfers)  walker, front-wheeled  -AG                   Row Name 06/13/19 1749                       Stand Pivot/Stand Step Transfer      Stand Pivot/Stand Step [place]  verbal cues;nonverbal cues (demo/gesture);minimum assist (75% patient effort)  -        Assistive Device (Stand Pivot Stand Step Transfer)  walker, front-wheeled  -AG                   Row Name 06/13/19 1749                       Gait/Stairs Assessment/Training      Gait/Stairs Assessment/Training  gait/ambulation independence;gait/ambulation assistive device;distance ambulated;gait pattern;gait deviations;maintains weight-bearing status  -        [place] Level (Gait)  verbal cues;nonverbal cues (demo/gesture);contact guard;minimum assist (75% patient effort)  -        Assistive Device (Gait)  walker, front-wheeled  -AG        Distance in Feet (Gait)  100  -AG        Pattern (Gait)  step-through  -AG        Deviations/Abnormal Patterns (Gait)  antalgic;stride length decreased  -        Bilateral Gait Deviations  forward flexed posture;heel strike decreased;weight shift ability decreased  -                   Row Name 06/13/19 1749                       MMT (Manual Muscle Testing)      General MMT Comments  B anterior tib 4/5; B quads 4/5  -                   Row Name 06/13/19 1749                       Motor Assessment/Intervention      Additional Documentation  Balance (Group);Balance Interventions (Group);Therapeutic Exercise (Group);Therapeutic Exercise Interventions (Group)  -                   Row Name 06/13/19 1749                       Therapeutic Exercise      Therapeutic Exercise  seated, lower extremities  -        Additional Documentation  Therapeutic Exercise (Row)  -                   Row Name 06/13/19 1749                       Lower Extremity Seated Therapeutic Exercise      Performed, Seated Lower Extremity (Therapeutic Exercise)  hip  abduction/adduction;ankle dorsiflexion/plantarflexion;LAQ (long arc quad), knee extension  -AG        Exercise Type, Seated Lower Extremity (Therapeutic Exercise)  AROM (active range of motion)  -AG        Restrictions, Seated Lower Extremity (Therapeutic Exercise)  L JANET precautions  -AG        Sets/Reps Detail, Seated Lower Extremity (Therapeutic Exercise)  1 x 20  -AG        Transfers Skills, Training to Functional Activity, Seated Lower Extremity (Therapeutic Exercise)  able to transfer skills from training to functional activity  -AG                   Row Name 06/13/19 1749                       Balance      Balance  static sitting balance;static standing balance;dynamic sitting balance;dynamic standing balance  -AG                   Row Name 06/13/19 1749                       Static Sitting Balance      Level of [place] (Unsupported Sitting, Static Balance)  conditional independence  -AG        Sitting Position (Unsupported Sitting, Static Balance)  sitting in chair;sitting on edge of bed  -AG        Time Able to Maintain Position (Unsupported Sitting, Static Balance)  more than 5 minutes  -AG                   Row Name 06/13/19 1749                       Static Standing Balance      Level of Lane (Supported Standing, Static Balance)  contact guard assist;minimal assist, 75% patient effort  -AG        Assistive Device Utilized (Supported Standing, Static Balance)  walker, rolling  -AG                   Row Name 06/13/19 1749                       Sensory Assessment/Intervention      Sensory General Assessment  no sensation deficits identified  -AG                   Row Name 06/13/19 1749                       Vision Assessment/Intervention      Visual Impairment/Limitations  WFL  -AG                   Row Name 06/13/19 1749                       Pain Assessment      Additional Documentation  Pain Scale: FACES Pre/Post-Treatment (Group)  -AG                   Row Name 06/13/19 0609                        Pain Scale: Numbers Pre/Post-Treatment      Pain Location - Side  Left  -AG        Pain Location  hip  -AG        Pain Intervention(s)  Repositioned;Ambulation/increased activity  -AG                   Row Name 06/13/19 1749                       Pain Scale: FACES Pre/Post-Treatment      Pain: FACES Scale, Pretreatment  4-->hurts little more  -AG        Pain: FACES Scale, Post-Treatment  4-->hurts little more  -AG                   Row Name                            Wound 06/05/19 1838 Left hip incision      Wound - Properties Group Date first assessed: 06/05/19  -KH Time first assessed: 1838  -KH Side: Left  -KH Location: hip  -KH Type: incision  -KH                 Row Name 06/13/19 1749                       Coping      Observed Emotional State  calm;cooperative;pleasant  -AG        Verbalized Emotional State  acceptance  -AG                   Row Name 06/13/19 1749                       Plan of Care Review      Plan of Care Reviewed With  patient;daughter  -AG                   Row Name 06/13/19 1749                       Physical Therapy Clinical Impression      Date of Referral to PT  06/13/19  -AG        PT Diagnosis (PT Clinical Impression)  decr LE strength  -AG        Prognosis (PT Clinical Impression)  good  -AG        Functional Level at Time of Evaluation (PT Clinical Impression)  Min A/ CGA  -AG        Patient/Family Goals Statement (PT Clinical Impression)  return home  -AG        Criteria for Skilled Interventions Met (PT Clinical Impression)  yes;treatment indicated  -AG        Pathology/Pathophysiology Noted (Describe Specifically for Each System)  musculoskeletal  -AG        Impairments Found (describe specific impairments)  aerobic capacity/endurance;ergonomics and body mechanics;gait, locomotion, and balance;joint integrity and mobility  -AG        Functional Limitations in Following Categories (Describe Specific Limitations)  self-care;community/leisure  -AG        Rehab Potential (PT  Clinical Summary)  good, to achieve stated therapy goals  -AG        Predicted Duration of Therapy (PT)  LOS  -AG        Care Plan Review (PT)  evaluation/treatment results reviewed;care plan/treatment goals reviewed;risks/benefits reviewed;current/potential barriers reviewed;patient/other agree to care plan  -AG        Care Plan Review, Other Participant (PT Clinical Impression)  daughter  -AG                   Row Name 06/13/19 1748                       Physical Therapy Goals      Bed Mobility Goal Selection (PT)  bed mobility, PT goal 1  -AG        Transfer Goal Selection (PT)  transfer, PT goal 1  -AG        Gait Training Goal Selection (PT)  gait training, PT goal 1  -AG                   Row Name 06/13/19 1745                       Bed Mobility Goal 1 (PT)      Activity/Assistive Device (Bed Mobility Goal 1, PT)  rolling to left;rolling to right;scooting;sit to supine/supine to sit  -AG        [place] Level/Cues Needed (Bed Mobility Goal 1, PT)  standby assist  -AG        Time Frame (Bed Mobility Goal 1, PT)  by discharge  -                   Row Name 06/13/19 1740                       Transfer Goal 1 (PT)      Activity/Assistive Device (Transfer Goal 1, PT)  sit-to-stand/stand-to-sit;bed-to-chair/chair-to-bed;walker, rolling  -AG        [place] Level/Cues Needed (Transfer Goal 1, PT)  standby assist  -AG        Time Frame (Transfer Goal 1, PT)  by discharge  -                   Row Name 06/13/19 1742                       Gait Training Goal 1 (PT)      Activity/Assistive Device (Gait Training Goal 1, PT)  gait (walking locomotion);assistive device use;decrease fall risk;diminish gait deviation;improve balance and speed;increase endurance/gait distance;walker, rolling  -AG        [place] Level (Gait Training Goal 1, PT)  contact guard assist;standby assist  -AG        Distance (Gait Goal 1, PT)  200  -AG        Time Frame (Gait Training Goal 1, PT)  by discharge  -                   Nigel  Name 06/13/19 1749                       Positioning and Restraints      Pre-Treatment Position  in bed  -AG        Post Treatment Position  chair  -AG        In Chair  notified nsg;sitting;call light within reach;encouraged to call for assist;with family/caregiver  -AG                          User Key  (r) = Recorded By, (t) = Taken By, (c) = Cosigned By     Initials Name Provider Type     Ary Coyne, RN Registered Nurse     Sandy Sloan, PT Physical Therapist                 Physical Therapy Education                           PT Recommendation and Plan  Anticipated Discharge Disposition (PT): home with assist  Planned Therapy Interventions (PT Eval): balance training, bed mobility training, gait training, home exercise program, patient/family education, postural re-education, ROM (range of motion), strengthening, transfer training  Therapy Frequency (PT Clinical Impression): other (see comments)(6 days/ week )  Outcome Summary/Treatment Plan (PT)  Anticipated Equipment Needs at Discharge (PT): raised toilet seat, front wheeled walker  Anticipated Discharge Disposition (PT): home with assist  Plan of Care Reviewed With: patient, daughter             Outcome Measures                    Row Name 06/13/19 1748 06/13/19 1623 06/12/19 1600                 How much help from another person do you currently need...     Turning from your back to your side while in flat bed without using bedrails?  4  -AG  --  3  -LL     Moving from lying on back to sitting on the side of a flat bed without bedrails?  3  -AG  --  3  -LL     Moving to and from a bed to a chair (including a wheelchair)?  3  -AG  --  3  -LL     Standing up from a chair using your arms (e.g., wheelchair, bedside chair)?  3  -AG  --  3  -LL     Climbing 3-5 steps with a railing?  3  -AG  --  2  -LL     To walk in hospital room?  3  -AG  --  3  -LL     AM-PAC 6 Clicks Score  19  -AG  --  17  -LL                 How much help from another is  currently needed...     Putting on and taking off regular lower body clothing?  --  2  -KR  --     Bathing (including washing, rinsing, and drying)  --  2  -KR  --     Toileting (which includes using toilet bed pan or urinal)  --  3  -KR  --     Putting on and taking off regular upper body clothing  --  3  -KR  --     Taking care of personal grooming (such as brushing teeth)  --  3  -KR  --     Eating meals  --  3  -KR  --     Score  --  16  -KR  --                 Functional Assessment     Outcome Measure Options  AM-PAC 6 Clicks Basic Mobility (PT)  -  --  -[facility] 6 Clicks Basic Mobility (PT)  -LL                Row Name 06/11/19 1600                           How much help from another person do you currently need...     Turning from your back to your side while in flat bed without using bedrails?  3  -LL         Moving from lying on back to sitting on the side of a flat bed without bedrails?  3  -LL         Moving to and from a bed to a chair (including a wheelchair)?  3  -LL         Standing up from a chair using your arms (e.g., wheelchair, bedside chair)?  3  -LL         Climbing 3-5 steps with a railing?  2  -LL         To walk in hospital room?  3  -LL         AM-PAC 6 Clicks Score  17  -LL                     Functional Assessment     Outcome Measure Options  AM-PAC 6 Clicks Basic Mobility (PT)  -LL                           User Key  (r) = Recorded By, (t) = Taken By, (c) = Cosigned By     Initials Name Provider Type      Sandy Covington, PT Physical Therapist     Brannon Wang, OT Occupational Therapist     LL Loyda Vega PTA Physical Therapy Assistant              Time Calculation:              PT Charges                 Row Name 06/13/19 1749 06/13/19 1746                      Time Calculation     PT Received On  --  06/13/19  -       PT - Next Appointment  --  06/14/19  -       PT Goal Re-Cert Due Date  --  06/27/19  -                   Time Calculation- PT     Total Timed Code  Minutes- PT  30 minute(s)  -LL  --       TCU Minutes- PT  --  38 min  -AG                         User Key  (r) = Recorded By, (t) = Taken By, (c) = Cosigned By     Initials Name Provider Type     Sandy Sloan, PT Physical Therapist     Loyda Kraus, PTA Physical Therapy Assistant                    Therapy Charges for Today      Code Description Service Date Service Provider Modifiers Qty     08784685927 HC GAIT TRAINING EA 15 MIN 6/13/2019 Sandy Covington, PT GP 1     29190710288 HC PT THER PROC EA 15 MIN 6/13/2019 Sandy Covington, PT GP 1     72633533735 HC PT THER SUPP EA 15 MIN 6/13/2019 Sandy Covington, PT GP 1     48736431601 HC PT EVAL MOD COMPLEXITY 1 6/13/2019 Sandy Covington, PT GP 1             PT G-Codes  Outcome Measure Options: AM-PAC 6 Clicks Basic Mobility (PT)  AM-PAC 6 Clicks Score: 19  Score: 16        Sandy Covington PT                6/13/2019

## 2019-06-18 NOTE — THERAPY TREATMENT NOTE
Acute Care - Occupational Therapy Treatment Note  [facility]     Patient Name: Abbi Mendez  : 1935  MRN: [id number]  Today's Date: 2019  Onset of Illness/Injury or Date of Surgery: 19     Referring Physician: Dr. Parsons    Admit Date: 2019     No diagnosis found.  Patient Active Problem List   Diagnosis   • Atrial fibrillation (CMS/HCC)   • Coronary artery disease involving native coronary artery of native heart without angina pectoris   • RADHA (obstructive sleep apnea)   • Essential hypertension   • Hyperlipidemia LDL goal <70   • Iron deficiency anemia   • Left bundle branch block   • Syncope and collapse   • Severe sinus bradycardia   • Third degree AV block (CMS/HCC)   • Syncope   • Compression deformity of vertebra   • Radiculopathy   • Pericardial effusion without cardiac tamponade   • Hypercalcemia   • Abnormal serum protein electrophoresis   • Weight loss, unintentional   • Primary hyperparathyroidism (CMS/HCC)   • Hypercalcemia   • Pacemaker   • Hiatal hernia   • Elevated serum immunoglobulin free light chains   • Left displaced femoral neck fracture (CMS/HCC)   • Hip fracture (CMS/HCC)     Past Medical History:   Diagnosis Date   • Anxiety    • Arthritis    • Atrial fibrillation (CMS/HCC) 2016   • Gastric ulcer    • Hiatal hernia    • Hypertension    • Iron deficiency anemia    • RADHA (obstructive sleep apnea) 2016     Past Surgical History:   Procedure Laterality Date   • CARDIAC ELECTROPHYSIOLOGY PROCEDURE N/A 2018    Procedure: Pacemaker DC new;  Surgeon: Soraya Darling MD;  Location: [facility] INVASIVE LOCATION;  Service:    • HIP BIPOLAR REPLACEMENT Left 2019    Procedure: HIP BIPOLAR REPLACEMENT;  Surgeon: Gerardo Jain MD;  Location: [facility];  Service: Orthopedics   • TOTAL KNEE ARTHROPLASTY Right    • TUBAL ABDOMINAL LIGATION         Therapy Treatment    Rehabilitation Treatment Summary     Row Name 19 1155             Treatment Time/Intention     Discipline  occupational therapist  -LM      Document Type  therapy note (daily note)  -LM      Subjective Information  complains of LLE pain/weakness  -LM      Patient Effort  good  -LM      Patient Response to Treatment  Tolerated tx  -LM      Recorded by [LM] Dominique Morales OT 06/18/19 1159      Row Name 06/18/19 1155             Cognitive Assessment/Intervention- PT/OT    Orientation Status (Cognition)  oriented x 3  -LM      Follows Commands (Cognition)  WNL  -LM      Recorded by [LM] Dominique Morales OT 06/18/19 1159      Row Name 06/18/19 1155             Motor Skills Assessment/Interventions    Additional Documentation  Therapeutic Exercise (Group)  -LM      Recorded by [LM] Dominique Morales OT 06/18/19 1159      Row Name 06/18/19 1155             Therapeutic Exercise    Upper Extremity (Therapeutic Exercise)  wand exercises  -LM      Hand (Therapeutic Exercise)  hand , bilateral  -LM      Exercise Type (Therapeutic Exercise)  AROM (active range of motion)  -LM      Position (Therapeutic Exercise)  seated  -LM      Equipment (Therapeutic Exercise)  dowel alma/wand  -LM      Expected Outcome (Therapeutic Exercise)  improve functional tolerance, self-care activity;increase active range of motion;improve motor control  -LM      Recorded by [LM] Dominique Morales OT 06/18/19 1159      Row Name 06/18/19 1155             Positioning and Restraints    Post Treatment Position  chair  -LM      In Bed  call light within reach  -LM      Recorded by [LM] Dominique Morales OT 06/18/19 1159      Row Name                Wound 06/05/19 1838 Left hip incision    Wound - Properties Group Date first assessed: 06/05/19 [KH] Time first assessed: 1838 [KH] Side: Left [KH] Location: hip [KH] Type: incision [KH] Recorded by:  [KH] Ary Sylvester RN 06/05/19 1838      User Key  (r) = Recorded By, (t) = Taken By, (c) = Cosigned By    Initials Name Effective Dates Discipline    Ary Coyne RN 06/16/16 -  Nurse     Dominique Medrano OT 03/14/16 -  OT        Wound 06/05/19 1838 Left hip incision (Active)   Dressing Appearance dry;intact;other (see comments) 6/18/2019  7:55 AM   Closure Other (Comment) 6/18/2019  7:55 AM   Base dry 6/18/2019  7:55 AM   Periwound dry;intact 6/18/2019  7:55 AM   Periwound Temperature warm 6/18/2019  7:55 AM   Periwound Skin Turgor soft 6/18/2019  7:55 AM   Edges irregular 6/17/2019  9:00 PM   Drainage Amount none 6/17/2019  9:00 PM       Occupational Therapy Education     Title: PT OT SLP Therapies (Done)     Topic: Occupational Therapy (Done)     Point: Home exercise program (Done)     Description: Instruct learner(s) on appropriate technique for monitoring, assisting and/or progressing therapeutic exercises/activities.    Learning Progress Summary           Patient Acceptance, E,D, DU,NR by  at 6/18/2019 12:00 PM                               User Key     Initials Effective Dates Name Provider Type Discipline     03/14/16 -  Dominique Morales, OT Occupational Therapist OT                OT Recommendation and Plan     Plan of Care Review  Plan of Care Reviewed With: patient  Plan of Care Reviewed With: patient  Outcome Summary: Patient seen in room bedside.  Patient tolerated tx session with appropriate rest breaks.  Patient continues to be motivated to return to prior level of function.  Outcome Measures     Row Name 06/17/19 1500 06/15/19 1400          How much help from another person do you currently need...    Turning from your back to your side while in flat bed without using bedrails?  4  -LL  4  -BC     Moving from lying on back to sitting on the side of a flat bed without bedrails?  3  -LL  3  -BC     Moving to and from a bed to a chair (including a wheelchair)?  3  -LL  3  -BC     Standing up from a chair using your arms (e.g., wheelchair, bedside chair)?  3  -LL  3  -BC     Climbing 3-5 steps with a railing?  3  -LL  3  -BC     To walk in hospital room?  3  -LL  3  -BC     AM-PAC 6  Clicks Score  19  -LL  19  -BC        Functional Assessment    Outcome Measure Options  AM-PAC 6 Clicks Basic Mobility (PT)  -LL  AM-PAC 6 Clicks Basic Mobility (PT)  -BC       User Key  (r) = Recorded By, (t) = Taken By, (c) = Cosigned By    Initials Name Provider Type    BC Fatou Doll, PT Physical Therapist    LL Loyda Vega, ALFREDO Physical Therapy Assistant           Time Calculation:   Time Calculation- OT     Row Name 06/18/19 1203             Time Calculation- OT    Total Timed Code Minutes- OT  15 minute(s)  -        User Key  (r) = Recorded By, (t) = Taken By, (c) = Cosigned By    Initials Name Provider Type     Dominique Morales, OT Occupational Therapist        Therapy Charges for Today     Code Description Service Date Service Provider Modifiers Qty    [id number]  OT THERAPEUTIC ACT EA 15 MIN 6/18/2019 Dominique Morales OT GO 1               Dominique Morales OT  6/18/2019

## 2019-06-18 NOTE — PROGRESS NOTES
[facility] Medicine Services  SWING BED BRIEF PROGRESS NOTE    SUBJECTIVE:  Patient was seen at bedside this AM. Denies any complaints. States she is doing well. Doing well with PT/OT. No new complaints or overnight events reported.     MEDICATION RECOMMENDATIONS/CHANGES:    · Continue PT/OT as tolerated   · BP controlled, cont current regimen. Closely monitor BP per hospital protocol, titrate meds as necessary   · Hypomagnesemia: Replace per protocol. Repeat mag level in AM.   · Repeat labs in AM.       Alize Parsons PA-C  Hospitalist Service -- [facility]   Pager: [phone]    06/18/19  11:20 AM    Attending Physician: Tristan Melara MD

## 2019-06-19 LAB
ANION GAP SERPL CALCULATED.3IONS-SCNC: 11.2 MMOL/L
BUN BLD-MCNC: 21 MG/DL (ref 8–23)
BUN/CREAT SERPL: 22.1 (ref 7–25)
CALCIUM SPEC-SCNC: 8.8 MG/DL (ref 8.6–10.5)
CHLORIDE SERPL-SCNC: 101 MMOL/L (ref 98–107)
CO2 SERPL-SCNC: 25.8 MMOL/L (ref 22–29)
CREAT BLD-MCNC: 0.95 MG/DL (ref 0.57–1)
DEPRECATED RDW RBC AUTO: 42.9 FL (ref 37–54)
ERYTHROCYTE [DISTWIDTH] IN BLOOD BY AUTOMATED COUNT: 12.6 % (ref 12.3–15.4)
GFR SERPL CREATININE-BSD FRML MDRD: 56 ML/MIN/1.73
GLUCOSE BLD-MCNC: 109 MG/DL (ref 65–99)
HCT VFR BLD AUTO: 34.1 % (ref 34–46.6)
HGB BLD-MCNC: 10.3 G/DL (ref 12–15.9)
MAGNESIUM SERPL-MCNC: 1.7 MG/DL (ref 1.6–2.4)
MCH RBC QN AUTO: 29.6 PG (ref 26.6–33)
MCHC RBC AUTO-ENTMCNC: 30.2 G/DL (ref 31.5–35.7)
MCV RBC AUTO: 98 FL (ref 79–97)
PHOSPHATE SERPL-MCNC: 3.6 MG/DL (ref 2.5–4.5)
PLATELET # BLD AUTO: 415 10*3/MM3 (ref 140–450)
PMV BLD AUTO: 10.1 FL (ref 6–12)
POTASSIUM BLD-SCNC: 3.6 MMOL/L (ref 3.5–5.2)
RBC # BLD AUTO: 3.48 10*6/MM3 (ref 3.77–5.28)
SODIUM BLD-SCNC: 138 MMOL/L (ref 136–145)
WBC NRBC COR # BLD: 9.17 10*3/MM3 (ref 3.4–10.8)

## 2019-06-19 PROCEDURE — 99308 SBSQ NF CARE LOW MDM 20: CPT | Performed by: PHYSICIAN ASSISTANT

## 2019-06-19 PROCEDURE — 97116 GAIT TRAINING THERAPY: CPT

## 2019-06-19 PROCEDURE — 97110 THERAPEUTIC EXERCISES: CPT

## 2019-06-19 PROCEDURE — 25010000002 MAGNESIUM SULFATE IN D5W 1G/100ML (PREMIX) 1-5 GM/100ML-% SOLUTION: Performed by: FAMILY MEDICINE

## 2019-06-19 PROCEDURE — 83735 ASSAY OF MAGNESIUM: CPT | Performed by: PHYSICIAN ASSISTANT

## 2019-06-19 PROCEDURE — 80048 BASIC METABOLIC PNL TOTAL CA: CPT | Performed by: PHYSICIAN ASSISTANT

## 2019-06-19 PROCEDURE — 84100 ASSAY OF PHOSPHORUS: CPT | Performed by: PHYSICIAN ASSISTANT

## 2019-06-19 PROCEDURE — 97530 THERAPEUTIC ACTIVITIES: CPT

## 2019-06-19 PROCEDURE — 85027 COMPLETE CBC AUTOMATED: CPT | Performed by: PHYSICIAN ASSISTANT

## 2019-06-19 RX ORDER — MAGNESIUM SULFATE 1 G/100ML
1 INJECTION INTRAVENOUS ONCE
Status: COMPLETED | OUTPATIENT
Start: 2019-06-19 | End: 2019-06-19

## 2019-06-19 RX ADMIN — MAGNESIUM SULFATE IN DEXTROSE 1 G: 10 INJECTION, SOLUTION INTRAVENOUS at 06:00

## 2019-06-19 RX ADMIN — APIXABAN 2.5 MG: 2.5 TABLET, FILM COATED ORAL at 21:31

## 2019-06-19 RX ADMIN — CARVEDILOL 12.5 MG: 6.25 TABLET, FILM COATED ORAL at 08:09

## 2019-06-19 RX ADMIN — ACETAMINOPHEN 650 MG: 325 TABLET ORAL at 21:31

## 2019-06-19 RX ADMIN — BUSPIRONE HYDROCHLORIDE 10 MG: 10 TABLET ORAL at 21:31

## 2019-06-19 RX ADMIN — ACETAMINOPHEN 650 MG: 325 TABLET ORAL at 08:09

## 2019-06-19 RX ADMIN — ATORVASTATIN CALCIUM 40 MG: 40 TABLET, FILM COATED ORAL at 21:31

## 2019-06-19 RX ADMIN — ACETAMINOPHEN 650 MG: 325 TABLET ORAL at 18:10

## 2019-06-19 RX ADMIN — CARVEDILOL 12.5 MG: 6.25 TABLET, FILM COATED ORAL at 18:10

## 2019-06-19 RX ADMIN — BUSPIRONE HYDROCHLORIDE 10 MG: 10 TABLET ORAL at 08:09

## 2019-06-19 RX ADMIN — APIXABAN 2.5 MG: 2.5 TABLET, FILM COATED ORAL at 08:09

## 2019-06-19 RX ADMIN — PANTOPRAZOLE SODIUM 40 MG: 40 TABLET, DELAYED RELEASE ORAL at 06:18

## 2019-06-19 NOTE — PROGRESS NOTES
PATIENT CONTINUES TO DO WELL WITH THERAPY AND IS APPROVED FOR CONTINUED STAY THRU 6/21/19. AT THIS TIME SHE CONTINUES TO MEET SWING BED CRITERIA. I WILL KEEP SS, CM AND MD UP TO DATE ON PATIENTS PROGRESS

## 2019-06-19 NOTE — PLAN OF CARE
Problem: Patient Care Overview  Goal: Plan of Care Review  Outcome: Ongoing (interventions implemented as appropriate)   06/19/19 1232   Coping/Psychosocial   Plan of Care Reviewed With patient   Plan of Care Review   Progress improving

## 2019-06-19 NOTE — PLAN OF CARE
Problem: Pain, Acute (Adult)  Goal: Identify Related Risk Factors and Signs and Symptoms  Outcome: Ongoing (interventions implemented as appropriate)   06/19/19 1211   Pain, Acute (Adult)   Related Risk Factors (Acute Pain) persistent pain;positioning   Signs and Symptoms (Acute Pain) fatigue/weakness;fear of reinjury;verbalization of pain descriptors       Problem: Fracture Orthopaedic (Adult)  Goal: Signs and Symptoms of Listed Potential Problems Will be Absent, Minimized or Managed (Fracture Orthopaedic)  Outcome: Ongoing (interventions implemented as appropriate)   06/19/19 1211   Goal/Outcome Evaluation   Problems Assessed (Orthopaedic Fracture) all   Problems Present (Orthopaedic Fracture) situational response       Problem: Skin Injury Risk (Adult)  Goal: Identify Related Risk Factors and Signs and Symptoms  Outcome: Ongoing (interventions implemented as appropriate)   06/19/19 1211   Skin Injury Risk (Adult)   Related Risk Factors (Skin Injury Risk) advanced age;edema;mobility impaired       Problem: Fall Risk (Adult)  Goal: Absence of Fall  Outcome: Ongoing (interventions implemented as appropriate)   06/19/19 1211   Fall Risk (Adult)   Absence of Fall making progress toward outcome       Problem: Patient Care Overview  Goal: Plan of Care Review  Outcome: Ongoing (interventions implemented as appropriate)   06/19/19 1211   Coping/Psychosocial   Plan of Care Reviewed With patient   Plan of Care Review   Progress improving

## 2019-06-19 NOTE — THERAPY TREATMENT NOTE
Acute Care - Occupational Therapy Treatment Note  [facility]     Patient Name: Abbi Mendez  : 1935  MRN: [id number]  Today's Date: 2019  Onset of Illness/Injury or Date of Surgery: 19     Referring Physician: Dr. Parsons    Admit Date: 2019     No diagnosis found.  Patient Active Problem List   Diagnosis   • Atrial fibrillation (CMS/HCC)   • Coronary artery disease involving native coronary artery of native heart without angina pectoris   • RADHA (obstructive sleep apnea)   • Essential hypertension   • Hyperlipidemia LDL goal <70   • Iron deficiency anemia   • Left bundle branch block   • Syncope and collapse   • Severe sinus bradycardia   • Third degree AV block (CMS/HCC)   • Syncope   • Compression deformity of vertebra   • Radiculopathy   • Pericardial effusion without cardiac tamponade   • Hypercalcemia   • Abnormal serum protein electrophoresis   • Weight loss, unintentional   • Primary hyperparathyroidism (CMS/HCC)   • Hypercalcemia   • Pacemaker   • Hiatal hernia   • Elevated serum immunoglobulin free light chains   • Left displaced femoral neck fracture (CMS/HCC)   • Hip fracture (CMS/HCC)     Past Medical History:   Diagnosis Date   • Anxiety    • Arthritis    • Atrial fibrillation (CMS/HCC) 2016   • Gastric ulcer    • Hiatal hernia    • Hypertension    • Iron deficiency anemia    • RADHA (obstructive sleep apnea) 2016     Past Surgical History:   Procedure Laterality Date   • CARDIAC ELECTROPHYSIOLOGY PROCEDURE N/A 2018    Procedure: Pacemaker DC new;  Surgeon: Soraya Darling MD;  [facility];  Service:    • HIP BIPOLAR REPLACEMENT Left 2019    Procedure: HIP BIPOLAR REPLACEMENT;  Surgeon: Gerardo Jain MD;  Location: [facility];  Service: Orthopedics   • TOTAL KNEE ARTHROPLASTY Right    • TUBAL ABDOMINAL LIGATION         Therapy Treatment    Rehabilitation Treatment Summary     Row Name 19 1232             Treatment Time/Intention     Discipline  occupational therapist  -AB      Document Type  therapy note (daily note)  -AB      Subjective Information  no complaints  -AB      Mode of Treatment  occupational therapy  -AB      Patient Effort  good  -AB      Existing Precautions/Restrictions  fall;left;hip  -AB      Patient Response to Treatment  Pt. tolerated 55 minutes OT tx this date in rehab gym w/ good tolerance. Continue tx per POC.   -AB      Recorded by [AB] Hilda Peralta, OT 06/19/19 1240      Row Name 06/19/19 1232             Cognitive Assessment/Intervention    Additional Documentation  Cognitive Assessment/Intervention (Group)  -AB      Recorded by [AB] Hilda Peralta, OT 06/19/19 1240      Row Name 06/19/19 1232             Cognitive Assessment/Intervention- PT/OT    Affect/Mental Status (Cognitive)  WFL  -AB      Follows Commands (Cognition)  verbal cues/prompting required  -AB      Personal Safety Interventions  fall prevention program maintained;gait belt;nonskid shoes/slippers when out of bed;supervised activity  -AB      Recorded by [AB] Hilda Peralta, OT 06/19/19 1240      Row Name 06/19/19 1232             Sit-Stand Transfer    Sit-Stand Garden (Transfers)  contact guard;verbal cues  -AB      Assistive Device (Sit-Stand Transfers)  wheelchair;other (see comments) wall rail  -AB      Recorded by [AB] Hilda Peralta, OT 06/19/19 1240      Row Name 06/19/19 1232             Stand-Sit Transfer    Stand-Sit Garden (Transfers)  contact guard;verbal cues  -AB      Assistive Device (Stand-Sit Transfers)  wheelchair;other (see comments) wall rail  -AB      Recorded by [AB] Hilda Peralta, OT 06/19/19 1240      Row Name 06/19/19 1232             Upper Extremity Seated Therapeutic Exercise    Exercise Type, Seated Upper Extremity (Therapeutic Exercise)  AROM (active range of motion) BUE CoordEx; G/FMC TherEx/Act; strengthening  -AB      Expected Outcomes, Seated Upper Extremity  (Therapeutic Exercise)  improve functional tolerance, self-care activity;improve performance, BADLs;improve performance, transfer skills  -AB      Sets/Reps Detail, Seated Upper Extremity (Therapeutic Exercise)  BUE Wrist Rolls X2  -AB      Recorded by [AB] Hilda Peralta, OT 06/19/19 1240      Row Name 06/19/19 1232             Positioning and Restraints    Post Treatment Position  other  -AB      Other Position  with other staff in transport chair w/ Shelly  -AB      Recorded by [AB] Hilda Peralta OT 06/19/19 1240      Row Name                Wound 06/05/19 1838 Left hip incision    Wound - Properties Group Date first assessed: 06/05/19 [KH] Time first assessed: 1838 [KH] Side: Left [KH] Location: hip [KH] Type: incision [KH] Recorded by:  [OUSMANE] Ary Sylvester RN 06/05/19 1838      User Key  (r) = Recorded By, (t) = Taken By, (c) = Cosigned By    Initials Name Effective Dates Discipline    Hilda Austin OT 04/03/18 -  OT    Ary Coyne RN 06/16/16 -  Nurse        Wound 06/05/19 1838 Left hip incision (Active)   Dressing Appearance dry;intact;open to air 6/19/2019  8:39 AM   Closure Other (Comment) 6/19/2019  8:39 AM   Base dry 6/19/2019  8:39 AM   Periwound intact;dry 6/19/2019  8:39 AM   Periwound Temperature warm 6/18/2019  7:45 PM   Periwound Skin Turgor soft 6/19/2019  8:39 AM   Edges rolled/closed 6/19/2019  8:39 AM   Drainage Amount none 6/19/2019  8:39 AM       Occupational Therapy Education     Title: PT OT SLP Therapies (Done)     Topic: Occupational Therapy (Done)     Point: Home exercise program (Done)     Description: Instruct learner(s) on appropriate technique for monitoring, assisting and/or progressing therapeutic exercises/activities.    Learning Progress Summary           Patient Acceptance, E,TB, VU by KM at 6/18/2019  1:41 PM    Acceptance, E,D, DU,NR by KILO at 6/18/2019 12:00 PM                               User Key     Initials Effective  Dates Name Provider Type Discipline     03/14/16 -  Dominique Morales, OT Occupational Therapist OT    KM 11/19/18 -  Lucille Torres RN Registered Nurse Nurse                OT Recommendation and Plan     Plan of Care Review  Plan of Care Reviewed With: patient  Plan of Care Reviewed With: patient  Outcome Measures     Row Name 06/17/19 1500             How much help from another person do you currently need...    Turning from your back to your side while in flat bed without using bedrails?  4  -LL      Moving from lying on back to sitting on the side of a flat bed without bedrails?  3  -LL      Moving to and from a bed to a chair (including a wheelchair)?  3  -LL      Standing up from a chair using your arms (e.g., wheelchair, bedside chair)?  3  -LL      Climbing 3-5 steps with a railing?  3  -LL      To walk in hospital room?  3  -LL      AM-PAC 6 Clicks Score  19  -LL         Functional Assessment    Outcome Measure Options  AM-PAC 6 Clicks Basic Mobility (PT)  -LL        User Key  (r) = Recorded By, (t) = Taken By, (c) = Cosigned By    Initials Name Provider Type    LL Loyda Vega PTA Physical Therapy Assistant           Time Calculation:   Time Calculation- OT     Row Name 06/19/19 1232             Time Calculation- OT    Total Timed Code Minutes- OT  55 minute(s)  -AB        User Key  (r) = Recorded By, (t) = Taken By, (c) = Cosigned By    Initials Name Provider Type    AB Hilda Peralta OT Occupational Therapist        Therapy Charges for Today     Code Description Service Date Service Provider Modifiers Qty    [id number]  OT THERAPEUTIC ACT EA 15 MIN 6/19/2019 Hilda Peralta OT GO 4               Hilda Peralta OT  6/19/2019

## 2019-06-19 NOTE — PLAN OF CARE
Problem: Pain, Acute (Adult)  Goal: Identify Related Risk Factors and Signs and Symptoms  Outcome: Ongoing (interventions implemented as appropriate)    Goal: Acceptable Pain Control/Comfort Level  Outcome: Ongoing (interventions implemented as appropriate)      Problem: Skin Injury Risk (Adult)  Goal: Identify Related Risk Factors and Signs and Symptoms  Outcome: Ongoing (interventions implemented as appropriate)    Goal: Skin Health and Integrity  Outcome: Ongoing (interventions implemented as appropriate)      Problem: Fall Risk (Adult)  Goal: Identify Related Risk Factors and Signs and Symptoms  Outcome: Outcome(s) achieved Date Met: 06/18/19    Goal: Absence of Fall  Outcome: Ongoing (interventions implemented as appropriate)      Problem: Patient Care Overview  Goal: Plan of Care Review  Outcome: Ongoing (interventions implemented as appropriate)

## 2019-06-19 NOTE — PROGRESS NOTES
Discharge Planning Assessment   [place]     Patient Name: Abbi Mendez  MRN: [id number]  Today's Date: 6/19/2019    Admit Date: 6/13/2019    Discharge Plan     Row Name 06/19/19 1406       Plan    Plan Pt was admitted to swing bed on 6/13. SS spoke to pt's daughter, Shruthi on this date and pt plans to return home with son, Mohinder at discharge. Pt does not utilize home health services. Pt request [facility]. [facility] [phone] to be arranged with dr. stallings. Pt has a rolling walker, C-Pap machine, cane and bedside commode at home. Pt request a wheelchair. W/C to be arranged with dr. stallings. SS to follow.         Annabel Archer

## 2019-06-19 NOTE — THERAPY TREATMENT NOTE
Acute Care - Physical Therapy Treatment Note   [facility]     Patient Name: Abbi Mendez  : 1935  MRN: [id number]  Today's Date: 2019  Onset of Illness/Injury or Date of Surgery: 19  Date of Referral to PT: 19  Referring Physician: Dr. Parsons    Admit Date: 2019    Visit Dx:  No diagnosis found.  Patient Active Problem List   Diagnosis   • Atrial fibrillation (CMS/HCC)   • Coronary artery disease involving native coronary artery of native heart without angina pectoris   • RADHA (obstructive sleep apnea)   • Essential hypertension   • Hyperlipidemia LDL goal <70   • Iron deficiency anemia   • Left bundle branch block   • Syncope and collapse   • Severe sinus bradycardia   • Third degree AV block (CMS/HCC)   • Syncope   • Compression deformity of vertebra   • Radiculopathy   • Pericardial effusion without cardiac tamponade   • Hypercalcemia   • Abnormal serum protein electrophoresis   • Weight loss, unintentional   • Primary hyperparathyroidism (CMS/HCC)   • Hypercalcemia   • Pacemaker   • Hiatal hernia   • Elevated serum immunoglobulin free light chains   • Left displaced femoral neck fracture (CMS/HCC)   • Hip fracture (CMS/HCC)       Therapy Treatment    Rehabilitation Treatment Summary     Row Name 19 1732 19 1232          Treatment Time/Intention    Discipline  physical therapy assistant  -LL  occupational therapist  -AB     Document Type  therapy note (daily note)  -LL  therapy note (daily note)  -AB     Subjective Information  no complaints  -LL  no complaints  -AB     Mode of Treatment  physical therapy;individual therapy  -LL  occupational therapy  -AB     Patient/Family Observations  Patient agreeable to PT session in rehab this date.  -LL  --     Patient Effort  good  -LL  good  -AB     Existing Precautions/Restrictions  --  fall;left;hip  -AB     Patient Response to Treatment  Patient tolerated 55 min of PT in rehab gym this date.  -LL  Pt. tolerated 55 minutes OT  tx this date in rehab gym w/ good tolerance. Continue tx per POC.   -AB     Recorded by [LL] Loyda Vega, ALFREDO 06/19/19 1739 [AB] Hilda Peralta, OT 06/19/19 1240     Row Name 06/19/19 1232             Cognitive Assessment/Intervention    Additional Documentation  Cognitive Assessment/Intervention (Group)  -AB      Recorded by [AB] Hilda Peralta, OT 06/19/19 1240      Row Name 06/19/19 1732 06/19/19 1232          Cognitive Assessment/Intervention- PT/OT    Affect/Mental Status (Cognitive)  WFL  -LL  WFL  -AB     Orientation Status (Cognition)  oriented x 3  -LL  --     Follows Commands (Cognition)  follows one step commands;verbal cues/prompting required  -LL  verbal cues/prompting required  -AB     Personal Safety Interventions  fall prevention program maintained;gait belt;nonskid shoes/slippers when out of bed;supervised activity  -LL  fall prevention program maintained;gait belt;nonskid shoes/slippers when out of bed;supervised activity  -AB     Recorded by [LL] Loyda Vega, ALFREDO 06/19/19 1739 [AB] Hilda Peralta, OT 06/19/19 1240     Row Name 06/19/19 1732             Mobility Assessment/Intervention    Extremity Weight-bearing Status  left lower extremity;right lower extremity  -LL      Left Lower Extremity (Weight-bearing Status)  weight-bearing as tolerated (WBAT)  -LL      Right Lower Extremity (Weight-bearing Status)  weight-bearing as tolerated (WBAT)  -LL      Recorded by [LL] Loyda Vega, ALFREDO 06/19/19 1739      Row Name 06/19/19 1732             Bed Mobility Assessment/Treatment    Bed Mobility Assessment/Treatment  bed mobility (all) activities  -LL      Supine-Sit [place] (Bed Mobility)  contact guard  -LL      Sit-Supine [place] (Bed Mobility)  contact guard  -LL      Assistive Device (Bed Mobility)  bed rails  -LL      Recorded by [LL] Loyda Vega, PTA 06/19/19 1739      Row Name 06/19/19 1732             Transfer Assessment/Treatment    Transfer  Assessment/Treatment  sit-stand transfer;stand-sit transfer  -LL      Recorded by [LL] Loyda Vega, [facility] 06/19/19 1739      Row Name 06/19/19 1732 06/19/19 1232          Sit-Stand Transfer    Sit-Stand [place] (Transfers)  minimum assist (75% patient effort);contact guard  -LL  contact guard;verbal cues  -AB     Assistive Device (Sit-Stand Transfers)  walker, front-wheeled  -LL  wheelchair;other (see comments) wall rail  -AB     Recorded by [LL] Loyda Vega, [facility] 06/19/19 1739 [AB] Hilda Peralta, OT 06/19/19 1240     Row Name 06/19/19 1732 06/19/19 1232          Stand-Sit Transfer    Stand-Sit [place] (Transfers)  minimum assist (75% patient effort);contact guard  -LL  contact guard;verbal cues  -AB     Assistive Device (Stand-Sit Transfers)  walker, front-wheeled  -LL  wheelchair;other (see comments) wall rail  -AB     Recorded by [LL] Loyda Vega, [facility] 06/19/19 1739 [AB] Hilda Peralta, OT 06/19/19 1240     Row Name 06/19/19 1732             Gait/Stairs Assessment/Training    Gait/Stairs Assessment/Training  gait/ambulation independence  -LL      [place] Level (Gait)  contact guard  -LL      Assistive Device (Gait)  walker, front-wheeled  -LL      Distance in Feet (Gait)  160 x 2  -LL      Pattern (Gait)  step-through  -LL      Deviations/Abnormal Patterns (Gait)  antalgic;stride length decreased  -LL      Bilateral Gait Deviations  forward flexed posture;heel strike decreased;weight shift ability decreased  -LL      Recorded by [LL] Loyda Vgea, [facility] 06/19/19 1739      Row Name 06/19/19 1732             Therapeutic Exercise    Therapeutic Exercise  aerobic exercise  -LL      Recorded by [LL] Loyda Vega, [facility] 06/19/19 1739      Row Name 06/19/19 1232             Upper Extremity Seated Therapeutic Exercise    Exercise Type, Seated Upper Extremity (Therapeutic Exercise)  AROM (active range of motion) BUE CoordEx; G/FMC TherEx/Act; strengthening  -AB      Expected  Outcomes, Seated Upper Extremity (Therapeutic Exercise)  improve functional tolerance, self-care activity;improve performance, BADLs;improve performance, transfer skills  -AB      Sets/Reps Detail, Seated Upper Extremity (Therapeutic Exercise)  BUE Wrist Rolls X2  -AB      Recorded by [AB] Hilda Peralta, OT 06/19/19 1240      Row Name 06/19/19 1732             Aerobic Exercise Activity    Exercise Performed (Aerobic, Therapeutic Exercise)  recumbent stationary bike  -LL      Expected Outcome (Aerobic, Therapeutic Exercise)  improve functional tolerance, household activity;improve functional tolerance, self-care activity;improve performance, gait skills;improve performance, transfer skills;strengthen normal movement patterns  -LL      Time (Aerobic, Therapeutic Exercise)  7 level 1.0  -LL      Transfers Skills, Training to Functional Activity (Aerobic, Therapeutic Exercise)  able to transfer skills from training to functional activity  -LL      Recorded by [LL] Loyda Vega, ALFREDO 06/19/19 1739      Row Name 06/19/19 1732             Lower Extremity Seated Therapeutic Exercise    Performed, Seated Lower Extremity (Therapeutic Exercise)  hip flexion/extension;hip abduction/adduction;ankle dorsiflexion/plantarflexion;LAQ (long arc quad), knee extension GS  -LL      Device, Seated Lower Extremity (Therapeutic Exercise)  elastic bands/tubing;small ball;free weights, cuff 2 lbs  -LL      Exercise Type, Seated Lower Extremity (Therapeutic Exercise)  AROM (active range of motion)  -LL      Restrictions, Seated Lower Extremity (Therapeutic Exercise)  L JANET precautions  -LL      Sets/Reps Detail, Seated Lower Extremity (Therapeutic Exercise)  1 x 20  -LL      Transfers Skills, Training to Functional Activity, Seated Lower Extremity (Therapeutic Exercise)  able to transfer skills from training to functional activity  -LL      Recorded by [LL] Loyda Vega, ALFREDO 06/19/19 1739      Row Name 06/19/19 1732              Standing Balance Activity    Activities Performed (Standing, Balance Training)  standing reaching outside base of support Bean bag toss & pickup with reacher  -LL      Support Needed for Balance (Standing, Balance Training)  uses both upper extremities for support  -LL      Upper Extremity Activity with Device (Standing, Balance Training)  -- bean bags & reacher  -LL      Progressive Balance Activity (Standing, Balance Training)  speed of movements increased during activity;combined head and eye movements during activity;upper extremity activities added during activity  -LL      Transfers Skills, Training to Functional Activity (Standing, Balance Training)  able to transfer skills from training to functional activity  -LL      Recorded by [LL] Loyda Vega, ALFREDO 06/19/19 1739      Row Name 06/19/19 1732 06/19/19 1232          Positioning and Restraints    Pre-Treatment Position  -- Transport chair PCA  -LL  --     Post Treatment Position  wheelchair  -LL  other  -AB     In Chair  sitting;with OT;legs elevated  -LL  --     Other Position  --  with other staff in transport chair w/ Shelly  -AB     Recorded by [LL] Loyda Vega PTA 06/19/19 1739 [AB] Hilda Peralta, OT 06/19/19 1240     Row Name                Wound 06/05/19 1838 Left hip incision    Wound - Properties Group Date first assessed: 06/05/19 [KH] Time first assessed: 1838 [KH] Side: Left [KH] Location: hip [KH] Type: incision [KH] Recorded by:  [KH] Ary Sylvester RN 06/05/19 1838      User Key  (r) = Recorded By, (t) = Taken By, (c) = Cosigned By    Initials Name Effective Dates Discipline    AB Hilda Peralta, OT 04/03/18 -  OT    Ary Coyne RN 06/16/16 -  Nurse    LL Loyda Vega, PTA 05/02/16 -  PT          Wound 06/05/19 1838 Left hip incision (Active)   Dressing Appearance dry;intact;open to air 6/19/2019  8:39 AM   Closure Other (Comment) 6/19/2019  8:39 AM   Base dry 6/19/2019  8:39 AM   Periwound  intact;dry 6/19/2019  8:39 AM   Periwound Temperature warm 6/18/2019  7:45 PM   Periwound Skin Turgor soft 6/19/2019  8:39 AM   Edges rolled/closed 6/19/2019  8:39 AM   Drainage Amount none 6/19/2019  8:39 AM           Physical Therapy Education     Title: PT OT SLP Therapies (Done)     Topic: Physical Therapy (Done)     Point: Mobility training (Done)     Learning Progress Summary           Patient Acceptance, E,D, VU,NR by LL at 6/19/2019  5:43 PM    Acceptance, E,TB, VU by KM at 6/18/2019  1:41 PM    Acceptance, E,TB, VU by KM at 6/17/2019  4:31 PM    Acceptance, E,D, VU,NR by LL at 6/17/2019  3:36 PM    Acceptance, E,TB, VU by DM at 6/16/2019  2:00 PM    Acceptance, E, VU by BC at 6/15/2019  2:53 PM    Acceptance, E,TB, VU by DM at 6/15/2019  2:48 PM    Acceptance, E,D, VU,NR by LL at 6/14/2019  4:37 PM    Acceptance, E,D, VU,NR by AG at 6/13/2019  5:48 PM   Family Acceptance, E,D, VU,NR by AG at 6/13/2019  5:48 PM                   Point: Home exercise program (Done)     Learning Progress Summary           Patient Acceptance, E,D, VU,NR by LL at 6/19/2019  5:43 PM    Acceptance, E,TB, VU by KM at 6/18/2019  1:41 PM    Acceptance, E,TB, VU by KM at 6/17/2019  4:31 PM    Acceptance, E,D, VU,NR by LL at 6/17/2019  3:36 PM    Acceptance, E,TB, VU by DM at 6/16/2019  2:00 PM    Acceptance, E, VU by BC at 6/15/2019  2:53 PM    Acceptance, E,TB, VU by DM at 6/15/2019  2:48 PM    Acceptance, E,D, VU,NR by LL at 6/14/2019  4:37 PM    Acceptance, E,D, VU,NR by AG at 6/13/2019  5:48 PM   Family Acceptance, E,D, VU,NR by AG at 6/13/2019  5:48 PM                   Point: Body mechanics (Done)     Learning Progress Summary           Patient Acceptance, E,D, VU,NR by LL at 6/19/2019  5:43 PM    Acceptance, E,TB, VU by KM at 6/18/2019  1:41 PM    Acceptance, E,TB, VU by KM at 6/17/2019  4:31 PM    Acceptance, E,D, VU,NR by LL at 6/17/2019  3:36 PM    Acceptance, E,TB, VU by DM at 6/16/2019  2:00 PM    Acceptance, E, VU by BC  at 6/15/2019  2:53 PM    Acceptance, E,TB, VU by DM at 6/15/2019  2:48 PM    Acceptance, E,D, VU,NR by LL at 6/14/2019  4:37 PM    Acceptance, E,D, VU,NR by AG at 6/13/2019  5:48 PM   Family Acceptance, E,D, VU,NR by AG at 6/13/2019  5:48 PM                   Point: Precautions (Done)     Learning Progress Summary           Patient Acceptance, E,D, VU,NR by LL at 6/19/2019  5:43 PM    Acceptance, E,TB, VU by KM at 6/18/2019  1:41 PM    Acceptance, E,TB, VU by KM at 6/17/2019  4:31 PM    Acceptance, E,D, VU,NR by LL at 6/17/2019  3:36 PM    Acceptance, E,TB, VU by DM at 6/16/2019  2:00 PM    Acceptance, E, VU by BC at 6/15/2019  2:53 PM    Acceptance, E,TB, VU by DM at 6/15/2019  2:48 PM    Acceptance, E,D, VU,NR by LL at 6/14/2019  4:37 PM    Acceptance, E,D, VU,NR by AG at 6/13/2019  5:48 PM   Family Acceptance, E,D, VU,NR by AG at 6/13/2019  5:48 PM                               User Key     Initials Effective Dates Name Provider Type Discipline     06/16/16 -  Migdalia Haney, RN Registered Nurse Nurse    BC 03/14/16 -  Fatou Doll, PT Physical Therapist PT     04/03/18 -  Sandy Covington, PT Physical Therapist PT     05/02/16 -  Loyda Vega PTA Physical Therapy Assistant PT     11/19/18 -  Lucille Torres, FANNY Registered Nurse Nurse                PT Recommendation and Plan  Anticipated Discharge Disposition (PT): home with assist  Therapy Frequency (PT Clinical Impression): other (see comments)(6 days/ week )  Outcome Summary/Treatment Plan (PT)  Anticipated Equipment Needs at Discharge (PT): raised toilet seat, front wheeled walker  Anticipated Discharge Disposition (PT): home with assist  Plan of Care Reviewed With: patient  Progress: improving  Outcome Summary: Patient continues to make progress with therapy & is requiring less assistance with functional mobility.  Continue w/ current POC.  Outcome Measures     Row Name 06/19/19 1700 06/17/19 1500          How much help from another person do  you currently need...    Turning from your back to your side while in flat bed without using bedrails?  4  -LL  4  -LL     Moving from lying on back to sitting on the side of a flat bed without bedrails?  3  -LL  3  -LL     Moving to and from a bed to a chair (including a wheelchair)?  3  -LL  3  -LL     Standing up from a chair using your arms (e.g., wheelchair, bedside chair)?  3  -LL  3  -LL     Climbing 3-5 steps with a railing?  3  -LL  3  -LL     To walk in hospital room?  3  -LL  3  -LL     AM-PAC 6 Clicks Score  19  -LL  19  -LL        Functional Assessment    Outcome Measure Options  AM-PAC 6 Clicks Basic Mobility (PT)  -LL  AM-PAC 6 Clicks Basic Mobility (PT)  -LL       User Key  (r) = Recorded By, (t) = Taken By, (c) = Cosigned By    Initials Name Provider Type    LL Loyda Vega PTA Physical Therapy Assistant         Time Calculation:   PT Charges     Row Name 06/19/19 1744             Time Calculation    Start Time  0915  -LL      Stop Time  1010  -LL      Time Calculation (min)  55 min  -LL      PT Received On  06/19/19  -LL         Time Calculation- PT    Total Timed Code Minutes- PT  55 minute(s)  -LL        User Key  (r) = Recorded By, (t) = Taken By, (c) = Cosigned By    Initials Name Provider Type    Loyda Kraus PTA Physical Therapy Assistant        Therapy Charges for Today     Code Description Service Date Service Provider Modifiers Qty    73069624456 HC GAIT TRAINING EA 15 MIN 6/19/2019 Loyda Vega PTA GP 1    11021670097 HC PT THER PROC EA 15 MIN 6/19/2019 Loyda Vega PTA GP 3    77638670316 HC PT THER SUPP EA 15 MIN 6/19/2019 Loyda Vega PTA GP 1          PT G-Codes  Outcome Measure Options: AM-PAC 6 Clicks Basic Mobility (PT)  AM-PAC 6 Clicks Score: 19  Score: 16    Justina Vega PTA  6/19/2019

## 2019-06-19 NOTE — PROGRESS NOTES
[facility] Medicine Services  SWING BED BRIEF PROGRESS NOTE    SUBJECTIVE:  Patient was seen at bedside this morning.  Patient's chart, labs, and vitals reviewed.  Discussed with Lucille ESCOBEDO.  Patient had a good night sleep after Tylenol administered yesterday evening.  Patient states she is slept better than she has this entire admission.  Patient states she feels much better today, had some soreness yesterday and refused physical therapy.  Patient is eager to perform physical therapy today.  She denies any new complaints.  Pain is controlled.  Denies any chest pain or dyspnea.  No fevers or chills.  No urinary symptoms.  Per last PT note on 6/17/2019, patient ambulated 160 feet x 2 with minimum assistance.  Discussed with patient, recommending a couple more days of inpatient therapy.  Patient's goal is to go home with home health and physical therapy.  She has multiple family members that can help in her home.  She is eager to get discharged home.    OBJECTIVE DATA:  Vitals:    06/19/19 0620   BP: 146/66   Pulse: 67   Resp: 18   Temp: 98.5 °F (36.9 °C)   SpO2: 95%       MEDICATION RECOMMENDATIONS/CHANGES:    · Continue PT/OT  · Continue magnesium replacement  · Blood pressure controlled, continue current regimen  · Plan for a couple more days physical therapy, then discharged home with home health long as she progresses well.      Alize Parsons PA-C  Hospitalist Service -- [facility]   Pager: [phone]    06/19/19  8:34 AM    Attending Physician: Tristan Melara MD

## 2019-06-20 LAB — MAGNESIUM SERPL-MCNC: 1.9 MG/DL (ref 1.6–2.4)

## 2019-06-20 PROCEDURE — 97116 GAIT TRAINING THERAPY: CPT

## 2019-06-20 PROCEDURE — 99308 SBSQ NF CARE LOW MDM 20: CPT | Performed by: PHYSICIAN ASSISTANT

## 2019-06-20 PROCEDURE — 97530 THERAPEUTIC ACTIVITIES: CPT

## 2019-06-20 PROCEDURE — 83735 ASSAY OF MAGNESIUM: CPT | Performed by: FAMILY MEDICINE

## 2019-06-20 PROCEDURE — 25010000002 MAGNESIUM SULFATE IN D5W 1G/100ML (PREMIX) 1-5 GM/100ML-% SOLUTION: Performed by: PHYSICIAN ASSISTANT

## 2019-06-20 PROCEDURE — 97110 THERAPEUTIC EXERCISES: CPT

## 2019-06-20 RX ORDER — MAGNESIUM SULFATE 1 G/100ML
1 INJECTION INTRAVENOUS ONCE
Status: COMPLETED | OUTPATIENT
Start: 2019-06-20 | End: 2019-06-20

## 2019-06-20 RX ADMIN — BUSPIRONE HYDROCHLORIDE 10 MG: 10 TABLET ORAL at 20:01

## 2019-06-20 RX ADMIN — PANTOPRAZOLE SODIUM 40 MG: 40 TABLET, DELAYED RELEASE ORAL at 05:51

## 2019-06-20 RX ADMIN — MAGNESIUM SULFATE IN DEXTROSE 1 G: 10 INJECTION, SOLUTION INTRAVENOUS at 05:51

## 2019-06-20 RX ADMIN — CARVEDILOL 12.5 MG: 6.25 TABLET, FILM COATED ORAL at 17:41

## 2019-06-20 RX ADMIN — BUSPIRONE HYDROCHLORIDE 10 MG: 10 TABLET ORAL at 08:40

## 2019-06-20 RX ADMIN — ACETAMINOPHEN 650 MG: 325 TABLET ORAL at 20:01

## 2019-06-20 RX ADMIN — APIXABAN 2.5 MG: 2.5 TABLET, FILM COATED ORAL at 20:01

## 2019-06-20 RX ADMIN — ACETAMINOPHEN 650 MG: 325 TABLET ORAL at 23:06

## 2019-06-20 RX ADMIN — OFLOXACIN 50000 UNITS: 300 TABLET, COATED ORAL at 08:40

## 2019-06-20 RX ADMIN — ATORVASTATIN CALCIUM 40 MG: 40 TABLET, FILM COATED ORAL at 20:01

## 2019-06-20 RX ADMIN — APIXABAN 2.5 MG: 2.5 TABLET, FILM COATED ORAL at 08:40

## 2019-06-20 RX ADMIN — CARVEDILOL 12.5 MG: 6.25 TABLET, FILM COATED ORAL at 08:40

## 2019-06-20 NOTE — PLAN OF CARE
Problem: Pain, Acute (Adult)  Goal: Identify Related Risk Factors and Signs and Symptoms  Outcome: Ongoing (interventions implemented as appropriate)      Problem: Fracture Orthopaedic (Adult)  Goal: Signs and Symptoms of Listed Potential Problems Will be Absent, Minimized or Managed (Fracture Orthopaedic)  Outcome: Ongoing (interventions implemented as appropriate)      Problem: Skin Injury Risk (Adult)  Goal: Identify Related Risk Factors and Signs and Symptoms  Outcome: Ongoing (interventions implemented as appropriate)      Problem: Fall Risk (Adult)  Goal: Absence of Fall  Outcome: Ongoing (interventions implemented as appropriate)      Problem: Patient Care Overview  Goal: Plan of Care Review  Outcome: Ongoing (interventions implemented as appropriate)

## 2019-06-20 NOTE — PROGRESS NOTES
Discharge Planning Assessment   [facility]     Patient Name: Abbi Mendez  MRN: [id number]  Today's Date: 6/20/2019    Admit Date: 6/13/2019      Discharge Plan     Row Name 06/20/19 1118       Plan    Plan Pt was admitted to swing bed on 6/13. [facility]. Dr. Melara states plans to discharge pt in 1-2 days. SS notified family. SS to follow.         Annabel Archer

## 2019-06-20 NOTE — THERAPY TREATMENT NOTE
Acute Care - Occupational Therapy Treatment Note  [facility]     Patient Name: Abbi Mendez  : 1935  MRN: [id number]  Today's Date: 2019  Onset of Illness/Injury or Date of Surgery: 19     Referring Physician: Dr. Parsons    Admit Date: 2019     No diagnosis found.  Patient Active Problem List   Diagnosis   • Atrial fibrillation (CMS/HCC)   • Coronary artery disease involving native coronary artery of native heart without angina pectoris   • RADHA (obstructive sleep apnea)   • Essential hypertension   • Hyperlipidemia LDL goal <70   • Iron deficiency anemia   • Left bundle branch block   • Syncope and collapse   • Severe sinus bradycardia   • Third degree AV block (CMS/HCC)   • Syncope   • Compression deformity of vertebra   • Radiculopathy   • Pericardial effusion without cardiac tamponade   • Hypercalcemia   • Abnormal serum protein electrophoresis   • Weight loss, unintentional   • Primary hyperparathyroidism (CMS/HCC)   • Hypercalcemia   • Pacemaker   • Hiatal hernia   • Elevated serum immunoglobulin free light chains   • Left displaced femoral neck fracture (CMS/HCC)   • Hip fracture (CMS/HCC)     Past Medical History:   Diagnosis Date   • Anxiety    • Arthritis    • Atrial fibrillation (CMS/HCC) 2016   • Gastric ulcer    • Hiatal hernia    • Hypertension    • Iron deficiency anemia    • RADHA (obstructive sleep apnea) 2016     Past Surgical History:   Procedure Laterality Date   • CARDIAC ELECTROPHYSIOLOGY PROCEDURE N/A 2018    Procedure: Pacemaker DC new;  Surgeon: Soraya Darling MD;  Location: [facility] INVASIVE LOCATION;  Service:    • HIP BIPOLAR REPLACEMENT Left 2019    Procedure: HIP BIPOLAR REPLACEMENT;  Surgeon: Gerardo Jain MD;  Location: [facility];  Service: Orthopedics   • TOTAL KNEE ARTHROPLASTY Right    • TUBAL ABDOMINAL LIGATION         Therapy Treatment    Rehabilitation Treatment Summary     Row Name 19 1112             Treatment Time/Intention     Discipline  occupational therapist  -FIDENCIO      Document Type  therapy note (daily note)  -JW      Subjective Information  no complaints  -JW      Mode of Treatment  occupational therapy  -JW      Patient/Family Observations  Pt agreeable for therapy. Tx session completed in rehab tx gym.   -JW      Patient Effort  good  -JW      Existing Precautions/Restrictions  fall;left;hip  -JW      Recorded by [JW] Justin Gomez, OTR 06/20/19 1114      Row Name 06/20/19 1112             Upper Extremity Seated Therapeutic Exercise    Exercise Type, Seated Upper Extremity (Therapeutic Exercise)  AROM (active range of motion);other (see comments) BUE ther ex/act, GMC/FMC   -JW      Expected Outcomes, Seated Upper Extremity (Therapeutic Exercise)  improve functional tolerance, self-care activity;improve performance, BADLs;improve performance, fine motor coordination skills;improve performance, gross motor coordination skills;improve performance, transfer skills  -JW      Recorded by [JW] Justin Gomez, OTR 06/20/19 1114      Row Name 06/20/19 1112             Positioning and Restraints    Post Treatment Position  -- transport chair with    -JW      Recorded by [JW] Justin Gomez OTR 06/20/19 1114      Row Name                Wound 06/05/19 1838 Left hip incision    Wound - Properties Group Date first assessed: 06/05/19 [KH] Time first assessed: 1838 [KH] Side: Left [KH] Location: hip [KH] Type: incision [KH] Recorded by:  [OUSMANE] Ary Sylvester RN 06/05/19 1838      User Key  (r) = Recorded By, (t) = Taken By, (c) = Cosigned By    Initials Name Effective Dates Discipline    Ary Coyne RN 06/16/16 -  Nurse    Justin Dia, OTR 03/07/18 -  OT        Wound 06/05/19 1838 Left hip incision (Active)   Dressing Appearance dry;intact 6/19/2019  9:00 PM   Closure Sutures 6/19/2019  9:00 PM   Base dry 6/19/2019  9:00 PM   Periwound intact;dry 6/19/2019  9:00 PM   Periwound Temperature warm 6/19/2019  9:00  PM   Periwound Skin Turgor soft 6/19/2019  9:00 PM   Edges rolled/closed 6/19/2019  9:00 PM   Drainage Amount none 6/19/2019  9:00 PM       Occupational Therapy Education     Title: PT OT SLP Therapies (Done)     Topic: Occupational Therapy (Done)     Point: Home exercise program (Done)     Description: Instruct learner(s) on appropriate technique for monitoring, assisting and/or progressing therapeutic exercises/activities.    Learning Progress Summary           Patient Acceptance, E,TB, VU by KM at 6/18/2019  1:41 PM    Acceptance, E,D, DU,NR by  at 6/18/2019 12:00 PM                               User Key     Initials Effective Dates Name Provider Type Discipline    KILO 03/14/16 -  Dominique Morales OT Occupational Therapist OT     11/19/18 -  Lucille Torres RN Registered Nurse Nurse                OT Recommendation and Plan     Plan of Care Review  Plan of Care Reviewed With: patient  Plan of Care Reviewed With: patient  Outcome Summary: Pt tolerated tx session well with rest breaks provided. Continue with plan of care.   Outcome Measures     Row Name 06/19/19 1700 06/17/19 1500          How much help from another person do you currently need...    Turning from your back to your side while in flat bed without using bedrails?  4  -LL  4  -LL     Moving from lying on back to sitting on the side of a flat bed without bedrails?  3  -LL  3  -LL     Moving to and from a bed to a chair (including a wheelchair)?  3  -LL  3  -LL     Standing up from a chair using your arms (e.g., wheelchair, bedside chair)?  3  -LL  3  -LL     Climbing 3-5 steps with a railing?  3  -LL  3  -LL     To walk in hospital room?  3  -LL  3  -LL     AM-PAC 6 Clicks Score  19  -LL  19  -LL        Functional Assessment    Outcome Measure Options  AM-PAC 6 Clicks Basic Mobility (PT)  -LL  AM-PAC 6 Clicks Basic Mobility (PT)  -LL       User Key  (r) = Recorded By, (t) = Taken By, (c) = Cosigned By    Initials Name Provider Type    ZECHARIAH Vega  ALFREDO Scales Physical Therapy Assistant           Time Calculation:   Time Calculation- OT     Row Name 06/20/19 1110             Time Calculation- OT    Total Timed Code Minutes- OT  55 minute(s)  -FIDENCIO        User Key  (r) = Recorded By, (t) = Taken By, (c) = Cosigned By    Initials Name Provider Type    Justin Dia OTR Occupational Therapist        Therapy Charges for Today     Code Description Service Date Service Provider Modifiers Qty    [id number]  OT THERAPEUTIC ACT EA 15 MIN 6/20/2019 Justin Gomez OTR GO 4               TABATHA Granger  6/20/2019

## 2019-06-20 NOTE — PAYOR COMM NOTE
"[facility]  Swing Bed Program  Phone:[phone]  Fax: [phone]    Auth# [id number]    Patient plans on DC 6/21 or 6/22 but I will be out of the office on Friday so I wanted you to have the updated   Information just in case. If you need immediate help please call Olimpia Boudreaux at [phone]       Abbi Mendez (83 y.o. Female)     Date of Birth Social Security Number Address Home Phone MRN    1935  [id number]  [place] [id number] [phone] [id number]    Ep[employer]opalian Marital Status          Voodoo Single       Admission Date Admission Type Admitting Provider Attending Provider Department, Room/Bed    6/13/19 Urgent Jeffrey Parsons DO Hays, Ray G, MD [address], [address]/    Discharge Date Discharge Disposition Discharge Destination                       Attending Provider:  Tristan Melara MD    Allergies:  Darvon [Propoxyphene], Penicillins    Isolation:  None   Infection:  None   Code Status:  CPR    Ht:  162.6 cm (64\")   Wt:  74.5 kg (164 lb 4.8 oz)    Admission Cmt:  None   Principal Problem:  None                Active Insurance as of 6/13/2019     Primary Coverage     Payor Plan Insurance Group Employer/Plan Group    HUMANA MEDICARE REPLACEMENT HUMANA MEDICARE REPL [id number]     Payor Plan Address Payor Plan Phone Number Payor Plan Fax Number Effective Dates    [address] [address] [phone]  1/1/2018 - None Entered    [facility] [id number]       Subscriber Name Subscriber Birth Date Member ID       ABBI MENDEZ 1935 [id number]                 Emergency Contacts      (Rel.) Home Phone Work Phone Mobile Phone    Shruthi Gama (Daughter) -- -- [phone]    Suze Ortiz (Daughter) -- -- [phone]    ASTRID YOUSIF (Daughter) [phone] -- --            Orders (last 24 hrs)     Start     Ordered    06/27/19 0600  TB Skin Test (Reading)  Once     Comments:  Use Enter / Edit Results to Document Results      06/13/19 " 1508    06/27/19 0600  tuberculin injection 5 Units  Once      06/13/19 1508    06/21/19 0600  Magnesium  Morning Draw      06/20/19 0525    06/20/19 0900  cholecalciferol (VITAMIN D3) capsule 50,000 Units  Weekly      06/13/19 1508    06/20/19 0600  Magnesium  Morning Draw      06/19/19 1928    06/20/19 0545  Magnesium Sulfate 1 gram infusion - Mg 1.6-1.9 mg/dL  Once      06/20/19 0448    06/18/19 1119  Magnesium Sulfate 2 gram infusion - Mg less than or equal to 1.5 mg/dL  As Needed      06/18/19 1119    06/18/19 1119  Magnesium Sulfate 1 gram infusion - Mg 1.6-1.9 mg/dL  As Needed      06/18/19 1119    06/14/19 0700  pantoprazole (PROTONIX) EC tablet 40 mg  Every Morning      06/13/19 1508    06/13/19 2100  apixaban (ELIQUIS) tablet 2.5 mg  Every 12 Hours Scheduled      06/13/19 1508    06/13/19 2100  atorvastatin (LIPITOR) tablet 40 mg  Nightly      06/13/19 1508    06/13/19 2100  busPIRone (BUSPAR) tablet 10 mg  2 Times Daily      06/13/19 1508    06/13/19 1800  carvedilol (COREG) tablet 12.5 mg  2 Times Daily With Meals      06/13/19 1508    06/13/19 1508  acetaminophen (TYLENOL) tablet 650 mg  Every 4 Hours PRN      06/13/19 1508    06/13/19 1508  acetaminophen (TYLENOL) 160 MG/5ML solution 650 mg  Every 4 Hours PRN      06/13/19 1508    06/13/19 1508  acetaminophen (TYLENOL) suppository 650 mg  Every 4 Hours PRN      06/13/19 1508    06/13/19 1508  bisacodyl (DULCOLAX) EC tablet 10 mg  Daily PRN      06/13/19 1508    06/13/19 1508  docusate sodium (COLACE) capsule 100 mg  2 Times Daily PRN      06/13/19 1508    Unscheduled  Skin Care  As Needed      06/13/19 1540    Unscheduled  Magnesium  As Needed      06/18/19 1119    Unscheduled  Potassium  As Needed      06/18/19 1119             Physician Progress Notes (last 24 hours) (Notes from 6/19/2019  4:01 PM through 6/20/2019  4:01 PM)      Alize Parsons PA at 6/20/2019  7:29 AM     Attestation signed by Tristan Melara MD at 6/20/2019  3:03 PM    I have  reviewed the documentation above and agree.I talked with pt today and she is doing very well.  Will likely be able to go home tomorrow.                                             [facility] Services  SWING BED PROGRESS NOTE     Patient Identification:  Name:  Abbi Mendez  Age:  83 y.o.  Sex:  female  :  1935  MRN:  [id number]  Visit Number:  [id number]  Primary Care Provider:  Eric Drvier MD    Length of stay:  7    ----------------------------------------------------------------------------------------------------------------------  Subjective     Chief Complaint:  Follow up for hip fracture post repair, undergoing PT in swing bed    History of Presenting Illness:  Patient is an 83-year-old female with past medical history significant for paroxysmal atrial fibrillation, coronary artery disease status post stenting in the past, essential hypertension, hyperlipidemia, primary hyperparathyroidism, 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  On 2019, patient underwent ORIF with left hip bipolar replacement per orthopedic surgery.  Patient tolerated procedure well.  Patient underwent PT and OT.  On 2019, patient was admitted to [facility] swing bed for further therapy.    Subjective:  Today, the patient was sitting up in bedside chair per my evaluation this morning.  Patient states she is doing well.  Pain controlled.  She is tolerating physical therapy well.  She had 2 sessions, age 55 minutes yesterday.  Per charting, it appears patient ambulated 160 feet x 2 with minimum assistance.  She denies any fevers or chills, no cough.  She denies any chest pain or dyspnea.  She denies any abdominal pain, nausea, vomiting or diarrhea.  No urinary symptoms.  Patient hopes to be home by  the weekend or early next week.    Present during exam: n/a  ----------------------------------------------------------------------------------------------------------------------  Objective     Procedures:  · 6/5/2019: Rowell catheter insertion -- Activity restriction due to hip fracture   · 6/5/2019: Left hip bipolar replacement -- Dr. Jain, orthopedic surgery   · 6/6/2019: Rowell catheter removal     Current Hospital Meds:    apixaban 2.5 mg Oral Q12H   atorvastatin 40 mg Oral Nightly   busPIRone 10 mg Oral BID   carvedilol 12.5 mg Oral BID With Meals   cholecalciferol 50,000 Units Oral Weekly   pantoprazole 40 mg Oral QAM   [START ON 6/27/2019] tuberculin 5 Units Intradermal Once        ----------------------------------------------------------------------------------------------------------------------  Vital Signs:  Temp:  [99.2 °F (37.3 °C)] 99.2 °F (37.3 °C)  Heart Rate:  [75-78] 75  Resp:  [18] 18  BP: (144-147)/(59-66) 147/66    SpO2 Percentage    06/18/19 1900 06/18/19 2200 06/19/19 0620   SpO2: 94% 95% 95%        on   ;   Device (Oxygen Therapy): room air    Body mass index is 28.2 kg/m².  Wt Readings from Last 3 Encounters:   06/13/19 74.5 kg (164 lb 4.8 oz)   06/05/19 76.2 kg (168 lb 0.2 oz)   05/28/19 78 kg (172 lb)        Intake/Output Summary (Last 24 hours) at 6/20/2019 0729  Last data filed at 6/20/2019 0300  Gross per 24 hour   Intake 810 ml   Output --   Net 810 ml     Diet Regular; Cardiac  ----------------------------------------------------------------------------------------------------------------------  Physical exam:  Physical Exam   Constitutional: She is oriented to person, place, and time. She appears well-developed and well-nourished.  Non-toxic appearance. No distress.   Pleasant, elderly, sitting up in bed side chair, in no acute distress.   HENT:   Head: Normocephalic and atraumatic.   Right Ear: External ear normal.   Left Ear: External ear normal.   Nose: Nose normal.    Mouth/Throat: Oropharynx is clear and moist and mucous membranes are normal. No oropharyngeal exudate.   Eyes: Conjunctivae and EOM are normal. Pupils are equal, round, and reactive to light. No scleral icterus.   Neck: Trachea normal and normal range of motion. Neck supple. No JVD present. No muscular tenderness present. Carotid bruit is not present. No tracheal deviation present. No thyromegaly present.   Cardiovascular: Normal rate, regular rhythm, normal heart sounds and intact distal pulses. Exam reveals no gallop and no friction rub.   No murmur heard.  Pulmonary/Chest: Effort normal and breath sounds normal. No respiratory distress. She has no wheezes. She has no rhonchi. She has no rales. She exhibits no tenderness.   Abdominal: Soft. Bowel sounds are normal. She exhibits no distension and no mass. There is no hepatosplenomegaly. There is no tenderness. There is no rebound and no guarding. No hernia.   Genitourinary:   Genitourinary Comments: No Rowell catheter in place, making urine   Musculoskeletal: She exhibits no edema, tenderness or deformity.        Right lower leg: She exhibits no edema.        Left lower leg: She exhibits no edema.   Left hip incision clean dry and intact.  Neurovascular intact bilateral lower extremities.  Good range of motion.  Strength intact bilaterally.  Sensation intact.   Neurological: She is alert and oriented to person, place, and time. She has normal strength. No cranial nerve deficit or sensory deficit.   Skin: Skin is warm and dry. Capillary refill takes less than 2 seconds. No rash noted. No erythema. No pallor.   Psychiatric: She has a normal mood and affect. Her speech is normal and behavior is normal. Judgment and thought content normal.   Nursing note and vitals reviewed.     ----------------------------------------------------------------------------------------------------------------------  Tele:    Patient is not currently on telemetry monitoring.    I have  personally reviewed the EKG/Telemetry strips   ----------------------------------------------------------------------------------------------------------------------            Results from last 7 days   Lab Units 06/19/19  0113 06/18/19  0333 06/17/19  0342   WBC 10*3/mm3 9.17 9.99 9.02   HEMOGLOBIN g/dL 10.3* 10.2* 10.0*   HEMATOCRIT % 34.1 33.7* 32.8*   MCV fL 98.0* 98.0* 96.2   MCHC g/dL 30.2* 30.3* 30.5*   PLATELETS 10*3/mm3 415 396 425     Results from last 7 days   Lab Units 06/20/19  0218 06/19/19  0113 06/18/19  0333 06/17/19  0342 06/15/19  0405   SODIUM mmol/L  --  138 141 142 141   POTASSIUM mmol/L  --  3.6 3.6 4.0 4.3   MAGNESIUM mg/dL 1.9 1.7 1.6  --   --    CHLORIDE mmol/L  --  101 102 103 103   CO2 mmol/L  --  25.8 24.2 27.1 26.9   BUN mg/dL  --  21 19 22 24*   CREATININE mg/dL  --  0.95 0.95 0.96 1.05*   EGFR IF NONAFRICN AM mL/min/1.73  --  56* 56* 56* 50*   CALCIUM mg/dL  --  8.8 8.4* 8.7 8.5*   GLUCOSE mg/dL  --  109* 100* 109* 92   ALBUMIN g/dL  --   --  2.27* 2.51* 2.29*   BILIRUBIN mg/dL  --   --  0.3 0.2 0.3   ALK PHOS U/L  --   --  78 76 71   AST (SGOT) U/L  --   --  20 25 47*   ALT (SGPT) U/L  --   --  35* 44* 58*   Estimated Creatinine Clearance: 44.3 mL/min (by C-G formula based on SCr of 0.95 mg/dL).    Lab Results   Component Value Date    HGBA1C 5.70 (H) 06/05/2019     Pain Management Panel     Pain Management Panel Latest Ref Rng & Units 6/5/2019    CREATININE UR mg/dL 195.3        I have personally reviewed the above laboratory results.   ----------------------------------------------------------------------------------------------------------------------  Imaging Results (last 24 hours)     ** No results found for the last 24 hours. **        I have personally reviewed the above radiology results.   ----------------------------------------------------------------------------------------------------------------------  Assessment/Plan     · Acute, traumatic, closed left hip fracture  status post mechanical fall, status post left hip replacement 6/5/2019: Continue PT/OT.  Patient in swing bed status.  As long as she continues to progress, plan for discharge early week.  Incision is clean dry and intact.  Patient will need to follow-up with Ortho as previously recommended.  · Hypomagnesemia: Continue supplementation per protocol.  Repeat magnesium level in the morning.  · History of coronary artery disease status post stenting in the past: Continue medical therapy.  Patient is asymptomatic, no chest pain.  · Paroxysmal atrial fibrillation: Continue Coreg for rate control.  Continue Eliquis for anticoagulation.  · Essential hypertension: Blood pressure controlled.  Continue current antihypertensive regimen.  Closely monitor blood pressure per hospital protocol and adjust medications as necessary.  · Hyperlipidemia: Continue statin therapy.  · History of third-degree AV block status post permanent pacemaker implantation  · History of hyper parathyroidism, primary  · Vitamin D deficiency: Continue cholecalciferol supplementation  · Stage III CKD: Stable.  Avoid nephrotoxins.  · Mild macrocytic anemia: Hemoglobin stable.  Vitamin B12 and folate within normal limits.  On vitamin D supplementation.  Closely monitor, transfuse if necessary.  · Anxiety: Continue BuSpar  · Advanced age  · F/E/N: No IV fluids.  Replace electro lites per protocol.  Cardiac diet.    --------------------------------------------------  DVT Prophylaxis: Eliquis  GI Prophylaxis: Protonix  Activity: As tolerated, PT    The patient is high risk due to the following diagnoses/reasons: Hip fracture, multiple comorbidities affecting all aspects of care, electrolyte abnormalities    I have discussed the patient's assessment and plan with the patient and nursing staff.    Disposition: As long as she progresses well, hopefully home in the next few days with home health services, patient eager to go home.  She has multiple family members  that can help, her daughter assists.  Per social workers note, patient has a rolling walker, cane, and bedside, she requests a wheelchair for which will be ordered at time of discharge.      Alize Parsons PA-C  Hospitalist Service -- [facility]   Pager: [phone]    19  7:29 AM    Attending Physician: Tristan Melara MD    ----------------------------------------------------------------------------------------------------------------------    Acute Care - Physical Therapy Treatment Note   [facility]     Patient Name: Abbi Mendez                  : 1935                        MRN: [id number]  Today's Date: 2019                   Onset of Illness/Injury or Date of Surgery: 19  Date of Referral to PT: 19                  Referring Physician: Dr. Parsons                    Admit Date: 2019     Visit Dx:  No diagnosis found.      Patient Active Problem List   Diagnosis   • Atrial fibrillation (CMS/HCC)   • Coronary artery disease involving native coronary artery of native heart without angina pectoris   • RADHA (obstructive sleep apnea)   • Essential hypertension   • Hyperlipidemia LDL goal <70   • Iron deficiency anemia   • Left bundle branch block   • Syncope and collapse   • Severe sinus bradycardia   • Third degree AV block (CMS/HCC)   • Syncope   • Compression deformity of vertebra   • Radiculopathy   • Pericardial effusion without cardiac tamponade   • Hypercalcemia   • Abnormal serum protein electrophoresis   • Weight loss, unintentional   • Primary hyperparathyroidism (CMS/HCC)   • Hypercalcemia   • Pacemaker   • Hiatal hernia   • Elevated serum immunoglobulin free light chains   • Left displaced femoral neck fracture (CMS/HCC)   • Hip fracture (CMS/HCC)         Therapy Treatment                  Rehabilitation Treatment Summary                      Row Name 19 1732 19 1232                          Treatment Time/Intention       Discipline  physical  therapy assistant  -LL  occupational therapist  -AB         Document Type  therapy note (daily note)  -LL  therapy note (daily note)  -AB         Subjective Information  no complaints  -LL  no complaints  -AB         Mode of Treatment  physical therapy;individual therapy  -LL  occupational therapy  -AB         Patient/Family Observations  Patient agreeable to PT session in rehab this date.  -LL  --         Patient Effort  good  -LL  good  -AB         Existing Precautions/Restrictions  --  fall;left;hip  -AB         Patient Response to Treatment  Patient tolerated 55 min of PT in rehab gym this date.  -LL  Pt. tolerated 55 minutes OT tx this date in rehab gym w/ good tolerance. Continue tx per POC.   -AB         Recorded by [LL] Loyda Vega, PTA 06/19/19 1739 [AB] Hilda Peralta, OT 06/19/19 1240                      Row Name 06/19/19 1232                               Cognitive Assessment/Intervention       Additional Documentation  Cognitive Assessment/Intervention (Group)  -AB           Recorded by [AB] Hilda Peralta, OT 06/19/19 1240                        Row Name 06/19/19 1732 06/19/19 1232                          Cognitive Assessment/Intervention- PT/OT       Affect/Mental Status (Cognitive)  WFL  -LL  WFL  -AB         Orientation Status (Cognition)  oriented x 3  -LL  --         Follows Commands (Cognition)  follows one step commands;verbal cues/prompting required  -LL  verbal cues/prompting required  -AB         Personal Safety Interventions  fall prevention program maintained;gait belt;nonskid shoes/slippers when out of bed;supervised activity  -LL  fall prevention program maintained;gait belt;nonskid shoes/slippers when out of bed;supervised activity  -AB         Recorded by [LL] Loyda Vega, PTA 06/19/19 1739 [AB] Hilda Peralta, OT 06/19/19 1240         Row Name 06/19/19 1732                               Mobility Assessment/Intervention       Extremity Weight-bearing  Status  left lower extremity;right lower extremity  -LL           Left Lower Extremity (Weight-bearing Status)  weight-bearing as tolerated (WBAT)  -LL           Right Lower Extremity (Weight-bearing Status)  weight-bearing as tolerated (WBAT)  -LL           Recorded by [LL] Loyda Vega, [facility] 06/19/19 1739           Row Name 06/19/19 1732                               Bed Mobility Assessment/Treatment       Bed Mobility Assessment/Treatment  bed mobility (all) activities  -LL           Supine-Sit [place] (Bed Mobility)  contact guard  -LL           Sit-Supine [place] (Bed Mobility)  contact guard  -LL           Assistive Device (Bed Mobility)  bed rails  -LL           Recorded by [LL] Loyda Vega, PTA 06/19/19 1739                        Row Name 06/19/19 1732                               Transfer Assessment/Treatment       Transfer Assessment/Treatment  sit-stand transfer;stand-sit transfer  -LL           Recorded by [LL] Loyda Vega, [facility] 06/19/19 1739                        Row Name 06/19/19 1732 06/19/19 1232                          Sit-Stand Transfer       Sit-Stand [place] (Transfers)  minimum assist (75% patient effort);contact guard  -LL  contact guard;verbal cues  -AB         Assistive Device (Sit-Stand Transfers)  walker, front-wheeled  -LL  wheelchair;other (see comments) wall rail  -AB         Recorded by [LL] Loyda Vega, [facility] 06/19/19 1739 [AB] Hilda Peralta, OT 06/19/19 1240                      Row Name 06/19/19 1732 06/19/19 1232                          Stand-Sit Transfer       Stand-Sit [place] (Transfers)  minimum assist (75% patient effort);contact guard  -LL  contact guard;verbal cues  -AB         Assistive Device (Stand-Sit Transfers)  walker, front-wheeled  -LL  wheelchair;other (see comments) wall rail  -AB         Recorded by [LL] Loyda Vega, [facility] 06/19/19 1739 [AB] Hilda Peralta, OT 06/19/19 1240                      Row Name  06/19/19 1732                               Gait/Stairs Assessment/Training       Gait/Stairs Assessment/Training  gait/ambulation independence  -LL           [place] Level (Gait)  contact guard  -LL           Assistive Device (Gait)  walker, front-wheeled  -LL           Distance in Feet (Gait)  160 x 2  -LL           Pattern (Gait)  step-through  -LL           Deviations/Abnormal Patterns (Gait)  antalgic;stride length decreased  -LL           Bilateral Gait Deviations  forward flexed posture;heel strike decreased;weight shift ability decreased  -LL           Recorded by [LL] Loyda Vega, PTA 06/19/19 1739                        Row Name 06/19/19 1732                               Therapeutic Exercise       Therapeutic Exercise  aerobic exercise  -LL           Recorded by [LL] Loyda Vega, PTA 06/19/19 1739                        Row Name 06/19/19 1232                               Upper Extremity Seated Therapeutic Exercise       Exercise Type, Seated Upper Extremity (Therapeutic Exercise)  AROM (active range of motion) BUE CoordEx; G/FMC TherEx/Act; strengthening  -AB           Expected Outcomes, Seated Upper Extremity (Therapeutic Exercise)  improve functional tolerance, self-care activity;improve performance, BADLs;improve performance, transfer skills  -AB           Sets/Reps Detail, Seated Upper Extremity (Therapeutic Exercise)  BUE Wrist Rolls X2  -AB           Recorded by [AB] Hilda Peralta, OT 06/19/19 1240                        Row Name 06/19/19 1732                               Aerobic Exercise Activity       Exercise Performed (Aerobic, Therapeutic Exercise)  recumbent stationary bike  -LL           Expected Outcome (Aerobic, Therapeutic Exercise)  improve functional tolerance, household activity;improve functional tolerance, self-care activity;improve performance, gait skills;improve performance, transfer skills;strengthen normal movement patterns  -LL           Time (Aerobic,  Therapeutic Exercise)  7 level 1.0  -LL           Transfers Skills, Training to Functional Activity (Aerobic, Therapeutic Exercise)  able to transfer skills from training to functional activity  -LL           Recorded by [LL] Loyda eVga, ALFREDO 06/19/19 1739           Row Name 06/19/19 1732                               Lower Extremity Seated Therapeutic Exercise       Performed, Seated Lower Extremity (Therapeutic Exercise)  hip flexion/extension;hip abduction/adduction;ankle dorsiflexion/plantarflexion;LAQ (long arc quad), knee extension GS  -LL           Device, Seated Lower Extremity (Therapeutic Exercise)  elastic bands/tubing;small ball;free weights, cuff 2 lbs  -LL           Exercise Type, Seated Lower Extremity (Therapeutic Exercise)  AROM (active range of motion)  -LL           Restrictions, Seated Lower Extremity (Therapeutic Exercise)  L JANET precautions  -LL           Sets/Reps Detail, Seated Lower Extremity (Therapeutic Exercise)  1 x 20  -LL           Transfers Skills, Training to Functional Activity, Seated Lower Extremity (Therapeutic Exercise)  able to transfer skills from training to functional activity  -LL           Recorded by [LL] Loyda Vega, ALFREDO 06/19/19 1739           Row Name 06/19/19 1732                               Standing Balance Activity       Activities Performed (Standing, Balance Training)  standing reaching outside base of support Bean bag toss & pickup with reacher  -LL           Support Needed for Balance (Standing, Balance Training)  uses both upper extremities for support  -           Upper Extremity Activity with Device (Standing, Balance Training)  -- bean bags & reacher  -LL           Progressive Balance Activity (Standing, Balance Training)  speed of movements increased during activity;combined head and eye movements during activity;upper extremity activities added during activity  -LL           Transfers Skills, Training to Functional Activity (Standing, Balance  Training)  able to transfer skills from training to functional activity  -LL           Recorded by [LL] Loyda Vega, PTA 06/19/19 1739           Row Name 06/19/19 1732 06/19/19 1232                          Positioning and Restraints       Pre-Treatment Position  -- Transport chair PCA  -LL  --         Post Treatment Position  wheelchair  -LL  other  -AB         In Chair  sitting;with OT;legs elevated  -LL  --         Other Position  --  with other staff in transport chair w/ Shelly  -AB         Recorded by [LL] Loyda Vega, ALFREDO 06/19/19 1739 [AB] Hilda Peralta, OT 06/19/19 1240                      Row Name                                    Wound 06/05/19 1838 Left hip incision       Wound - Properties Group Date first assessed: 06/05/19 [KH] Time first assessed: 1838 [KH] Side: Left [KH] Location: hip [KH] Type: incision [KH] Recorded by:  [OUSMANE] Ary Sylvester RN 06/05/19 1838                           User Key  (r) = Recorded By, (t) = Taken By, (c) = Cosigned By     Initials Name Effective Dates Discipline     AB Hilda Peralta, OT 04/03/18 -  OT     Ary Coyne RN 06/16/16 -  Nurse     LL Loyda Vega, [facility] 05/02/16 -  PT                     Wound 06/05/19 1838 Left hip incision (Active)   Dressing Appearance dry;intact;open to air 6/19/2019  8:39 AM   Closure Other (Comment) 6/19/2019  8:39 AM   Base dry 6/19/2019  8:39 AM   Periwound intact;dry 6/19/2019  8:39 AM   Periwound Temperature warm 6/18/2019  7:45 PM   Periwound Skin Turgor soft 6/19/2019  8:39 AM   Edges rolled/closed 6/19/2019  8:39 AM   Drainage Amount none 6/19/2019  8:39 AM           PT Recommendation and Plan  Anticipated Discharge Disposition (PT): home with assist  Therapy Frequency (PT Clinical Impression): other (see comments)(6 days/ week )  Outcome Summary/Treatment Plan (PT)  Anticipated Equipment Needs at Discharge (PT): raised toilet seat, front wheeled walker  Anticipated Discharge  Disposition (PT): home with assist  Plan of Care Reviewed With: patient  Progress: improving  Outcome Summary: Patient continues to make progress with therapy & is requiring less assistance with functional mobility.  Continue w/ current POC.             Outcome Measures                    Row Name 06/19/19 1700 06/17/19 1500                      How much help from another person do you currently need...     Turning from your back to your side while in flat bed without using bedrails?  4  -LL  4  -LL       Moving from lying on back to sitting on the side of a flat bed without bedrails?  3  -LL  3  -LL       Moving to and from a bed to a chair (including a wheelchair)?  3  -LL  3  -LL       Standing up from a chair using your arms (e.g., wheelchair, bedside chair)?  3  -LL  3  -LL       Climbing 3-5 steps with a railing?  3  -LL  3  -LL       To walk in hospital room?  3  -LL  3  -LL       AM-PAC 6 Clicks Score  19  -LL  19  -LL                   Functional Assessment     Outcome Measure Options  AM-PAC 6 Clicks Basic Mobility (PT)  -LL  AM-PAC 6 Clicks Basic Mobility (PT)  -LL                 User Key  (r) = Recorded By, (t) = Taken By, (c) = Cosigned By     Initials Name Provider Type     LL Loyda Vega PTA Physical Therapy Assistant              Time Calculation:              PT Charges                 Row Name 06/19/19 1744                           Time Calculation     Start Time  0915  -LL         Stop Time  1010  -LL         Time Calculation (min)  55 min  -LL         PT Received On  06/19/19  -LL                     Time Calculation- PT     Total Timed Code Minutes- PT  55 minute(s)  -LL                   User Key  (r) = Recorded By, (t) = Taken By, (c) = Cosigned By     Initials Name Provider Type     Loyda Kraus PTA Physical Therapy Assistant                    Therapy Charges for Today      Code Description Service Date Service Provider Modifiers Qty     22891546956 HC GAIT TRAINING EA 15 MIN  2019 Loyda Vega, ALFREDO GP 1     63113718364 HC PT THER PROC EA 15 MIN 2019 Loyda Vega, PTA GP 3     28217617344 HC PT THER SUPP EA 15 MIN 2019 Loyda Vega, PTA GP 1             PT G-Codes  Outcome Measure Options: AM-PAC 6 Clicks Basic Mobility (PT)  AM-PAC 6 Clicks Score: 19  Score: 16     Justina VegaALFREDO             2019                                                                          Acute Care - Occupational Therapy Treatment Note   [facility]     Patient Name: Abbi Mendez                  : 1935                        MRN: [id number]  Today's Date: 2019                   Onset of Illness/Injury or Date of Surgery: 19                          Referring Physician: Dr. Parsons                    Admit Date: 2019     No diagnosis found.      Patient Active Problem List   Diagnosis   • Atrial fibrillation (CMS/HCC)   • Coronary artery disease involving native coronary artery of native heart without angina pectoris   • RADHA (obstructive sleep apnea)   • Essential hypertension   • Hyperlipidemia LDL goal <70   • Iron deficiency anemia   • Left bundle branch block   • Syncope and collapse   • Severe sinus bradycardia   • Third degree AV block (CMS/HCC)   • Syncope   • Compression deformity of vertebra   • Radiculopathy   • Pericardial effusion without cardiac tamponade   • Hypercalcemia   • Abnormal serum protein electrophoresis   • Weight loss, unintentional   • Primary hyperparathyroidism (CMS/HCC)   • Hypercalcemia   • Pacemaker   • Hiatal hernia   • Elevated serum immunoglobulin free light chains   • Left displaced femoral neck fracture (CMS/HCC)   • Hip fracture (CMS/HCC)      Medical History        Past Medical History:   Diagnosis Date   • Anxiety     • Arthritis     • Atrial fibrillation (CMS/HCC) 2016   • Gastric ulcer     • Hiatal hernia     • Hypertension     • Iron deficiency anemia     • RADHA (obstructive sleep apnea) 2016          Surgical History         Past Surgical History:   Procedure Laterality Date   • CARDIAC ELECTROPHYSIOLOGY PROCEDURE N/A 1/18/2018     Procedure: Pacemaker DC new;  Surgeon: Soraya Darling MD;  Location:  [facility];  Service:    • HIP BIPOLAR REPLACEMENT Left 6/5/2019     Procedure: HIP BIPOLAR REPLACEMENT;  Surgeon: Gerardo Jain MD;  Location:  [facility];  Service: Orthopedics   • TOTAL KNEE ARTHROPLASTY Right     • TUBAL ABDOMINAL LIGATION                Therapy Treatment                  Rehabilitation Treatment Summary                      Row Name 06/20/19 1112                               Treatment Time/Intention       Discipline  occupational therapist  -FIDENCIO           Document Type  therapy note (daily note)  -FIDENCIO           Subjective Information  no complaints  -JW           Mode of Treatment  occupational therapy  -JW           Patient/Family Observations  Pt agreeable for therapy. Tx session completed in rehab tx gym.   -JW           Patient Effort  good  -JW           Existing Precautions/Restrictions  fall;left;hip  -JW           Recorded by [JW] Justin Gomez OTR 06/20/19 1114                        Row Name 06/20/19 1112                               Upper Extremity Seated Therapeutic Exercise       Exercise Type, Seated Upper Extremity (Therapeutic Exercise)  AROM (active range of motion);other (see comments) BUE ther ex/act, GMC/FMC   -JW           Expected Outcomes, Seated Upper Extremity (Therapeutic Exercise)  improve functional tolerance, self-care activity;improve performance, BADLs;improve performance, fine motor coordination skills;improve performance, gross motor coordination skills;improve performance, transfer skills  -JW           Recorded by [JW] Justin Gomez OTR 06/20/19 1114                        Row Name 06/20/19 1112                               Positioning and Restraints       Post Treatment Position  -- transport chair with    -FIDENCIO            Recorded by [JW] Justin Gomez, OTR 06/20/19 1114                        Row Name                                    Wound 06/05/19 1838 Left hip incision       Wound - Properties Group Date first assessed: 06/05/19 [KH] Time first assessed: 1838 [KH] Side: Left [KH] Location: hip [KH] Type: incision [KH] Recorded by:  [OUSMANE] Ary Sylvester RN 06/05/19 1838                           User Key  (r) = Recorded By, (t) = Taken By, (c) = Cosigned By     Initials Name Effective Dates Discipline      Ary Sylvester RN 06/16/16 -  Nurse     Justin Dia, OTR 03/07/18 -  OT                  Wound 06/05/19 1838 Left hip incision (Active)   Dressing Appearance dry;intact 6/19/2019  9:00 PM   Closure Sutures 6/19/2019  9:00 PM   Base dry 6/19/2019  9:00 PM   Periwound intact;dry 6/19/2019  9:00 PM   Periwound Temperature warm 6/19/2019  9:00 PM   Periwound Skin Turgor soft 6/19/2019  9:00 PM   Edges rolled/closed 6/19/2019  9:00 PM   Drainage Amount none 6/19/2019  9:00 PM                Occupational Therapy Education              Title: PT OT SLP Therapies (Done)                Topic: Occupational Therapy (Done)                 Point: Home exercise program (Done)            Description: Instruct learner(s) on appropriate technique for monitoring, assisting and/or progressing therapeutic exercises/activities.                 Learning Progress Summary                 Patient Acceptance, E,TB, VU by KM at 6/18/2019  1:41 PM     Acceptance, E,D, DU,NR by KILO at 6/18/2019 12:00 PM                                                 User Key               Initials Effective Dates Name Provider Type Discipline      LM 03/14/16 -  Dominique Morales OT Occupational Therapist OT       11/19/18 -  Lucille Torres RN Registered Nurse Nurse                        OT Recommendation and Plan  Plan of Care Review  Plan of Care Reviewed With: patient  Plan of Care Reviewed With: patient  Outcome Summary: Pt tolerated tx session well  with rest breaks provided. Continue with plan of care.              Outcome Measures                    Row Name 06/19/19 1700 06/17/19 1500                      How much help from another person do you currently need...     Turning from your back to your side while in flat bed without using bedrails?  4  -LL  4  -LL       Moving from lying on back to sitting on the side of a flat bed without bedrails?  3  -LL  3  -LL       Moving to and from a bed to a chair (including a wheelchair)?  3  -LL  3  -LL       Standing up from a chair using your arms (e.g., wheelchair, bedside chair)?  3  -LL  3  -LL       Climbing 3-5 steps with a railing?  3  -LL  3  -LL       To walk in hospital room?  3  -LL  3  -LL       AM-PAC 6 Clicks Score  19  -LL  19  -LL                   Functional Assessment     Outcome Measure Options  AM-PAC 6 Clicks Basic Mobility (PT)  -LL  AM-PAC 6 Clicks Basic Mobility (PT)  -LL                         User Key  (r) = Recorded By, (t) = Taken By, (c) = Cosigned By     Initials Name Provider Type     Loyda Kraus PTA Physical Therapy Assistant                Time Calculation:              Time Calculation- OT                 Row Name 06/20/19 1110                           Time Calculation- OT     Total Timed Code Minutes- OT  55 minute(s)  -                           User Key  (r) = Recorded By, (t) = Taken By, (c) = Cosigned By     Initials Name Provider Type     Justin Dia OTR Occupational Therapist                    Therapy Charges for Today      Code Description Service Date Service Provider Modifiers Qty     28201653606  OT THERAPEUTIC ACT EA 15 MIN 6/20/2019 Justin Gomez OTR GO 4                TABATHA Granger                   6/20/2019

## 2019-06-20 NOTE — PLAN OF CARE
Problem: Patient Care Overview  Goal: Plan of Care Review  Outcome: Ongoing (interventions implemented as appropriate)   06/20/19 1111   Coping/Psychosocial   Plan of Care Reviewed With patient   Plan of Care Review   Progress improving   OTHER   Outcome Summary Pt tolerated tx session well with rest breaks provided. Continue with plan of care.

## 2019-06-20 NOTE — PROGRESS NOTES
[facility] Medicine Services  SWING BED PROGRESS NOTE     Patient Identification:  Name:  Abbi Mendez  Age:  83 y.o.  Sex:  female  :  1935  MRN:  [id number]  Visit Number:  [id number]  Primary Care Provider:  Eric Driver MD    Length of stay:  7    ----------------------------------------------------------------------------------------------------------------------  Subjective     Chief Complaint:  Follow up for hip fracture post repair, undergoing PT in swing bed    History of Presenting Illness:  Patient is an 83-year-old female with past medical history significant for paroxysmal atrial fibrillation, coronary artery disease status post stenting in the past, essential hypertension, hyperlipidemia, primary hyperparathyroidism, 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  On 2019, patient underwent ORIF with left hip bipolar replacement per orthopedic surgery.  Patient tolerated procedure well.  Patient underwent PT and OT.  On 2019, patient was admitted to [facility] swing bed for further therapy.    Subjective:  Today, the patient was sitting up in bedside chair per my evaluation this morning.  Patient states she is doing well.  Pain controlled.  She is tolerating physical therapy well.  She had 2 sessions, age 55 minutes yesterday.  Per charting, it appears patient ambulated 160 feet x 2 with minimum assistance.  She denies any fevers or chills, no cough.  She denies any chest pain or dyspnea.  She denies any abdominal pain, nausea, vomiting or diarrhea.  No urinary symptoms.  Patient hopes to be home by the weekend or early next week.    Present during exam:  n/a  ----------------------------------------------------------------------------------------------------------------------  Objective     Procedures:  · 6/5/2019: Rowell catheter insertion -- Activity restriction due to hip fracture   · 6/5/2019: Left hip bipolar replacement -- Dr. Jain, orthopedic surgery   · 6/6/2019: Rowell catheter removal     Current Hospital Meds:    apixaban 2.5 mg Oral Q12H   atorvastatin 40 mg Oral Nightly   busPIRone 10 mg Oral BID   carvedilol 12.5 mg Oral BID With Meals   cholecalciferol 50,000 Units Oral Weekly   pantoprazole 40 mg Oral QAM   [START ON 6/27/2019] tuberculin 5 Units Intradermal Once        ----------------------------------------------------------------------------------------------------------------------  Vital Signs:  Temp:  [99.2 °F (37.3 °C)] 99.2 °F (37.3 °C)  Heart Rate:  [75-78] 75  Resp:  [18] 18  BP: (144-147)/(59-66) 147/66    SpO2 Percentage    06/18/19 1900 06/18/19 2200 06/19/19 0620   SpO2: 94% 95% 95%        on   ;   Device (Oxygen Therapy): room air    Body mass index is 28.2 kg/m².  Wt Readings from Last 3 Encounters:   06/13/19 74.5 kg (164 lb 4.8 oz)   06/05/19 76.2 kg (168 lb 0.2 oz)   05/28/19 78 kg (172 lb)        Intake/Output Summary (Last 24 hours) at 6/20/2019 0729  Last data filed at 6/20/2019 0300  Gross per 24 hour   Intake 810 ml   Output --   Net 810 ml     Diet Regular; Cardiac  ----------------------------------------------------------------------------------------------------------------------  Physical exam:  Physical Exam   Constitutional: She is oriented to person, place, and time. She appears well-developed and well-nourished.  Non-toxic appearance. No distress.   Pleasant, elderly, sitting up in bed side chair, in no acute distress.   HENT:   Head: Normocephalic and atraumatic.   Right Ear: External ear normal.   Left Ear: External ear normal.   Nose: Nose normal.   Mouth/Throat: Oropharynx is clear and moist and mucous membranes  are normal. No oropharyngeal exudate.   Eyes: Conjunctivae and EOM are normal. Pupils are equal, round, and reactive to light. No scleral icterus.   Neck: Trachea normal and normal range of motion. Neck supple. No JVD present. No muscular tenderness present. Carotid bruit is not present. No tracheal deviation present. No thyromegaly present.   Cardiovascular: Normal rate, regular rhythm, normal heart sounds and intact distal pulses. Exam reveals no gallop and no friction rub.   No murmur heard.  Pulmonary/Chest: Effort normal and breath sounds normal. No respiratory distress. She has no wheezes. She has no rhonchi. She has no rales. She exhibits no tenderness.   Abdominal: Soft. Bowel sounds are normal. She exhibits no distension and no mass. There is no hepatosplenomegaly. There is no tenderness. There is no rebound and no guarding. No hernia.   Genitourinary:   Genitourinary Comments: No Rowell catheter in place, making urine   Musculoskeletal: She exhibits no edema, tenderness or deformity.        Right lower leg: She exhibits no edema.        Left lower leg: She exhibits no edema.   Left hip incision clean dry and intact.  Neurovascular intact bilateral lower extremities.  Good range of motion.  Strength intact bilaterally.  Sensation intact.   Neurological: She is alert and oriented to person, place, and time. She has normal strength. No cranial nerve deficit or sensory deficit.   Skin: Skin is warm and dry. Capillary refill takes less than 2 seconds. No rash noted. No erythema. No pallor.   Psychiatric: She has a normal mood and affect. Her speech is normal and behavior is normal. Judgment and thought content normal.   Nursing note and vitals reviewed.     ----------------------------------------------------------------------------------------------------------------------  Tele:    Patient is not currently on telemetry monitoring.    I have personally reviewed the EKG/Telemetry strips    ----------------------------------------------------------------------------------------------------------------------            Results from last 7 days   Lab Units 06/19/19  0113 06/18/19  0333 06/17/19  0342   WBC 10*3/mm3 9.17 9.99 9.02   HEMOGLOBIN g/dL 10.3* 10.2* 10.0*   HEMATOCRIT % 34.1 33.7* 32.8*   MCV fL 98.0* 98.0* 96.2   MCHC g/dL 30.2* 30.3* 30.5*   PLATELETS 10*3/mm3 415 396 425     Results from last 7 days   Lab Units 06/20/19  0218 06/19/19  0113 06/18/19  0333 06/17/19  0342 06/15/19  0405   SODIUM mmol/L  --  138 141 142 141   POTASSIUM mmol/L  --  3.6 3.6 4.0 4.3   MAGNESIUM mg/dL 1.9 1.7 1.6  --   --    CHLORIDE mmol/L  --  101 102 103 103   CO2 mmol/L  --  25.8 24.2 27.1 26.9   BUN mg/dL  --  21 19 22 24*   CREATININE mg/dL  --  0.95 0.95 0.96 1.05*   EGFR IF NONAFRICN AM mL/min/1.73  --  56* 56* 56* 50*   CALCIUM mg/dL  --  8.8 8.4* 8.7 8.5*   GLUCOSE mg/dL  --  109* 100* 109* 92   ALBUMIN g/dL  --   --  2.27* 2.51* 2.29*   BILIRUBIN mg/dL  --   --  0.3 0.2 0.3   ALK PHOS U/L  --   --  78 76 71   AST (SGOT) U/L  --   --  20 25 47*   ALT (SGPT) U/L  --   --  35* 44* 58*   Estimated Creatinine Clearance: 44.3 mL/min (by C-G formula based on SCr of 0.95 mg/dL).    Lab Results   Component Value Date    HGBA1C 5.70 (H) 06/05/2019     Pain Management Panel     Pain Management Panel Latest Ref Rng & Units 6/5/2019    CREATININE UR mg/dL 195.3        I have personally reviewed the above laboratory results.   ----------------------------------------------------------------------------------------------------------------------  Imaging Results (last 24 hours)     ** No results found for the last 24 hours. **        I have personally reviewed the above radiology results.   ----------------------------------------------------------------------------------------------------------------------  Assessment/Plan     · Acute, traumatic, closed left hip fracture status post mechanical fall, status post left  hip replacement 6/5/2019: Continue PT/OT.  Patient in swing bed status.  As long as she continues to progress, plan for discharge early week.  Incision is clean dry and intact.  Patient will need to follow-up with Ortho as previously recommended.  · Hypomagnesemia: Continue supplementation per protocol.  Repeat magnesium level in the morning.  · History of coronary artery disease status post stenting in the past: Continue medical therapy.  Patient is asymptomatic, no chest pain.  · Paroxysmal atrial fibrillation: Continue Coreg for rate control.  Continue Eliquis for anticoagulation.  · Essential hypertension: Blood pressure controlled.  Continue current antihypertensive regimen.  Closely monitor blood pressure per hospital protocol and adjust medications as necessary.  · Hyperlipidemia: Continue statin therapy.  · History of third-degree AV block status post permanent pacemaker implantation  · History of hyper parathyroidism, primary  · Vitamin D deficiency: Continue cholecalciferol supplementation  · Stage III CKD: Stable.  Avoid nephrotoxins.  · Mild macrocytic anemia: Hemoglobin stable.  Vitamin B12 and folate within normal limits.  On vitamin D supplementation.  Closely monitor, transfuse if necessary.  · Anxiety: Continue BuSpar  · Advanced age  · F/E/N: No IV fluids.  Replace electro lites per protocol.  Cardiac diet.    --------------------------------------------------  DVT Prophylaxis: Eliquis  GI Prophylaxis: Protonix  Activity: As tolerated, PT    The patient is high risk due to the following diagnoses/reasons: Hip fracture, multiple comorbidities affecting all aspects of care, electrolyte abnormalities    I have discussed the patient's assessment and plan with the patient and nursing staff.    Disposition: As long as she progresses well, hopefully home in the next few days with home health services, patient eager to go home.  She has multiple family members that can help, her daughter assists.  Per  social workers note, patient has a rolling walker, cane, and bedside, she requests a wheelchair for which will be ordered at time of discharge.      Alize Parsons PA-C  Hospitalist Service -- [facility]   Pager: [phone]    06/20/19  7:29 AM    Attending Physician: Tristan Melara MD    ----------------------------------------------------------------------------------------------------------------------

## 2019-06-20 NOTE — PLAN OF CARE
Problem: Patient Care Overview  Goal: Plan of Care Review  Outcome: Ongoing (interventions implemented as appropriate)      Problem: Pain, Acute (Adult)  Goal: Identify Related Risk Factors and Signs and Symptoms  Outcome: Ongoing (interventions implemented as appropriate)   06/19/19 1211   Pain, Acute (Adult)   Related Risk Factors (Acute Pain) persistent pain;positioning   Signs and Symptoms (Acute Pain) fatigue/weakness;fear of reinjury;verbalization of pain descriptors       Problem: Fracture Orthopaedic (Adult)  Goal: Signs and Symptoms of Listed Potential Problems Will be Absent, Minimized or Managed (Fracture Orthopaedic)  Outcome: Ongoing (interventions implemented as appropriate)   06/19/19 1211   Goal/Outcome Evaluation   Problems Present (Orthopaedic Fracture) situational response       Problem: Skin Injury Risk (Adult)  Goal: Identify Related Risk Factors and Signs and Symptoms  Outcome: Ongoing (interventions implemented as appropriate)    Goal: Skin Health and Integrity  Outcome: Ongoing (interventions implemented as appropriate)   06/18/19 2259   Skin Injury Risk (Adult)   Skin Health and Integrity making progress toward outcome       Problem: Fall Risk (Adult)  Goal: Absence of Fall  Outcome: Ongoing (interventions implemented as appropriate)   06/20/19 0054   Fall Risk (Adult)   Absence of Fall making progress toward outcome       Problem: Patient Care Overview  Goal: Plan of Care Review  Outcome: Ongoing (interventions implemented as appropriate)   06/20/19 1111   Coping/Psychosocial   Plan of Care Reviewed With patient   Plan of Care Review   Progress improving   OTHER   Outcome Summary Pt tolerated tx session well with rest breaks provided. Continue with plan of care.

## 2019-06-20 NOTE — THERAPY TREATMENT NOTE
Acute Care - Physical Therapy Treatment Note   Umer     Patient Name: Abbi Mendez  : 1935  MRN: [id number]  Today's Date: 2019  Onset of Illness/Injury or Date of Surgery: 19  Date of Referral to PT: 19  Referring Physician: Dr. Parsons    Admit Date: 2019    Visit Dx:  No diagnosis found.  Patient Active Problem List   Diagnosis   • Atrial fibrillation (CMS/HCC)   • Coronary artery disease involving native coronary artery of native heart without angina pectoris   • RADHA (obstructive sleep apnea)   • Essential hypertension   • Hyperlipidemia LDL goal <70   • Iron deficiency anemia   • Left bundle branch block   • Syncope and collapse   • Severe sinus bradycardia   • Third degree AV block (CMS/HCC)   • Syncope   • Compression deformity of vertebra   • Radiculopathy   • Pericardial effusion without cardiac tamponade   • Hypercalcemia   • Abnormal serum protein electrophoresis   • Weight loss, unintentional   • Primary hyperparathyroidism (CMS/HCC)   • Hypercalcemia   • Pacemaker   • Hiatal hernia   • Elevated serum immunoglobulin free light chains   • Left displaced femoral neck fracture (CMS/HCC)   • Hip fracture (CMS/HCC)       Therapy Treatment    Rehabilitation Treatment Summary     Row Name 19 1744 19 1112          Treatment Time/Intention    Discipline  physical therapy assistant  -LL  occupational therapist  -JW     Document Type  progress note/recertification  -LL  therapy note (daily note)  -JW     Subjective Information  no complaints  -LL  no complaints  -JW     Mode of Treatment  physical therapy;individual therapy  -LL  occupational therapy  -JW     Patient/Family Observations  Patient agreeable to PT session this date in rehab gym  -LL  Pt agreeable for therapy. Tx session completed in rehab tx gym.   -JW     Patient Effort  good  -LL  good  -JW     Existing Precautions/Restrictions  --  fall;left;hip  -JW     Patient Response to Treatment  Patient  tolerated 55 min of PT in rehab gym this date with adequate rest breaks.  -LL  --     Recorded by [LL] Loyda Vega, ALFREDO 06/20/19 1752 [JW] Justin Gomez OTR 06/20/19 1114     Row Name 06/20/19 1744             Cognitive Assessment/Intervention- PT/OT    Affect/Mental Status (Cognitive)  WFL  -LL      Orientation Status (Cognition)  oriented x 3  -LL      Follows Commands (Cognition)  follows one step commands;verbal cues/prompting required  -LL      Personal Safety Interventions  fall prevention program maintained;gait belt;nonskid shoes/slippers when out of bed;supervised activity  -LL      Recorded by [LL] Loyda Vega PTA 06/20/19 1752      Row Name 06/20/19 1744             Mobility Assessment/Intervention    Extremity Weight-bearing Status  left lower extremity;right lower extremity  -LL      Left Lower Extremity (Weight-bearing Status)  weight-bearing as tolerated (WBAT)  -LL      Right Lower Extremity (Weight-bearing Status)  weight-bearing as tolerated (WBAT)  -LL      Recorded by [LL] Loyda Vega PTA 06/20/19 1752      Row Name 06/20/19 1744             Bed Mobility Assessment/Treatment    Bed Mobility Assessment/Treatment  bed mobility (all) activities  -LL      Supine-Sit Slope (Bed Mobility)  contact guard  -LL      Sit-Supine Slope (Bed Mobility)  contact guard  -LL      Assistive Device (Bed Mobility)  bed rails  -LL      Recorded by [LL] Loyda Vega PTA 06/20/19 1752      Row Name 06/20/19 1744             Transfer Assessment/Treatment    Transfer Assessment/Treatment  sit-stand transfer;stand-sit transfer  -LL      Recorded by [LL] Loyda Vega PTA 06/20/19 1752      Row Name 06/20/19 1744             Sit-Stand Transfer    Sit-Stand Slope (Transfers)  contact guard  -LL      Assistive Device (Sit-Stand Transfers)  walker, front-wheeled  -LL      Recorded by [LL] Loyda Vega PTA 06/20/19 1752      Row Name 06/20/19 1744             Stand-Sit Transfer     Stand-Sit [place] (Transfers)  contact guard  -LL      Assistive Device (Stand-Sit Transfers)  walker, front-wheeled  -LL      Recorded by [LL] Loyda Vega PTA 06/20/19 1752      Row Name 06/20/19 1744             Gait/Stairs Assessment/Training    Gait/Stairs Assessment/Training  gait/ambulation independence  -LL      [place] Level (Gait)  contact guard with cues to slow down  -LL      Assistive Device (Gait)  walker, front-wheeled  -LL      Distance in Feet (Gait)  300  -LL      Pattern (Gait)  step-through  -LL      Deviations/Abnormal Patterns (Gait)  antalgic;stride length decreased  -LL      Bilateral Gait Deviations  forward flexed posture;heel strike decreased;weight shift ability decreased  -LL      Recorded by [LL] Loyda Vega, ALFREDO 06/20/19 1752      Row Name 06/20/19 1112             Upper Extremity Seated Therapeutic Exercise    Exercise Type, Seated Upper Extremity (Therapeutic Exercise)  AROM (active range of motion);other (see comments) BUE ther ex/act, GMC/FMC   -JW      Expected Outcomes, Seated Upper Extremity (Therapeutic Exercise)  improve functional tolerance, self-care activity;improve performance, BADLs;improve performance, fine motor coordination skills;improve performance, gross motor coordination skills;improve performance, transfer skills  -      Recorded by [JW] Justin Gomez OTR 06/20/19 1114      Row Name 06/20/19 1744             Aerobic Exercise Activity    Exercise Performed (Aerobic, Therapeutic Exercise)  recumbent stationary bike  -      Expected Outcome (Aerobic, Therapeutic Exercise)  improve functional tolerance, community activity;improve functional tolerance, household activity;improve functional tolerance, self-care activity;improve performance, gait skills;improve performance, transfer skills;strengthen normal movement patterns  -      Time (Aerobic, Therapeutic Exercise)  10 1.0  -LL      Transfers Skills, Training to Functional Activity (Aerobic,  Therapeutic Exercise)  able to transfer skills from training to functional activity  -LL      Recorded by [LL] Loyda Vega, ALFREDO 06/20/19 1752      Row Name 06/20/19 1744             Lower Extremity Seated Therapeutic Exercise    Performed, Seated Lower Extremity (Therapeutic Exercise)  hip flexion/extension;hip abduction/adduction;ankle dorsiflexion/plantarflexion;LAQ (long arc quad), knee extension Ball squeeze, GS  -LL      Device, Seated Lower Extremity (Therapeutic Exercise)  elastic bands/tubing;small ball;free weights, cuff 2.5#  -LL      Exercise Type, Seated Lower Extremity (Therapeutic Exercise)  AROM (active range of motion)  -LL      Restrictions, Seated Lower Extremity (Therapeutic Exercise)  L JANET precautions  -LL      Sets/Reps Detail, Seated Lower Extremity (Therapeutic Exercise)  1 x 25  -LL      Transfers Skills, Training to Functional Activity, Seated Lower Extremity (Therapeutic Exercise)  able to transfer skills from training to functional activity  -LL      Recorded by [LL] Loyda Vega, ALFREDO 06/20/19 1752      Row Name 06/20/19 1744             Standing Balance Activity    Activities Performed (Standing, Balance Training)  standing reaching outside base of support Balloon tap  -LL      Support Needed for Balance (Standing, Balance Training)  uses at least one upper extremity for support  -LL      Upper Extremity Activity with Device (Standing, Balance Training)  -- Balloon  -LL      Progressive Balance Activity (Standing, Balance Training)  speed of movements increased during activity;combined head and eye movements during activity;upper extremity activities added during activity  -LL      Transfers Skills, Training to Functional Activity (Standing, Balance Training)  able to transfer skills from training to functional activity  -LL      Recorded by [LL] Loyda Vega, ALFREDO 06/20/19 1752      Row Name 06/20/19 1744 06/20/19 1112          Positioning and Restraints    Pre-Treatment  Position  -- Transport chair with PCA  -LL  --     Post Treatment Position  wheelchair  -LL  -- transport chair with    -FIDENCIO     In Wheelchair  sitting;with OT;legs elevated  -LL  --     Recorded by [LL] Loyda Vega, ALFREDO 06/20/19 1752 [JW] Justin Gomez, OTR 06/20/19 1114     Row Name                Wound 06/05/19 1838 Left hip incision    Wound - Properties Group Date first assessed: 06/05/19 [KH] Time first assessed: 1838 [KH] Side: Left [KH] Location: hip [KH] Type: incision [KH] Recorded by:  [OUSMANE] Ary Sylvester RN 06/05/19 1838      User Key  (r) = Recorded By, (t) = Taken By, (c) = Cosigned By    Initials Name Effective Dates Discipline     Ary Sylvester RN 06/16/16 -  Nurse    Loyda Kraus PTA 05/02/16 -  PT    Justin Dia OTR 03/07/18 -  OT          Wound 06/05/19 1838 Left hip incision (Active)   Dressing Appearance dry;intact 6/20/2019  8:35 AM   Closure Sutures 6/19/2019  9:00 PM   Base dry 6/19/2019  9:00 PM   Periwound intact;dry 6/19/2019  9:00 PM   Periwound Temperature warm 6/19/2019  9:00 PM   Periwound Skin Turgor soft 6/19/2019  9:00 PM   Edges rolled/closed 6/19/2019  9:00 PM   Drainage Amount none 6/19/2019  9:00 PM           Physical Therapy Education     Title: PT OT SLP Therapies (Done)     Topic: Physical Therapy (Done)     Point: Mobility training (Done)     Learning Progress Summary           Patient Acceptance, E,D, VU,NR by LL at 6/20/2019  5:52 PM    Acceptance, E,TB, VU by AD at 6/20/2019  5:04 PM    Acceptance, E,D, VU,NR by LL at 6/19/2019  5:43 PM    Acceptance, E,TB, VU by KM at 6/18/2019  1:41 PM    Acceptance, E,TB, VU by KM at 6/17/2019  4:31 PM    Acceptance, E,D, VU,NR by LL at 6/17/2019  3:36 PM    Acceptance, E,TB, VU by DM at 6/16/2019  2:00 PM    Acceptance, E, VU by BC at 6/15/2019  2:53 PM    Acceptance, E,TB, VU by DM at 6/15/2019  2:48 PM    Acceptance, E,D, VU,NR by LL at 6/14/2019  4:37 PM    Acceptance, E,D, VU,NR by  AG at 6/13/2019  5:48 PM   Family Acceptance, E,D, VU,NR by AG at 6/13/2019  5:48 PM                   Point: Home exercise program (Done)     Learning Progress Summary           Patient Acceptance, E,D, VU,NR by LL at 6/20/2019  5:52 PM    Acceptance, E,TB, VU by AD at 6/20/2019  5:04 PM    Acceptance, E,D, VU,NR by LL at 6/19/2019  5:43 PM    Acceptance, E,TB, VU by KM at 6/18/2019  1:41 PM    Acceptance, E,TB, VU by KM at 6/17/2019  4:31 PM    Acceptance, E,D, VU,NR by LL at 6/17/2019  3:36 PM    Acceptance, E,TB, VU by DM at 6/16/2019  2:00 PM    Acceptance, E, VU by BC at 6/15/2019  2:53 PM    Acceptance, E,TB, VU by DM at 6/15/2019  2:48 PM    Acceptance, E,D, VU,NR by LL at 6/14/2019  4:37 PM    Acceptance, E,D, VU,NR by AG at 6/13/2019  5:48 PM   Family Acceptance, E,D, VU,NR by AG at 6/13/2019  5:48 PM                   Point: Body mechanics (Done)     Learning Progress Summary           Patient Acceptance, E,D, VU,NR by LL at 6/20/2019  5:52 PM    Acceptance, E,TB, VU by AD at 6/20/2019  5:04 PM    Acceptance, E,D, VU,NR by LL at 6/19/2019  5:43 PM    Acceptance, E,TB, VU by KM at 6/18/2019  1:41 PM    Acceptance, E,TB, VU by KM at 6/17/2019  4:31 PM    Acceptance, E,D, VU,NR by LL at 6/17/2019  3:36 PM    Acceptance, E,TB, VU by DM at 6/16/2019  2:00 PM    Acceptance, E, VU by BC at 6/15/2019  2:53 PM    Acceptance, E,TB, VU by DM at 6/15/2019  2:48 PM    Acceptance, E,D, VU,NR by LL at 6/14/2019  4:37 PM    Acceptance, E,D, VU,NR by AG at 6/13/2019  5:48 PM   Family Acceptance, E,D, VU,NR by AG at 6/13/2019  5:48 PM                   Point: Precautions (Done)     Learning Progress Summary           Patient Acceptance, E,D, VU,NR by LL at 6/20/2019  5:52 PM    Acceptance, E,TB, VU by AD at 6/20/2019  5:04 PM    Acceptance, E,D, VU,NR by LL at 6/19/2019  5:43 PM    Acceptance, E,TB, VU by KM at 6/18/2019  1:41 PM    Acceptance, E,TB, VU by KM at 6/17/2019  4:31 PM    Acceptance, E,D, VU,NR by LL at  6/17/2019  3:36 PM    Acceptance, E,TB, VU by DM at 6/16/2019  2:00 PM    Acceptance, E, VU by BC at 6/15/2019  2:53 PM    Acceptance, E,TB, VU by DM at 6/15/2019  2:48 PM    Acceptance, E,D, VU,NR by  at 6/14/2019  4:37 PM    Acceptance, E,D, VU,NR by AG at 6/13/2019  5:48 PM   Family Acceptance, E,D, VU,NR by AG at 6/13/2019  5:48 PM                               User Key     Initials Effective Dates Name Provider Type Discipline    AD 06/16/16 -  Saumya Ochoa, RN Registered Nurse Nurse    DM 06/16/16 -  Migdalia Haney, RN Registered Nurse Nurse    BC 03/14/16 -  Fatou Doll, PT Physical Therapist PT    AG 04/03/18 -  Sandy Covington, PT Physical Therapist PT     05/02/16 -  Loyda Vega PTA Physical Therapy Assistant PT     11/19/18 -  Lucille Torres RN Registered Nurse Nurse                PT Recommendation and Plan  Anticipated Discharge Disposition (PT): home with assist  Therapy Frequency (PT Clinical Impression): other (see comments)(6 days/ week )  Outcome Summary/Treatment Plan (PT)  Anticipated Equipment Needs at Discharge (PT): raised toilet seat, front wheeled walker  Anticipated Discharge Disposition (PT): home with assist  Plan of Care Reviewed With: patient  Progress: improving  Outcome Summary: Patient continues to make progress with therapy & is requiring less assistance with functional mobility.  Continue w/ current POC.  Outcome Measures     Row Name 06/20/19 1700 06/19/19 1700          How much help from another person do you currently need...    Turning from your back to your side while in flat bed without using bedrails?  4  -LL  4  -LL     Moving from lying on back to sitting on the side of a flat bed without bedrails?  3  -LL  3  -LL     Moving to and from a bed to a chair (including a wheelchair)?  3  -LL  3  -LL     Standing up from a chair using your arms (e.g., wheelchair, bedside chair)?  3  -LL  3  -LL     Climbing 3-5 steps with a railing?  3  -LL  3  -LL     To  walk in hospital room?  3  -LL  3  -LL     AM-PAC 6 Clicks Score  19  -LL  19  -LL        Functional Assessment    Outcome Measure Options  AM-PAC 6 Clicks Basic Mobility (PT)  -LL  AM-PAC 6 Clicks Basic Mobility (PT)  -LL       User Key  (r) = Recorded By, (t) = Taken By, (c) = Cosigned By    Initials Name Provider Type    LL Loyda Vega PTA Physical Therapy Assistant         Time Calculation:   PT Charges     Row Name 06/20/19 1753             Time Calculation    Start Time  0915  -LL      Stop Time  1010  -LL      Time Calculation (min)  55 min  -LL      PT Received On  06/20/19  -LL         Time Calculation- PT    Total Timed Code Minutes- PT  55 minute(s)  -LL        User Key  (r) = Recorded By, (t) = Taken By, (c) = Cosigned By    Initials Name Provider Type    LL Loyda Vega PTA Physical Therapy Assistant        Therapy Charges for Today     Code Description Service Date Service Provider Modifiers Qty    03069678707 HC GAIT TRAINING EA 15 MIN 6/19/2019 Loyda Vega, PTA GP 1    45110114841 HC PT THER PROC EA 15 MIN 6/19/2019 Gary Loyda, PTA GP 3    03652979480 HC PT THER SUPP EA 15 MIN 6/19/2019 Loyda Vega, PTA GP 1    01541653737 HC GAIT TRAINING EA 15 MIN 6/20/2019 Loyda Vega, PTA GP 1    01651375377 HC PT THER PROC EA 15 MIN 6/20/2019 Gary Loyda, PTA GP 3    22454087813 HC PT THER SUPP EA 15 MIN 6/20/2019 Loyda Vega, PTA GP 1          PT G-Codes  Outcome Measure Options: AM-PAC 6 Clicks Basic Mobility (PT)  AM-PAC 6 Clicks Score: 19  Score: 16    Loyda. ALFREDO Vega  6/20/2019

## 2019-06-21 VITALS
TEMPERATURE: 98.4 F | HEIGHT: 64 IN | SYSTOLIC BLOOD PRESSURE: 131 MMHG | WEIGHT: 164.3 LBS | RESPIRATION RATE: 18 BRPM | BODY MASS INDEX: 28.05 KG/M2 | HEART RATE: 64 BPM | DIASTOLIC BLOOD PRESSURE: 64 MMHG | OXYGEN SATURATION: 96 %

## 2019-06-21 LAB — MAGNESIUM SERPL-MCNC: 1.7 MG/DL (ref 1.6–2.4)

## 2019-06-21 PROCEDURE — 94799 UNLISTED PULMONARY SVC/PX: CPT

## 2019-06-21 PROCEDURE — 25010000002 MAGNESIUM SULFATE IN D5W 1G/100ML (PREMIX) 1-5 GM/100ML-% SOLUTION: Performed by: PHYSICIAN ASSISTANT

## 2019-06-21 PROCEDURE — 99315 NF DSCHRG MGMT 30 MIN/LESS: CPT | Performed by: FAMILY MEDICINE

## 2019-06-21 PROCEDURE — 97530 THERAPEUTIC ACTIVITIES: CPT

## 2019-06-21 PROCEDURE — 97535 SELF CARE MNGMENT TRAINING: CPT

## 2019-06-21 PROCEDURE — 83735 ASSAY OF MAGNESIUM: CPT | Performed by: FAMILY MEDICINE

## 2019-06-21 PROCEDURE — 97110 THERAPEUTIC EXERCISES: CPT

## 2019-06-21 PROCEDURE — 97116 GAIT TRAINING THERAPY: CPT

## 2019-06-21 RX ORDER — ACETAMINOPHEN 325 MG/1
650 TABLET ORAL EVERY 6 HOURS PRN
Qty: 30 TABLET | Refills: 0 | Status: SHIPPED | OUTPATIENT
Start: 2019-06-21

## 2019-06-21 RX ORDER — ACETAMINOPHEN 325 MG/1
650 TABLET ORAL EVERY 6 HOURS PRN
Qty: 30 TABLET | Refills: 0 | Status: SHIPPED | OUTPATIENT
Start: 2019-06-21 | End: 2019-06-21

## 2019-06-21 RX ORDER — MAGNESIUM SULFATE 1 G/100ML
1 INJECTION INTRAVENOUS ONCE
Status: COMPLETED | OUTPATIENT
Start: 2019-06-21 | End: 2019-06-21

## 2019-06-21 RX ADMIN — PANTOPRAZOLE SODIUM 40 MG: 40 TABLET, DELAYED RELEASE ORAL at 05:16

## 2019-06-21 RX ADMIN — CARVEDILOL 12.5 MG: 6.25 TABLET, FILM COATED ORAL at 08:23

## 2019-06-21 RX ADMIN — ACETAMINOPHEN 650 MG: 325 TABLET ORAL at 12:44

## 2019-06-21 RX ADMIN — BUSPIRONE HYDROCHLORIDE 10 MG: 10 TABLET ORAL at 08:23

## 2019-06-21 RX ADMIN — APIXABAN 2.5 MG: 2.5 TABLET, FILM COATED ORAL at 08:23

## 2019-06-21 RX ADMIN — MAGNESIUM SULFATE IN DEXTROSE 1 G: 10 INJECTION, SOLUTION INTRAVENOUS at 05:16

## 2019-06-21 NOTE — PROGRESS NOTES
Discharge Planning Assessment   Umer     Patient Name: Abbi Mendez  MRN: [id number]  Today's Date: 6/21/2019    Admit Date: 6/13/2019      Discharge Plan     Row Name 06/21/19 1214       Plan    Final Discharge Disposition Code  06 - home with home health care    Final Note Pt is being discharged home today. SS received orders for home health for PT services, (r) walker & wheelchair. SS spoke to pt who request [facility] and has no preference for DME company. SS contacted [facility] per Amy to arrange DME. SS faxed information including DME orders to [facility]. [facility] to deliver DME to hospital per pt's request. SS contacted [facility] per Brenna with referral. SS faxed information including order for PT to [facility]. No other needs identified.               Home Medical Care - Selection Complete      Service Provider Request Status Selected Services Address Phone Number Fax Number    [facility] Selected Home Health Services [address] [place] [address] [place] [address] [phone]     Annabel Archer

## 2019-06-21 NOTE — DISCHARGE PLACEMENT REQUEST
"Abbi Mendez (83 y.o. Female)     Date of Birth Social Security Number Address Home Phone MRN    1935  [address]  [place] [address] [phone] [id number]    Worship Marital Status          Religious Single       Admission Date Admission Type Admitting Provider Attending Provider Department, Room/Bed    19 Urgent Jeffrey Parsons DO Hays, Ray G, MD [address], [address]/    Discharge Date Discharge Disposition Discharge Destination         Home-Health Care [facility]              Attending Provider:  Tristan Melara MD    Allergies:  Darvon [Propoxyphene], Penicillins    Isolation:  None   Infection:  None   Code Status:  CPR    Ht:  162.6 cm (64\")   Wt:  74.5 kg (164 lb 4.8 oz)    Admission Cmt:  None   Principal Problem:  None                Active Insurance as of 2019     Primary Coverage     Payor Plan Insurance Group Employer/Plan Group    HUMANA MEDICARE REPLACEMENT HUMANA MEDICARE REPL [id number]     Payor Plan Address Payor Plan Phone Number Payor Plan Fax Number Effective Dates    [address] [address] [phone]  2018 - None Entered    [facility] [id number]       Subscriber Name Subscriber Birth Date Member ID       ABBI MENDEZ 1935 [id number]                 Emergency Contacts      (Rel.) Home Phone Work Phone Mobile Phone    Shruthi Gama (Daughter) -- -- [phone]    Suze Ortiz (Daughter) -- -- [phone]    ASTRID YOUSIF (Daughter) [phone] -- --        [address] [address]  Phone:  [phone]  Fax:   Date: 2019      Referral to Home Health     Patient:  Abbi Mendez MRN:  [id number]   [address]  [place] [address] :  1935  SSN:    Phone: [phone] Sex:  F      INSURANCE PAYOR PLAN GROUP # SUBSCRIBER ID   Primary:    HUMANA MEDICARE REPLACEMENT [id number]      Referring Provider Information:  TRISTAN MELARA Phone: [phone] Fax:     "   Referral Information:   # Visits:  1 Referral Type: Home Health [42]   Urgency:  Routine Referral Reason: Specialty Services Required   Start Date: 2019 End Date:  To be determined by Insurer   Diagnosis: Closed fracture of left hip with routine healing, subsequent encounter (S72.002D [ICD-10-CM] V54.13 [ICD-9-CM])  Paroxysmal atrial fibrillation (CMS/HCC) (I48.0 [ICD-10-CM] 427.31 [ICD-9-CM])      Refer to Dept:   Refer to Provider:   Refer to Facility:       Face to Face Visit Date: 2019  Follow-up Provider for Plan of Care? I treated the patient in an acute care facility and will not continue treatment after discharge.  Follow-up Provider: ERIC DRIVER [[id number]]  Reason/Clinical Findings: s/p hip fx  Describe mobility limitations that make leaving home difficult: recent hip fx  Nursing/Therapeutic Services Requested: Physical Therapy  PT orders: Strengthening  Frequency: 1 Week 1     This document serves as a request of services and does not constitute Insurance authorization or approval of services.  To determine eligibility, please contact the members Insurance carrier to verify and review coverage.     If you have medical questions regarding this request for services. Please contact [address] at [phone] between the hours of 8:00am - 5:00pm (Mon-Fri).       Authorizing Provider:Tristan Melara MD  Authorizing Provider's NPI: [id number]  Order Entered By: Tristan Melara MD 2019 11:13 AM     Electronically signed by: Tristan Melara MD 2019 11:13 AM               History & Physical      Jeffrey Parsons DO at 2019  4:40 PM              [facility] Medicine Services  History & Physical    Patient Identification:  Name:  Abbi Mendez  Age:  83 y.o.  Sex:  female  :  1935  MRN:  [id number]   Visit Number:  [id number]  Primary Care Physician:  Eric Driver MD    I have seen the patient in conjunction with Amy Jurado PA-C and MADDY  agree with the following statements. I have made any necessary changes below to reflect my findings.     Subjective     Chief complaint: Left hip fracture    History of presenting illness:      Abbi Mendez is a 83 y.o. 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  She remained hospitalized receiving therapies until approved for swing bed.    On this date, she has been admitted to swing bed status.  She denies chest pain, dyspnea, palpitations.  She did feel slightly fatigued this morning when blood pressures were lower but this did resolve following a small fluid bolus.  She is eating and drinking well.  She reports she feels she is making progress since her surgical repair.    Issa: Pt seen and examined with FANNY Sutton. Awake and alert. States she felt fatigued this AM with hypotension likely contributing. She feels improved after fluid bolus and improvement in her hypotension. Pain is controlled. States she slept well last PM. Denies fever or chills. Denies nausea or vomiting. Denies CP, dyspnea or palpitations. Reports good UOP with dose of Lasix yesterday and states she feels less swollen.     ---------------------------------------------------------------------------------------------------------------------   Review of Systems   Constitutional: Negative for activity change, appetite change, chills and fever.   HENT: Negative for congestion and drooling.    Eyes: Negative for pain and discharge.   Respiratory: Negative for apnea, shortness of breath and wheezing.    Cardiovascular: Negative for chest pain and leg swelling.   Gastrointestinal: Negative  for diarrhea and nausea.   Endocrine: Negative for cold intolerance and heat intolerance.   Genitourinary: Negative for difficulty urinating and dysuria.   Musculoskeletal: Positive for arthralgias.   Skin: Negative for color change and pallor.   Allergic/Immunologic: Negative for environmental allergies and food allergies.   Neurological: Negative for dizziness and headaches.   Psychiatric/Behavioral: Negative for agitation and confusion.      ---------------------------------------------------------------------------------------------------------------------   Past Medical History:   Diagnosis Date   • Anxiety    • Arthritis    • Atrial fibrillation (CMS/HCC) 11/14/2016   • Gastric ulcer    • Hiatal hernia    • Hypertension    • Iron deficiency anemia    • RADHA (obstructive sleep apnea) 11/14/2016     Past Surgical History:   Procedure Laterality Date   • CARDIAC ELECTROPHYSIOLOGY PROCEDURE N/A 1/18/2018    Procedure: Pacemaker DC new;  Surgeon: Soraya Darling MD;  Location: [facility] INVASIVE LOCATION;  Service:    • HIP BIPOLAR REPLACEMENT Left 6/5/2019    Procedure: HIP BIPOLAR REPLACEMENT;  Surgeon: Gerardo Jain MD;  Location: [facility] OR;  Service: Orthopedics   • TOTAL KNEE ARTHROPLASTY Right    • TUBAL ABDOMINAL LIGATION       Family History   Problem Relation Age of Onset   • Heart attack Mother 69   • Heart disease Mother    • Heart disease Sister    • Heart attack Brother 60   • Heart attack Brother 45   • Lymphoma Brother    • Heart disease Daughter    • Diabetes Son    • Heart disease Daughter    • Heart disease Daughter    • Diabetes Daughter      Social History     Socioeconomic History   • Marital status: Single     Spouse name: Not on file   • Number of children: Not on file   • Years of education: Not on file   • Highest education level: Not on file   Tobacco Use   • Smoking status: Never Smoker   • Smokeless tobacco: Never Used   Substance and Sexual Activity   • Alcohol use: No   • Drug use: No    • Sexual activity: Not Currently     Partners: Male   Social History Narrative         seven children      ---------------------------------------------------------------------------------------------------------------------   Allergies:  Darvon [propoxyphene] and Penicillins  ---------------------------------------------------------------------------------------------------------------------   Home medications:    Medications below are reported home medications pulling from within the system; at this time, these medications have not been reconciled unless otherwise specified and are in the verification process for further verifcation as current home medications.  Medications Prior to Admission   Medication Sig Dispense Refill Last Dose   • aspirin 81 MG EC tablet Take 81 mg by mouth Daily.   Unknown at Unknown time   • atorvastatin (LIPITOR) 40 MG tablet Take 40 mg by mouth Every Night.   Unknown at Unknown time   • busPIRone (BUSPAR) 10 MG tablet Take 10 mg by mouth 2 (Two) Times a Day.   Unknown at Unknown time   • carvedilol (COREG) 12.5 MG tablet Take 1 tablet by mouth 2 (Two) Times a Day With Meals. 180 tablet 3 Unknown at Unknown time   • esomeprazole (nexIUM) 40 MG capsule Take 40 mg by mouth Every Morning Before Breakfast.   Unknown at Unknown time   • losartan (COZAAR) 100 MG tablet Take 100 mg by mouth Daily.  1 Unknown at Unknown time       Hospital Scheduled Meds:      No current facility-administered medications for this encounter.     Current listed hospital scheduled medications may not yet reflect those currently placed in orders that are signed and held awaiting patient's arrival to floor.   ---------------------------------------------------------------------------------------------------------------------     Objective     Vital Signs:  Temp:  [98 °F (36.7 °C)-100.1 °F (37.8 °C)] 98 °F (36.7 °C)  Heart Rate:  [68-82] 76  Resp:  [18-20] 18  BP: ()/(40-70) 120/47      06/04/19  2108  06/05/19 0305   Weight: 76.2 kg (168 lb) 76.2 kg (168 lb 0.2 oz)     Body mass index is 27.13 kg/m².  ---------------------------------------------------------------------------------------------------------------------         Physical Exam:    Constitutional: Awake, alert, well-nourished, well-developed, nontoxic ( No acute distress)  HEENT: Normocephalic, atraumatic. PERRLA, EOMI, sclerae anicteric, conjunctivae without injection, mucous membranes moist, no oropharyngeal erythema appreciated.    Neck: Supple. No JVD appreciated.  Pulmonary: Clear to auscultation bilaterally, nonlabored respirations, no wheezing appreciated.   CV: Normal rate, regular rhythm. Normal s1/s2 with no murmur appreciated.   Abdominal: Soft, No distension or tenderness appreciated. Bowel sounds appreciated in all four quadrants, no guarding or rebound tenderness. No organomegaly.   Musculoskeletal: No erythema or swelling to joints of upper and lower extremities   Extremities: No clubbing, cyanosis, or edema (Mild edema in LLE but improved)  Vascular: 2+ DP/PT bilaterally, warm extremities  Skin: Skin is warm and dry. No truncal or extremity rash on limited exam  Neuro: Alert and oriented to person, place, and time. Strength symmetric in all extremities, Cranial Nerves grossly intact to confrontation, speech clear, sensation intact to fine touch throughout.  No slurred speech.  No facial droop.    Psych: Appropriate mood and affect.  Judgement and though content appropriate.       ---------------------------------------------------------------------------------------------------------------------  EKG:              ---------------------------------------------------------------------------------------------------------------------   Results from last 7 days   Lab Units 06/13/19  0515 06/12/19  0523 06/11/19  0456   WBC 10*3/mm3 10.39 11.29* 11.82*   HEMOGLOBIN g/dL 10.2* 10.3* 10.9*   HEMATOCRIT % 33.1* 33.8* 34.8   MCV fL 96.2 97.1*  96.4   MCHC g/dL 30.8* 30.5* 31.3*   PLATELETS 10*3/mm3 414 411 423         Results from last 7 days   Lab Units 06/13/19  0515 06/12/19  0523 06/11/19  0456   SODIUM mmol/L 137 139 138   POTASSIUM mmol/L 3.7 3.8 3.9   MAGNESIUM mg/dL 1.9 1.6 2.0   CHLORIDE mmol/L 98 101 101   CO2 mmol/L 27.4 27.2 25.7   BUN mg/dL 23 18 19   CREATININE mg/dL 1.08* 0.88 0.95   EGFR IF NONAFRICN AM mL/min/1.73 48* 61 56*   CALCIUM mg/dL 8.5* 8.4* 8.4*   GLUCOSE mg/dL 103* 104* 105*   Estimated Creatinine Clearance: 41.2 mL/min (A) (by C-G formula based on SCr of 1.08 mg/dL (H)).  No results found for: AMMONIA          Lab Results   Component Value Date    HGBA1C 5.70 (H) 06/05/2019     No results found for: TSH, FREET4  No results found for: PREGTESTUR, PREGSERUM, HCG, HCGQUANT  Pain Management Panel     Pain Management Panel Latest Ref Rng & Units 6/5/2019    CREATININE UR mg/dL 195.3                        ---------------------------------------------------------------------------------------------------------------------  Imaging Results (last 7 days)     ** No results found for the last 168 hours. **               Last echocardiogram:  Results for orders placed during the hospital encounter of 01/25/19   Adult Transthoracic Echo Complete W/ Cont if Necessary Per Protocol    Narrative · Right ventricular cavity is borderline dilated.  · Left ventricular wall thickness is consistent with concentric   hypertrophy.  · Left ventricular diastolic dysfunction (grade I) consistent with   impaired relaxation.  · Left ventricular systolic function is normal. Estimated EF = 60%.  · Mild aortic valve stenosis is present. Aortic valve mean pressure   gradient is 10.9 mmHg.  · Mild aortic valve regurgitation is present.  · Mild-to-moderate mitral valve regurgitation is present  · Mild to moderate tricuspid valve regurgitation is present.  · Calculated right ventricular systolic pressure from tricuspid   regurgitation is 29 mmHg.  · No  significant pericardial effusion is noted.              I have personally reviewed the radiology images and read the final radiology report.  ---------------------------------------------------------------------------------------------------------------------  Assessment / Plan     Active Hospital Problems    Diagnosis POA   • Left displaced femoral neck fracture (CMS/[facility]) [S72.002A] Yes       ASSESSMENT/PLAN:  · Acute traumatic left hip fracture s/p left bipolar replacement:  Continue PT/OT. Continue Eliquis daily.    · CKD 3: Continue close monitoring of creatinine.   · Essential Hypertension: Losartan 100mg discontinued prior to SB admission 2/2 lower blood pressures.  BP improved after small IV fluid bolus. Continue to monitor closely and hold further diuresis at present.   · History of SSS s/p PPM  · Paroxysmal atrial fibrillation: Continue Coreg. Currently on Eliquis s/p hip repair (this is not home anticoagulation per review of records).    · Hypokalemia, improved  · Hypomagnesemia, improved  · Leukocytosis, likely reactive to hip fracture and surgical repair, now resolved      ----------  -DVT prophylaxis: Eliquis to serve  -Activity: PT/OT  -Expected length of stay:     Amy Jurado PA-C  06/13/19  4:28 PM  Pager # [phone]  ---------------------------------------------------------------------------------------------------------------------     I have reviewed the notes, assessments, and/or procedures performed by Amy Jurado PA-C. I concur with her/his documentation of Abbi Mendez.    Jeffrey Parsons D.O.         Electronically signed by Jeffrey Parsons DO at 6/13/2019  7:10 PM       Operative/Procedure Notes (most recent note)     No notes of this type exist for this encounter.           Physical Therapy Notes (most recent note)      Loyda Vega PTA at 6/20/2019  5:54 PM  Version 1 of 1         Acute Care - Physical Therapy Treatment Note   [place]     Patient  Name: Abbi Mendez  : 1935  MRN: [id number]  Today's Date: 2019  Onset of Illness/Injury or Date of Surgery: 19  Date of Referral to PT: 19  Referring Physician: Dr. Parsons    Admit Date: 2019    Visit Dx:  No diagnosis found.  Patient Active Problem List   Diagnosis   • Atrial fibrillation (CMS/HCC)   • Coronary artery disease involving native coronary artery of native heart without angina pectoris   • RADHA (obstructive sleep apnea)   • Essential hypertension   • Hyperlipidemia LDL goal <70   • Iron deficiency anemia   • Left bundle branch block   • Syncope and collapse   • Severe sinus bradycardia   • Third degree AV block (CMS/HCC)   • Syncope   • Compression deformity of vertebra   • Radiculopathy   • Pericardial effusion without cardiac tamponade   • Hypercalcemia   • Abnormal serum protein electrophoresis   • Weight loss, unintentional   • Primary hyperparathyroidism (CMS/HCC)   • Hypercalcemia   • Pacemaker   • Hiatal hernia   • Elevated serum immunoglobulin free light chains   • Left displaced femoral neck fracture (CMS/HCC)   • Hip fracture (CMS/HCC)       Therapy Treatment    Rehabilitation Treatment Summary     Row Name 19 1744 19 1112          Treatment Time/Intention    Discipline  physical therapy assistant  -LL  occupational therapist  -JW     Document Type  progress note/recertification  -LL  therapy note (daily note)  -JW     Subjective Information  no complaints  -LL  no complaints  -JW     Mode of Treatment  physical therapy;individual therapy  -LL  occupational therapy  -JW     Patient/Family Observations  Patient agreeable to PT session this date in rehab gym  -LL  Pt agreeable for therapy. Tx session completed in rehab tx gym.   -JW     Patient Effort  good  -LL  good  -JW     Existing Precautions/Restrictions  --  fall;left;hip  -JW     Patient Response to Treatment  Patient tolerated 55 min of PT in rehab gym this date with adequate rest breaks.   -LL  --     Recorded by [LL] Loyda Vega, [facility] 06/20/19 1752 [JW] Justin Gomez, OTR 06/20/19 1114     Row Name 06/20/19 1744             Cognitive Assessment/Intervention- PT/OT    Affect/Mental Status (Cognitive)  WFL  -LL      Orientation Status (Cognition)  oriented x 3  -LL      Follows Commands (Cognition)  follows one step commands;verbal cues/prompting required  -LL      Personal Safety Interventions  fall prevention program maintained;gait belt;nonskid shoes/slippers when out of bed;supervised activity  -LL      Recorded by [LL] Loyda Vega, [facility] 06/20/19 1752      Row Name 06/20/19 1744             Mobility Assessment/Intervention    Extremity Weight-bearing Status  left lower extremity;right lower extremity  -LL      Left Lower Extremity (Weight-bearing Status)  weight-bearing as tolerated (WBAT)  -LL      Right Lower Extremity (Weight-bearing Status)  weight-bearing as tolerated (WBAT)  -LL      Recorded by [LL] Loyda Vega, ALFREDO 06/20/19 1752      Row Name 06/20/19 1744             Bed Mobility Assessment/Treatment    Bed Mobility Assessment/Treatment  bed mobility (all) activities  -LL      Supine-Sit [place] (Bed Mobility)  contact guard  -LL      Sit-Supine [place] (Bed Mobility)  contact guard  -LL      Assistive Device (Bed Mobility)  bed rails  -LL      Recorded by [LL] Loyda Vega, [facility] 06/20/19 1752      Row Name 06/20/19 1744             Transfer Assessment/Treatment    Transfer Assessment/Treatment  sit-stand transfer;stand-sit transfer  -LL      Recorded by [LL] Loyda Vega, [facility] 06/20/19 1752      Row Name 06/20/19 1744             Sit-Stand Transfer    Sit-Stand [place] (Transfers)  contact guard  -LL      Assistive Device (Sit-Stand Transfers)  walker, front-wheeled  -LL      Recorded by [LL] Loyda Vega, [facility] 06/20/19 1752      Row Name 06/20/19 1744             Stand-Sit Transfer    Stand-Sit [place] (Transfers)  contact guard  -LL      Assistive  Device (Stand-Sit Transfers)  walker, front-wheeled  -LL      Recorded by [LL] Loyda Vega, ALFREDO 06/20/19 1752      Row Name 06/20/19 1744             Gait/Stairs Assessment/Training    Gait/Stairs Assessment/Training  gait/ambulation independence  -LL      [place] Level (Gait)  contact guard with cues to slow down  -LL      Assistive Device (Gait)  walker, front-wheeled  -LL      Distance in Feet (Gait)  300  -LL      Pattern (Gait)  step-through  -LL      Deviations/Abnormal Patterns (Gait)  antalgic;stride length decreased  -LL      Bilateral Gait Deviations  forward flexed posture;heel strike decreased;weight shift ability decreased  -LL      Recorded by [LL] Loyda Vega, ALFREDO 06/20/19 1752      Row Name 06/20/19 1117             Upper Extremity Seated Therapeutic Exercise    Exercise Type, Seated Upper Extremity (Therapeutic Exercise)  AROM (active range of motion);other (see comments) BUE ther ex/act, GMC/FMC   -JW      Expected Outcomes, Seated Upper Extremity (Therapeutic Exercise)  improve functional tolerance, self-care activity;improve performance, BADLs;improve performance, fine motor coordination skills;improve performance, gross motor coordination skills;improve performance, transfer skills  -JW      Recorded by [JW] Justin Gomez OTR 06/20/19 1114      Row Name 06/20/19 1744             Aerobic Exercise Activity    Exercise Performed (Aerobic, Therapeutic Exercise)  recumbent stationary bike  -LL      Expected Outcome (Aerobic, Therapeutic Exercise)  improve functional tolerance, community activity;improve functional tolerance, household activity;improve functional tolerance, self-care activity;improve performance, gait skills;improve performance, transfer skills;strengthen normal movement patterns  -LL      Time (Aerobic, Therapeutic Exercise)  10 1.0  -LL      Transfers Skills, Training to Functional Activity (Aerobic, Therapeutic Exercise)  able to transfer skills from training to  functional activity  -LL      Recorded by [LL] Loyda Vega, ALFREDO 06/20/19 1752      Row Name 06/20/19 1744             Lower Extremity Seated Therapeutic Exercise    Performed, Seated Lower Extremity (Therapeutic Exercise)  hip flexion/extension;hip abduction/adduction;ankle dorsiflexion/plantarflexion;LAQ (long arc quad), knee extension Ball squeeze, GS  -LL      Device, Seated Lower Extremity (Therapeutic Exercise)  elastic bands/tubing;small ball;free weights, cuff 2.5#  -LL      Exercise Type, Seated Lower Extremity (Therapeutic Exercise)  AROM (active range of motion)  -LL      Restrictions, Seated Lower Extremity (Therapeutic Exercise)  L JANET precautions  -LL      Sets/Reps Detail, Seated Lower Extremity (Therapeutic Exercise)  1 x 25  -LL      Transfers Skills, Training to Functional Activity, Seated Lower Extremity (Therapeutic Exercise)  able to transfer skills from training to functional activity  -LL      Recorded by [LL] Loyda Vega, ALFREDO 06/20/19 1752      Row Name 06/20/19 1744             Standing Balance Activity    Activities Performed (Standing, Balance Training)  standing reaching outside base of support Balloon tap  -LL      Support Needed for Balance (Standing, Balance Training)  uses at least one upper extremity for support  -LL      Upper Extremity Activity with Device (Standing, Balance Training)  -- Balloon  -LL      Progressive Balance Activity (Standing, Balance Training)  speed of movements increased during activity;combined head and eye movements during activity;upper extremity activities added during activity  -LL      Transfers Skills, Training to Functional Activity (Standing, Balance Training)  able to transfer skills from training to functional activity  -LL      Recorded by [LL] Loyda Vega, ALFREDO 06/20/19 1752      Row Name 06/20/19 1744 06/20/19 1112          Positioning and Restraints    Pre-Treatment Position  -- Transport chair with PCA  -LL  --     Post Treatment  Position  wheelchair  -LL  -- transport chair with    -FIDENCIO     In Wheelchair  sitting;with OT;legs elevated  -LL  --     Recorded by [LL] Loyda Vega PTA 06/20/19 1752 [JW] Justin Gomez, OTR 06/20/19 1114     Row Name                Wound 06/05/19 1838 Left hip incision    Wound - Properties Group Date first assessed: 06/05/19 [KH] Time first assessed: 1838 [KH] Side: Left [KH] Location: hip [KH] Type: incision [KH] Recorded by:  [KH] Ary Sylvester RN 06/05/19 1838      User Key  (r) = Recorded By, (t) = Taken By, (c) = Cosigned By    Initials Name Effective Dates Discipline     Ary Sylvester RN 06/16/16 -  Nurse    Loyda Kraus, ALFREDO 05/02/16 -  PT    Justin Dia OTR 03/07/18 -  OT          Wound 06/05/19 1838 Left hip incision (Active)   Dressing Appearance dry;intact 6/20/2019  8:35 AM   Closure Sutures 6/19/2019  9:00 PM   Base dry 6/19/2019  9:00 PM   Periwound intact;dry 6/19/2019  9:00 PM   Periwound Temperature warm 6/19/2019  9:00 PM   Periwound Skin Turgor soft 6/19/2019  9:00 PM   Edges rolled/closed 6/19/2019  9:00 PM   Drainage Amount none 6/19/2019  9:00 PM           Physical Therapy Education     Title: PT OT SLP Therapies (Done)     Topic: Physical Therapy (Done)     Point: Mobility training (Done)     Learning Progress Summary           Patient Acceptance, E,D, VU,NR by LL at 6/20/2019  5:52 PM    Acceptance, E,TB, VU by AD at 6/20/2019  5:04 PM    Acceptance, E,D, VU,NR by LL at 6/19/2019  5:43 PM    Acceptance, E,TB, VU by KM at 6/18/2019  1:41 PM    Acceptance, E,TB, VU by KM at 6/17/2019  4:31 PM    Acceptance, E,D, VU,NR by LL at 6/17/2019  3:36 PM    Acceptance, E,TB, VU by DM at 6/16/2019  2:00 PM    Acceptance, E, VU by BC at 6/15/2019  2:53 PM    Acceptance, E,TB, VU by DM at 6/15/2019  2:48 PM    Acceptance, E,D, VU,NR by LL at 6/14/2019  4:37 PM    Acceptance, E,D, VU,NR by AG at 6/13/2019  5:48 PM   Family Acceptance, E,D, VU,NR by AG at  6/13/2019  5:48 PM                   Point: Home exercise program (Done)     Learning Progress Summary           Patient Acceptance, E,D, VU,NR by LL at 6/20/2019  5:52 PM    Acceptance, E,TB, VU by AD at 6/20/2019  5:04 PM    Acceptance, E,D, VU,NR by LL at 6/19/2019  5:43 PM    Acceptance, E,TB, VU by KM at 6/18/2019  1:41 PM    Acceptance, E,TB, VU by KM at 6/17/2019  4:31 PM    Acceptance, E,D, VU,NR by LL at 6/17/2019  3:36 PM    Acceptance, E,TB, VU by DM at 6/16/2019  2:00 PM    Acceptance, E, VU by BC at 6/15/2019  2:53 PM    Acceptance, E,TB, VU by DM at 6/15/2019  2:48 PM    Acceptance, E,D, VU,NR by LL at 6/14/2019  4:37 PM    Acceptance, E,D, VU,NR by AG at 6/13/2019  5:48 PM   Family Acceptance, E,D, VU,NR by AG at 6/13/2019  5:48 PM                   Point: Body mechanics (Done)     Learning Progress Summary           Patient Acceptance, E,D, VU,NR by LL at 6/20/2019  5:52 PM    Acceptance, E,TB, VU by AD at 6/20/2019  5:04 PM    Acceptance, E,D, VU,NR by LL at 6/19/2019  5:43 PM    Acceptance, E,TB, VU by KM at 6/18/2019  1:41 PM    Acceptance, E,TB, VU by KM at 6/17/2019  4:31 PM    Acceptance, E,D, VU,NR by LL at 6/17/2019  3:36 PM    Acceptance, E,TB, VU by DM at 6/16/2019  2:00 PM    Acceptance, E, VU by BC at 6/15/2019  2:53 PM    Acceptance, E,TB, VU by DM at 6/15/2019  2:48 PM    Acceptance, E,D, VU,NR by LL at 6/14/2019  4:37 PM    Acceptance, E,D, VU,NR by AG at 6/13/2019  5:48 PM   Family Acceptance, E,D, VU,NR by AG at 6/13/2019  5:48 PM                   Point: Precautions (Done)     Learning Progress Summary           Patient Acceptance, E,D, VU,NR by LL at 6/20/2019  5:52 PM    Acceptance, E,TB, VU by AD at 6/20/2019  5:04 PM    Acceptance, E,D, VU,NR by LL at 6/19/2019  5:43 PM    Acceptance, E,TB, VU by KM at 6/18/2019  1:41 PM    Acceptance, E,TB, VU by KM at 6/17/2019  4:31 PM    Acceptance, E,D, VU,NR by LL at 6/17/2019  3:36 PM    Acceptance, E,TB, VU by DM at 6/16/2019  2:00 PM     Acceptance, E, VU by BC at 6/15/2019  2:53 PM    Acceptance, E,TB, VU by DM at 6/15/2019  2:48 PM    Acceptance, E,D, VU,NR by  at 6/14/2019  4:37 PM    Acceptance, E,D, VU,NR by  at 6/13/2019  5:48 PM   Family Acceptance, E,D, VU,NR by  at 6/13/2019  5:48 PM                               User Key     Initials Effective Dates Name Provider Type Discipline    AD 06/16/16 -  Saumya Ochoa, RN Registered Nurse Nurse     06/16/16 -  Migdalia Haney RN Registered Nurse Nurse    BC 03/14/16 -  Fatou Doll, PT Physical Therapist PT    AG 04/03/18 -  Sandy Covington, PT Physical Therapist PT     05/02/16 -  Loyda Vega PTA Physical Therapy Assistant PT     11/19/18 -  Lucille Torres RN Registered Nurse Nurse                PT Recommendation and Plan  Anticipated Discharge Disposition (PT): home with assist  Therapy Frequency (PT Clinical Impression): other (see comments)(6 days/ week )  Outcome Summary/Treatment Plan (PT)  Anticipated Equipment Needs at Discharge (PT): raised toilet seat, front wheeled walker  Anticipated Discharge Disposition (PT): home with assist  Plan of Care Reviewed With: patient  Progress: improving  Outcome Summary: Patient continues to make progress with therapy & is requiring less assistance with functional mobility.  Continue w/ current POC.  Outcome Measures     Row Name 06/20/19 1700 06/19/19 1700          How much help from another person do you currently need...    Turning from your back to your side while in flat bed without using bedrails?  4  -LL  4  -LL     Moving from lying on back to sitting on the side of a flat bed without bedrails?  3  -LL  3  -LL     Moving to and from a bed to a chair (including a wheelchair)?  3  -LL  3  -LL     Standing up from a chair using your arms (e.g., wheelchair, bedside chair)?  3  -LL  3  -LL     Climbing 3-5 steps with a railing?  3  -LL  3  -LL     To walk in hospital room?  3  -LL  3  -LL     AM-PAC 6 Clicks Score  19  -LL   19  -LL        Functional Assessment    Outcome Measure Options  AM-PAC 6 Clicks Basic Mobility (PT)  -LL  AM-PAC 6 Clicks Basic Mobility (PT)  -LL       User Key  (r) = Recorded By, (t) = Taken By, (c) = Cosigned By    Initials Name Provider Type    LL Loyda Vega PTA Physical Therapy Assistant         Time Calculation:   PT Charges     Row Name 06/20/19 1753             Time Calculation    Start Time  0915  -LL      Stop Time  1010  -LL      Time Calculation (min)  55 min  -LL      PT Received On  06/20/19  -LL         Time Calculation- PT    Total Timed Code Minutes- PT  55 minute(s)  -LL        User Key  (r) = Recorded By, (t) = Taken By, (c) = Cosigned By    Initials Name Provider Type    LL Loyda Vega PTA Physical Therapy Assistant        Therapy Charges for Today     Code Description Service Date Service Provider Modifiers Qty    44304337771 HC GAIT TRAINING EA 15 MIN 6/19/2019 Loyda Vega, PTA GP 1    47627305855 HC PT THER PROC EA 15 MIN 6/19/2019 Loyda Vega, PTA GP 3    89089223672 HC PT THER SUPP EA 15 MIN 6/19/2019 Loyda Vega, PTA GP 1    74811506474 HC GAIT TRAINING EA 15 MIN 6/20/2019 Loyda Vega, PTA GP 1    48154650524 HC PT THER PROC EA 15 MIN 6/20/2019 Loyda Vega, PTA GP 3    80201937493 HC PT THER SUPP EA 15 MIN 6/20/2019 Loyda Vega, PTA GP 1          PT G-Codes  Outcome Measure Options: AM-PAC 6 Clicks Basic Mobility (PT)  AM-PAC 6 Clicks Score: 19  Score: 16    Justina Vega PTA  6/20/2019         Electronically signed by Loyda Vega PTA at 6/20/2019  5:54 PM

## 2019-06-21 NOTE — THERAPY DISCHARGE NOTE
Acute Care - Occupational Therapy Treatment Note/Discharge   [facility]     Patient Name: Abbi Mendez  : 1935  MRN: [id number]  Today's Date: 2019  Onset of Illness/Injury or Date of Surgery: 19     Referring Physician: Dr. Parsons      Admit Date: 2019    Visit Dx:     ICD-10-CM ICD-9-CM   1. Closed fracture of left hip with routine healing, subsequent encounter S72.002D V54.13   2. Paroxysmal atrial fibrillation (CMS/HCC) I48.0 427.31     Patient Active Problem List   Diagnosis   • Atrial fibrillation (CMS/HCC)   • Coronary artery disease involving native coronary artery of native heart without angina pectoris   • RADHA (obstructive sleep apnea)   • Essential hypertension   • Hyperlipidemia LDL goal <70   • Iron deficiency anemia   • Left bundle branch block   • Syncope and collapse   • Severe sinus bradycardia   • Third degree AV block (CMS/HCC)   • Syncope   • Compression deformity of vertebra   • Radiculopathy   • Pericardial effusion without cardiac tamponade   • Hypercalcemia   • Abnormal serum protein electrophoresis   • Weight loss, unintentional   • Primary hyperparathyroidism (CMS/HCC)   • Hypercalcemia   • Pacemaker   • Hiatal hernia   • Elevated serum immunoglobulin free light chains   • Left displaced femoral neck fracture (CMS/HCC)   • Hip fracture (CMS/HCC)       Therapy Treatment    Rehabilitation Treatment Summary     Row Name 19 1454             Treatment Time/Intention    Discipline  occupational therapist  -FIDENCIO      Document Type  discharge treatment  -FIDENCIO      Subjective Information  no complaints  -JW      Patient/Family Observations  Pt. discharged from swing bed this date to home w/ family assisting. OT recommends BSC and shower seat for safety w/ ADL's and fxl transfers.  referral made for continued skilled OT services.   -JW      Patient Effort  good  -FIDENCIO      Existing Precautions/Restrictions  fall;hip;left  -JW      Recorded by [FIDENCIO] Justin Gomez, OTR 19  1500      Row Name 06/21/19 1454             Cognitive Assessment/Intervention- PT/OT    Affect/Mental Status (Cognitive)  WFL  -JW      Follows Commands (Cognition)  physical/tactile prompts required;verbal cues/prompting required  -JW      Personal Safety Interventions  fall prevention program maintained;gait belt;nonskid shoes/slippers when out of bed;supervised activity  -JW      Recorded by [JW] Justin Gomez, OTR 06/21/19 1500      Row Name 06/21/19 1454             Toilet Transfer    Cut Off Level (Toilet Transfer)  contact guard;verbal cues  -JW      Assistive Device (Toilet Transfer)  wheelchair;grab bars/safety frame;raised toilet seat  -JW      Recorded by [JW] Justin Gomez, OTR 06/21/19 1500      Row Name 06/21/19 1454             Bathing Assessment/Intervention    Bathing Cut Off Level  moderate assist (50% patient effort);verbal cues  -JW      Recorded by [JW] Justin Gomez, OTR 06/21/19 1500      Row Name 06/21/19 1454             Upper Body Dressing Assessment/Training    Upper Body Dressing Cut Off Level  supervision;set up;verbal cues  -JW      Recorded by [JW] Justin Gomez, OTR 06/21/19 1500      Row Name 06/21/19 1454             Lower Body Dressing Assessment/Training    Lower Body Dressing Cut Off Level  maximum assist (25% patient effort);verbal cues;nonverbal cues (demo/gesture)  -JW      Recorded by [JW] Justin Gomez, OTR 06/21/19 1500      Row Name 06/21/19 1454             Grooming Assessment/Training    Cut Off Level (Grooming)  supervision;set up  -JW      Recorded by [JW] Justin Gomez, OTR 06/21/19 1500      Row Name 06/21/19 1454             Self-Feeding Assessment/Training    Cut Off Level (Feeding)  conditional independence  -JW      Recorded by [JW] Justin Gomez, OTR 06/21/19 1500      Row Name 06/21/19 1454             Toileting Assessment/Training    Cut Off Level (Toileting)  moderate assist (50% patient effort);minimum assist (75% patient effort);verbal  cues  -JW      Recorded by [JW] Justin Gomez OTR 06/21/19 1500      Row Name 06/21/19 1454             Upper Extremity Seated Therapeutic Exercise    Exercise Type, Seated Upper Extremity (Therapeutic Exercise)  AROM (active range of motion) BUE CoordEx; G/FMC TherEx/Act; strengthening  -JW      Expected Outcomes, Seated Upper Extremity (Therapeutic Exercise)  improve functional tolerance, self-care activity;improve performance, BADLs;improve performance, transfer skills  -JW      Recorded by [JW] Justin Gomez OTR 06/21/19 1500      Row Name 06/21/19 1454             Positioning and Restraints    Post Treatment Position  wheelchair  -JW      In Wheelchair  sitting;with PT  -JW      Recorded by [JW] Justin Gomez OTR 06/21/19 1500      Row Name                Wound 06/05/19 1838 Left hip incision    Wound - Properties Group Date first assessed: 06/05/19 [KH] Time first assessed: 1838 [KH] Side: Left [KH] Location: hip [KH] Type: incision [KH] Recorded by:  [KH] Ary Sylvester RN 06/05/19 1838      User Key  (r) = Recorded By, (t) = Taken By, (c) = Cosigned By    Initials Name Effective Dates Discipline     Ary Sylvester RN 06/16/16 -  Nurse    Justin Dia OTR 03/07/18 -  OT        Wound 06/05/19 1838 Left hip incision (Active)   Dressing Appearance dry;intact 6/20/2019  9:00 PM   Closure Sutures 6/20/2019  9:00 PM   Base dry 6/20/2019  9:00 PM   Periwound intact;dry 6/20/2019  9:00 PM   Periwound Temperature warm 6/20/2019  9:00 PM   Periwound Skin Turgor soft 6/20/2019  9:00 PM   Edges rolled/closed 6/20/2019  9:00 PM   Drainage Amount none 6/20/2019  9:00 PM       Rehab Goal Summary     Row Name 06/21/19 1502             Occupational Therapy Goals    Dressing Goal Selection (OT)  dressing, OT goal 1  -JW      ROM Goal Selection (OT)  ROM, OT goal 1  -JW      Strength Goal Selection (OT)  strength, OT goal 1  -JW         Dressing Goal 1 (OT)    Progress/Outcome (Dressing Goal 1, OT)  goal not met   -         ROM Goal 1 (OT)    Progress/Outcome (ROM Goal 1, OT)  goal met  -         Strength Goal 1 (OT)    Progress/Outcome (Strength Goal 1, OT)  goal met  -        User Key  (r) = Recorded By, (t) = Taken By, (c) = Cosigned By    Initials Name Provider Type Discipline    JW Justin Gomez OTR Occupational Therapist OT          Occupational Therapy Education     Title: PT OT SLP Therapies (Done)     Topic: Occupational Therapy (Resolved)     Point: Home exercise program (Resolved)     Description: Instruct learner(s) on appropriate technique for monitoring, assisting and/or progressing therapeutic exercises/activities.    Learning Progress Summary           Patient Acceptance, E,TB, VU by AD at 6/20/2019  5:04 PM    Acceptance, E,TB, VU by  at 6/18/2019  1:41 PM    Acceptance, E,D, DU,NR by  at 6/18/2019 12:00 PM                               User Key     Initials Effective Dates Name Provider Type Discipline    AD 06/16/16 -  Saumya Ochoa, RN Registered Nurse Nurse    KILO 03/14/16 -  Dominique Morales OT Occupational Therapist OT     11/19/18 -  Lucille Torres RN Registered Nurse Nurse                OT Recommendation and Plan     Plan of Care Review  Plan of Care Reviewed With: patient  Plan of Care Reviewed With: patient  Outcome Summary: Pt tolerated tx session well with rest breaks provided. Continue with plan of care.     Outcome Measures     Row Name 06/20/19 1700 06/19/19 1700          How much help from another person do you currently need...    Turning from your back to your side while in flat bed without using bedrails?  4  -LL  4  -LL     Moving from lying on back to sitting on the side of a flat bed without bedrails?  3  -LL  3  -LL     Moving to and from a bed to a chair (including a wheelchair)?  3  -LL  3  -LL     Standing up from a chair using your arms (e.g., wheelchair, bedside chair)?  3  -LL  3  -LL     Climbing 3-5 steps with a railing?  3  -LL  3  -LL     To walk in hospital  room?  3  -LL  3  -LL     AM-PAC 6 Clicks Score  19  -LL  19  -LL        Functional Assessment    Outcome Measure Options  AM-PAC 6 Clicks Basic Mobility (PT)  -LL  AM-PAC 6 Clicks Basic Mobility (PT)  -LL       User Key  (r) = Recorded By, (t) = Taken By, (c) = Cosigned By    Initials Name Provider Type    LL Loyda Vega PTA Physical Therapy Assistant           Time Calculation:    Time Calculation- OT     Row Name 06/21/19 1455             Time Calculation- OT    Total Timed Code Minutes- OT  55 minute(s)  -        User Key  (r) = Recorded By, (t) = Taken By, (c) = Cosigned By    Initials Name Provider Type     Justin Gomez OTR Occupational Therapist          Therapy Charges for Today     Code Description Service Date Service Provider Modifiers Qty    96874997789 HC OT THERAPEUTIC ACT EA 15 MIN 6/20/2019 Justin Gomez OTR GO 4    70624707362 HC OT THERAPEUTIC ACT EA 15 MIN 6/21/2019 Justin Gomez OTR GO 3    [id number]  OT SELF CARE/MGMT/TRAIN EA 15 MIN 6/21/2019 Justin Gomez OTR GO 1               OT Discharge Summary  Reason for Discharge: Discharge from facility  Outcomes Achieved: Refer to plan of care for updates on goals achieved  Discharge Destination: Home with assist, Home with home health    TABATHA Granger  6/21/2019

## 2019-06-21 NOTE — THERAPY DISCHARGE NOTE
Acute Care - Physical Therapy Treatment Note/Discharge  [facility] [place]     Patient Name: Abbi Mendez  : 1935  MRN: [id number]  Today's Date: 2019  Onset of Illness/Injury or Date of Surgery: 19  Date of Referral to PT: 19  Referring Physician: Dr. Parsons    Admit Date: 2019    Visit Dx:    ICD-10-CM ICD-9-CM   1. Closed fracture of left hip with routine healing, subsequent encounter S72.002D V54.13   2. Paroxysmal atrial fibrillation (CMS/HCC) I48.0 427.31     Patient Active Problem List   Diagnosis   • Atrial fibrillation (CMS/HCC)   • Coronary artery disease involving native coronary artery of native heart without angina pectoris   • RADHA (obstructive sleep apnea)   • Essential hypertension   • Hyperlipidemia LDL goal <70   • Iron deficiency anemia   • Left bundle branch block   • Syncope and collapse   • Severe sinus bradycardia   • Third degree AV block (CMS/HCC)   • Syncope   • Compression deformity of vertebra   • Radiculopathy   • Pericardial effusion without cardiac tamponade   • Hypercalcemia   • Abnormal serum protein electrophoresis   • Weight loss, unintentional   • Primary hyperparathyroidism (CMS/HCC)   • Hypercalcemia   • Pacemaker   • Hiatal hernia   • Elevated serum immunoglobulin free light chains   • Left displaced femoral neck fracture (CMS/HCC)   • Hip fracture (CMS/HCC)       Physical Therapy Education     Title: PT OT SLP Therapies (Resolved)     Topic: Physical Therapy (Resolved)     Point: Mobility training (Resolved)     Learning Progress Summary           Patient Acceptance, E,D,H, VU by LL at 2019  3:22 PM    Acceptance, E,D, VU,NR by LL at 2019  5:52 PM    Acceptance, E,TB, VU by AD at 2019  5:04 PM    Acceptance, E,D, VU,NR by LL at 2019  5:43 PM    Acceptance, E,TB, VU by KM at 2019  1:41 PM    Acceptance, E,TB, VU by KM at 2019  4:31 PM    Acceptance, E,D, VU,NR by LL at 2019  3:36 PM    Acceptance, E,TB, VU by DM at  6/16/2019  2:00 PM    Acceptance, E, VU by BC at 6/15/2019  2:53 PM    Acceptance, E,TB, VU by DM at 6/15/2019  2:48 PM    Acceptance, E,D, VU,NR by LL at 6/14/2019  4:37 PM    Acceptance, E,D, VU,NR by AG at 6/13/2019  5:48 PM   Family Acceptance, E,D, VU,NR by AG at 6/13/2019  5:48 PM                   Point: Home exercise program (Resolved)     Learning Progress Summary           Patient Acceptance, E,D,H, VU by LL at 6/21/2019  3:22 PM    Acceptance, E,D, VU,NR by LL at 6/20/2019  5:52 PM    Acceptance, E,TB, VU by AD at 6/20/2019  5:04 PM    Acceptance, E,D, VU,NR by LL at 6/19/2019  5:43 PM    Acceptance, E,TB, VU by KM at 6/18/2019  1:41 PM    Acceptance, E,TB, VU by KM at 6/17/2019  4:31 PM    Acceptance, E,D, VU,NR by LL at 6/17/2019  3:36 PM    Acceptance, E,TB, VU by DM at 6/16/2019  2:00 PM    Acceptance, E, VU by BC at 6/15/2019  2:53 PM    Acceptance, E,TB, VU by DM at 6/15/2019  2:48 PM    Acceptance, E,D, VU,NR by LL at 6/14/2019  4:37 PM    Acceptance, E,D, VU,NR by AG at 6/13/2019  5:48 PM   Family Acceptance, E,D, VU,NR by AG at 6/13/2019  5:48 PM                   Point: Body mechanics (Resolved)     Learning Progress Summary           Patient Acceptance, E,D,H, VU by LL at 6/21/2019  3:22 PM    Acceptance, E,D, VU,NR by LL at 6/20/2019  5:52 PM    Acceptance, E,TB, VU by AD at 6/20/2019  5:04 PM    Acceptance, E,D, VU,NR by LL at 6/19/2019  5:43 PM    Acceptance, E,TB, VU by KM at 6/18/2019  1:41 PM    Acceptance, E,TB, VU by KM at 6/17/2019  4:31 PM    Acceptance, E,D, VU,NR by LL at 6/17/2019  3:36 PM    Acceptance, E,TB, VU by DM at 6/16/2019  2:00 PM    Acceptance, E, VU by BC at 6/15/2019  2:53 PM    Acceptance, E,TB, VU by DM at 6/15/2019  2:48 PM    Acceptance, E,D, VU,NR by LL at 6/14/2019  4:37 PM    Acceptance, E,D, VU,NR by AG at 6/13/2019  5:48 PM   Family Acceptance, E,D, VU,NR by AG at 6/13/2019  5:48 PM                   Point: Precautions (Resolved)     Learning Progress Summary            Patient Acceptance, E,D,H, VU by LL at 6/21/2019  3:22 PM    Acceptance, E,D, VU,NR by LL at 6/20/2019  5:52 PM    Acceptance, E,TB, VU by AD at 6/20/2019  5:04 PM    Acceptance, E,D, VU,NR by LL at 6/19/2019  5:43 PM    Acceptance, E,TB, VU by KM at 6/18/2019  1:41 PM    Acceptance, E,TB, VU by KM at 6/17/2019  4:31 PM    Acceptance, E,D, VU,NR by LL at 6/17/2019  3:36 PM    Acceptance, E,TB, VU by DM at 6/16/2019  2:00 PM    Acceptance, E, VU by BC at 6/15/2019  2:53 PM    Acceptance, E,TB, VU by DM at 6/15/2019  2:48 PM    Acceptance, E,D, VU,NR by LL at 6/14/2019  4:37 PM    Acceptance, E,D, VU,NR by AG at 6/13/2019  5:48 PM   Family Acceptance, E,D, VU,NR by AG at 6/13/2019  5:48 PM                               User Key     Initials Effective Dates Name Provider Type Discipline    AD 06/16/16 -  Saumya Ochoa, RN Registered Nurse Nurse    DM 06/16/16 -  Migdalia Haney, RN Registered Nurse Nurse    BC 03/14/16 -  Fatou Doll, PT Physical Therapist PT    AG 04/03/18 -  Sandy Covington, PT Physical Therapist PT    LL 05/02/16 -  Loyda Vega PTA Physical Therapy Assistant PT     11/19/18 -  Lucille Torres RN Registered Nurse Nurse              Rehab Goal Summary     Row Name 06/21/19 1502             Physical Therapy Goals    Bed Mobility Goal Selection (PT)  bed mobility, PT goal 1  -LL      Transfer Goal Selection (PT)  transfer, PT goal 1  -LL      Gait Training Goal Selection (PT)  gait training, PT goal 1  -LL         Bed Mobility Goal 1 (PT)    Progress/Outcomes (Bed Mobility Goal 1, PT)  goal not met  -LL         Transfer Goal 1 (PT)    Progress/Outcome (Transfer Goal 1, PT)  goal not met  -LL         Gait Training Goal 1 (PT)    Progress/Outcome (Gait Training Goal 1, PT)  goal met  -LL         Occupational Therapy Goals    Dressing Goal Selection (OT)  dressing, OT goal 1  -JW      ROM Goal Selection (OT)  ROM, OT goal 1  -JW      Strength Goal Selection (OT)  strength, OT goal  1  -JW         Dressing Goal 1 (OT)    Progress/Outcome (Dressing Goal 1, OT)  goal not met  -JW         ROM Goal 1 (OT)    Progress/Outcome (ROM Goal 1, OT)  goal met  -JW         Strength Goal 1 (OT)    Progress/Outcome (Strength Goal 1, OT)  goal met  -JW        User Key  (r) = Recorded By, (t) = Taken By, (c) = Cosigned By    Initials Name Provider Type Discipline    LL Loyda Vega, PTA Physical Therapy Assistant PT    JW Justin Gomez, OTR Occupational Therapist OT        Therapy Treatment  Rehabilitation Treatment Summary     Row Name 06/21/19 1517 06/21/19 1454          Treatment Time/Intention    Discipline  physical therapy assistant  -LL  occupational therapist  -JW     Document Type  discharge treatment  -  discharge treatment  -     Subjective Information  no complaints  -LL  no complaints  -JW     Mode of Treatment  physical therapy;individual therapy  -LL  --     Patient/Family Observations  Patient discharged from swing bed to home today with assistance of family and with home health.  Education completed with patient regarding home safety, patient & caregiver safety, HEP, total hip precautions and adjustment of RW.  Written materials issued & no questions/concerns voiced.  -LL  Pt. discharged from swing bed this date to home w/ family assisting. OT recommends BSC and shower seat for safety w/ ADL's and fxl transfers.  referral made for continued skilled OT services.   -JW     Patient Effort  good  -LL  good  -JW     Existing Precautions/Restrictions  --  fall;hip;left  -JW     Patient Response to Treatment  Patient tolerated 55 min of PT in rehab gym with adequate rest breaks.  -LL  --     Recorded by [LL] Loyda Vega PTA 06/21/19 1522 [JW] Justin Gomez OTLUZ 06/21/19 1500     Row Name 06/21/19 1517 06/21/19 1454          Cognitive Assessment/Intervention- PT/OT    Affect/Mental Status (Cognitive)  WFL  -LL  WFL  -JW     Orientation Status (Cognition)  oriented x 3  -LL  --     Follows  Commands (Cognition)  follows one step commands;verbal cues/prompting required  -LL  physical/tactile prompts required;verbal cues/prompting required  -JW     Personal Safety Interventions  fall prevention program maintained;gait belt;nonskid shoes/slippers when out of bed;supervised activity  -LL  fall prevention program maintained;gait belt;nonskid shoes/slippers when out of bed;supervised activity  -JW     Recorded by [LL] Loyda Vega, ALFREDO 06/21/19 1522 [JW] Justin Gomez OTLUZ 06/21/19 1500     Row Name 06/21/19 1517             Mobility Assessment/Intervention    Extremity Weight-bearing Status  left lower extremity;right lower extremity  -LL      Left Lower Extremity (Weight-bearing Status)  weight-bearing as tolerated (WBAT)  -LL      Right Lower Extremity (Weight-bearing Status)  weight-bearing as tolerated (WBAT)  -LL      Recorded by [LL] Loyda Vega, ALFREDO 06/21/19 1522      Row Name 06/21/19 1517             Bed Mobility Assessment/Treatment    Bed Mobility Assessment/Treatment  bed mobility (all) activities  -LL      Supine-Sit [place] (Bed Mobility)  contact guard  -LL      Sit-Supine [place] (Bed Mobility)  contact guard  -LL      Assistive Device (Bed Mobility)  bed rails  -LL      Recorded by [LL] Loyda Vega, \[facility]\"" 06/21/19 1522      Row Name 06/21/19 1517             Transfer Assessment/Treatment    Transfer Assessment/Treatment  sit-stand transfer;stand-sit transfer  -LL      Recorded by [LL] Loyda Vega, ALFREDO 06/21/19 1522      Row Name 06/21/19 1517             Sit-Stand Transfer    Sit-Stand [place] (Transfers)  contact guard  -LL      Assistive Device (Sit-Stand Transfers)  walker, front-wheeled  -LL      Recorded by [LL] Loyda Vega PTA 06/21/19 1522      Row Name 06/21/19 1517             Stand-Sit Transfer    Stand-Sit [place] (Transfers)  contact guard  -LL      Assistive Device (Stand-Sit Transfers)  walker, front-wheeled  -LL      Recorded by [LL]  Loyda Vega, [facility] 06/21/19 1522      Row Name 06/21/19 1454             Toilet Transfer    [place] Level (Toilet Transfer)  contact guard;verbal cues  -JW      Assistive Device (Toilet Transfer)  wheelchair;grab bars/safety frame;raised toilet seat  -JW      Recorded by [JW] Justin Gomez, OTR 06/21/19 1500      Row Name 06/21/19 1517             Gait/Stairs Assessment/Training    Gait/Stairs Assessment/Training  gait/ambulation independence  -LL      [place] Level (Gait)  contact guard with cues to slow down  -LL      Assistive Device (Gait)  walker, front-wheeled  -LL      Distance in Feet (Gait)  320  -LL      Pattern (Gait)  step-through  -LL      Deviations/Abnormal Patterns (Gait)  antalgic;stride length decreased  -LL      Bilateral Gait Deviations  forward flexed posture;heel strike decreased;weight shift ability decreased  -LL      Recorded by [LL] GaryStephanieLoyda, [facility] 06/21/19 1522      Row Name 06/21/19 1454             Bathing Assessment/Intervention    Bathing [place] Level  moderate assist (50% patient effort);verbal cues  -JW      Recorded by [JW] Justin Gomez, OTR 06/21/19 1500      Row Name 06/21/19 1454             Upper Body Dressing Assessment/Training    Upper Body Dressing [place] Level  supervision;set up;verbal cues  -JW      Recorded by [JW] Justin Gomez, OTR 06/21/19 1500      Row Name 06/21/19 1454             Lower Body Dressing Assessment/Training    Lower Body Dressing [place] Level  maximum assist (25% patient effort);verbal cues;nonverbal cues (demo/gesture)  -JW      Recorded by [JW] Justin Gomez, OTR 06/21/19 1500      Row Name 06/21/19 1454             Grooming Assessment/Training    [place] Level (Grooming)  supervision;set up  -JW      Recorded by [JW] Justin Gomez, OTR 06/21/19 1500      Row Name 06/21/19 1454             Self-Feeding Assessment/Training    [place] Level (Feeding)  conditional independence  -JW      Recorded by [JW] Justin Gomez,  OTR 06/21/19 1500      Row Name 06/21/19 1454             Toileting Assessment/Training    [place] Level (Toileting)  moderate assist (50% patient effort);minimum assist (75% patient effort);verbal cues  -JW      Recorded by [JW] Justin Gomez, OTR 06/21/19 1500      Row Name 06/21/19 1454             Upper Extremity Seated Therapeutic Exercise    Exercise Type, Seated Upper Extremity (Therapeutic Exercise)  AROM (active range of motion) BUE CoordEx; G/FMC TherEx/Act; strengthening  -JW      Expected Outcomes, Seated Upper Extremity (Therapeutic Exercise)  improve functional tolerance, self-care activity;improve performance, BADLs;improve performance, transfer skills  -JW      Recorded by [JW] Justin Gomez, OTR 06/21/19 1500      Row Name 06/21/19 1517             Lower Extremity Seated Therapeutic Exercise    Performed, Seated Lower Extremity (Therapeutic Exercise)  hip flexion/extension;hip abduction/adduction;ankle dorsiflexion/plantarflexion;LAQ (long arc quad), knee extension Ball squeeze  -LL      Device, Seated Lower Extremity (Therapeutic Exercise)  elastic bands/tubing;small ball;free weights, cuff 2.5#  -LL      Exercise Type, Seated Lower Extremity (Therapeutic Exercise)  AROM (active range of motion)  -LL      Restrictions, Seated Lower Extremity (Therapeutic Exercise)  L JANET precautions  -LL      Sets/Reps Detail, Seated Lower Extremity (Therapeutic Exercise)  1 x 25  -LL      Transfers Skills, Training to Functional Activity, Seated Lower Extremity (Therapeutic Exercise)  able to transfer skills from training to functional activity  -LL      Recorded by [LL] Loyda Vega, PTA 06/21/19 1522      Row Name 06/21/19 1517             Standing Balance Activity    Activities Performed (Standing, Balance Training)  standing reaching outside base of support Balloon tap  -LL      Support Needed for Balance (Standing, Balance Training)  uses one upper extremity part time for support  -LL      Upper Extremity  Activity with Device (Standing, Balance Training)  -- Balloon  -LL      Progressive Balance Activity (Standing, Balance Training)  speed of movements increased during activity;combined head and eye movements during activity;upper extremity activities added during activity  -LL      Transfers Skills, Training to Functional Activity (Standing, Balance Training)  able to transfer skills from training to functional activity  -LL      Recorded by [LL] Loyda Vega PTA 06/21/19 1522      Row Name 06/21/19 1517 06/21/19 1454          Positioning and Restraints    Pre-Treatment Position  -- Transport chair with OT  -LL  --     Post Treatment Position  wheelchair  -LL  wheelchair  -JW     In Wheelchair  sitting;with other staff Volunteer to return patient to acute unit  -LL  sitting;with PT  -JW     Recorded by [LL] Loyda Vega PTA 06/21/19 1522 [JW] Justin Gomez, OTR 06/21/19 1500     Row Name                Wound 06/05/19 1838 Left hip incision    Wound - Properties Group Date first assessed: 06/05/19 [KH] Time first assessed: 1838 [KH] Side: Left [KH] Location: hip [KH] Type: incision [KH] Recorded by:  [KH] Ary Sylvester RN 06/05/19 1838      User Key  (r) = Recorded By, (t) = Taken By, (c) = Cosigned By    Initials Name Effective Dates Discipline     Ary Sylvester RN 06/16/16 -  Nurse    LL Loyda Vega PTA 05/02/16 -  PT    Justin Dia OTR 03/07/18 -  OT        Wound 06/05/19 1838 Left hip incision (Active)   Dressing Appearance dry;intact 6/20/2019  9:00 PM   Closure Sutures 6/20/2019  9:00 PM   Base dry 6/20/2019  9:00 PM   Periwound intact;dry 6/20/2019  9:00 PM   Periwound Temperature warm 6/20/2019  9:00 PM   Periwound Skin Turgor soft 6/20/2019  9:00 PM   Edges rolled/closed 6/20/2019  9:00 PM   Drainage Amount none 6/20/2019  9:00 PM       PT Recommendation and Plan  Anticipated Discharge Disposition (PT): home with assist  Therapy Frequency (PT Clinical Impression): other (see  comments)(6 days/ week )  Outcome Summary/Treatment Plan (PT)  Anticipated Equipment Needs at Discharge (PT): raised toilet seat, front wheeled walker  Anticipated Discharge Disposition (PT): home with assist  Plan of Care Reviewed With: patient  Progress: improving  Outcome Summary: Patient continues to make progress with therapy & is requiring less assistance with functional mobility.  Continue w/ current POC.    Outcome Measures     Row Name 06/21/19 1500 06/20/19 1700 06/19/19 1700       How much help from another person do you currently need...    Turning from your back to your side while in flat bed without using bedrails?  4  -LL  4  -LL  4  -LL    Moving from lying on back to sitting on the side of a flat bed without bedrails?  3  -LL  3  -LL  3  -LL    Moving to and from a bed to a chair (including a wheelchair)?  3  -LL  3  -LL  3  -LL    Standing up from a chair using your arms (e.g., wheelchair, bedside chair)?  3  -LL  3  -LL  3  -LL    Climbing 3-5 steps with a railing?  3  -LL  3  -LL  3  -LL    To walk in hospital room?  3  -LL  3  -LL  3  -LL    AM-PAC 6 Clicks Score  19  -LL  19  -LL  19  -LL       Functional Assessment    Outcome Measure Options  AM-PAC 6 Clicks Basic Mobility (PT)  -LL  AM-PAC 6 Clicks Basic Mobility (PT)  -LL  AM-PAC 6 Clicks Basic Mobility (PT)  -LL      User Key  (r) = Recorded By, (t) = Taken By, (c) = Cosigned By    Initials Name Provider Type    LL Loyda Vega PTA Physical Therapy Assistant           Time Calculation:   PT Charges     Row Name 06/21/19 1524             Time Calculation    Start Time  1340  -LL      Stop Time  1435  -LL      Time Calculation (min)  55 min  -LL      PT Received On  06/21/19  -LL         Time Calculation- PT    Total Timed Code Minutes- PT  55 minute(s)  -LL        User Key  (r) = Recorded By, (t) = Taken By, (c) = Cosigned By    Initials Name Provider Type    Loyda Kraus PTA Physical Therapy Assistant        Therapy Charges for  Today     Code Description Service Date Service Provider Modifiers Qty    07391288581 HC GAIT TRAINING EA 15 MIN 6/20/2019 Loyda Vega, PTA GP 1    62774905834 HC PT THER PROC EA 15 MIN 6/20/2019 Loyda Vega, PTA GP 3    37023203642 HC PT THER SUPP EA 15 MIN 6/20/2019 Gary Loyda, PTA GP 1    80215010816 HC GAIT TRAINING EA 15 MIN 6/21/2019 Loyda Vega, PTA GP 1    41738385066 HC PT THER PROC EA 15 MIN 6/21/2019 Gary Loyda, PTA GP 4    67242785292 HC PT THER SUPP EA 15 MIN 6/21/2019 Loyda Vega, PTA GP 1          PT G-Codes  Outcome Measure Options: AM-PAC 6 Clicks Basic Mobility (PT)  AM-PAC 6 Clicks Score: 19  Score: 16    PT Discharge Summary  Anticipated Discharge Disposition (PT): home with assist    Justina Vega PTA  6/21/2019

## 2019-06-21 NOTE — DISCHARGE INSTR - APPOINTMENTS
Follow-up at [facility] with Charlotte GAMEZ on 6/28/19 at 2:30  Office# [phone]    Follow-up with Dr Jain on 6/27/19 at 3:30  Office# [phone]

## 2019-06-21 NOTE — PLAN OF CARE
Problem: Patient Care Overview  Goal: Plan of Care Review  Outcome: Ongoing (interventions implemented as appropriate)   06/21/19 3650   Coping/Psychosocial   Plan of Care Reviewed With patient   Plan of Care Review   Progress improving

## 2019-06-21 NOTE — DISCHARGE PLACEMENT REQUEST
"Abbi Mendez (83 y.o. Female)     Date of Birth Social Security Number Address Home Phone MRN    1935  [facility] [address]  [place] [address] [phone] [id number]    Shinto Marital Status          Samaritan Single       Admission Date Admission Type Admitting Provider Attending Provider Department, Room/Bed    19 Urgent Jeffrey Parsons DO Hays, Ray G, MD [address], [address]/    Discharge Date Discharge Disposition Discharge Destination         Home-Health Care [facility]              Attending Provider:  Tristan Melara MD    Allergies:  Darvon [Propoxyphene], Penicillins    Isolation:  None   Infection:  None   Code Status:  CPR    Ht:  162.6 cm (64\")   Wt:  74.5 kg (164 lb 4.8 oz)    Admission Cmt:  None   Principal Problem:  None                Active Insurance as of 2019     Primary Coverage     Payor Plan Insurance Group Employer/Plan Group    HUMANA MEDICARE REPLACEMENT HUMANA MEDICARE REPL [id number]     Payor Plan Address Payor Plan Phone Number Payor Plan Fax Number Effective Dates    [address] [address] [phone]  2018 - None Entered    [facility] [address]       Subscriber Name Subscriber Birth Date Member ID       ABBI MENDEZ 1935 [id number]                 Emergency Contacts      (Rel.) Home Phone Work Phone Mobile Phone    Shruthi Gama (Daughter) -- -- [phone]    Suze Ortiz (Daughter) -- -- [phone]    BENJAASTRID SEGURA (Daughter) [phone] -- --          [address] [address]  Dept. Phone:  [phone]  Dept. Fax:   Date Ordered: 2019         Patient:  Abbi Mendez MRN:  [id number]   [address] [place] [address]  [place] [address] :  1935  SSN:    Phone: [phone] Sex:  F     Weight: 74.5 kg (164 lb 4.8 oz)         Ht Readings from Last 1 Encounters:   19 162.6 cm (64\")         Walker               (Order ID: [id number])    Diagnosis:  Closed fracture of left hip " "with routine healing, subsequent encounter (S72.002D [ICD-10-CM] V54.13 [ICD-9-CM])   Quantity:  1     Equipment:  Walker Folding with Wheels  Length of Need (99 Months = Lifetime): 99 Months = Lifetime        Authorizing Provider's Phone: [phone]   Verbal Order Mode: Telephone with readback   Authorizing Provider: Tristan Melara MD  Authorizing Provider's NPI: [id number]     Order Entered By: Ángela Murray RN 2019  1:32 PM     Electronically signed by:          [address] [address]  Dept. Phone:  [phone]  Dept. Fax:   Date Ordered: 2019         Patient:  Abbi Mendez MRN:  [id number]   [address] [place] [address]  [place] [address] :  1935  SSN:    Phone: [phone] Sex:  F     Weight: 74.5 kg (164 lb 4.8 oz)         Ht Readings from Last 1 Encounters:   19 162.6 cm (64\")         Wheelchair      (Order ID: [id number])    Diagnosis:  Closed fracture of left hip with routine healing, subsequent encounter (S72.002D [ICD-10-CM] V54.13 [ICD-9-CM])   Quantity:  1     Equipment:  Standard Wheelchair  Wheelchair accessories:  Manual W/C Seat Widths 24-27 inches  The face to face evaluation was performed on: 2019  Length of Need (99 Months = Lifetime): 3 Months        Authorizing Provider's Phone: [phone]   Authorizing Provider:Tristan Melara MD  Authorizing Provider's NPI: [id number]  Order Entered By: Tristan Melara MD 2019 11:13 AM     Electronically signed by: Tristan Melara MD 2019 11:13 AM             History & Physical      Jeffrey Parsons DO at 2019  4:40 PM              [facility] Medicine Services  History & Physical    Patient Identification:  Name:  Abbi Mendez  Age:  83 y.o.  Sex:  female  :  1935  MRN:  [id number]   Visit Number:  [id number]  Primary Care Physician:  Eric Driver MD    I have seen the patient in conjunction with Amy Jurado PA-C " and I agree with the following statements. I have made any necessary changes below to reflect my findings.     Subjective     Chief complaint: Left hip fracture    History of presenting illness:      Abbi Mendez is a 83 y.o. 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  She remained hospitalized receiving therapies until approved for swing bed.    On this date, she has been admitted to swing bed status.  She denies chest pain, dyspnea, palpitations.  She did feel slightly fatigued this morning when blood pressures were lower but this did resolve following a small fluid bolus.  She is eating and drinking well.  She reports she feels she is making progress since her surgical repair.    Issa: Pt seen and examined with FANNY Sutton. Awake and alert. States she felt fatigued this AM with hypotension likely contributing. She feels improved after fluid bolus and improvement in her hypotension. Pain is controlled. States she slept well last PM. Denies fever or chills. Denies nausea or vomiting. Denies CP, dyspnea or palpitations. Reports good UOP with dose of Lasix yesterday and states she feels less swollen.     ---------------------------------------------------------------------------------------------------------------------   Review of Systems   Constitutional: Negative for activity change, appetite change, chills and fever.   HENT: Negative for congestion and drooling.    Eyes: Negative for pain and discharge.   Respiratory: Negative for apnea, shortness of breath and wheezing.    Cardiovascular: Negative for chest pain and leg swelling.   Gastrointestinal:  Negative for diarrhea and nausea.   Endocrine: Negative for cold intolerance and heat intolerance.   Genitourinary: Negative for difficulty urinating and dysuria.   Musculoskeletal: Positive for arthralgias.   Skin: Negative for color change and pallor.   Allergic/Immunologic: Negative for environmental allergies and food allergies.   Neurological: Negative for dizziness and headaches.   Psychiatric/Behavioral: Negative for agitation and confusion.      ---------------------------------------------------------------------------------------------------------------------   Past Medical History:   Diagnosis Date   • Anxiety    • Arthritis    • Atrial fibrillation (CMS/HCC) 11/14/2016   • Gastric ulcer    • Hiatal hernia    • Hypertension    • Iron deficiency anemia    • RADHA (obstructive sleep apnea) 11/14/2016     Past Surgical History:   Procedure Laterality Date   • CARDIAC ELECTROPHYSIOLOGY PROCEDURE N/A 1/18/2018    Procedure: Pacemaker DC new;  Surgeon: Soraya Darling MD;  Location: [facility] INVASIVE LOCATION;  Service:    • HIP BIPOLAR REPLACEMENT Left 6/5/2019    Procedure: HIP BIPOLAR REPLACEMENT;  Surgeon: Gerardo Jain MD;  Location: [facility] OR;  Service: Orthopedics   • TOTAL KNEE ARTHROPLASTY Right    • TUBAL ABDOMINAL LIGATION       Family History   Problem Relation Age of Onset   • Heart attack Mother 69   • Heart disease Mother    • Heart disease Sister    • Heart attack Brother 60   • Heart attack Brother 45   • Lymphoma Brother    • Heart disease Daughter    • Diabetes Son    • Heart disease Daughter    • Heart disease Daughter    • Diabetes Daughter      Social History     Socioeconomic History   • Marital status: Single     Spouse name: Not on file   • Number of children: Not on file   • Years of education: Not on file   • Highest education level: Not on file   Tobacco Use   • Smoking status: Never Smoker   • Smokeless tobacco: Never Used   Substance and Sexual Activity   • Alcohol use: No   • Drug  use: No   • Sexual activity: Not Currently     Partners: Male   Social History Narrative         seven children      ---------------------------------------------------------------------------------------------------------------------   Allergies:  Darvon [propoxyphene] and Penicillins  ---------------------------------------------------------------------------------------------------------------------   Home medications:    Medications below are reported home medications pulling from within the system; at this time, these medications have not been reconciled unless otherwise specified and are in the verification process for further verifcation as current home medications.  Medications Prior to Admission   Medication Sig Dispense Refill Last Dose   • aspirin 81 MG EC tablet Take 81 mg by mouth Daily.   Unknown at Unknown time   • atorvastatin (LIPITOR) 40 MG tablet Take 40 mg by mouth Every Night.   Unknown at Unknown time   • busPIRone (BUSPAR) 10 MG tablet Take 10 mg by mouth 2 (Two) Times a Day.   Unknown at Unknown time   • carvedilol (COREG) 12.5 MG tablet Take 1 tablet by mouth 2 (Two) Times a Day With Meals. 180 tablet 3 Unknown at Unknown time   • esomeprazole (nexIUM) 40 MG capsule Take 40 mg by mouth Every Morning Before Breakfast.   Unknown at Unknown time   • losartan (COZAAR) 100 MG tablet Take 100 mg by mouth Daily.  1 Unknown at Unknown time       Hospital Scheduled Meds:      No current facility-administered medications for this encounter.     Current listed hospital scheduled medications may not yet reflect those currently placed in orders that are signed and held awaiting patient's arrival to floor.   ---------------------------------------------------------------------------------------------------------------------     Objective     Vital Signs:  Temp:  [98 °F (36.7 °C)-100.1 °F (37.8 °C)] 98 °F (36.7 °C)  Heart Rate:  [68-82] 76  Resp:  [18-20] 18  BP: ()/(40-70) 120/47       06/04/19 2104 06/05/19  0305   Weight: 76.2 kg (168 lb) 76.2 kg (168 lb 0.2 oz)     Body mass index is 27.13 kg/m².  ---------------------------------------------------------------------------------------------------------------------         Physical Exam:    Constitutional: Awake, alert, well-nourished, well-developed, nontoxic ( No acute distress)  HEENT: Normocephalic, atraumatic. PERRLA, EOMI, sclerae anicteric, conjunctivae without injection, mucous membranes moist, no oropharyngeal erythema appreciated.    Neck: Supple. No JVD appreciated.  Pulmonary: Clear to auscultation bilaterally, nonlabored respirations, no wheezing appreciated.   CV: Normal rate, regular rhythm. Normal s1/s2 with no murmur appreciated.   Abdominal: Soft, No distension or tenderness appreciated. Bowel sounds appreciated in all four quadrants, no guarding or rebound tenderness. No organomegaly.   Musculoskeletal: No erythema or swelling to joints of upper and lower extremities   Extremities: No clubbing, cyanosis, or edema (Mild edema in LLE but improved)  Vascular: 2+ DP/PT bilaterally, warm extremities  Skin: Skin is warm and dry. No truncal or extremity rash on limited exam  Neuro: Alert and oriented to person, place, and time. Strength symmetric in all extremities, Cranial Nerves grossly intact to confrontation, speech clear, sensation intact to fine touch throughout.  No slurred speech.  No facial droop.    Psych: Appropriate mood and affect.  Judgement and though content appropriate.       ---------------------------------------------------------------------------------------------------------------------  EKG:              ---------------------------------------------------------------------------------------------------------------------   Results from last 7 days   Lab Units 06/13/19  0515 06/12/19  0523 06/11/19  0456   WBC 10*3/mm3 10.39 11.29* 11.82*   HEMOGLOBIN g/dL 10.2* 10.3* 10.9*   HEMATOCRIT % 33.1* 33.8* 34.8   MCV  fL 96.2 97.1* 96.4   MCHC g/dL 30.8* 30.5* 31.3*   PLATELETS 10*3/mm3 414 411 423         Results from last 7 days   Lab Units 06/13/19  0515 06/12/19  0523 06/11/19  0456   SODIUM mmol/L 137 139 138   POTASSIUM mmol/L 3.7 3.8 3.9   MAGNESIUM mg/dL 1.9 1.6 2.0   CHLORIDE mmol/L 98 101 101   CO2 mmol/L 27.4 27.2 25.7   BUN mg/dL 23 18 19   CREATININE mg/dL 1.08* 0.88 0.95   EGFR IF NONAFRICN AM mL/min/1.73 48* 61 56*   CALCIUM mg/dL 8.5* 8.4* 8.4*   GLUCOSE mg/dL 103* 104* 105*   Estimated Creatinine Clearance: 41.2 mL/min (A) (by C-G formula based on SCr of 1.08 mg/dL (H)).  No results found for: AMMONIA          Lab Results   Component Value Date    HGBA1C 5.70 (H) 06/05/2019     No results found for: TSH, FREET4  No results found for: PREGTESTUR, PREGSERUM, HCG, HCGQUANT  Pain Management Panel     Pain Management Panel Latest Ref Rng & Units 6/5/2019    CREATININE UR mg/dL 195.3                        ---------------------------------------------------------------------------------------------------------------------  Imaging Results (last 7 days)     ** No results found for the last 168 hours. **               Last echocardiogram:  Results for orders placed during the hospital encounter of 01/25/19   Adult Transthoracic Echo Complete W/ Cont if Necessary Per Protocol    Narrative · Right ventricular cavity is borderline dilated.  · Left ventricular wall thickness is consistent with concentric   hypertrophy.  · Left ventricular diastolic dysfunction (grade I) consistent with   impaired relaxation.  · Left ventricular systolic function is normal. Estimated EF = 60%.  · Mild aortic valve stenosis is present. Aortic valve mean pressure   gradient is 10.9 mmHg.  · Mild aortic valve regurgitation is present.  · Mild-to-moderate mitral valve regurgitation is present  · Mild to moderate tricuspid valve regurgitation is present.  · Calculated right ventricular systolic pressure from tricuspid   regurgitation is 29  mmHg.  · No significant pericardial effusion is noted.              I have personally reviewed the radiology images and read the final radiology report.  ---------------------------------------------------------------------------------------------------------------------  Assessment / Plan     Active Hospital Problems    Diagnosis POA   • Left displaced femoral neck fracture (CMS/[facility]) [S72.002A] Yes       ASSESSMENT/PLAN:  · Acute traumatic left hip fracture s/p left bipolar replacement:  Continue PT/OT. Continue Eliquis daily.    · CKD 3: Continue close monitoring of creatinine.   · Essential Hypertension: Losartan 100mg discontinued prior to SB admission 2/2 lower blood pressures.  BP improved after small IV fluid bolus. Continue to monitor closely and hold further diuresis at present.   · History of SSS s/p PPM  · Paroxysmal atrial fibrillation: Continue Coreg. Currently on Eliquis s/p hip repair (this is not home anticoagulation per review of records).    · Hypokalemia, improved  · Hypomagnesemia, improved  · Leukocytosis, likely reactive to hip fracture and surgical repair, now resolved      ----------  -DVT prophylaxis: Eliquis to serve  -Activity: PT/OT  -Expected length of stay:     Amy Jurado PA-C  06/13/19  4:28 PM  Pager # [phone]  ---------------------------------------------------------------------------------------------------------------------     I have reviewed the notes, assessments, and/or procedures performed by Amy Jurado PA-C. I concur with her/his documentation of Abbi Mendez.    Jeffrey Parsons D.O.         Electronically signed by Jeffrey Parsons DO at 6/13/2019  7:10 PM       Operative/Procedure Notes (most recent note)     No notes of this type exist for this encounter.

## 2019-06-21 NOTE — DISCHARGE SUMMARY
[facility] HOSPITALISTS DISCHARGE SUMMARY    Patient Identification:  Name:  Abbi Mendez  Age:  83 y.o.  Sex:  female  :  1935  MRN:  [id number]  Visit Number:  [id number]    Date of Admission: 2019  Date of Discharge:  2019     PCP: Eric Driver MD    DISCHARGE DIAGNOSIS  Left hip fx--s/p orif  Paroxysmal atrial fibrillation  htn  ckd stage 3      CONSULTS     Dr Jain  PROCEDURES PERFORMED  ORIF left hip fx    HOSPITAL COURSE  Patient is a 83 y.o. female presented to [facility] complaining of .  Please see the admitting history and physical for further details.      Issa week ending   82 yo female admitted to swing bed on 19 for PT/OT following recent left hip fracture s/p left bipolar replacement. Clinically stable.   Edited by: Jeffrey Parsons DO at 2019 1823    Patient is an 83-year-old female with a past history for paroxysmal atrial fibrillation, bradycardia with history of pacemaker placement, hypertension, CKD stage III.  He was admitted on  after sustaining a left hip fracture.  Patient did receive ORIF during the admission patient did well afterwards but it was felt patient needed to be transferred to swing bed for continued physical therapy prior to discharge to home.  Patient has done well during the admission has hadMagnesium replaced during the admission but otherwise has been medically stable throughout the admission.  She has worked with physical therapy diligently and is done extremely well.  Patient states that this time she is feeling much better is ambulating better we will arrange for home health to see patient as she she would benefit from continued physical therapy.  We will also discharge patient home with a walker and wheelchair.  She should follow-up with her orthopedic surgeon the next 1 to 2 weeks.  Follow-up with her primary care provider next week as well.  Patient doing well and should make complete  recovery with continued physical therapy and home health.    VITAL SIGNS:  Temp:  [98.4 °F (36.9 °C)-98.9 °F (37.2 °C)] 98.4 °F (36.9 °C)  Heart Rate:  [64-74] 64  Resp:  [18] 18  BP: (123-131)/(53-64) 131/64  SpO2:  [95 %-96 %] 96 %  on   ;   Device (Oxygen Therapy): room air    Body mass index is 28.2 kg/m².  Wt Readings from Last 3 Encounters:   06/13/19 74.5 kg (164 lb 4.8 oz)   06/05/19 76.2 kg (168 lb 0.2 oz)   05/28/19 78 kg (172 lb)       PHYSICAL EXAM:  Constitutional: No acute distress  HEENT:normoCephalic atraumatic neck supple  Neck:     Cardiovascular: Regular rate and rhythm   pulmonary/Chest: Clear to auscultation  Abdominal:  .  Positive bowel sounds soft nontender palpation  Musculoskeletal: No arthropathy  Neurological: No focal deficits  Skin: No rashes  Peripheral vascular:  Genitourinary::    DISCHARGE DISPOSITION   Stable    DISCHARGE MEDICATIONS:     Discharge Medications      New Medications      Instructions Start Date   acetaminophen 325 MG tablet  Commonly known as:  TYLENOL   650 mg, Oral, Every 6 Hours PRN      apixaban 2.5 MG tablet tablet  Commonly known as:  ELIQUIS   2.5 mg, Oral, Every 12 Hours Scheduled      cholecalciferol 01382 units capsule  Commonly known as:  VITAMIN D3   50,000 Units, Oral, Weekly   Start Date:  6/27/2019        Continue These Medications      Instructions Start Date   aspirin 81 MG EC tablet   81 mg, Oral, Daily      atorvastatin 40 MG tablet  Commonly known as:  LIPITOR   40 mg, Oral, Nightly      busPIRone 10 MG tablet  Commonly known as:  BUSPAR   10 mg, Oral, 2 Times Daily      carvedilol 12.5 MG tablet  Commonly known as:  COREG   12.5 mg, Oral, 2 Times Daily With Meals      esomeprazole 40 MG capsule  Commonly known as:  nexIUM   40 mg, Oral, Every Morning Before Breakfast         Stop These Medications    losartan 100 MG tablet  Commonly known as:  COZAAR             Your medication list      START taking these medications      Instructions Last Dose  Given Next Dose Due   acetaminophen 325 MG tablet  Commonly known as:  TYLENOL      Take 2 tablets by mouth Every 6 (Six) Hours As Needed for Mild Pain .       apixaban 2.5 MG tablet tablet  Commonly known as:  ELIQUIS      Take 1 tablet by mouth Every 12 (Twelve) Hours.       cholecalciferol 11788 units capsule  Commonly known as:  VITAMIN D3  Start taking on:  6/27/2019      Take 1 capsule by mouth 1 (One) Time Per Week for 5 doses.          CONTINUE taking these medications      Instructions Last Dose Given Next Dose Due   aspirin 81 MG EC tablet      Take 81 mg by mouth Daily.       atorvastatin 40 MG tablet  Commonly known as:  LIPITOR      Take 40 mg by mouth Every Night.       busPIRone 10 MG tablet  Commonly known as:  BUSPAR      Take 10 mg by mouth 2 (Two) Times a Day.       carvedilol 12.5 MG tablet  Commonly known as:  COREG      Take 1 tablet by mouth 2 (Two) Times a Day With Meals.       esomeprazole 40 MG capsule  Commonly known as:  nexIUM      Take 40 mg by mouth Every Morning Before Breakfast.          STOP taking these medications    losartan 100 MG tablet  Commonly known as:  COZAAR              Where to Get Your Medications      These medications were sent to [facility]  [address], [place] [address]    Hours:  8AM-6PM Mon-Fri Phone:  [phone]   · cholecalciferol 71496 units capsule     You can get these medications from any pharmacy    Bring a paper prescription for each of these medications  · acetaminophen 325 MG tablet  · apixaban 2.5 MG tablet tablet           Future Appointments   Date Time Provider Department Center   6/25/2019 11:00 AM Kim Jorge PA [facility] ORS [facility] None   8/2/2019 10:45 AM Kyler Jamil MD [facility]   8/20/2019 11:00 AM Soraya Darling MD [facility] None     Additional Instructions for the Follow-ups that You Need to Schedule     Discharge Follow-up with PCP   As directed       Currently Documented PCP:    Eric Driver MD    PCP  Phone Number:    [phone]     Follow Up Details:  one week         Discharge Follow-up with Specified Provider: Dr Jain; 1 Week   As directed      To:  Dr Jain    Follow Up:  1 Week    Follow Up Details:  followup of hip fx--s/p orif         Referral to Home Health   As directed      Face to Face Visit Date:  6/21/2019    Follow-up Provider for Plan of Care?:  I treated the patient in an acute care facility and will not continue treatment after discharge.    Follow-up Provider:  ÁLVARO DRIVER [[id number]]    Reason/Clinical Findings:  s/p hip fx    Describe mobility limitations that make leaving home difficult:  recent hip fx    Nursing/Therapeutic Services Requested:  Physical Therapy    PT orders:  Strengthening    Frequency:  1 Week 1           Follow-up Information     Álvaro Driver MD .    Specialty:  Gastroenterology  Why:  one week  Contact information:  [address] [place] [address] [address]  [facility] [address] [phone]             Soraya Darling MD .    Specialties:  Cardiology, Interventional Cardiology  Why:  one week  Contact information:  [address] [place] [address] [address] [place] [address] [phone]                    TEST  RESULTS PENDING AT DISCHARGE       CODE STATUS  Code Status and Medical Interventions:   Ordered at: 06/13/19 1508     Code Status:    CPR     Medical Interventions (Level of Support Prior to Arrest):    Full       Tristan Melara MD  [facility]  06/21/19  11:13 AM

## 2019-06-22 ENCOUNTER — READMISSION MANAGEMENT (OUTPATIENT)
Dept: CALL CENTER | Facility: HOSPITAL | Age: 84
End: 2019-06-22

## 2019-06-22 NOTE — OUTREACH NOTE
Prep Survey      Responses   Facility patient discharged from?  [facility]   Is patient eligible?  Yes   Discharge diagnosis  Left hip fx--s/p orif   Does the patient have one of the following disease processes/diagnoses(primary or secondary)?  Total Joint Replacement   Does the patient have Home health ordered?  Yes   What is the Home health agency?   [facility] [facility]    Is there a DME ordered?  Yes   What DME was ordered?  Walker/W/C per [facility]    Medication alerts for this patient  Eliquis    Prep survey completed?  Yes          Lily Padilla RN

## 2019-06-24 ENCOUNTER — READMISSION MANAGEMENT (OUTPATIENT)
Dept: CALL CENTER | Facility: HOSPITAL | Age: 84
End: 2019-06-24

## 2019-06-24 NOTE — OUTREACH NOTE
Total Joint Week 1 Survey      Responses   Facility patient discharged from?  [facility]   Does the patient have one of the following disease processes/diagnoses(primary or secondary)?  Total Joint Replacement   Is there a successful TCM telephone encounter documented?  No   Joint surgery performed?  Hip   Week 1 attempt successful?  No   Unsuccessful attempts  Attempt 1          Boom Carney RN

## 2019-06-25 ENCOUNTER — OFFICE VISIT (OUTPATIENT)
Dept: ORTHOPEDIC SURGERY | Facility: CLINIC | Age: 84
End: 2019-06-25

## 2019-06-25 ENCOUNTER — READMISSION MANAGEMENT (OUTPATIENT)
Dept: CALL CENTER | Facility: HOSPITAL | Age: 84
End: 2019-06-25

## 2019-06-25 VITALS — WEIGHT: 164 LBS | HEIGHT: 64 IN | BODY MASS INDEX: 28 KG/M2

## 2019-06-25 DIAGNOSIS — S63.501D SPRAIN OF RIGHT WRIST, SUBSEQUENT ENCOUNTER: Primary | ICD-10-CM

## 2019-06-25 PROCEDURE — 99212 OFFICE O/P EST SF 10 MIN: CPT | Performed by: PHYSICIAN ASSISTANT

## 2019-06-25 NOTE — OUTREACH NOTE
Total Joint Week 1 Survey      Responses   Facility patient discharged from?  [facility]   Does the patient have one of the following disease processes/diagnoses(primary or secondary)?  Total Joint Replacement   Is there a successful TCM telephone encounter documented?  No   Joint surgery performed?  Hip   Week 1 attempt successful?  No   Unsuccessful attempts  Attempt 2          Shruthi Trejo RN

## 2019-06-25 NOTE — PROGRESS NOTES
"Abbi Mendez   :1935    Date of encounter:2019    Chief Complaint   Patient presents with   • Right Hand - Follow-up, Pain       HPI:  Abbi Mendez is a 83 y.o. female who returns today for follow-up of right wrist sprain that occurred little over a month ago.  She is been doing well in regards to her wrist and has improving pain.  She states it is a little stiff first thing in the morning but overall she is doing well without complaints in regards to her wrist.    PMH:   Patient Active Problem List   Diagnosis   • Atrial fibrillation (CMS/HCC)   • Coronary artery disease involving native coronary artery of native heart without angina pectoris   • RADHA (obstructive sleep apnea)   • Essential hypertension   • Hyperlipidemia LDL goal <70   • Iron deficiency anemia   • Left bundle branch block   • Syncope and collapse   • Severe sinus bradycardia   • Third degree AV block (CMS/HCC)   • Syncope   • Compression deformity of vertebra   • Radiculopathy   • Pericardial effusion without cardiac tamponade   • Hypercalcemia   • Abnormal serum protein electrophoresis   • Weight loss, unintentional   • Primary hyperparathyroidism (CMS/HCC)   • Hypercalcemia   • Pacemaker   • Hiatal hernia   • Elevated serum immunoglobulin free light chains   • Left displaced femoral neck fracture (CMS/HCC)   • Hip fracture (CMS/HCC)       Exam:  General Appearance:    83 y.o. female  cooperative, in no acute distress.  Alert and oriented x 3,                   Vitals:    19 1053   Weight: 74.4 kg (164 lb)   Height: 162.6 cm (64\")          Body mass index is 28.15 kg/m².    On examination she has no significant swelling or ecchymosis.  She has limited motion secondary to her arthritis.  There is no gross instability.  Her neurovascular status is intact.      Assessment    ICD-10-CM ICD-9-CM   1. Sprain of right wrist, subsequent encounter S63.501D V58.89     842.00       Plan:  83-year-old female sustained a right wrist " sprain approximately 5 weeks ago.  Clinically she is doing well with improving pain.  I advised to continue working on her motion daily at home.  She will otherwise return back here on an as-needed basis with any further difficulties.    Kim Jorge PA-C

## 2019-06-28 ENCOUNTER — READMISSION MANAGEMENT (OUTPATIENT)
Dept: CALL CENTER | Facility: HOSPITAL | Age: 84
End: 2019-06-28

## 2019-06-28 NOTE — OUTREACH NOTE
Total Joint Week 2 Survey      Responses   Facility patient discharged from?  [facility]   Does the patient have one of the following disease processes/diagnoses(primary or secondary)?  Total Joint Replacement   Joint surgery performed?  Hip   Week 2 attempt successful?  No   Unsuccessful attempts  Attempt 1          Blue Portillo RN

## 2019-07-01 ENCOUNTER — READMISSION MANAGEMENT (OUTPATIENT)
Dept: CALL CENTER | Facility: HOSPITAL | Age: 84
End: 2019-07-01

## 2019-07-01 NOTE — OUTREACH NOTE
Total Joint Week 2 Survey      Responses   Facility patient discharged from?  [facility]   Does the patient have one of the following disease processes/diagnoses(primary or secondary)?  Total Joint Replacement   Joint surgery performed?  Hip   Week 2 attempt successful?  Yes   Call start time  1039   Call end time  1043   Has the patient been back in either the hospital or Emergency Department since discharge?  No   Discharge diagnosis  Left hip fx--s/p orif   Is patient permission given to speak with other caregiver?  Yes   List who call center can speak with  DaughterNavin Hawkins   Person spoke with today (if not patient) and relationship  DaughterNavin Hawkins   Is the patient taking all medications as directed (includes completed medication regime)?  Yes   Is the patient able to teach back alternate methods of pain control?  Ice, Reposition, Correct alignment, Short, frequent activity   Has the patient kept scheduled appointments due by today?  Yes   What is the Home health agency?   [facility]    Has home health visited the patient within 72 hours of discharge?  Yes   Has the patient began therapy sessions (either in the home or as an out patient)?  Yes   Has the patient fallen since discharge?  No   What is the patient's perception of their functional status since discharge?  Improving   Is the patient/caregiver able to teach back the hierarchy of who to call/visit for symptoms/problems? PCP, Specialist, Home health nurse, Urgent Care, ED, 911  Yes   Additional teach back comments  per daughter, pt is doing well, no questions or concerns at this time.   Week 2 call completed?  Yes   Wrap up additional comments  Daughter has limited information.          Adrianne Colmenares RN

## 2019-07-16 ENCOUNTER — READMISSION MANAGEMENT (OUTPATIENT)
Dept: CALL CENTER | Facility: HOSPITAL | Age: 84
End: 2019-07-16

## 2019-07-16 NOTE — OUTREACH NOTE
Total Joint Month 1 Survey      Responses   Facility patient discharged from?  [facility]   Does the patient have one of the following disease processes/diagnoses(primary or secondary)?  Total Joint Replacement   Joint surgery performed?  Hip   Month 1 attempt successful?  Yes   Call start time  1119   Call end time  1124   Has the patient been back in either the hospital or Emergency Department since discharge?  Yes   If the patient has been back to hospital or Emergency Department list date and reason  6/13/19 Pt returned to ER and was admitted to [facility] to aide with further therapy and recovery.    Discharge diagnosis  Left hip fx--s/p orif   Is patient permission given to speak with other caregiver?  Yes   List who call center can speak with  Daughter- Shruthi   Person spoke with today (if not patient) and relationship  Daughter- Shruthi   Medication alerts for this patient  Eliquis    Is the patient taking all medications as directed (includes completed medication regime)?  Yes   Is the patient able to teach back alternate methods of pain control?  Ice, Reposition, Correct alignment, Short, frequent activity   Has the patient kept scheduled appointments due by today?  Yes   Is the patient still receiving Home Health Services?  No   Is the patient still attending therapy sessions(either in the home or as an outpatient)?  No   Has the patient fallen since discharge?  No   Comments  Pt's daughter reports HH discharged her. Advised to contact  and Dr. Jain's office to discuss further therapy.    What is the patient's perception of their functional status since discharge?  Improving   Is the patient able to teach back signs and symptoms of infection?  Severe discomfort or pain, Changes in mobility, Shortness of breath or chest pain   Is the patient/caregiver able to teach back the hierarchy of who to call/visit for symptoms/problems? PCP, Specialist, Home health nurse, Urgent Care, ED, 911  Yes   Month 1  call completed?  Yes          Fidel Ch RN

## 2019-07-25 ENCOUNTER — CLINICAL SUPPORT NO REQUIREMENTS (OUTPATIENT)
Dept: CARDIOLOGY | Facility: CLINIC | Age: 84
End: 2019-07-25

## 2019-07-25 DIAGNOSIS — I44.2 THIRD DEGREE AV BLOCK (HCC): Primary | ICD-10-CM

## 2019-07-25 DIAGNOSIS — R00.1 SEVERE SINUS BRADYCARDIA: ICD-10-CM

## 2019-07-25 PROCEDURE — 93294 REM INTERROG EVL PM/LDLS PM: CPT | Performed by: PHYSICIAN ASSISTANT

## 2019-07-25 PROCEDURE — 93296 REM INTERROG EVL PM/IDS: CPT | Performed by: PHYSICIAN ASSISTANT

## 2019-08-02 ENCOUNTER — OFFICE VISIT (OUTPATIENT)
Dept: ONCOLOGY | Facility: CLINIC | Age: 84
End: 2019-08-02

## 2019-08-02 VITALS
SYSTOLIC BLOOD PRESSURE: 187 MMHG | TEMPERATURE: 97.6 F | DIASTOLIC BLOOD PRESSURE: 83 MMHG | RESPIRATION RATE: 20 BRPM | BODY MASS INDEX: 28.15 KG/M2 | HEIGHT: 64 IN | HEART RATE: 71 BPM

## 2019-08-02 DIAGNOSIS — E83.52 HYPERCALCEMIA: Primary | Chronic | ICD-10-CM

## 2019-08-02 PROCEDURE — 99213 OFFICE O/P EST LOW 20 MIN: CPT | Performed by: INTERNAL MEDICINE

## 2019-08-02 NOTE — PROGRESS NOTES
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  Apparently has monoclonal gammopathy but the results and the data from outside [facility] are not available to me at time of dictation.  She does have hypercalcemia with elevated ionized calcium but not a significant elevation of her total calcium.  No renal dysfunction of significance.  Has a history of iron deficiency but presently counts are acceptable.  Her total protein and ratio of total protein to albumin are normal.She has a history of coronary disease with permanent pacing and paroxysmal atrial fibrillation.  History of syncope and T12 compression fracture apparently.  -4/20/18 Ionized calcium 1.62 with normal total calcium of 10.4 and GFR of 60 with creatinine 0.9 and normal CBC.  Bone marrow was 40% cellular with normal hematopoiesis and no increase in plasma cells.  There was no clonal B or T-cell population on flow cytometry and no increase in plasma cells on flow cytometry.  Normal cytogenetics.  No tumor or granuloma.  Stainable iron present.  Parathyroid related protein was less than 2.  Ionized calcium was up to 1.68 with calcium of 11.6 and a normal albumin.  Repeat serum immunoelectrophoresis showed 200 mg/dL monoclonal protein.  Bone survey done in [place] during her initial admission there in April 18 showed no lytic bone disease according to the hospital record.  -5/21/18: CT chest abdomen and pelvis negative.  -6/21/18:  24-hour urine showed no monoclonal protein in June 2018.  Calcium was 11.8 with a creatinine of 1.  Intact parathyroid hormone level was normal at 51 at that time with a stable ionized calcium 1.63.  There  was a balanced increase in kappa and lambda light chains of 35 and 40 respectively.  -June 2019 had open reduction internal fixation by Dr. Jain of left hip fracture from accidental fall.  Had paroxysmal atrial fibrillation with stage III kidney disease and hypertension          Hypercalcemia    4/30/2018 Initial Diagnosis     Monoclonal gammopathy            HISTORY OF PRESENT ILLNESS:  The patient is a 83 y.o. 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  Since last we saw her she had a hip fracture from accidental fall treated in [place] with normal total calcium.      Past Medical History:   Diagnosis Date   • Anxiety    • Arthritis    • Atrial fibrillation (CMS/HCC) 11/14/2016   • Broken hip (CMS/HCC)    • Gastric ulcer    • Hiatal hernia    • Hypertension    • Iron deficiency anemia    • RADHA (obstructive sleep apnea) 11/14/2016     Past Surgical History:   Procedure Laterality Date   • CARDIAC ELECTROPHYSIOLOGY PROCEDURE N/A 1/18/2018    Procedure: Pacemaker DC new;  Surgeon: Soraya Darling MD;  Location:  [facility];  Service:    • HIP BIPOLAR REPLACEMENT Left 6/5/2019    Procedure: HIP BIPOLAR REPLACEMENT;  Surgeon: Gerardo Jain MD;  Location: [facility];  Service: Orthopedics   • TOTAL KNEE ARTHROPLASTY Right    • TUBAL ABDOMINAL LIGATION         Allergies   Allergen Reactions   • Darvon [Propoxyphene] Paresthesia   • Penicillins Rash       Family History and Social History reviewed and changed as necessary      REVIEW OF SYSTEM:   Review of Systems   Constitutional: Negative for appetite change, chills, diaphoresis, fatigue, fever and unexpected weight change.   HENT:   Negative for mouth sores, sore throat and trouble swallowing.    Eyes: Negative for icterus.   Respiratory: Negative for cough, hemoptysis and shortness of breath.    Cardiovascular: Negative for chest pain, leg swelling and palpitations.  "  Gastrointestinal: Negative for abdominal distention, abdominal pain, blood in stool, constipation, diarrhea, nausea and vomiting.   Endocrine: Negative for hot flashes.   Genitourinary: Negative for bladder incontinence, difficulty urinating, dysuria, frequency and hematuria.    Musculoskeletal: Negative for gait problem, neck pain and neck stiffness.   Skin: Negative for rash.   Neurological: Negative for dizziness, gait problem, headaches, light-headedness and numbness.   Hematological: Negative for adenopathy. Does not bruise/bleed easily.   Psychiatric/Behavioral: Negative for depression. The patient is not nervous/anxious.    All other systems reviewed and are negative.       PHYSICAL EXAM    Vitals:    08/02/19 1039   BP: (!) 187/83   Pulse: 71   Resp: 20   Temp: 97.6 °F (36.4 °C)   TempSrc: Temporal   Height: 162.6 cm (64\")     Constitutional: Appears well-developed and well-nourished. No distress.   ECOG: (1) Restricted in physically strenuous activity, ambulatory and able to do work of light nature  HENT:   Head: Normocephalic.   Mouth/Throat: Oropharynx is clear and moist.   Eyes: Conjunctivae are normal. Pupils are equal, round, and reactive to light. No scleral icterus.   Neck: Neck supple. No JVD present. No thyromegaly present.   Cardiovascular: Normal rate, regular rhythm and normal heart sounds.    Pulmonary/Chest: Breath sounds normal. No respiratory distress.   Abdominal: Soft. Exhibits no distension and no mass. There is no hepatosplenomegaly. There is no tenderness. There is no rebound and no guarding.   Musculoskeletal:Exhibits no edema, tenderness or deformity.   Neurological: Alert and oriented to person, place, and time. Exhibits normal muscle tone.   Skin: No ecchymosis, no petechiae and no rash noted. Not diaphoretic. No cyanosis. Nails show no clubbing.   Psychiatric: Normal mood and affect.   Vitals reviewed.      Lab Results   Component Value Date    HGB 10.3 (L) 06/19/2019    HCT 34.1 " 06/19/2019    MCV 98.0 (H) 06/19/2019     06/19/2019    WBC 9.17 06/19/2019    NEUTROABS 6.67 06/18/2019    LYMPHSABS 2.01 06/18/2019    MONOSABS 0.86 06/18/2019    EOSABS 0.38 06/18/2019    BASOSABS 0.04 06/18/2019       Lab Results   Component Value Date    GLUCOSE 109 (H) 06/19/2019    BUN 21 06/19/2019    CREATININE 0.95 06/19/2019     06/19/2019    K 3.6 06/19/2019     06/19/2019    CO2 25.8 06/19/2019    CALCIUM 8.8 06/19/2019    PROTEINTOT 5.9 (L) 06/18/2019    ALBUMIN 2.27 (L) 06/18/2019    BILITOT 0.3 06/18/2019    ALKPHOS 78 06/18/2019    AST 20 06/18/2019    ALT 35 (H) 06/18/2019                   ASSESSMENT & PLAN:    1. Hypercalcemia resolved with parathyroidectomy  2. Monoclonal gammopathy    Discussion:  -Parathyroidectomy early part of 2019 with Dr. Blue Cage with resolution of hypercalcemia  -June 2019 had open reduction internal fixation by Dr. Jain of left hip fracture from accidental fall.  Had paroxysmal atrial fibrillation with stage III kidney disease and hypertension treated.  -8/2/2019 hematology office visit: Hypercalcemia resolved with parathyroidectomy.  Discussed her benign monoclonal gammopathy.  There is a 1% annual risk of progression to myeloma but at the age of 84 with multiple other comorbidities, she is not interested in aggressive treatment or monitoring and she will follow-up with routine blood counts and chemistries with primary care and we will see only on an as-needed basis.  Should further hematology input be needed, she could see our partners with [facility] oncology.        Kyler Jamil MD    08/02/2019

## 2019-08-15 ENCOUNTER — READMISSION MANAGEMENT (OUTPATIENT)
Dept: CALL CENTER | Facility: HOSPITAL | Age: 84
End: 2019-08-15

## 2019-08-15 NOTE — OUTREACH NOTE
Total Joint Month 2 Survey      Responses   Facility patient discharged from?  [facility]   Does the patient have one of the following disease processes/diagnoses(primary or secondary)?  Total Joint Replacement   Joint surgery performed?  Hip   Month 2 attempt successful?  Yes   Call start time  0856   Call end time  0907   Has the patient been back in either the hospital or Emergency Department since discharge?  No   If the patient has been back to hospital or Emergency Department list date and reason  6/13/19 Pt returned to ER and was admitted to [facility] to aide with further therapy and recovery.    Discharge diagnosis  Left hip fx--s/p orif   Is patient permission given to speak with other caregiver?  Yes   List who call center can speak with  Daughter- Shruthi   Person spoke with today (if not patient) and relationship  Daughter- Shruthi   Medication alerts for this patient  Eliquis --daughter states pt has finished Eliquis   Is the patient taking all medications as directed (includes completed medication regime)?  Yes   Is the patient able to teach back alternate methods of pain control?  Correct alignment, Short, frequent activity   Has the patient kept scheduled appointments due by today?  Yes   Comments  Pt has appt today   Is the patient still receiving Home Health Services?  No   Is the patient still attending therapy sessions(either in the home or as an outpatient)?  No   Has the patient fallen since discharge?  No   What is the patient's perception of their functional status since discharge?  New symptoms unrelated to diagnosis   Is the patient able to teach back signs and symptoms of infection?  Incisional drainage, Blisters around incision, Increased swelling or redness around incision (not associated with surgical edema), Severe discomfort or pain   Is the patient/caregiver able to teach back the hierarchy of who to call/visit for symptoms/problems? PCP, Specialist, Home health nurse, Urgent  Care, ED, 911  Yes   Additional teach back comments  Daughter states B/P elevated--'s. I have advised Daughter to take pt to ED for eval but Daughter states they are going to MD today. I have told daughter to take pt to ED if she is having symptoms.    Month 2 Call Completed?  Yes          Melanie Townsend RN

## 2019-08-20 ENCOUNTER — OFFICE VISIT (OUTPATIENT)
Dept: CARDIOLOGY | Facility: CLINIC | Age: 84
End: 2019-08-20

## 2019-08-20 VITALS
SYSTOLIC BLOOD PRESSURE: 154 MMHG | WEIGHT: 145 LBS | DIASTOLIC BLOOD PRESSURE: 75 MMHG | BODY MASS INDEX: 24.75 KG/M2 | HEIGHT: 64 IN | HEART RATE: 60 BPM

## 2019-08-20 DIAGNOSIS — I44.2 THIRD DEGREE AV BLOCK (HCC): ICD-10-CM

## 2019-08-20 DIAGNOSIS — E78.5 HYPERLIPIDEMIA LDL GOAL <70: ICD-10-CM

## 2019-08-20 DIAGNOSIS — I25.10 CORONARY ARTERY DISEASE INVOLVING NATIVE CORONARY ARTERY OF NATIVE HEART WITHOUT ANGINA PECTORIS: ICD-10-CM

## 2019-08-20 DIAGNOSIS — I48.0 PAROXYSMAL ATRIAL FIBRILLATION (HCC): ICD-10-CM

## 2019-08-20 DIAGNOSIS — I10 ESSENTIAL HYPERTENSION: Primary | ICD-10-CM

## 2019-08-20 PROCEDURE — 93288 INTERROG EVL PM/LDLS PM IP: CPT | Performed by: INTERNAL MEDICINE

## 2019-08-20 PROCEDURE — 99214 OFFICE O/P EST MOD 30 MIN: CPT | Performed by: INTERNAL MEDICINE

## 2019-08-20 RX ORDER — LOSARTAN POTASSIUM 100 MG/1
100 TABLET ORAL DAILY
COMMUNITY

## 2019-08-20 RX ORDER — FERROUS SULFATE TAB EC 324 MG (65 MG FE EQUIVALENT) 324 (65 FE) MG
324 TABLET DELAYED RESPONSE ORAL
COMMUNITY
End: 2022-06-14

## 2019-08-20 RX ORDER — LORAZEPAM 0.5 MG/1
0.5 TABLET ORAL DAILY
COMMUNITY
End: 2020-03-17

## 2019-08-20 RX ORDER — CITALOPRAM 20 MG/1
20 TABLET ORAL DAILY
COMMUNITY
End: 2022-06-14

## 2019-08-20 NOTE — PROGRESS NOTES
Encounter Date:08/20/2019        Patient ID: Abbi Mendez is a 84 y.o. female.    PCP: Eric Driver MD     Chief Complaint: Hypertension      PROBLEM LIST:  1. Syncope and collapse with evidence of advanced conduction system disease, high-grade AV block and multiple sinus pauses:  a. [employer] pacemaker implant 01/18/2018   b. Echo 1/18/2018: EF 55%. Mild septal asymmetric LVH. There is calcification of the aortic valve. Small (<1cm) pericardial effusion. No evidence of pericardial tamponade.  c. Echo, 01/19/2018: Limited follow-up echocardiogram shows small, less than 1 cm circumferential pericardial effusion. There is no evidence of pericardial tamponade. On comparison with the study of 01/18/2018, no significant change in the size of effusion is noted.  d. Echo, 01/25/2019: EF 60%. Concentric LVH. Grade I diastolic dysfunction. Mild AS with mean PG 10.9 mmHg and mild AI. Mild-to-mod MR and TR, RVSP 29 mmHg. No significant pericardial effusion.  2. Paroxysmal Atrial fibrillation, resolved, 03/30/2007.  a. Atenolol therapy.  b. Questionable recurrence of atrial fibrillation, 03/31/2015 via EMS, data deficit:  No confirmation via EMS strips or EKG.  Event recorder 06/09/2015-07/09/2015:  No atrial fibrillation or arrhythmias appreciated.   c. Event recorder (06/09/2015 - 07/09/2015): No atrial fibrillation; occasional isolated PACs; no ventricular ectopy.  d. Event recorder, 04/01/2016:  No atrial fibrillation; 4 runs of repetitive PACs, the longest consisting of 6 beats at 156 BPM; dyspnea, tachypalpitations and lightheadedness were not associated with rhythm disturbance.  3. Coronary artery disease:  a. Cardiac catheterization, 03/30/2007:  With 30-50% mid/distal LAD in “worst views” within region of extreme tortuosity, EF 70% post PVC without segmental wall motion abnormality; trace mitral regurgitation associated with ectopy; no mitral valve prolapse.  b. Nuclear stress test, 02/04/2010:   Negative for ischemia/scar; left ventricular ejection fraction (87%).  c. Nuclear stress test negative for ischemia and scar; normal LVEF, 04/10/2015.  4. History of left bundle branch block.   5. Obstructive sleep apnea:   a. Diagnosed by sleep study.   b. Currently on CPAP.    6. Hypertension.  7. Dyslipidemia.  8. History of LLE DVT x2 treated with Coumadin; Coumadin discontinued in 2002.  9. History of gastric ulcer.  10. Iron deficiency anemia.  11. Status post tubal ligation.       History of Present Illness  Patient presents today for 6 month follow-up with a history of conduction system disease s/p pacemaker, coronary artery disease, PAF, and cardiac risk factors. Since last visit, she has been feeling well overall from a cardiovascular standpoint. Her main complaint today is of high blood pressure readings, as high as 200 systolic. She was taken off all her BP medication at the time of her hip replacment, except for her carvedilol. It has been high for about 3 weeks. Her carvedilol was recently increased to 25 mg in the AM. She was put back on losartan last week by Dr. Driver. Denies chest pain, shortness of breath, leg swelling, palpitations, and syncope. Remains busy and active with limitations due to recent hip surgery and walker use.     Allergies   Allergen Reactions   • Darvon [Propoxyphene] Paresthesia   • Penicillins Rash         Current Outpatient Medications:   •  acetaminophen (TYLENOL) 325 MG tablet, Take 2 tablets by mouth Every 6 (Six) Hours As Needed for Mild Pain ., Disp: 30 tablet, Rfl: 0  •  aspirin 81 MG EC tablet, Take 81 mg by mouth Daily., Disp: , Rfl:   •  atorvastatin (LIPITOR) 40 MG tablet, Take 40 mg by mouth Every Night., Disp: , Rfl:   •  busPIRone (BUSPAR) 10 MG tablet, Take 10 mg by mouth 2 (Two) Times a Day., Disp: , Rfl:   •  carvedilol (COREG) 12.5 MG tablet, Take 1 tablet by mouth 2 (Two) Times a Day With Meals. (Patient taking differently: Take 25 mg by mouth Daily. 12.5 in  "the Afternoon), Disp: 180 tablet, Rfl: 3  •  citalopram (CeleXA) 20 MG tablet, Take 20 mg by mouth Daily., Disp: , Rfl:   •  esomeprazole (nexIUM) 40 MG capsule, Take 40 mg by mouth Every Morning Before Breakfast., Disp: , Rfl:   •  ferrous sulfate 324 (65 Fe) MG tablet delayed-release EC tablet, Take 324 mg by mouth Daily With Breakfast., Disp: , Rfl:   •  LORazepam (ATIVAN) 0.5 MG tablet, Take 0.5 mg by mouth Daily. Takes extra throughout day, Disp: , Rfl:   •  losartan (COZAAR) 100 MG tablet, Take 100 mg by mouth Daily., Disp: , Rfl:     The following portions of the patient's history were reviewed and updated as appropriate: allergies, current medications, past family history, past medical history, past social history, past surgical history and problem list.    ROS  Review of Systems   Constitution: Negative for chills, fatigue, fever, generalized weakness. Positive for weight loss.   Cardiovascular: Negative for chest pain, claudication, dyspnea on exertion, leg swelling, orthopnea, palpitations, paroxysmal nocturnal dyspnea and syncope.   Respiratory: Negative for cough, shortness of breath, and wheezing.  HENT: Negative for ear pain, nosebleeds, and tinnitus.  Gastrointestinal: Negative for abdominal pain, constipation, diarrhea, nausea and vomiting.   Genitourinary: No urinary symptoms   Musculoskeletal: Negative for muscle cramps.  Neurological: Negative for dizziness, headaches, loss of balance, numbness, and symptoms of stroke.  Psychiatric: Positive for anxiety.    All other systems reviewed and are negative.    Objective:     /75 (BP Location: Right arm, Patient Position: Sitting)   Pulse 60   Ht 162.6 cm (64\")   Wt 65.8 kg (145 lb)   BMI 24.89 kg/m²        Physical Exam  Constitutional: Patient appears well-developed and well-nourished.   HENT: HEENT exam unremarkable.   Neck: Neck supple. No JVD present. No carotid bruits.   Cardiovascular: Normal rate, regular rhythm and normal heart " sounds. No murmur heard.   2+ symmetric pulses.   Pulmonary/Chest: Breath sounds normal. Does not exhibit tenderness.   Abdominal: Abdomen benign.   Musculoskeletal: Does not exhibit edema.   Neurological: Neurological exam unremarkable.   Vitals reviewed.    Lab Review:   Lab Results   Component Value Date    GLUCOSE 109 (H) 06/19/2019    BUN 21 06/19/2019    CREATININE 0.95 06/19/2019    EGFRIFNONA 56 (L) 06/19/2019    BCR 22.1 06/19/2019    K 3.6 06/19/2019    CO2 25.8 06/19/2019    CALCIUM 8.8 06/19/2019    PROTENTOTREF 6.9 02/01/2019    ALBUMIN 2.27 (L) 06/18/2019    LABIL2 1.3 02/01/2019    AST 20 06/18/2019    ALT 35 (H) 06/18/2019     Lab Results   Component Value Date    WBC 9.17 06/19/2019    HGB 10.3 (L) 06/19/2019    HCT 34.1 06/19/2019    MCV 98.0 (H) 06/19/2019     06/19/2019     Lab Results   Component Value Date    HGBA1C 5.70 (H) 06/05/2019        Procedures     Manual device interrogation, 8/20/2019: Normal functioning [employer] pacemaker with 6.5 years of battery left. 100% V-paced. 5 mode switches, longest 10 minutes 3 seconds in 5/2019. One ventricular high rate.        Assessment:      Diagnosis Plan   1. Essential hypertension  Increase carvedilol to 25 mg BID. Continue losartan at current dose, ok to take ativan prn for anxiety.   2. Coronary artery disease involving native coronary artery of native heart without angina pectoris  Stable, continue current medications.   3. Hyperlipidemia LDL goal <70  Monitored by PCP. Continue atorvastatin 40 mg.   4. Third degree AV block (CMS/HCC)     5. Paroxysmal atrial fibrillation (CMS/HCC)  Off anticoagulation due to risk of fall and lack of episodes of PAF for years prior to device check today. We will put the patient back on Eliquis if PAF reoccurs.     Plan:   Increase carvedilol to 25 mg BID.Rare mode switch, none recently will continue ASA and hold off anticoagulation due to concern about fall and bleeding.   Continue all other  current medications.   FU in 6 MO with device check, sooner as needed.  Thank you for allowing us to participate in the care of your patient.     Scribed for Soraya Darling MD by Janki Ga. 8/20/2019  11:18 AM      I, Soraya Darling MD, personally performed the services described in this documentation as scribed by the above named individual in my presence, and it is both accurate and complete.  8/20/2019  11:52 AM        Please note that portions of this note may have been completed with a voice recognition program. Efforts were made to edit the dictations, but occasionally words are mistranscribed.

## 2019-09-17 ENCOUNTER — READMISSION MANAGEMENT (OUTPATIENT)
Dept: CALL CENTER | Facility: HOSPITAL | Age: 84
End: 2019-09-17

## 2019-09-17 NOTE — OUTREACH NOTE
Total Joint Month 3 Survey      Responses   Facility patient discharged from?  [facility]   Does the patient have one of the following disease processes/diagnoses(primary or secondary)?  Total Joint Replacement   Joint surgery performed?  Hip   Month 3 attempt successful?  No   Unsuccessful attempts  Attempt 1          Janna Ramirez RN

## 2019-09-18 ENCOUNTER — READMISSION MANAGEMENT (OUTPATIENT)
Dept: CALL CENTER | Facility: HOSPITAL | Age: 84
End: 2019-09-18

## 2019-09-18 NOTE — OUTREACH NOTE
Total Joint Month 3 Survey      Responses   Facility patient discharged from?  [facility]   Does the patient have one of the following disease processes/diagnoses(primary or secondary)?  Total Joint Replacement   Joint surgery performed?  Hip   Month 3 attempt successful?  No   Unsuccessful attempts  Attempt 2          Adrianne Alvarado RN

## 2019-10-30 ENCOUNTER — CLINICAL SUPPORT NO REQUIREMENTS (OUTPATIENT)
Dept: CARDIOLOGY | Facility: CLINIC | Age: 84
End: 2019-10-30

## 2019-10-30 DIAGNOSIS — R00.1 SEVERE SINUS BRADYCARDIA: ICD-10-CM

## 2019-10-30 PROCEDURE — 93294 REM INTERROG EVL PM/LDLS PM: CPT | Performed by: INTERNAL MEDICINE

## 2019-10-30 PROCEDURE — 93296 REM INTERROG EVL PM/IDS: CPT | Performed by: INTERNAL MEDICINE

## 2020-01-29 ENCOUNTER — CLINICAL SUPPORT NO REQUIREMENTS (OUTPATIENT)
Dept: CARDIOLOGY | Facility: CLINIC | Age: 85
End: 2020-01-29

## 2020-01-29 DIAGNOSIS — R00.1 SEVERE SINUS BRADYCARDIA: ICD-10-CM

## 2020-01-29 PROCEDURE — 93296 REM INTERROG EVL PM/IDS: CPT | Performed by: INTERNAL MEDICINE

## 2020-01-29 PROCEDURE — 93294 REM INTERROG EVL PM/LDLS PM: CPT | Performed by: INTERNAL MEDICINE

## 2020-03-17 ENCOUNTER — OFFICE VISIT (OUTPATIENT)
Dept: CARDIOLOGY | Facility: CLINIC | Age: 85
End: 2020-03-17

## 2020-03-17 VITALS
OXYGEN SATURATION: 94 % | WEIGHT: 174 LBS | BODY MASS INDEX: 27.97 KG/M2 | DIASTOLIC BLOOD PRESSURE: 62 MMHG | HEIGHT: 66 IN | SYSTOLIC BLOOD PRESSURE: 132 MMHG | HEART RATE: 61 BPM

## 2020-03-17 DIAGNOSIS — I44.2 THIRD DEGREE AV BLOCK (HCC): ICD-10-CM

## 2020-03-17 DIAGNOSIS — I25.10 CORONARY ARTERY DISEASE INVOLVING NATIVE CORONARY ARTERY OF NATIVE HEART WITHOUT ANGINA PECTORIS: ICD-10-CM

## 2020-03-17 DIAGNOSIS — I10 ESSENTIAL HYPERTENSION: ICD-10-CM

## 2020-03-17 DIAGNOSIS — I48.0 PAROXYSMAL ATRIAL FIBRILLATION (HCC): Primary | ICD-10-CM

## 2020-03-17 DIAGNOSIS — E78.5 HYPERLIPIDEMIA LDL GOAL <70: ICD-10-CM

## 2020-03-17 PROBLEM — I31.39 PERICARDIAL EFFUSION WITHOUT CARDIAC TAMPONADE: Status: RESOLVED | Noted: 2018-01-22 | Resolved: 2020-03-17

## 2020-03-17 PROCEDURE — 99214 OFFICE O/P EST MOD 30 MIN: CPT | Performed by: PHYSICIAN ASSISTANT

## 2020-03-17 PROCEDURE — 93280 PM DEVICE PROGR EVAL DUAL: CPT | Performed by: PHYSICIAN ASSISTANT

## 2020-03-17 RX ORDER — MIRTAZAPINE 15 MG/1
7.5 TABLET, FILM COATED ORAL NIGHTLY
COMMUNITY

## 2020-03-17 NOTE — PROGRESS NOTES
[facility] Card[facility]    Encounter Date:2020    Patient ID: Abbi Mendez is a 84 y.o. female.  : 1935     PCP: Eric Driver MD       Chief Complaint: Atrial Fibrillation and Hypertension      PROBLEM LIST:  1. Symptomatic bradycardia:  a. Syncope and collapse with evidence of advanced conduction system disease, high-grade AV block and multiple sinus pauses  b. Pacemaker placement, 2018: [employer] pacemaker.  c. Echo, 2018: EF 55%. Mild septal asymmetric LVH. Calcification of the aortic valve. Small (<1cm) pericardial effusion. No evidence of pericardial tamponade.  d. Echo, 2018: Small, less than 1 cm circumferential pericardial effusion. No evidence of pericardial tamponade. On comparison with the study of 2018, no significant change in the size of effusion is noted.  e. Echo, 2019: EF 60%. Concentric LVH. Grade I diastolic dysfunction. Mild AS with mean PG 10.9 mmHg and mild AI. Mild-to-mod MR and TR, RVSP 29 mmHg. No significant pericardial effusion.  2. Paroxysmal Atrial fibrillation, resolved, 2007.  a. Atenolol therapy.  b. Questionable recurrence of atrial fibrillation, 2015 via EMS, data deficit:  No confirmation via EMS strips or EKG.  Event recorder 2015-2015:  No atrial fibrillation or arrhythmias appreciated.   c. Event recorder (2015 - 2015): No atrial fibrillation; occasional isolated PACs; no ventricular ectopy.  d. Event recorder, 2016: No atrial fibrillation; 4 runs of repetitive PACs, the longest consisting of 6 beats at 156 BPM; dyspnea, tachypalpitations and lightheadedness were not associated with rhythm disturbance.  3. Coronary artery disease:  a. [facility], 2007: 30-50% mid/distal LAD in “worst views” within region of extreme tortuosity, EF 70% post PVC without segmental wall motion abnormality; trace MR associated with ectopy; no MVP.  b. Nuclear stress test,  02/04/2010: Negative for ischemia/scar. EF 87%.  c. Nuclear stress test, 04/10/2015: Negative for ischemia/scar. Normal EF.  4. History of left bundle branch block.   5. RADHA:   a. Diagnosed by sleep study.   b. Currently on CPAP.    6. Hypertension.  7. Dyslipidemia.  8. History of LLE DVT x2 treated with Coumadin; Coumadin discontinued in 2002.  9. History of gastric ulcer.  10. Iron deficiency anemia.  11. Status post tubal ligation.    History of Present Illness  Patient presents today for 6 month follow-up with a history of pacemaker, paroxysmal atrial fibrillation, mild nonobstructive CAD, and cardiac risk factors. Since last visit, she is done very well.  She has no current complaint of exertional chest pain or dyspnea no orthopnea PND no claudication no lower extremity edema.  She has no awareness tachyarrhythmias, no dizziness or syncope.  Her blood pressure has improved significantly since increasing her carvedilol to 25 mg twice daily.  She reports average systolic blood pressures of 140 mmHg systolic.  Her cholesterol is followed by primary care and reported to be favorable.    Allergies   Allergen Reactions   • Darvon [Propoxyphene] Paresthesia   • Penicillins Rash         Current Outpatient Medications:   •  acetaminophen (TYLENOL) 325 MG tablet, Take 2 tablets by mouth Every 6 (Six) Hours As Needed for Mild Pain ., Disp: 30 tablet, Rfl: 0  •  aspirin 81 MG EC tablet, Take 81 mg by mouth Daily., Disp: , Rfl:   •  atorvastatin (LIPITOR) 40 MG tablet, Take 40 mg by mouth Every Night., Disp: , Rfl:   •  carvedilol (COREG) 25 MG tablet, Take 1 tablet by mouth 2 (Two) Times a Day With Meals. (Patient taking differently: Take 25 mg by mouth 2 (Two) Times a Day With Meals. 12.5 in the Afternoon), Disp: 180 tablet, Rfl: 3  •  citalopram (CeleXA) 20 MG tablet, Take 20 mg by mouth Daily., Disp: , Rfl:   •  esomeprazole (nexIUM) 40 MG capsule, Take 40 mg by mouth Every Morning Before Breakfast., Disp: , Rfl:   •   "ferrous sulfate 324 (65 Fe) MG tablet delayed-release EC tablet, Take 324 mg by mouth Daily With Breakfast., Disp: , Rfl:   •  losartan (COZAAR) 100 MG tablet, Take 100 mg by mouth Daily., Disp: , Rfl:   •  mirtazapine (REMERON) 15 MG tablet, Take 15 mg by mouth Every Night., Disp: , Rfl:     The following portions of the patient's history were reviewed and updated as appropriate: allergies, current medications, past family history, past medical history, past social history, past surgical history and problem list.    ROS  Review of Systems   Constitution: Negative for chills, fever, fatigue, generalized weakness.   Cardiovascular: Negative for chest pain, claudication, dyspnea on exertion, leg swelling, palpitations, orthopnea, and syncope.   Respiratory: Negative for cough, shortness of breath, and wheezing.  HENT: Negative for ear pain, nosebleeds, and tinnitus.  Gastrointestinal: Negative for abdominal pain, constipation, diarrhea, nausea and vomiting.   Genitourinary: No urinary symptoms.  Musculoskeletal: Negative for muscle cramps.  Neurological: Negative for dizziness, headaches, loss of balance, numbness, and symptoms of stroke.  Psychiatric: Normal mental status.     All other systems reviewed and are negative.    Objective:     /62 (BP Location: Left arm, Patient Position: Sitting)   Pulse 61   Ht 167.6 cm (66\")   Wt 78.9 kg (174 lb)   SpO2 94%   BMI 28.08 kg/m²          Physical Exam  Constitutional: Patient appears well-developed and well-nourished.   HENT: HEENT exam unremarkable.   Neck: Neck supple. No JVD present. No carotid bruits.   Cardiovascular: Normal rate, regular rhythm and normal heart sounds. No murmur heard.   2+ symmetric pulses.   Pulmonary/Chest: Breath sounds normal. Does not exhibit tenderness.   Abdominal: Abdomen benign.   Musculoskeletal: Does not exhibit edema.   Neurological: Neurological exam unremarkable.   Vitals reviewed.    Lab Review:   Lab Results   Component " Value Date    GLUCOSE 109 (H) 06/19/2019    BUN 21 06/19/2019    CREATININE 0.95 06/19/2019    EGFRIFNONA 56 (L) 06/19/2019    BCR 22.1 06/19/2019    K 3.6 06/19/2019    CO2 25.8 06/19/2019    CALCIUM 8.8 06/19/2019    ALBUMIN 2.27 (L) 06/18/2019    AST 20 06/18/2019    ALT 35 (H) 06/18/2019     Lab Results   Component Value Date    WBC 9.17 06/19/2019    RBC 3.48 (L) 06/19/2019    HGB 10.3 (L) 06/19/2019    HCT 34.1 06/19/2019    MCV 98.0 (H) 06/19/2019     06/19/2019     Lab Results   Component Value Date    HGBA1C 5.70 (H) 06/05/2019     Procedures     Device interrogation, [employer] dual-chamber  Right atrium was 74% paced, peak was 4.4 mV, threshold is 1 V at 0.4 ms, impedance is 763 ohms  RV is 100% paced impedance is 707 ohms  Battery voltage 6 years remaining events 2 mode switches both less than 1 minute  No reprogramming      Assessment:      Diagnosis Plan   1. Paroxysmal atrial fibrillation (CMS/HCC)   she had less than 2 minutes of atrial fibrillation over the past 6 months.  At this time we feel that anticoagulation remains a higher risk due to her fall risk.  We would like her to continue aspirin 81 mg daily.   2. Coronary artery disease involving native coronary artery of native heart without angina pectoris   no current anginal symptoms, continue current medical regimen   3. Third degree AV block (CMS/HCC)   normal dual-chamber permanent pacemaker interrogation   4. Essential hypertension   well managed on current medical regimen   5. Hyperlipidemia LDL goal <70   on statin therapy and monitored by primary care     Plan:     Continue current medications.   FU in 6 MO, sooner as needed.  Thank you for allowing us to participate in the care of your patient.         Electronically signed by FRANCO Mujica, 03/17/20, 11:24 AM.      Please note that portions of this note may have been completed with a voice recognition program. Efforts were made to edit the dictations, but  occasionally words are mistranscribed.

## 2020-09-29 ENCOUNTER — OFFICE VISIT (OUTPATIENT)
Dept: CARDIOLOGY | Facility: CLINIC | Age: 85
End: 2020-09-29

## 2020-09-29 VITALS
SYSTOLIC BLOOD PRESSURE: 132 MMHG | WEIGHT: 181 LBS | HEART RATE: 60 BPM | BODY MASS INDEX: 29.09 KG/M2 | DIASTOLIC BLOOD PRESSURE: 60 MMHG | HEIGHT: 66 IN

## 2020-09-29 DIAGNOSIS — I44.2 THIRD DEGREE AV BLOCK (HCC): ICD-10-CM

## 2020-09-29 DIAGNOSIS — E78.5 HYPERLIPIDEMIA LDL GOAL <70: ICD-10-CM

## 2020-09-29 DIAGNOSIS — I10 ESSENTIAL HYPERTENSION: ICD-10-CM

## 2020-09-29 DIAGNOSIS — I48.0 PAROXYSMAL ATRIAL FIBRILLATION (HCC): Primary | ICD-10-CM

## 2020-09-29 DIAGNOSIS — I25.10 CORONARY ARTERY DISEASE INVOLVING NATIVE CORONARY ARTERY OF NATIVE HEART WITHOUT ANGINA PECTORIS: ICD-10-CM

## 2020-09-29 PROCEDURE — 93280 PM DEVICE PROGR EVAL DUAL: CPT | Performed by: INTERNAL MEDICINE

## 2020-09-29 PROCEDURE — 99214 OFFICE O/P EST MOD 30 MIN: CPT | Performed by: INTERNAL MEDICINE

## 2020-09-29 NOTE — PROGRESS NOTES
[facility] Cardiology    Encounter Date: 2020    Patient ID: Abbi Mendez is a 85 y.o. female.  : 1935     PCP: Eric Driver MD       Chief Complaint: Atrial Fibrillation and Hypertension      PROBLEM LIST:  1. Symptomatic bradycardia:  a. Syncope and collapse with evidence of advanced conduction system disease, high-grade AV block and multiple sinus pauses  b. Pacemaker placement, 2018: [employer] pacemaker.  c. Echo, 2018: EF 55%. Mild septal asymmetric LVH. Calcification of the aortic valve. Small (<1cm) pericardial effusion. No evidence of pericardial tamponade.  d. Echo, 2018: Small, less than 1 cm circumferential pericardial effusion. No evidence of pericardial tamponade. On comparison with the study of 2018, no significant change in the size of effusion is noted.  e. Echo, 2019: EF 60%. Concentric LVH. Grade I diastolic dysfunction. Mild AS with mean PG 10.9 mmHg and mild AI. Mild-to-mod MR and TR, RVSP 29 mmHg. No significant pericardial effusion.  2. Paroxysmal Atrial fibrillation, resolved, 2007.  a. Atenolol therapy.  b. Questionable recurrence of atrial fibrillation, 2015 via EMS, data deficit:  No confirmation via EMS strips or EKG.  Event recorder 2015-2015:  No atrial fibrillation or arrhythmias appreciated.   c. Event recorder (2015 - 2015): No atrial fibrillation; occasional isolated PACs; no ventricular ectopy.  d. Event recorder, 2016: No atrial fibrillation; 4 runs of repetitive PACs, the longest consisting of 6 beats at 156 BPM; dyspnea, tachypalpitations and lightheadedness were not associated with rhythm disturbance.  e. Device interrogation, 2020: 2 mode switches- max 18 minutes 25 seconds, <1% total.   3. Coronary artery disease:  a. [facility], 2007: 30-50% mid/distal LAD in “worst views” within region of extreme tortuosity, EF 70% post PVC without segmental  wall motion abnormality; trace MR associated with ectopy; no MVP.  b. Nuclear stress test, 02/04/2010: Negative for ischemia/scar. EF 87%.  c. Nuclear stress test, 04/10/2015: Negative for ischemia/scar. Normal EF.  4. History of left bundle branch block.   5. RADHA:   a. Diagnosed by sleep study.   b. Currently on CPAP.    6. Hypertension.  7. Dyslipidemia.  8. History of LLE DVT x2 treated with Coumadin; Coumadin discontinued in 2002.  9. History of gastric ulcer.  10. Iron deficiency anemia.  11. Status post tubal ligation.    History of Present Illness  Patient presents today for a 6 month follow-up with a history of paroxysmal atrial fibrillation, coronary artery disease, third degree AV block, and cardiac risk factors. Since last visit, she has been feeling well overall from a cardiovascular standpoint. She stays active by working in her garden and around the house. Patient denies chest pain, shortness of breath, palpitations, edema, dizziness, and syncope.       Allergies   Allergen Reactions   • Darvon [Propoxyphene] Paresthesia   • Penicillins Rash         Current Outpatient Medications:   •  acetaminophen (TYLENOL) 325 MG tablet, Take 2 tablets by mouth Every 6 (Six) Hours As Needed for Mild Pain ., Disp: 30 tablet, Rfl: 0  •  aspirin 81 MG EC tablet, Take 81 mg by mouth Daily., Disp: , Rfl:   •  atorvastatin (LIPITOR) 40 MG tablet, Take 40 mg by mouth Every Night., Disp: , Rfl:   •  carvedilol (COREG) 12.5 MG tablet, Take 1 tablet by mouth 2 (Two) Times a Day With Meals. (Patient taking differently: Take 25 mg by mouth 2 (Two) Times a Day With Meals. 12.5 in the Afternoon), Disp: 180 tablet, Rfl: 3  •  citalopram (CeleXA) 20 MG tablet, Take 20 mg by mouth Daily., Disp: , Rfl:   •  esomeprazole (nexIUM) 40 MG capsule, Take 40 mg by mouth Every Morning Before Breakfast., Disp: , Rfl:   •  ferrous sulfate 324 (65 Fe) MG tablet delayed-release EC tablet, Take 324 mg by mouth Daily With Breakfast., Disp: , Rfl:  "  •  losartan (COZAAR) 100 MG tablet, Take 100 mg by mouth Daily., Disp: , Rfl:   •  mirtazapine (REMERON) 15 MG tablet, Take 15 mg by mouth Every Night., Disp: , Rfl:     The following portions of the patient's history were reviewed and updated as appropriate: allergies, current medications, past family history, past medical history, past social history, past surgical history and problem list.    ROS  Review of Systems   Constitution: Negative for chills, fever, fatigue, generalized weakness.   Cardiovascular: Negative for chest pain, dyspnea on exertion, leg swelling, palpitations, orthopnea, and syncope.   Respiratory: Negative for cough, shortness of breath, and wheezing.  HENT: Negative for ear pain, nosebleeds, and tinnitus.  Gastrointestinal: Negative for abdominal pain, constipation, diarrhea, nausea and vomiting.   Genitourinary: No urinary symptoms.  Musculoskeletal: Negative for muscle cramps.  Neurological: Negative for dizziness, headaches, loss of balance, numbness, and symptoms of stroke.  Psychiatric: Normal mental status.     All other systems reviewed and are negative.        Objective:     /60 (BP Location: Left arm, Patient Position: Sitting)   Pulse 60   Ht 167.6 cm (66\")   Wt 82.1 kg (181 lb)   BMI 29.21 kg/m²      Physical Exam  Constitutional: Patient appears well-developed and well-nourished.   HENT: HEENT exam unremarkable.   Neck: Neck supple. No JVD present. No carotid bruits.   Cardiovascular: Normal rate, regular rhythm and normal heart sounds. No murmur heard.   2+ symmetric pulses.   Pulmonary/Chest: Breath sounds normal. Does not exhibit tenderness.   Abdominal: Abdomen benign.   Musculoskeletal: Does not exhibit edema.   Neurological: Neurological exam unremarkable.   Vitals reviewed.    Data Review:   Lab Results   Component Value Date    GLUCOSE 109 (H) 06/19/2019    BUN 21 06/19/2019    CREATININE 0.95 06/19/2019    EGFRIFNONA 56 (L) 06/19/2019    BCR 22.1 06/19/2019    " K 3.6 06/19/2019    CO2 25.8 06/19/2019    CALCIUM 8.8 06/19/2019    ALBUMIN 2.27 (L) 06/18/2019    AST 20 06/18/2019    ALT 35 (H) 06/18/2019      Lab Results   Component Value Date    WBC 9.17 06/19/2019    RBC 3.48 (L) 06/19/2019    HGB 10.3 (L) 06/19/2019    HCT 34.1 06/19/2019    MCV 98.0 (H) 06/19/2019     06/19/2019     Lab Results   Component Value Date    HGBA1C 5.70 (H) 06/05/2019        Procedures   Manual device interrogation, 09/29/20:   Normal, well-functioning [employer] with 5.5 years of battery life remaining.   Events: 2 mode switches; max 18 minutes 25 seconds, <1% total.   Device updates: No changes made.         Assessment:      Diagnosis Plan   1. Paroxysmal atrial fibrillation (CMS/HCC) noted on device interrogation lasting up to 18 minutes. Asymptomatic; Begin Eliquis 2.5 mg BID and continue all other current medications.    2. Coronary artery disease involving native coronary artery of native heart without angina pectoris  No current anginal symptoms; continue current medications.    3. Third degree AV block (CMS/HCC)  Device interrogation shows 2 mode switches- max 18 minutes 25 seconds. <1% total.    4. Essential hypertension  Well-controlled; continue current medications.    5. Hyperlipidemia LDL goal <70  Monitored by PCP; continue atorvastatin 40 mg daily.      Plan:   Stable cardiac status.   Begin Eliquis 2.5 mg BID.   Continue all other current medications.   FU in 6 MO with device check, sooner as needed.  Thank you for allowing us to participate in the care of your patient.     Scribed for Soraya Darling MD by Tiffany Frias. 9/29/2020  10:49 EDT      I, Soraya Darling MD, personally performed the services described in this documentation as scribed by the above named individual in my presence, and it is both accurate and complete.  9/29/2020  11:41 EDT        Please note that portions of this note may have been completed with a voice recognition program. Efforts were made  to edit the dictations, but occasionally words are mistranscribed.

## 2021-01-27 ENCOUNTER — TELEPHONE (OUTPATIENT)
Dept: CARDIOLOGY | Facility: CLINIC | Age: 86
End: 2021-01-27

## 2021-01-27 NOTE — TELEPHONE ENCOUNTER
Pt daughter LVM to discuss BP issues. Called back, no answer. LVM on personal VM. Will await return call.

## 2021-01-28 NOTE — TELEPHONE ENCOUNTER
Pt daughter called back stating pt BP been running 90/70s HR 60s. No other symptoms other than lethargy. States pt has been taking 25 mg Coreg BID. Encouraged daughter to half Coreg dose to 12.5 mg BID, continue monitoring and call back in 1 week w readings. She verbalized understanding and is agreeable.

## 2021-04-20 ENCOUNTER — OFFICE VISIT (OUTPATIENT)
Dept: CARDIOLOGY | Facility: CLINIC | Age: 86
End: 2021-04-20

## 2021-04-20 VITALS
WEIGHT: 181 LBS | HEIGHT: 64 IN | BODY MASS INDEX: 30.9 KG/M2 | DIASTOLIC BLOOD PRESSURE: 70 MMHG | SYSTOLIC BLOOD PRESSURE: 100 MMHG | HEART RATE: 70 BPM

## 2021-04-20 DIAGNOSIS — I10 ESSENTIAL HYPERTENSION: ICD-10-CM

## 2021-04-20 DIAGNOSIS — I44.2 THIRD DEGREE AV BLOCK (HCC): ICD-10-CM

## 2021-04-20 DIAGNOSIS — E78.5 HYPERLIPIDEMIA LDL GOAL <70: ICD-10-CM

## 2021-04-20 DIAGNOSIS — I25.10 CORONARY ARTERY DISEASE INVOLVING NATIVE CORONARY ARTERY OF NATIVE HEART WITHOUT ANGINA PECTORIS: Primary | ICD-10-CM

## 2021-04-20 DIAGNOSIS — I48.0 PAROXYSMAL ATRIAL FIBRILLATION (HCC): ICD-10-CM

## 2021-04-20 PROCEDURE — 93288 INTERROG EVL PM/LDLS PM IP: CPT | Performed by: INTERNAL MEDICINE

## 2021-04-20 PROCEDURE — 99214 OFFICE O/P EST MOD 30 MIN: CPT | Performed by: INTERNAL MEDICINE

## 2021-04-20 RX ORDER — CLONIDINE HYDROCHLORIDE 0.1 MG/1
0.1 TABLET ORAL 2 TIMES DAILY
COMMUNITY
End: 2022-06-14

## 2021-04-20 RX ORDER — CARVEDILOL PHOSPHATE 10 MG/1
10 CAPSULE, EXTENDED RELEASE ORAL DAILY
COMMUNITY
End: 2021-11-23 | Stop reason: DRUGHIGH

## 2021-05-10 PROCEDURE — 93296 REM INTERROG EVL PM/IDS: CPT | Performed by: INTERNAL MEDICINE

## 2021-05-10 PROCEDURE — 93294 REM INTERROG EVL PM/LDLS PM: CPT | Performed by: INTERNAL MEDICINE

## 2021-05-24 RX ORDER — APIXABAN 2.5 MG/1
TABLET, FILM COATED ORAL
Qty: 60 TABLET | Refills: 11 | Status: SHIPPED | OUTPATIENT
Start: 2021-05-24 | End: 2021-06-08 | Stop reason: SDUPTHER

## 2021-08-09 PROCEDURE — 93294 REM INTERROG EVL PM/LDLS PM: CPT | Performed by: INTERNAL MEDICINE

## 2021-08-09 PROCEDURE — 93296 REM INTERROG EVL PM/IDS: CPT | Performed by: INTERNAL MEDICINE

## 2021-11-08 PROCEDURE — 93296 REM INTERROG EVL PM/IDS: CPT | Performed by: INTERNAL MEDICINE

## 2021-11-08 PROCEDURE — 93294 REM INTERROG EVL PM/LDLS PM: CPT | Performed by: INTERNAL MEDICINE

## 2021-11-22 NOTE — PROGRESS NOTES
[facility] Cardiology    Encounter Date: 2021    Patient ID: Abbi Mendez is a 86 y.o. female.  : 1935     PCP: Charlotte STEPHENS       Chief Complaint: bradycardia, atrial fibrillation, hypertension  PROBLEM LIST:  1. Symptomatic bradycardia:  a. Syncope and collapse with evidence of advanced conduction system disease, high-grade AV block and multiple sinus pauses  b. Pacemaker placement, 2018: [employer] pacemaker.  c. Echo, 2018: EF 55%. Mild septal asymmetric LVH. Calcification of the aortic valve. Small (<1cm) pericardial effusion. No evidence of pericardial tamponade.  d. Echo, 2018: Small, less than 1 cm circumferential pericardial effusion. No evidence of pericardial tamponade. On comparison with the study of 2018, no significant change in the size of effusion is noted.  e. Echo, 2019: EF 60%. Concentric LVH. Grade I diastolic dysfunction. Mild AS with mean PG 10.9 mmHg and mild AI. Mild-to-mod MR and TR, RVSP 29 mmHg. No significant pericardial effusion.  2. Paroxysmal Atrial fibrillation, resolved, 2007.  a. Atenolol therapy.  b. Questionable recurrence of atrial fibrillation, 2015 via EMS, data deficit:  No confirmation via EMS strips or EKG.  Event recorder 2015-2015:  No atrial fibrillation or arrhythmias appreciated.   c. Event recorder (2015 - 2015): No atrial fibrillation; occasional isolated PACs; no ventricular ectopy.  d. Event recorder, 2016: No atrial fibrillation; 4 runs of repetitive PACs, the longest consisting of 6 beats at 156 BPM; dyspnea, tachypalpitations and lightheadedness were not associated with rhythm disturbance.  e. Device interrogation, 2020: 2 mode switches- max 18 minutes 25 seconds, <1% total.   f. Device interrogation 21: 1 mode switch- 1 minute- 4 years of battery life.  3. Coronary artery disease:  a. [facility], 2007: 30-50%  mid/distal LAD in “worst views” within region of extreme tortuosity, EF 70% post PVC without segmental wall motion abnormality; trace MR associated with ectopy; no MVP.  b. Nuclear stress test, 02/04/2010: Negative for ischemia/scar. EF 87%.  c. Nuclear stress test, 04/10/2015: Negative for ischemia/scar. Normal EF.  4. History of left bundle branch block.   5. RADHA:   a. Diagnosed by sleep study.   b. Currently on CPAP.    6. Hypertension.  7. Dyslipidemia.  8. History of LLE DVT x2 treated with Coumadin; Coumadin discontinued in 2002.  9. History of gastric ulcer.  10. Iron deficiency anemia.  11. Status post tubal ligation.    History of Present Illness  Patient presents today for a 6 month follow-up with a history of coronary artery disease, paroxysmal atrial fibrillation, third degree AV block, and cardiac risk factors. Since last visit, she has done well from a cardiac standpoint.  She denies any symptoms of chest pain, unusual shortness of breath, palpitations, edema or hear failure. Her blood pressure is usually high early in the morning and then comes down to 130/80 and sometimes it is as low as 100/60.  Her blood pressure is elevated today and she is not sure if she took her meds.    Allergies   Allergen Reactions   • Darvon [Propoxyphene] Paresthesia   • Penicillins Rash         Current Outpatient Medications:   •  acetaminophen (TYLENOL) 325 MG tablet, Take 2 tablets by mouth Every 6 (Six) Hours As Needed for Mild Pain ., Disp: 30 tablet, Rfl: 0  •  apixaban (Eliquis) 2.5 MG tablet tablet, Take 1 tablet by mouth 2 (Two) Times a Day., Disp: 180 tablet, Rfl: 3  •  aspirin 81 MG EC tablet, Take 81 mg by mouth Daily., Disp: , Rfl:   •  atorvastatin (LIPITOR) 40 MG tablet, Take 40 mg by mouth Every Night., Disp: , Rfl:   •  carvedilol (COREG) 12.5 MG tablet, Take 12.5 mg by mouth 2 (Two) Times a Day With Meals., Disp: , Rfl:   •  citalopram (CeleXA) 20 MG tablet, Take 20 mg by mouth Daily., Disp: , Rfl:   •   "cloNIDine (CATAPRES) 0.1 MG tablet, Take 0.1 mg by mouth 2 (Two) Times a Day., Disp: , Rfl:   •  esomeprazole (nexIUM) 40 MG capsule, Take 40 mg by mouth Every Morning Before Breakfast., Disp: , Rfl:   •  ferrous sulfate 324 (65 Fe) MG tablet delayed-release EC tablet, Take 324 mg by mouth Daily With Breakfast., Disp: , Rfl:   •  losartan (COZAAR) 100 MG tablet, Take 100 mg by mouth Daily., Disp: , Rfl:   •  mirtazapine (REMERON) 15 MG tablet, Take 15 mg by mouth Every Night., Disp: , Rfl:     The following portions of the patient's history were reviewed and updated as appropriate: allergies, current medications, past family history, past medical history, past social history, past surgical history and problem list.    ROS  Review of Systems   Constitution: Negative for chills, fever, fatigue, generalized weakness.   Cardiovascular: Negative for chest pain, dyspnea on exertion, leg swelling, palpitations, orthopnea, and syncope.   Respiratory: Negative for cough, shortness of breath, and wheezing.  HENT: Negative for ear pain, nosebleeds, and tinnitus.  Gastrointestinal: Negative for abdominal pain, constipation, diarrhea, nausea and vomiting.   Genitourinary: No urinary symptoms.  Musculoskeletal: Negative for muscle cramps.  Neurological: Negative for dizziness, headaches, loss of balance, numbness, and symptoms of stroke.  Psychiatric: Normal mental status.     All other systems reviewed and are negative.        Objective:     BP (!) 198/86 (BP Location: Left arm, Patient Position: Sitting)   Pulse 60   Ht 162.6 cm (64\")   Wt 82.1 kg (181 lb)   BMI 31.07 kg/m²    B/P rechecked by Dr. Darling 180/90  Physical Exam  Constitutional: Patient appears well-developed and well-nourished.   HENT: HEENT exam unremarkable.   Neck: Neck supple. No JVD present. No carotid bruits.   Cardiovascular: Normal rate, regular rhythm and normal heart sounds. No murmur heard.   2+ symmetric pulses.   Pulmonary/Chest: Breath sounds " normal. Does not exhibit tenderness.   Abdominal: Abdomen benign.   Musculoskeletal: Does not exhibit edema.   Neurological: Neurological exam unremarkable.   Vitals reviewed.    Data Review:      Procedures     Manual device interrogation, 11/23/21:   Normal, well-functioning [employer] PPM with 4 years of battery life remaining. Patient is dependant.  Events: 1 mode switch < 1 minute  Device updates: none        Assessment:      Diagnosis Plan   1. Coronary artery disease involving native coronary artery of native heart without angina pectoris  Stable without symptoms, continue ASA   2. Paroxysmal atrial fibrillation (HCC)  Well- controlled- continue Eliquis   3. Essential hypertension  Poor control   4. Hyperlipidemia LDL goal <70  Continue Atorvastatin   5. Third degree AV block (HCC)  Normal pacemaker check     Plan:   Stable from cardiac standpoint.  Continue current medications.   FU in 6 MO with [employer] device check, sooner as needed.  Thank you for allowing us to participate in the care of your patient.     Scribed for Soraya Darling MD by Belkys Harding RN. 11/23/2021  14:16 EST      I, Soraya Darling MD, personally performed the services described in this documentation as scribed by the above named individual in my presence, and it is both accurate and complete.  11/24/2021  16:38 EST        Part of this note may be an electronic transcription/translation of spoken language to printed text using the Dragon Dictation System.

## 2021-11-23 ENCOUNTER — OFFICE VISIT (OUTPATIENT)
Dept: CARDIOLOGY | Facility: CLINIC | Age: 86
End: 2021-11-23

## 2021-11-23 VITALS
WEIGHT: 181 LBS | SYSTOLIC BLOOD PRESSURE: 198 MMHG | BODY MASS INDEX: 30.9 KG/M2 | HEIGHT: 64 IN | HEART RATE: 60 BPM | DIASTOLIC BLOOD PRESSURE: 86 MMHG

## 2021-11-23 DIAGNOSIS — E78.5 HYPERLIPIDEMIA LDL GOAL <70: ICD-10-CM

## 2021-11-23 DIAGNOSIS — I48.0 PAROXYSMAL ATRIAL FIBRILLATION (HCC): ICD-10-CM

## 2021-11-23 DIAGNOSIS — I44.2 THIRD DEGREE AV BLOCK (HCC): ICD-10-CM

## 2021-11-23 DIAGNOSIS — I10 ESSENTIAL HYPERTENSION: ICD-10-CM

## 2021-11-23 DIAGNOSIS — I25.10 CORONARY ARTERY DISEASE INVOLVING NATIVE CORONARY ARTERY OF NATIVE HEART WITHOUT ANGINA PECTORIS: Primary | ICD-10-CM

## 2021-11-23 PROCEDURE — 99214 OFFICE O/P EST MOD 30 MIN: CPT | Performed by: INTERNAL MEDICINE

## 2021-11-23 PROCEDURE — 93280 PM DEVICE PROGR EVAL DUAL: CPT | Performed by: INTERNAL MEDICINE

## 2021-11-23 RX ORDER — CARVEDILOL 12.5 MG/1
25 TABLET ORAL 2 TIMES DAILY WITH MEALS
COMMUNITY
End: 2023-02-28

## 2022-02-07 PROCEDURE — 93294 REM INTERROG EVL PM/LDLS PM: CPT | Performed by: INTERNAL MEDICINE

## 2022-02-07 PROCEDURE — 93296 REM INTERROG EVL PM/IDS: CPT | Performed by: INTERNAL MEDICINE

## 2022-02-10 ENCOUNTER — TRANSCRIBE ORDERS (OUTPATIENT)
Dept: ADMINISTRATIVE | Facility: HOSPITAL | Age: 87
End: 2022-02-10

## 2022-02-10 DIAGNOSIS — N18.31 CHRONIC KIDNEY DISEASE (CKD) STAGE G3A/A1, MODERATELY DECREASED GLOMERULAR FILTRATION RATE (GFR) BETWEEN 45-59 ML/MIN/1.73 SQUARE METER AND ALBUMINURIA CREATININE RATIO LESS THAN 30 MG/G (CMS/H*: Primary | ICD-10-CM

## 2022-03-07 ENCOUNTER — HOSPITAL ENCOUNTER (OUTPATIENT)
Dept: ULTRASOUND IMAGING | Facility: HOSPITAL | Age: 87
Discharge: HOME | End: 2022-03-07
Admitting: FAMILY MEDICINE

## 2022-03-07 DIAGNOSIS — N18.31 CHRONIC KIDNEY DISEASE (CKD) STAGE G3A/A1, MODERATELY DECREASED GLOMERULAR FILTRATION RATE (GFR) BETWEEN 45-59 ML/MIN/1.73 SQUARE METER AND ALBUMINURIA CREATININE RATIO LESS THAN 30 MG/G (CMS/H*: ICD-10-CM

## 2022-03-07 PROCEDURE — 76775 US EXAM ABDO BACK WALL LIM: CPT

## 2022-03-07 PROCEDURE — 76775 US EXAM ABDO BACK WALL LIM: CPT | Performed by: RADIOLOGY

## 2022-05-09 PROCEDURE — 93294 REM INTERROG EVL PM/LDLS PM: CPT | Performed by: INTERNAL MEDICINE

## 2022-05-09 PROCEDURE — 93296 REM INTERROG EVL PM/IDS: CPT | Performed by: INTERNAL MEDICINE

## 2022-06-14 ENCOUNTER — OFFICE VISIT (OUTPATIENT)
Dept: CARDIOLOGY | Facility: CLINIC | Age: 87
End: 2022-06-14

## 2022-06-14 VITALS
OXYGEN SATURATION: 96 % | BODY MASS INDEX: 29.18 KG/M2 | WEIGHT: 181.6 LBS | HEART RATE: 60 BPM | SYSTOLIC BLOOD PRESSURE: 120 MMHG | HEIGHT: 66 IN | DIASTOLIC BLOOD PRESSURE: 64 MMHG

## 2022-06-14 DIAGNOSIS — I25.10 CORONARY ARTERY DISEASE INVOLVING NATIVE CORONARY ARTERY OF NATIVE HEART WITHOUT ANGINA PECTORIS: Primary | ICD-10-CM

## 2022-06-14 DIAGNOSIS — I44.2 THIRD DEGREE AV BLOCK: ICD-10-CM

## 2022-06-14 DIAGNOSIS — I48.0 PAROXYSMAL ATRIAL FIBRILLATION: ICD-10-CM

## 2022-06-14 DIAGNOSIS — I10 ESSENTIAL HYPERTENSION: ICD-10-CM

## 2022-06-14 DIAGNOSIS — E78.5 HYPERLIPIDEMIA LDL GOAL <70: ICD-10-CM

## 2022-06-14 PROCEDURE — 99214 OFFICE O/P EST MOD 30 MIN: CPT | Performed by: INTERNAL MEDICINE

## 2022-06-14 PROCEDURE — 93288 INTERROG EVL PM/LDLS PM IP: CPT | Performed by: INTERNAL MEDICINE

## 2022-06-14 RX ORDER — SERTRALINE HYDROCHLORIDE 25 MG/1
25 TABLET, FILM COATED ORAL DAILY
COMMUNITY

## 2022-06-14 RX ORDER — LEVOTHYROXINE SODIUM 0.05 MG/1
50 TABLET ORAL DAILY
COMMUNITY

## 2022-06-14 NOTE — PROGRESS NOTES
[facility] Cardiology    Encounter Date: 2022    Patient ID: Abbi Mendez is a 86 y.o. female.  : 1935     PCP: Eric Driver MD       Chief Complaint: Coronary artery disease involving native coronary artery of      PROBLEM LIST:  1. Symptomatic bradycardia:  a. Syncope and collapse with evidence of advanced conduction system disease, high-grade AV block and multiple sinus pauses  b. Pacemaker placement, 2018: [employer] pacemaker.  c. Echo, 2018: EF 55%. Mild septal asymmetric LVH. Calcification of the aortic valve. Small (<1cm) pericardial effusion. No evidence of pericardial tamponade.  d. Echo, 2018: Small, less than 1 cm circumferential pericardial effusion. No evidence of pericardial tamponade. On comparison with the study of 2018, no significant change in the size of effusion is noted.  e. Echo, 2019: EF 60%. Concentric LVH. Grade I diastolic dysfunction. Mild AS with mean PG 10.9 mmHg and mild AI. Mild-to-mod MR and TR, RVSP 29 mmHg. No significant pericardial effusion.  2. Paroxysmal Atrial fibrillation, resolved, 2007.  a. Atenolol therapy.  b. Questionable recurrence of atrial fibrillation, 2015 via EMS, data deficit:  No confirmation via EMS strips or EKG.  Event recorder 2015-2015:  No atrial fibrillation or arrhythmias appreciated.   c. Event recorder (2015 - 2015): No atrial fibrillation; occasional isolated PACs; no ventricular ectopy.  d. Event recorder, 2016: No atrial fibrillation; 4 runs of repetitive PACs, the longest consisting of 6 beats at 156 BPM; dyspnea, tachypalpitations and lightheadedness were not associated with rhythm disturbance.  e. Device interrogation, 2020: 2 mode switches- max 18 minutes 25 seconds, <1% total.   f. Device interrogation 21: 1 mode switch- 1 minute- 4 years of battery life.  3. Coronary artery disease:  a. [facility], 2007:  30-50% mid/distal LAD in “worst views” within region of extreme tortuosity, EF 70% post PVC without segmental wall motion abnormality; trace MR associated with ectopy; no MVP.  b. Nuclear stress test, 02/04/2010: Negative for ischemia/scar. EF 87%.  c. Nuclear stress test, 04/10/2015: Negative for ischemia/scar. Normal EF.  4. History of left bundle branch block.   5. RADHA:   a. Diagnosed by sleep study.   b. Currently on CPAP.    6. Hypertension.  7. Dyslipidemia.  8. History of LLE DVT x2 treated with Coumadin; Coumadin discontinued in 2002.  9. History of gastric ulcer.  10. Iron deficiency anemia.  11. Status post tubal ligation.     History of Present Illness  Patient presents today for a 6 month follow-up with a history of coronary artery disease, paroxysmal atrial fibrillation, third degree AV block, and cardiac risk factors. Since last visit, the patient has been doing well overall from a cardiovascular standpoint. She does walk some but admits to not walking as much as she should. Her daughter reports that she hears her mother wheezing occasionally when she is walking from the house to the car. Patient denies chest pain, shortness of breath, orthopnea, palpitations, edema, dizziness, and syncope.      Allergies   Allergen Reactions   • Darvon [Propoxyphene] Paresthesia   • Penicillins Rash         Current Outpatient Medications:   •  acetaminophen (TYLENOL) 325 MG tablet, Take 2 tablets by mouth Every 6 (Six) Hours As Needed for Mild Pain ., Disp: 30 tablet, Rfl: 0  •  AMLODIPINE BESYLATE PO, Take 5 mg by mouth Daily., Disp: , Rfl:   •  apixaban (Eliquis) 2.5 MG tablet tablet, Take 1 tablet by mouth 2 (Two) Times a Day., Disp: 180 tablet, Rfl: 3  •  aspirin 81 MG EC tablet, Take 81 mg by mouth Daily., Disp: , Rfl:   •  atorvastatin (LIPITOR) 40 MG tablet, Take 40 mg by mouth Every Night., Disp: , Rfl:   •  carvedilol (COREG) 12.5 MG tablet, Take 25 mg by mouth 2 (Two) Times a Day With Meals., Disp: , Rfl:  "  •  esomeprazole (nexIUM) 40 MG capsule, Take 40 mg by mouth Every Morning Before Breakfast., Disp: , Rfl:   •  levothyroxine (SYNTHROID, LEVOTHROID) 50 MCG tablet, Take 50 mcg by mouth Daily., Disp: , Rfl:   •  losartan (COZAAR) 100 MG tablet, Take 100 mg by mouth Daily., Disp: , Rfl:   •  mirtazapine (REMERON) 15 MG tablet, Take 15 mg by mouth Every Night., Disp: , Rfl:   •  sertraline (ZOLOFT) 25 MG tablet, Take 25 mg by mouth Daily., Disp: , Rfl:     The following portions of the patient's history were reviewed and updated as appropriate: allergies, current medications, past family history, past medical history, past social history, past surgical history and problem list.    ROS  Review of Systems   Constitution: Negative for chills, fever, fatigue, generalized weakness.   Cardiovascular: Negative for chest pain, dyspnea on exertion, leg swelling, palpitations, orthopnea, and syncope.   Respiratory: Negative for cough, shortness of breath, and wheezing.  HENT: Negative for ear pain, nosebleeds, and tinnitus.  Gastrointestinal: Negative for abdominal pain, constipation, diarrhea, nausea and vomiting.   Genitourinary: No urinary symptoms.  Musculoskeletal: Negative for muscle cramps.  Neurological: Negative for dizziness, headaches, loss of balance, numbness, and symptoms of stroke.  Psychiatric: Normal mental status.     All other systems reviewed and are negative.        Objective:     /64 (BP Location: Right arm, Patient Position: Sitting)   Pulse 60   Ht 167.6 cm (66\")   Wt 82.4 kg (181 lb 9.6 oz)   SpO2 96%   BMI 29.31 kg/m²      Physical Exam  Constitutional: Patient appears well-developed and well-nourished.   HENT: HEENT exam unremarkable.   Neck: Neck supple. No JVD present. No carotid bruits.   Cardiovascular: Normal rate, regular rhythm and normal heart sounds. No murmur heard.   2+ symmetric pulses.   Pulmonary/Chest: Breath sounds normal. Does not exhibit tenderness.   Abdominal: Abdomen " benign.   Musculoskeletal: Does not exhibit edema.   Neurological: Neurological exam unremarkable.   Vitals reviewed.    Data Review:     Lab Results   Component Value Date    CHLPL 190 02/20/2014    TRIG 142 02/20/2014    HDL 65 02/20/2014    LDL 97 02/20/2014         Procedures     DEVICE INTERROGATION: [employer] /2016, PPM: RA pacing 77%, RV pacing 100%. P wave is 2-3 mV with a threshold of 0.8 V at 0.4 msec and an impedance of 725 ohms. R wave is PACED with a threshold of 1.2 V at 0.4 msec and an impedance of 673 ohms. Battery voltage is 3.5. Events: <1%. AT/AF, 6 mode switches, all <1 min. No VHR.         Assessment:      Diagnosis Plan   1. Coronary artery disease involving native coronary artery of native heart without angina pectoris  Stable without angina on current activity. Continue on aspirin 81 mg.    2. Paroxysmal atrial fibrillation (HCC)  Stable with no recurring palpitations. Continue on Eliquis 2.5 mg and carvedilol 12.5 mg.    3. Third degree AV block (HCC), status post PPM Normal PPM check on DI today.    4. Essential hypertension  Well controlled. Continue on amlodipine 5 mg and losartan 100 mg.    5. Hyperlipidemia LDL goal <70  Well controlled. Continue atorvastatin 40 mg,      Plan:   Stable cardiac status.   Continue current medications.   FU in 6 MO, sooner as needed.  Thank you for allowing us to participate in the care of your patient.     Scribed for Soraya Darling MD by Alison Zaman. 6/14/2022 14:02 EDT        I, Soraya Darling MD, personally performed the services described in this documentation as scribed by the above named individual in my presence, and it is both accurate and complete.  6/17/2022  08:21 EDT        Please note that portions of this note may have been completed with a voice recognition program. Efforts were made to edit the dictations, but occasionally words are mistranscribed.

## 2022-11-07 PROCEDURE — 93294 REM INTERROG EVL PM/LDLS PM: CPT | Performed by: INTERNAL MEDICINE

## 2022-11-07 PROCEDURE — 93296 REM INTERROG EVL PM/IDS: CPT | Performed by: INTERNAL MEDICINE

## 2022-11-24 NOTE — PROGRESS NOTES
[facility] Medicine Services  PROGRESS NOTE     Patient Identification:  Name:  Abbi Mendez  Age:  83 y.o.  Sex:  female  :  1935  MRN:  [id number]  Visit Number:  [id number]  Primary Care Provider:  Eric Driver MD    Length of stay:  1    ----------------------------------------------------------------------------------------------------------------------  Subjective     Chief Complaint:  Follow up for hip fracture, UTI    History of Presenting Illness:  Patient is an 83-year-old female with past medical history significant for paroxysmal atrial fibrillation, coronary artery disease status post stenting, essential hypertension, hyperlipidemia primary hyperparathyroidism, third-degree AV block status post permanent pacemaker implantation, stage III CKD, and advanced age was admitted on 2014 still with mechanical fall resulting in mild hip fracture.  Patient underwent left hip replacement: Tolerated procedure well.  Inpatient rehab consulted for further therapy.    Subjective:  Today, the patient.  Patient's daughter present at bedside.  No overnight events reported, no new complaints.  Patient's only complaint is feeling fatigued.  She has been to physical therapy, ambulated well.  Her pain is controlled with current regimen.  She denies any paresthesias, no numbness or tingling.  She denies any chest pain or dyspnea, and no cough.  Denies any fevers or chills.  She denies any abdominal pain, nausea, vomiting or diarrhea.  She reports having bowel movements.  She denies any urinary symptoms, no dysuria or hematuria.    Present during exam: Patient's daughter   ----------------------------------------------------------------------------------------------------------------------  Objective     Consults:  · Orthopedic surgery     Procedures:  · 2019: Rowell catheter insertion -- Activity restriction due to hip fracture   · 2019: Left hip bipolar  Vaccine status unknown replacement -- Dr. Jain, orthopedic surgery   · 6/6/2019: Rowell catheter removal     Current Hospital Meds:    apixaban 2.5 mg Oral Q12H   atorvastatin 40 mg Oral Nightly   busPIRone 10 mg Oral BID   carvedilol 12.5 mg Oral BID With Meals   ceftriaxone 1 g Intravenous Q24H   losartan 50 mg Oral Daily   pantoprazole 40 mg Oral QAM   sodium chloride 3 mL Intravenous Q12H       lactated ringers 125 mL/hr Last Rate: 125 mL/hr (06/05/19 2125)   sodium chloride 100 mL/hr Last Rate: 100 mL/hr (06/05/19 1657)     ----------------------------------------------------------------------------------------------------------------------  Vital Signs:  Temp:  [97.2 °F (36.2 °C)-99.7 °F (37.6 °C)] 98.1 °F (36.7 °C)  Heart Rate:  [71-87] 73  Resp:  [10-22] 18  BP: (100-185)/() 100/59  Mean Arterial Pressure (Non-Invasive) for the past 24 hrs (Last 3 readings):   Noninvasive MAP (mmHg)   06/05/19 2215 112   06/05/19 2052 121     SpO2 Percentage    06/05/19 2243 06/05/19 2300 06/06/19 0248   SpO2: 96% 94% 97%     SpO2:  [92 %-99 %] 97 %  on  Flow (L/min):  [2-10] 2;   Device (Oxygen Therapy): nasal cannula    Body mass index is 27.13 kg/m².  Wt Readings from Last 3 Encounters:   06/05/19 76.2 kg (168 lb 0.2 oz)   05/28/19 78 kg (172 lb)   02/01/19 75.8 kg (167 lb)        Intake/Output Summary (Last 24 hours) at 6/6/2019 1228  Last data filed at 6/6/2019 0914  Gross per 24 hour   Intake 1290 ml   Output 700 ml   Net 590 ml     Diet Regular; Cardiac  ----------------------------------------------------------------------------------------------------------------------  Physical exam:  Physical Exam   Constitutional: She is oriented to person, place, and time. She appears well-developed and well-nourished.  Non-toxic appearance. No distress.   Pleasant, no acute distress   HENT:   Head: Normocephalic and atraumatic.   Right Ear: External ear normal.   Left Ear: External ear normal.   Nose: Nose normal.   Mouth/Throat: Oropharynx is  clear and moist and mucous membranes are normal. No oropharyngeal exudate.   Eyes: Conjunctivae and EOM are normal. Pupils are equal, round, and reactive to light. No scleral icterus.   Neck: Trachea normal and normal range of motion. Neck supple. No JVD present. No muscular tenderness present. Carotid bruit is not present. No tracheal deviation present. No thyromegaly present.   Cardiovascular: Normal rate, regular rhythm, normal heart sounds and intact distal pulses. Exam reveals no gallop and no friction rub.   No murmur heard.  Pulmonary/Chest: Effort normal and breath sounds normal. No respiratory distress. She has no wheezes. She has no rhonchi. She has no rales. She exhibits no tenderness.   Abdominal: Soft. Bowel sounds are normal. She exhibits no distension and no mass. There is no hepatosplenomegaly. There is no tenderness. There is no rebound and no guarding. No hernia.   Genitourinary:   Genitourinary Comments: No Rowell catheter in place, making urine   Musculoskeletal: She exhibits no edema or deformity.        Left hip: She exhibits tenderness and swelling.        Right lower leg: She exhibits no swelling.        Left lower leg: She exhibits no swelling.   Left hip incision, clean dry and intact.  Mild edema.  Mild tenderness to palpation.  Good range of motion.  Distal pulses intact.  Neurovascularly intact bilaterally.  Sensation intact.  Cap refill less than 3 seconds.  Intact bilaterally.     Vascular Status -  Her right foot exhibits normal foot vasculature  and no edema. Her left foot exhibits normal foot vasculature  and no edema.  Neurological: She is alert and oriented to person, place, and time. She has normal strength. No cranial nerve deficit or sensory deficit.   Skin: Skin is warm and dry. Capillary refill takes less than 2 seconds. No rash noted. No erythema. No pallor.   Psychiatric: She has a normal mood and affect. Her speech is normal and behavior is normal. Judgment and thought  content normal.   Nursing note and vitals reviewed.     ----------------------------------------------------------------------------------------------------------------------  Tele:    Paced 80s    I have personally reviewed the EKG/Telemetry strips   ----------------------------------------------------------------------------------------------------------------------            Results from last 7 days   Lab Units 06/06/19  0415 06/05/19 0408 06/04/19  2305   WBC 10*3/mm3 14.64* 16.94* 17.80*   HEMOGLOBIN g/dL 10.5* 11.6* 11.7*   HEMATOCRIT % 33.6* 36.7 36.5   MCV fL 97.1* 94.8 94.1   MCHC g/dL 31.3* 31.6 32.1   PLATELETS 10*3/mm3 324 356 354   INR   --   --  1.19*     Results from last 7 days   Lab Units 06/06/19  0802 06/06/19  0415 06/05/19  2155 06/05/19  0508 06/05/19 0408 06/04/19  2305   SODIUM mmol/L  --  140  --   --  140 139   POTASSIUM mmol/L  --  4.0 4.5  --  3.1* 3.2*   MAGNESIUM mg/dL 2.5*  --   --  1.4*  --   --    CHLORIDE mmol/L  --  104  --   --  100 98   CO2 mmol/L  --  23.3  --   --  24.6 25.4   BUN mg/dL  --  25*  --   --  16 17   CREATININE mg/dL  --  1.26*  --   --  1.06* 1.05*   EGFR IF NONAFRICN AM mL/min/1.73  --  41*  --   --  50* 50*   CALCIUM mg/dL  --  7.4*  --   --  7.9* 8.2*   GLUCOSE mg/dL  --  201*  --   --  130* 155*   ALBUMIN g/dL  --   --   --   --  3.16* 3.17*   BILIRUBIN mg/dL  --   --   --   --  0.5 0.5   ALK PHOS U/L  --   --   --   --  74 75   AST (SGOT) U/L  --   --   --   --  22 28   ALT (SGPT) U/L  --   --   --   --  18 20   Estimated Creatinine Clearance: 35.3 mL/min (A) (by C-G formula based on SCr of 1.26 mg/dL (H)).    Hemoglobin A1C   Date/Time Value Ref Range Status   06/05/2019 0508 5.70 (H) 4.80 - 5.60 % Final     I have personally reviewed the above laboratory results.   ----------------------------------------------------------------------------------------------------------------------  Imaging Results (last 24 hours)     Procedure Component Value Units  Date/Time    XR Pelvis 1 or 2 View [[id number]] Collected:  06/06/19 0711     Updated:  06/06/19 0713    Narrative:       FINDINGS:       BONES: No acute fracture. No blastic or lytic lesions.       JOINT: TOTAL LEFT HIP ARTHROPLASTY.       SOFT TISSUES:  No soft tissue mass.    Impression:       No acute or destructive bony abnormality.     This report was finalized on 6/6/2019 7:11 AM by Dr. Maxime Treviño MD.      I have personally reviewed the above radiology results.   ----------------------------------------------------------------------------------------------------------------------  Assessment/Plan     · Acute, traumatic, closed left hip fracture status post mechanical fall: Pt s/p left hip replacement per ortho surgery. Elizabeth procedure well. Cont post op orders and recommendations per ortho, assistance is appreciated. PT. PRN pain meds with holding parameters.   · Leukocytosis: Stable.  Likely stress-induced post fracture/operation.  Patient afebrile, no active signs or symptoms of infection.  Monitor closely with antibiotic therapy.  Repeat CBC in a.m.  · Urinary tract infection: Continue with IV rocephin daily. Urine culture pending. Afebrile.   · Acute hypokalemia: Double supplementation.  Replace per protocol as necessary.  Repeat chemistry panel in the morning.  · Hypomagnesemia: Resolved with supplementation with mom.  Hypomagnesemia.  Monitor closely repeat magnesium level in the morning.  · History of coronary artery disease status post stenting in the past: No chest pain or symptoms of ACS.  Closely monitor on telemetry. Continue medical therapy.   · Paroxysmal atrial fibrillation: Cont Coreg for rate control, she remains rate controlled. Cont telemetry monitoring. Eliquis for anticoagulation.   · Essential hypertension: BP moderately controlled. Cont Coreg and losartan with holding parameters. Closely monitor and titrate medications as necessary.    · Hyperlipidemia: Continue statin therapy.    · History of primary hyperparathyroidism: Monitor.   · History of third-degree AV block status post permanent pacemaker implantation: telemetry monitoring.   · Hyperglycemia: A1C 5.70. Monitor with daily chemistry, no indication for SSI at this time.   · Stage III CKD: Appears stable. Avoid nephrotoxins. Monitor closely, repeat chem panel in AM.   · Vitamin D deficiency: Cholecalciferol supplementation.   · Mild hypoalbuminemia: Likely multifactorial. Encourage PO intake. Monitor closely.   · Anxiety: Continue Buspar.   · Microscopic hematuria/proteinuria: Protein/creatinine ratio elevated, likely hypertensive nephropathy. Closely monitor, recommend outpatient follow up once stabilized from surgery standpoint. Treat HTN as previously mentioned.   · Macrocytic anemia: Obtain vitamin levels, supplement if deficient. No signs of bleeding. Likely ABLA post operatively. Closely monitor H&H, transfuse should hgb drop below 7.0. Repeat CBC in AM.   · F/E/N: No IV fluids. Replace electrolytes per protocol as necessary. Cardiac diet.     --------------------------------------------------  DVT Prophylaxis:  Eliquis to serve   GI Prophylaxis: Protonix   Activity: up with assistance    The patient is high risk due to the following diagnoses/reasons:  Hip fracture, advanced age, CAD, UTI, electrolyte abnormalities, afib, anticoagulation    I have discussed the patient's assessment and plan with the patient and nursing staff.     Disposition: Patient states that she would like to go home on discharge with home health.  Patient's daughter at bedside feels that she needs further therapy with inpatient rehab.  Patient states she will think about inpatient rehab if she does not progress adequately with physical therapy while inpatient.  Inpatient rehab consult placed.   to follow with disposition planning.      Alize Parsons PA-C  Hospitalist Service -- [facility]   Pager: [phone]    06/06/19  12:28  PM    Attending Physician: Tristan Melara MD    ----------------------------------------------------------------------------------------------------------------------

## 2023-02-06 PROCEDURE — 93296 REM INTERROG EVL PM/IDS: CPT | Performed by: INTERNAL MEDICINE

## 2023-02-06 PROCEDURE — 93294 REM INTERROG EVL PM/LDLS PM: CPT | Performed by: INTERNAL MEDICINE

## 2023-02-28 ENCOUNTER — OFFICE VISIT (OUTPATIENT)
Dept: CARDIOLOGY | Facility: CLINIC | Age: 88
End: 2023-02-28
Payer: MEDICARE

## 2023-02-28 VITALS — WEIGHT: 170.8 LBS | HEART RATE: 60 BPM | HEIGHT: 66 IN | BODY MASS INDEX: 27.45 KG/M2 | OXYGEN SATURATION: 98 %

## 2023-02-28 DIAGNOSIS — I25.10 CORONARY ARTERY DISEASE INVOLVING NATIVE CORONARY ARTERY OF NATIVE HEART WITHOUT ANGINA PECTORIS: Primary | ICD-10-CM

## 2023-02-28 DIAGNOSIS — I44.2 THIRD DEGREE AV BLOCK: ICD-10-CM

## 2023-02-28 DIAGNOSIS — I48.0 PAROXYSMAL ATRIAL FIBRILLATION: ICD-10-CM

## 2023-02-28 DIAGNOSIS — I10 ESSENTIAL HYPERTENSION: ICD-10-CM

## 2023-02-28 DIAGNOSIS — E78.5 HYPERLIPIDEMIA LDL GOAL <70: ICD-10-CM

## 2023-02-28 PROCEDURE — 99214 OFFICE O/P EST MOD 30 MIN: CPT

## 2023-02-28 PROCEDURE — 93280 PM DEVICE PROGR EVAL DUAL: CPT

## 2023-02-28 RX ORDER — CHOLECALCIFEROL (VITAMIN D3) 1250 MCG
CAPSULE ORAL
COMMUNITY

## 2023-02-28 RX ORDER — CARVEDILOL 25 MG/1
25 TABLET ORAL 2 TIMES DAILY WITH MEALS
COMMUNITY

## 2023-02-28 RX ORDER — AMLODIPINE BESYLATE 5 MG/1
1 TABLET ORAL EVERY MORNING
COMMUNITY
Start: 2022-12-20

## 2023-03-03 ENCOUNTER — TRANSCRIBE ORDERS (OUTPATIENT)
Dept: ADMINISTRATIVE | Facility: HOSPITAL | Age: 88
End: 2023-03-03
Payer: MEDICARE

## 2023-03-03 DIAGNOSIS — Z12.31 ENCOUNTER FOR MAMMOGRAM TO ESTABLISH BASELINE MAMMOGRAM: Primary | ICD-10-CM

## 2023-03-03 DIAGNOSIS — Z78.0 POST-MENOPAUSAL: Primary | ICD-10-CM

## 2023-03-06 ENCOUNTER — TRANSCRIBE ORDERS (OUTPATIENT)
Dept: ADMINISTRATIVE | Facility: HOSPITAL | Age: 88
End: 2023-03-06
Payer: MEDICARE

## 2023-03-06 DIAGNOSIS — F03.A3 MILD DEMENTIA WITH MOOD DISTURBANCE, UNSPECIFIED DEMENTIA TYPE: Primary | ICD-10-CM

## 2023-04-04 ENCOUNTER — HOSPITAL ENCOUNTER (OUTPATIENT)
Dept: BONE DENSITY | Facility: HOSPITAL | Age: 88
Discharge: HOME | End: 2023-04-04
Payer: MEDICARE

## 2023-04-04 ENCOUNTER — HOSPITAL ENCOUNTER (OUTPATIENT)
Dept: MAMMOGRAPHY | Facility: HOSPITAL | Age: 88
Discharge: HOME | End: 2023-04-04
Payer: MEDICARE

## 2023-04-04 DIAGNOSIS — Z12.31 ENCOUNTER FOR MAMMOGRAM TO ESTABLISH BASELINE MAMMOGRAM: ICD-10-CM

## 2023-04-04 DIAGNOSIS — Z78.0 POST-MENOPAUSAL: ICD-10-CM

## 2023-04-04 PROCEDURE — 77080 DXA BONE DENSITY AXIAL: CPT | Performed by: RADIOLOGY

## 2023-04-04 PROCEDURE — 77067 SCR MAMMO BI INCL CAD: CPT | Performed by: RADIOLOGY

## 2023-04-04 PROCEDURE — 77080 DXA BONE DENSITY AXIAL: CPT

## 2023-04-04 PROCEDURE — 77063 BREAST TOMOSYNTHESIS BI: CPT | Performed by: RADIOLOGY

## 2023-04-04 PROCEDURE — 77063 BREAST TOMOSYNTHESIS BI: CPT

## 2023-04-04 PROCEDURE — 77067 SCR MAMMO BI INCL CAD: CPT

## 2023-05-08 PROCEDURE — 93296 REM INTERROG EVL PM/IDS: CPT | Performed by: INTERNAL MEDICINE

## 2023-05-08 PROCEDURE — 93294 REM INTERROG EVL PM/LDLS PM: CPT | Performed by: INTERNAL MEDICINE

## 2023-08-07 PROCEDURE — 93294 REM INTERROG EVL PM/LDLS PM: CPT | Performed by: INTERNAL MEDICINE

## 2023-08-07 PROCEDURE — 93296 REM INTERROG EVL PM/IDS: CPT | Performed by: INTERNAL MEDICINE

## 2024-02-05 ENCOUNTER — TELEPHONE (OUTPATIENT)
Dept: CARDIOLOGY | Facility: CLINIC | Age: 89
End: 2024-02-05
Payer: MEDICARE

## 2024-02-05 NOTE — TELEPHONE ENCOUNTER
Caller: Shruthi Gama    Relationship: Emergency Contact    Best call back number: [phone]    What is the best time to reach you: ANY    What was the call regarding: INQUIRING ABOUT: WHAT IF THE BATTERY HAS TO BE REPLACED IN THE PACEMAKER-HOW WILL THIS WORK WITH [employer] PPO COVERAGE. PLEASE CALL THE ABOVE TO DISCUSS    Is it okay if the provider responds through [facility]: NO        DELETE AFTER READING TO PATIENT: “ Thank you for sharing this information with me. I will send a message to the . Please allow up to 48 hours for the  to follow up on this request.”

## 2024-02-05 NOTE — TELEPHONE ENCOUNTER
Spoke with daughter juan explained we still accept [employer] P[employer] but her mother may have to pay more out of pocket. I suggested she call her mother's insurance and speak with them regarding the out of pocket cost.

## 2024-02-07 PROCEDURE — 93296 REM INTERROG EVL PM/IDS: CPT | Performed by: INTERNAL MEDICINE

## 2024-02-07 PROCEDURE — 93294 REM INTERROG EVL PM/LDLS PM: CPT | Performed by: INTERNAL MEDICINE

## 2024-04-22 NOTE — PROGRESS NOTES
[facility] Cardiology    Encounter Date: 2024    Patient ID: Abbi Mendez is a 88 y.o. female.  : 1935     PCP: Bettye Bryan DO       Chief Complaint: Atrial Fibrillation and Hypertension      PROBLEM LIST:  Symptomatic bradycardia:  Syncope and collapse with evidence of advanced conduction system disease, high-grade AV block and multiple sinus pauses  Pacemaker placement, 2018: [employer] pacemaker.  Echo, 2018: EF 55%. Mild septal asymmetric LVH. Calcification of the aortic valve. Small (<1cm) pericardial effusion. No evidence of pericardial tamponade.  Echo, 2018: Small, less than 1 cm circumferential pericardial effusion. No evidence of pericardial tamponade. On comparison with the study of 2018, no significant change in the size of effusion is noted.  Echo, 2019: EF 60%. Concentric LVH. Grade I diastolic dysfunction. Mild AS with mean PG 10.9 mmHg and mild AI. Mild-to-mod MR and TR, RVSP 29 mmHg. No significant pericardial effusion.  Paroxysmal Atrial fibrillation, resolved, 2007.  Atenolol therapy.  Questionable recurrence of atrial fibrillation, 2015 via EMS, data deficit:  No confirmation via EMS strips or EKG.  Event recorder 2015-2015:  No atrial fibrillation or arrhythmias appreciated.   Event recorder (2015 - 2015): No atrial fibrillation; occasional isolated PACs; no ventricular ectopy.  Event recorder, 2016: No atrial fibrillation; 4 runs of repetitive PACs, the longest consisting of 6 beats at 156 BPM; dyspnea, tachypalpitations and lightheadedness were not associated with rhythm disturbance.  Device interrogation, 2020: 2 mode switches- max 18 minutes 25 seconds, <1% total.   Device interrogation 21: 1 mode switch- 1 minute- 4 years of battery life.  Coronary artery disease:  [facility], 2007: 30-50% mid/distal LAD in “worst views” within region of extreme  tortuosity, EF 70% post PVC without segmental wall motion abnormality; trace MR associated with ectopy; no MVP.  Nuclear stress test, 02/04/2010: Negative for ischemia/scar. EF 87%.  Nuclear stress test, 04/10/2015: Negative for ischemia/scar. Normal EF.  History of left bundle branch block.   RADHA:   Diagnosed by sleep study.   Currently on CPAP.    Hypertension.  Dyslipidemia.  History of LLE DVT x2 treated with Coumadin; Coumadin discontinued in 2002.  History of gastric ulcer   Iron deficiency anemia.  Status post tubal ligation.     History of Present Illness  Patient presents today for a follow-up with a history of CAD, PAF, and cardiac risk factors. Since last visit, patient has been doing well from a cardiac standpoint. She is going to physical therapy twice per week to help maintain strength and balance. Patient denies any chest pain, shortness of air, palpitations, orthopnea, edema, presyncope or syncope. Her BP has been well controlled at home. She has a flower garden and she loves to work in this. She does live alone.     Allergies   Allergen Reactions    Darvon [Propoxyphene] Paresthesia    Penicillins Rash         Current Outpatient Medications:     acetaminophen (TYLENOL) 325 MG tablet, Take 2 tablets by mouth Every 6 (Six) Hours As Needed for Mild Pain ., Disp: 30 tablet, Rfl: 0    amLODIPine (NORVASC) 5 MG tablet, Take 1 tablet by mouth Every Morning., Disp: , Rfl:     apixaban (Eliquis) 2.5 MG tablet tablet, Take 1 tablet by mouth 2 (Two) Times a Day., Disp: 180 tablet, Rfl: 3    aspirin 81 MG EC tablet, Take 1 tablet by mouth Every Night., Disp: , Rfl:     atorvastatin (LIPITOR) 40 MG tablet, Take 1 tablet by mouth Every Night., Disp: , Rfl:     calcium carbonate, oyster shell, 500 MG tablet tablet, Take 1 tablet by mouth Daily., Disp: , Rfl:     carvedilol (COREG) 25 MG tablet, Take 1 tablet by mouth 2 (Two) Times a Day With Meals., Disp: , Rfl:     cetirizine (zyrTEC) 10 MG tablet, Take 1 tablet  "by mouth Daily., Disp: , Rfl:     donepezil (ARICEPT) 10 MG tablet, TAKE ONE Tablet BY MOUTH EACH NIGHT AT BEDTIME, Disp: , Rfl:     famotidine (PEPCID) 20 MG tablet, Take 1 tablet by mouth 2 (Two) Times a Day. as directed, Disp: , Rfl:     FLUoxetine (PROzac) 10 MG capsule, Take 1 capsule by mouth Daily., Disp: , Rfl:     levothyroxine (SYNTHROID, LEVOTHROID) 50 MCG tablet, Take 1 tablet by mouth Daily., Disp: , Rfl:     methocarbamol (ROBAXIN) 500 MG tablet, Take 1 tablet by mouth As Needed for Muscle Spasms., Disp: , Rfl:     traMADol (ULTRAM) 50 MG tablet, Take 1 tablet by mouth Every 6 (Six) Hours As Needed for Moderate Pain., Disp: , Rfl:     vitamin D (ERGOCALCIFEROL) 1.25 MG (00595 UT) capsule capsule, Take 1 capsule by mouth 1 (One) Time Per Week., Disp: , Rfl:     losartan (COZAAR) 100 MG tablet, Take 1 tablet by mouth Daily. (Patient not taking: Reported on 4/23/2024), Disp: , Rfl:     The following portions of the patient's history were reviewed and updated as appropriate: allergies, current medications, past family history, past medical history, past social history, past surgical history and problem list.        Objective:     /60 (BP Location: Right arm, Patient Position: Sitting)   Pulse 60   Ht 162.6 cm (64\")   Wt 72.4 kg (159 lb 9.6 oz)   SpO2 92%   BMI 27.40 kg/m²      Physical Exam  Constitutional: Patient appears well-developed and well-nourished.   HENT: HEENT exam unremarkable.   Neck: Neck supple. No JVD present.   Cardiovascular: Normal rate, regular rhythm and normal heart sounds. No murmur heard.   2+ symmetric pulses.   Pulmonary/Chest: Breath sounds normal. Does not exhibit tenderness.   Abdominal: Abdomen benign.   Musculoskeletal: Does not exhibit edema.   Neurological: Neurological exam unremarkable.   Vitals reviewed.    Data Review:   Lab date: 05/02/2023  CMP: Glu 93, BUN 22, Creat 1.01, eGFR 54, Na 141, K 4.4, Cl 103, CO2 25, Ca 8.9, Alk Phos 64, AST 20, ALT 11   "   Procedures       Advance Care Planning   ACP discussion was held with the patient during this visit. Patient does not have an advance directive, declines further assistance.           Assessment and plan:      Diagnosis Plan   1. Coronary artery disease involving native coronary artery of native heart without angina pectoris  Stable without current angina. Continue aspirin and statin.      2. Paroxysmal atrial fibrillation  Khanh is unable to make check for her appt today. Remote monitor reading received 1.5 yrs on battery. Longest AF since last remote 8 sec.     Continue Eliquis 2.5mg BID. Can consider discontinuation if she does not have frequent or prolonged episodes of atrial fibrillation on next interrogation.      3. Essential hypertension  Elevated in the office today but well controlled at home. Continue current antihypertensive regimen.       4. Dyslipidemia  Continue statin therapy.         Continue current medications.   FU in 6 MO, sooner as needed.  Thank you for allowing us to participate in the care of your patient.       Harriett Downing PA-C    Please note that portions of this note may have been completed with a voice recognition program. Efforts were made to edit the dictations, but occasionally words are mistranscribed.

## 2024-04-23 ENCOUNTER — OFFICE VISIT (OUTPATIENT)
Dept: CARDIOLOGY | Facility: CLINIC | Age: 89
End: 2024-04-23
Payer: MEDICARE

## 2024-04-23 VITALS
OXYGEN SATURATION: 92 % | DIASTOLIC BLOOD PRESSURE: 60 MMHG | WEIGHT: 159.6 LBS | HEART RATE: 60 BPM | BODY MASS INDEX: 27.25 KG/M2 | HEIGHT: 64 IN | SYSTOLIC BLOOD PRESSURE: 152 MMHG

## 2024-04-23 DIAGNOSIS — I10 ESSENTIAL HYPERTENSION: ICD-10-CM

## 2024-04-23 DIAGNOSIS — E78.5 DYSLIPIDEMIA: ICD-10-CM

## 2024-04-23 DIAGNOSIS — I25.10 CORONARY ARTERY DISEASE INVOLVING NATIVE CORONARY ARTERY OF NATIVE HEART WITHOUT ANGINA PECTORIS: Primary | ICD-10-CM

## 2024-04-23 DIAGNOSIS — I48.0 PAROXYSMAL ATRIAL FIBRILLATION: ICD-10-CM

## 2024-04-23 RX ORDER — CETIRIZINE HYDROCHLORIDE 10 MG/1
10 TABLET ORAL DAILY
COMMUNITY

## 2024-04-23 RX ORDER — DONEPEZIL HYDROCHLORIDE 10 MG/1
TABLET, FILM COATED ORAL
COMMUNITY

## 2024-04-23 RX ORDER — CALCIUM CARBONATE 500(1250)
500 TABLET ORAL DAILY
COMMUNITY

## 2024-04-23 RX ORDER — FLUOXETINE 10 MG/1
1 CAPSULE ORAL DAILY
COMMUNITY
Start: 2024-02-23

## 2024-04-23 RX ORDER — METHOCARBAMOL 500 MG/1
500 TABLET, FILM COATED ORAL AS NEEDED
COMMUNITY

## 2024-04-23 RX ORDER — TRAMADOL HYDROCHLORIDE 50 MG/1
50 TABLET ORAL EVERY 6 HOURS PRN
COMMUNITY

## 2024-04-23 RX ORDER — ERGOCALCIFEROL 1.25 MG/1
1 CAPSULE ORAL WEEKLY
COMMUNITY
Start: 2024-02-06

## 2024-04-23 RX ORDER — FAMOTIDINE 20 MG/1
20 TABLET, FILM COATED ORAL 2 TIMES DAILY
COMMUNITY
Start: 2024-03-01

## 2024-11-06 ENCOUNTER — TELEPHONE (OUTPATIENT)
Dept: CARDIOLOGY | Facility: CLINIC | Age: 89
End: 2024-11-06
Payer: MEDICARE

## 2024-11-06 NOTE — TELEPHONE ENCOUNTER
Spoke with daughter and let her know her mom's latitude monitor did not send the scheduled reading. She is going to her mothers house and will call me back.

## 2025-01-10 NOTE — PROGRESS NOTES
[facility]    Encounter Date: 2025    Patient ID: Abbi Mendez is a 89 y.o. female.  : 1935     PCP: Bettye Bryan DO       Chief Complaint: Coronary Artery Disease      PROBLEM LIST:  Symptomatic bradycardia:  Syncope and collapse with evidence of advanced conduction system disease, high-grade AV block and multiple sinus pauses  Pacemaker placement, 2018: [employer] pacemaker.  Echo, 2018: EF 55%. Mild septal asymmetric LVH. Calcification of the aortic valve. Small (<1cm) pericardial effusion. No evidence of pericardial tamponade.  Echo, 2018: Small, less than 1 cm circumferential pericardial effusion. No evidence of pericardial tamponade. On comparison with the study of 2018, no significant change in the size of effusion is noted.  Echo, 2019: EF 60%. Concentric LVH. Grade I diastolic dysfunction. Mild AS with mean PG 10.9 mmHg and mild AI. Mild-to-mod MR and TR, RVSP 29 mmHg. No significant pericardial effusion.  Paroxysmal Atrial fibrillation, resolved, 2007.  Atenolol therapy.  Questionable recurrence of atrial fibrillation, 2015 via EMS, data deficit:  No confirmation via EMS strips or EKG.  Event recorder 2015-2015:  No atrial fibrillation or arrhythmias appreciated.   Event recorder (2015 - 2015): No atrial fibrillation; occasional isolated PACs; no ventricular ectopy.  Event recorder, 2016: No atrial fibrillation; 4 runs of repetitive PACs, the longest consisting of 6 beats at 156 BPM; dyspnea, tachypalpitations and lightheadedness were not associated with rhythm disturbance.  Device interrogation, 2020: 2 mode switches- max 18 minutes 25 seconds, <1% total.   Device interrogation 21: 1 mode switch- 1 minute- 4 years of battery life.  Coronary artery disease:  [facility], 2007: 30-50% mid/distal LAD in “worst views” within region of extreme tortuosity, EF  70% post PVC without segmental wall motion abnormality; trace MR associated with ectopy; no MVP.  Nuclear stress test, 02/04/2010: Negative for ischemia/scar. EF 87%.  Nuclear stress test, 04/10/2015: Negative for ischemia/scar. Normal EF.  History of left bundle branch block.   RADHA:   Diagnosed by sleep study.   Currently on CPAP.    Hypertension.  Dyslipidemia.  History of LLE DVT x2 treated with Coumadin; Coumadin discontinued in 2002.  History of gastric ulcer   Iron deficiency anemia.  Status post tubal ligation.    History of Present Illness  Patient presents today for a follow-up with a history of CAD, PAF, and cardiac risk factors. Since last visit, she has done well.  She continues to live alone and is able to keep up with her housework without limiting symptoms.  She is requesting to stop Eliquis due to cost and bleeding risk.  Home blood pressures have been well-controlled.  She denies any tachypalpitations, chest pain, shortness of breath, orthopnea, presyncope/syncope, and edema.  Remote device interrogation in November showed 1 year remaining on pacemaker, less than 1% atrial fibrillation.    Allergies   Allergen Reactions    Darvon [Propoxyphene] Paresthesia    Penicillins Rash       Current Outpatient Medications   Medication Instructions    acetaminophen (TYLENOL) 650 mg, Oral, Every 6 Hours PRN    apixaban (ELIQUIS) 2.5 mg, Oral, 2 Times Daily    aspirin 81 mg, Nightly    atorvastatin (LIPITOR) 40 mg, Nightly    calcium carbonate (oyster shell) 500 mg, Daily    carvedilol (COREG) 25 mg, 2 Times Daily With Meals    donepezil (ARICEPT) 10 MG tablet TAKE ONE Tablet BY MOUTH EACH NIGHT AT BEDTIME    FeroSul 325 (65 Fe) MG tablet 1 tablet, Daily    FLUoxetine (PROZAC) 20 mg, Daily    levothyroxine (SYNTHROID, LEVOTHROID) 50 mcg, Daily    losartan (COZAAR) 25 MG tablet 1 tablet, Oral, Nightly    methocarbamol (ROBAXIN) 500 mg, As Needed    traMADol (ULTRAM) 50 mg, Every 6 Hours PRN         The following  "portions of the patient's history were reviewed and updated as appropriate: allergies, current medications, past family history, past medical history, past social history, past surgical history and problem list.    ROS  Review of Systems   14 point ROS negative except for that listed in the HPI.         Objective:     BP 93/52 (BP Location: Right arm, Patient Position: Sitting)   Pulse 60   Ht 157.5 cm (62\")   Wt 72.7 kg (160 lb 3.2 oz)   SpO2 94%   BMI 29.30 kg/m²      Physical Exam  General: No apparent distress.  Neck: no JVD.  Chest:No respiratory distress, breath sounds are normal. No wheezes,  rhonchi or rales.  Cardiovascular: Normal S1 and S2, no murmur, gallop or rub.    Extremities: No edema.      Data Review:   No recent laboratory studies available for review today.       Procedures       Advance Care Planning   ACP discussion was declined by the patient. Patient does not have an advance directive, declines further assistance.           Assessment:      Diagnosis Plan   1. Paroxysmal atrial fibrillation   SSS, PPM <1% atrial fibrillation on remote interrogation in November, sufficient battery duration.  Continue remote interrogations  Recommend continuation of Eliquis for stroke prophylaxis  With concerns related to bleeding risk we will stop aspirin.      2. Essential hypertension  Well-controlled.  Continue losartan and carvedilol      3. Dyslipidemia  Continue statin        Plan:   Stable cardiac status.   Discontinue aspirin, continue Eliquis.  Continue rest of the current medications.  FU in 6 MO, sooner as needed.  Thank you for allowing us to participate in the care of your patient.       Scribed for Soraya Darling MD by ANGEOL Gallardo. 1/14/2025  12:17 EST    I, Soraya Darling MD, personally performed the services described in this documentation as scribed by the above named individual in my presence, and it is both accurate and complete.  1/15/2025  06:21 EST      Please note that " portions of this note may have been completed with a voice recognition program. Efforts were made to edit the dictations, but occasionally words are mistranscribed.

## 2025-01-14 ENCOUNTER — OFFICE VISIT (OUTPATIENT)
Dept: CARDIOLOGY | Facility: CLINIC | Age: OVER 89
End: 2025-01-14
Payer: MEDICARE

## 2025-01-14 VITALS
DIASTOLIC BLOOD PRESSURE: 52 MMHG | SYSTOLIC BLOOD PRESSURE: 93 MMHG | WEIGHT: 160.2 LBS | OXYGEN SATURATION: 94 % | BODY MASS INDEX: 29.48 KG/M2 | HEIGHT: 62 IN | HEART RATE: 60 BPM

## 2025-01-14 DIAGNOSIS — I10 ESSENTIAL HYPERTENSION: ICD-10-CM

## 2025-01-14 DIAGNOSIS — I48.0 PAROXYSMAL ATRIAL FIBRILLATION: Primary | ICD-10-CM

## 2025-01-14 DIAGNOSIS — E78.5 DYSLIPIDEMIA: ICD-10-CM

## 2025-01-14 PROCEDURE — 1160F RVW MEDS BY RX/DR IN RCRD: CPT | Performed by: INTERNAL MEDICINE

## 2025-01-14 PROCEDURE — 99214 OFFICE O/P EST MOD 30 MIN: CPT | Performed by: INTERNAL MEDICINE

## 2025-01-14 PROCEDURE — 1159F MED LIST DOCD IN RCRD: CPT | Performed by: INTERNAL MEDICINE

## 2025-01-14 RX ORDER — FERROUS SULFATE 325(65) MG
1 TABLET ORAL DAILY
COMMUNITY
Start: 2024-09-17

## 2025-01-14 RX ORDER — LOSARTAN POTASSIUM 25 MG/1
1 TABLET ORAL NIGHTLY
COMMUNITY
Start: 2024-09-17

## 2025-02-05 PROCEDURE — 93294 REM INTERROG EVL PM/LDLS PM: CPT | Performed by: INTERNAL MEDICINE

## 2025-02-05 PROCEDURE — 93296 REM INTERROG EVL PM/IDS: CPT | Performed by: INTERNAL MEDICINE

## 2025-03-06 ENCOUNTER — TELEPHONE (OUTPATIENT)
Dept: CARDIOLOGY | Facility: CLINIC | Age: OVER 89
End: 2025-03-06
Payer: MEDICARE

## 2025-03-06 DIAGNOSIS — I44.2 AV BLOCK, COMPLETE: Primary | ICD-10-CM

## 2025-03-06 NOTE — TELEPHONE ENCOUNTER
Mrs Vanessa is dependent and part of a BSC recall.   Setting her up for a generator change and discussed with Dr Darling.  He wants to hold Eliquis 2 days prior.

## 2025-03-07 PROBLEM — I44.2 AV BLOCK, COMPLETE: Status: ACTIVE | Noted: 2025-03-06

## 2025-03-12 DIAGNOSIS — Z95.0 PACEMAKER: Primary | ICD-10-CM

## 2025-03-12 RX ORDER — SODIUM CHLORIDE 0.9 % (FLUSH) 0.9 %
10 SYRINGE (ML) INJECTION AS NEEDED
OUTPATIENT
Start: 2025-03-12

## 2025-03-12 RX ORDER — SODIUM CHLORIDE 9 MG/ML
40 INJECTION, SOLUTION INTRAVENOUS AS NEEDED
OUTPATIENT
Start: 2025-03-12

## 2025-03-12 RX ORDER — SODIUM CHLORIDE 0.9 % (FLUSH) 0.9 %
10 SYRINGE (ML) INJECTION EVERY 12 HOURS SCHEDULED
OUTPATIENT
Start: 2025-03-12

## 2025-03-24 ENCOUNTER — HOSPITAL ENCOUNTER (OUTPATIENT)
Facility: HOSPITAL | Age: OVER 89
Setting detail: HOSPITAL OUTPATIENT SURGERY
Discharge: HOME | End: 2025-03-24
Attending: INTERNAL MEDICINE | Admitting: INTERNAL MEDICINE
Payer: MEDICARE

## 2025-03-24 VITALS
WEIGHT: 159.17 LBS | HEART RATE: 60 BPM | HEIGHT: 64 IN | RESPIRATION RATE: 16 BRPM | OXYGEN SATURATION: 94 % | BODY MASS INDEX: 27.17 KG/M2 | DIASTOLIC BLOOD PRESSURE: 68 MMHG | SYSTOLIC BLOOD PRESSURE: 168 MMHG

## 2025-03-24 DIAGNOSIS — I44.2 AV BLOCK, COMPLETE: ICD-10-CM

## 2025-03-24 DIAGNOSIS — Z95.0 PACEMAKER: ICD-10-CM

## 2025-03-24 LAB
ANION GAP SERPL CALCULATED.3IONS-SCNC: 10 MMOL/L (ref 5–15)
BUN SERPL-MCNC: 19 MG/DL (ref 8–23)
BUN/CREAT SERPL: 17.3 (ref 7–25)
CALCIUM SPEC-SCNC: 8.7 MG/DL (ref 8.6–10.5)
CHLORIDE SERPL-SCNC: 102 MMOL/L (ref 98–107)
CO2 SERPL-SCNC: 28 MMOL/L (ref 22–29)
CREAT SERPL-MCNC: 1.1 MG/DL (ref 0.57–1)
DEPRECATED RDW RBC AUTO: 41.8 FL (ref 37–54)
EGFRCR SERPLBLD CKD-EPI 2021: 48.1 ML/MIN/1.73
ERYTHROCYTE [DISTWIDTH] IN BLOOD BY AUTOMATED COUNT: 12.5 % (ref 12.3–15.4)
GLUCOSE SERPL-MCNC: 101 MG/DL (ref 65–99)
HCT VFR BLD AUTO: 37 % (ref 34–46.6)
HGB BLD-MCNC: 12.2 G/DL (ref 12–15.9)
MCH RBC QN AUTO: 30.1 PG (ref 26.6–33)
MCHC RBC AUTO-ENTMCNC: 33 G/DL (ref 31.5–35.7)
MCV RBC AUTO: 91.4 FL (ref 79–97)
PLATELET # BLD AUTO: 181 10*3/MM3 (ref 140–450)
PMV BLD AUTO: 10.5 FL (ref 6–12)
POTASSIUM SERPL-SCNC: 4.2 MMOL/L (ref 3.5–5.2)
RBC # BLD AUTO: 4.05 10*6/MM3 (ref 3.77–5.28)
SODIUM SERPL-SCNC: 140 MMOL/L (ref 136–145)
WBC NRBC COR # BLD AUTO: 7.94 10*3/MM3 (ref 3.4–10.8)

## 2025-03-24 PROCEDURE — 25010000002 CEFAZOLIN PER 500 MG

## 2025-03-24 PROCEDURE — 25010000002 LIDOCAINE PF 1% 1 % SOLUTION: Performed by: INTERNAL MEDICINE

## 2025-03-24 PROCEDURE — 85027 COMPLETE CBC AUTOMATED: CPT | Performed by: INTERNAL MEDICINE

## 2025-03-24 PROCEDURE — 80048 BASIC METABOLIC PNL TOTAL CA: CPT | Performed by: INTERNAL MEDICINE

## 2025-03-24 PROCEDURE — S0260 H&P FOR SURGERY: HCPCS | Performed by: INTERNAL MEDICINE

## 2025-03-24 PROCEDURE — C1785 PMKR, DUAL, RATE-RESP: HCPCS | Performed by: INTERNAL MEDICINE

## 2025-03-24 PROCEDURE — 25010000002 FENTANYL CITRATE (PF) 50 MCG/ML SOLUTION: Performed by: INTERNAL MEDICINE

## 2025-03-24 PROCEDURE — 36415 COLL VENOUS BLD VENIPUNCTURE: CPT

## 2025-03-24 PROCEDURE — 25010000002 MIDAZOLAM PER 1 MG: Performed by: INTERNAL MEDICINE

## 2025-03-24 PROCEDURE — 33228 REMV&REPLC PM GEN DUAL LEAD: CPT | Performed by: INTERNAL MEDICINE

## 2025-03-24 DEVICE — PACEMAKER
Type: IMPLANTABLE DEVICE | Site: CHEST | Status: FUNCTIONAL
Brand: ACCOLADE™ MRI DR

## 2025-03-24 RX ORDER — SODIUM CHLORIDE 0.9 % (FLUSH) 0.9 %
10 SYRINGE (ML) INJECTION EVERY 12 HOURS SCHEDULED
Status: CANCELLED | OUTPATIENT
Start: 2025-03-24

## 2025-03-24 RX ORDER — SODIUM CHLORIDE 0.9 % (FLUSH) 0.9 %
10 SYRINGE (ML) INJECTION EVERY 12 HOURS SCHEDULED
Status: DISCONTINUED | OUTPATIENT
Start: 2025-03-24 | End: 2025-03-24 | Stop reason: HOSPADM

## 2025-03-24 RX ORDER — SODIUM CHLORIDE 9 MG/ML
40 INJECTION, SOLUTION INTRAVENOUS AS NEEDED
Status: DISCONTINUED | OUTPATIENT
Start: 2025-03-24 | End: 2025-03-24 | Stop reason: HOSPADM

## 2025-03-24 RX ORDER — SODIUM CHLORIDE 9 MG/ML
40 INJECTION, SOLUTION INTRAVENOUS AS NEEDED
Status: CANCELLED | OUTPATIENT
Start: 2025-03-24

## 2025-03-24 RX ORDER — ESOMEPRAZOLE MAGNESIUM 40 MG/1
40 CAPSULE, DELAYED RELEASE ORAL
COMMUNITY

## 2025-03-24 RX ORDER — CARVEDILOL 12.5 MG/1
25 TABLET ORAL 2 TIMES DAILY WITH MEALS
Status: DISCONTINUED | OUTPATIENT
Start: 2025-03-24 | End: 2025-03-24 | Stop reason: HOSPADM

## 2025-03-24 RX ORDER — LIDOCAINE HYDROCHLORIDE 10 MG/ML
INJECTION, SOLUTION EPIDURAL; INFILTRATION; INTRACAUDAL; PERINEURAL
Status: DISCONTINUED | OUTPATIENT
Start: 2025-03-24 | End: 2025-03-24 | Stop reason: HOSPADM

## 2025-03-24 RX ORDER — ACETAMINOPHEN 325 MG/1
650 TABLET ORAL EVERY 6 HOURS PRN
Status: DISCONTINUED | OUTPATIENT
Start: 2025-03-24 | End: 2025-03-24 | Stop reason: HOSPADM

## 2025-03-24 RX ORDER — SODIUM CHLORIDE 0.9 % (FLUSH) 0.9 %
10 SYRINGE (ML) INJECTION AS NEEDED
Status: CANCELLED | OUTPATIENT
Start: 2025-03-24

## 2025-03-24 RX ORDER — FENTANYL CITRATE 50 UG/ML
INJECTION, SOLUTION INTRAMUSCULAR; INTRAVENOUS
Status: DISCONTINUED | OUTPATIENT
Start: 2025-03-24 | End: 2025-03-24 | Stop reason: HOSPADM

## 2025-03-24 RX ORDER — SODIUM CHLORIDE 0.9 % (FLUSH) 0.9 %
10 SYRINGE (ML) INJECTION AS NEEDED
Status: DISCONTINUED | OUTPATIENT
Start: 2025-03-24 | End: 2025-03-24 | Stop reason: HOSPADM

## 2025-03-24 RX ORDER — LEVOCETIRIZINE DIHYDROCHLORIDE 5 MG/1
5 TABLET, FILM COATED ORAL EVERY MORNING
COMMUNITY

## 2025-03-24 RX ORDER — MIDAZOLAM HYDROCHLORIDE 1 MG/ML
INJECTION, SOLUTION INTRAMUSCULAR; INTRAVENOUS
Status: DISCONTINUED | OUTPATIENT
Start: 2025-03-24 | End: 2025-03-24 | Stop reason: HOSPADM

## 2025-03-24 RX ADMIN — SODIUM CHLORIDE 2000 MG: 900 INJECTION INTRAVENOUS at 07:52

## 2025-03-24 NOTE — H&P
"[facility]  IP Progress Note      Chief Complaint: Pacemaker close to end of battery life and a pacemaker dependent with a recall device.    Subjective   The patient is a pleasant 89-year-old female with history of sick sinus, tachycardia/bradycardia syndrome and paroxysmal atrial fibrillation.  She is status post [employer] dual-chamber pacemaker implant and 2018.  On recent remote monitoring she was noted to have reached close to end of battery life and her device is on [employer] recall list.  Patient is pacemaker dependent and is scheduled for generator change today.  Her condition has otherwise been stable without any new symptoms.    Objective     Blood pressure 175/83, pulse 60, resp. rate 16, height 162.6 cm (64\"), weight 72.2 kg (159 lb 2.8 oz), SpO2 94%, not currently breastfeeding.   No intake or output data in the 24 hours ending 03/24/25 0717    Physical Exam:  General: No acute distress.   Neck: no JVD.  Chest:No respiratory distress, breath sounds are normal. No wheezes,  rhonchi or rales.  Cardiovascular: Normal S1 and S2, no murmur, gallop or rub.    Extremities: No edema.    Results Review:     I reviewed the patient's new clinical results.              Invalid input(s): \"LABALBU\", \"PROT\"          Lab Results   Component Value Date    TROPONINI <0.30 01/11/2018                   Tele: Paced      Assessment:  Dual-chamber pacemaker close to end of battery life in a pacemaker dependent patient with a recall device.  Original reason for implant was symptomatic bradycardia/sick sinus syndrome  Paroxysmal atrial fibrillation.  Hypertension.    Plan:  Proceed to pacemaker generator change.  Antibiotic prophylaxis has been ordered.  The procedure was explained to the patient/family extensively. Indications, benefits, risks and alternatives were discussed. The patient understands well, and wishes to proceed.       Soraya Darling MD, FACC, " FSCAI

## 2025-03-25 NOTE — PROGRESS NOTES
[facility]        Outpatient Follow-Up note         3/25/2025 9:59 AM    Patient Name:   Alex Acosta  YOB: 1947  Primary Care Physician:  No primary care provider on file.   [facility]  [facility]  [address]  [address]  [facility] [address]  Dept: [phone]  Loc: [phone]     Chief Complaint / Reason for follow-up : Follow Up of CKD and multiple myeloma      Interval History :  Patient seen and examined by me. Feels well.  No cp or SOB. No hospitalizations      Past History :  Past Medical History:   Diagnosis Date    Asthma      Past Surgical History:   Procedure Laterality Date    COLONOSCOPY  2007    Dr Kingsley    LEG SURGERY Left 1990    post fx,has rods    VASECTOMY          Medications :     Outpatient Medications Marked as Taking for the 3/25/25 encounter (Office Visit) with Yo Yin MD   Medication Sig Dispense Refill    amLODIPine (NORVASC) 5 MG tablet Take 1 tablet by mouth daily 90 tablet 1    lenalidomide (REVLIMID) 10 MG chemo capsule Take 5 mg by mouth daily Daily x 21 days then 7 days off and then repeat      dexamethasone (DECADRON) 4 MG tablet Take 2 tablets by mouth once a week      aspirin 81 MG tablet Take 1 tablet by mouth daily      budesonide-formoterol (SYMBICORT) 80-4.5 MCG/ACT AERO Inhale 2 puffs into the lungs 2 times daily         Vitals     /86 (BP Site: Left Upper Arm, Patient Position: Sitting, BP Cuff Size: Medium Adult)   Pulse 67   Wt 69.4 kg (153 lb)   SpO2 97%   BMI 22.27 kg/m²  Wt Readings from Last 3 Encounters:   03/25/25 69.4 kg (153 lb)   03/05/24 71.2 kg (157 lb)   09/05/23 70.8 kg (156 lb)        Physical Exam     General -- no distress  Lungs -- clear  Heart -- S1, S2 heard, JVD - no  Abdomen - soft, non-tender  Extremities -- no edema  CNS - awake and alert    Labs, Radiology and Tests    Labs -    10/22 11/22 2/23 8/23 2/24  [facility]    Encounter Date: 2021    Patient ID: Abbi Mendez is a 85 y.o. female.  : 1935     PCP: Eric Driver MD       Chief Complaint: Atrial Fibrillation      PROBLEM LIST:  1. Symptomatic bradycardia:  a. Syncope and collapse with evidence of advanced conduction system disease, high-grade AV block and multiple sinus pauses  b. Pacemaker placement, 2018: [employer] pacemaker.  c. Echo, 2018: EF 55%. Mild septal asymmetric LVH. Calcification of the aortic valve. Small (<1cm) pericardial effusion. No evidence of pericardial tamponade.  d. Echo, 2018: Small, less than 1 cm circumferential pericardial effusion. No evidence of pericardial tamponade. On comparison with the study of 2018, no significant change in the size of effusion is noted.  e. Echo, 2019: EF 60%. Concentric LVH. Grade I diastolic dysfunction. Mild AS with mean PG 10.9 mmHg and mild AI. Mild-to-mod MR and TR, RVSP 29 mmHg. No significant pericardial effusion.  2. Paroxysmal Atrial fibrillation, resolved, 2007.  a. Atenolol therapy.  b. Questionable recurrence of atrial fibrillation, 2015 via EMS, data deficit:  No confirmation via EMS strips or EKG.  Event recorder 2015-2015:  No atrial fibrillation or arrhythmias appreciated.   c. Event recorder (2015 - 2015): No atrial fibrillation; occasional isolated PACs; no ventricular ectopy.  d. Event recorder, 2016: No atrial fibrillation; 4 runs of repetitive PACs, the longest consisting of 6 beats at 156 BPM; dyspnea, tachypalpitations and lightheadedness were not associated with rhythm disturbance.  e. Device interrogation, 2020: 2 mode switches- max 18 minutes 25 seconds, <1% total.   3. Coronary artery disease:  a. [facility], 2007: 30-50% mid/distal LAD in “worst views” within region of extreme tortuosity, EF 70% post PVC without segmental wall motion  abnormality; trace MR associated with ectopy; no MVP.  b. Nuclear stress test, 02/04/2010: Negative for ischemia/scar. EF 87%.  c. Nuclear stress test, 04/10/2015: Negative for ischemia/scar. Normal EF.  4. History of left bundle branch block.   5. RADHA:   a. Diagnosed by sleep study.   b. Currently on CPAP.    6. Hypertension.  7. Dyslipidemia.  8. History of LLE DVT x2 treated with Coumadin; Coumadin discontinued in 2002.  9. History of gastric ulcer.  10. Iron deficiency anemia.  11. Status post tubal ligation.    History of Present Illness  Patient presents today for a 6 month follow-up with a history of paroxysmal atrial fibrillation, coronary artery disease, third degree AV block, and cardiac risk factors. Since last visit, she has been feeling well overall from a cardiovascular standpoint. She occasionally experiences pain by palpation around the pacemaker site. Her systolic blood pressure has been ranging from 130's to 180's. Her blood pressure is high in the morning but normalizes after she takes her medications. She notes that she feels fatigued after she takes her medications and her blood pressure decreases. She notes that she does not want to increase her blood pressure medications due to fatigue. Her blood pressure medications were adjusted by Dr. Driver. Patient denies shortness of breath, palpitations, edema, dizziness, and syncope. She recently had a normal laboratory study- data deficit.       Allergies   Allergen Reactions   • Darvon [Propoxyphene] Paresthesia   • Penicillins Rash         Current Outpatient Medications:   •  acetaminophen (TYLENOL) 325 MG tablet, Take 2 tablets by mouth Every 6 (Six) Hours As Needed for Mild Pain ., Disp: 30 tablet, Rfl: 0  •  apixaban (ELIQUIS) 2.5 MG tablet tablet, Take 1 tablet by mouth 2 (Two) Times a Day., Disp: 60 tablet, Rfl: 6  •  aspirin 81 MG EC tablet, Take 81 mg by mouth Daily., Disp: , Rfl:   •  atorvastatin (LIPITOR) 40 MG tablet, Take 40 mg by mouth  "Every Night., Disp: , Rfl:   •  carvedilol (COREG) 12.5 MG tablet, Take 1 tablet by mouth 2 (Two) Times a Day With Meals.   •  citalopram (CeleXA) 10 MG tablet, Take 10 mg by mouth Daily., Disp: , Rfl:   •  esomeprazole (nexIUM) 40 MG capsule, Take 40 mg by mouth Every Morning Before Breakfast., Disp: , Rfl:   •  ferrous sulfate 324 (65 Fe) MG tablet delayed-release EC tablet, Take 324 mg by mouth Daily With Breakfast., Disp: , Rfl:   •  losartan (COZAAR) 100 MG tablet, Take 100 mg by mouth Daily., Disp: , Rfl:   •  mirtazapine (REMERON) 15 MG tablet, Take 15 mg by mouth Every Night., Disp: , Rfl:   Clonidine 0.1 mg BID    The following portions of the patient's history were reviewed and updated as appropriate: allergies, current medications, past family history, past medical history, past social history, past surgical history and problem list.    ROS  Review of Systems   Constitution: Negative for chills, fever, generalized weakness. Positive for fatigue.   Cardiovascular: Negative for chest pain, dyspnea on exertion, leg swelling, palpitations, orthopnea, and syncope.   Respiratory: Negative for cough, shortness of breath, and wheezing.  HENT: Negative for ear pain, nosebleeds, and tinnitus.  Gastrointestinal: Negative for abdominal pain, constipation, diarrhea, nausea and vomiting.   Genitourinary: No urinary symptoms.  Musculoskeletal: Negative for muscle cramps.  Neurological: Negative for dizziness, headaches, loss of balance, numbness, and symptoms of stroke.  Psychiatric: Normal mental status.     All other systems reviewed and are negative.        Objective:     /70   Pulse 70   Ht 162.6 cm (64\")   Wt 82.1 kg (181 lb)   BMI 31.07 kg/m²    Repeat BP: 160/70    Physical Exam  Constitutional: Patient appears well-developed and well-nourished.   HENT: HEENT exam unremarkable.   Neck: Neck supple. No JVD present. No carotid bruits.   Cardiovascular: Normal rate, regular rhythm and normal heart sounds. " No murmur heard.   2+ symmetric pulses.   Pulmonary/Chest: Breath sounds normal. Does not exhibit tenderness.   Abdominal: Abdomen benign.   Musculoskeletal: Does not exhibit edema.   Neurological: Neurological exam unremarkable.   Vitals reviewed.    Data Review:        Procedures   Manual device interrogation, 04/20/21:   Normal, well-functioning [employer] PPM with 4.5 years of battery life remaining.   Events: <1% AT/AF, 1 mode switch <1 min. No VHR.   Device updates: No changes.         Assessment:      Diagnosis Plan   1. Coronary artery disease involving native coronary artery of native heart without angina pectoris  Stable with no current angina; continue aspirin 81 mg daily.    2. Paroxysmal atrial fibrillation (CMS/HCC)  Stable and asymptomatic; continue carvedilol 12.5 mg BID for rate control and Eliquis 2.5 mg BID for stroke prophylaxis.    3. Third degree AV block (CMS/HCC)  Normal device check in office today.    4. Essential hypertension  Acceptable control; continue carvedilol 12.5 mg BID, losartan 100 mg daily, and clonidine 0.1 mg BID.    5. Hyperlipidemia LDL goal <70  Well-controlled; continue atorvastatin 40 mg nightly.      Plan:   Stable cardiac status.  Blood pressure was somewhat elevated today, patient reports that at home it has been running 130s to 140s with occasional high readings before she takes her morning meds.  I recommended increasing clonidine to 0.1 mg 3 times daily which she is very reluctant at this makes her feel fatigued.  With overall acceptable control we will continue current medications.   FU in 6 MO with device check, sooner as needed.  Thank you for allowing us to participate in the care of your patient.     Scribed for Soraya Darling MD by Tiffany Frias. 4/20/2021  09:48 EDT      I, Soraya Darling MD, personally performed the services described in this documentation as scribed by the above named individual in my presence, and it is both accurate and complete.   4/20/2021  11:00 EDT        Please note that portions of this note may have been completed with a voice recognition program. Efforts were made to edit the dictations, but occasionally words are mistranscribed.

## 2025-04-01 ENCOUNTER — OFFICE VISIT (OUTPATIENT)
Age: OVER 89
End: 2025-04-01
Payer: MEDICARE

## 2025-04-01 DIAGNOSIS — E78.5 DYSLIPIDEMIA: ICD-10-CM

## 2025-04-01 DIAGNOSIS — I48.0 PAROXYSMAL ATRIAL FIBRILLATION: Primary | ICD-10-CM

## 2025-04-01 DIAGNOSIS — I44.2 AV BLOCK, COMPLETE: ICD-10-CM

## 2025-04-01 DIAGNOSIS — I10 ESSENTIAL HYPERTENSION: ICD-10-CM

## 2025-04-01 NOTE — PROGRESS NOTES
2025    Abbi Mendez, : 1935      Fever: No    Temperature if indicated:     Wound Location: Left Infraclavicular    Dressing Removed: Removed by MA/RN      Old Dressing Appearance:  Clean, dry    Wound Appearance: Redness []                  Drainage []                  Culture obtained []        Color: N/A     Consistency:  n/a     Amount: none         Gloves used, wound cleansed with sterile 4x4 and peroxide [x]       MD notified []     MD orders:     Antibiotic started []      If checked, type     Other:       Appointment for follow-up scheduled for 3 months post procedure []    Future Appointments   Date Time Provider Department Center   2025 10:00 AM Soraya Darling MD [facility]   2025 11:15 AM Soraya Darling MD [facility]           Savanna Mead MA, 25      MD Signature:______________________________ Completed By/Date:

## 2025-06-24 NOTE — PROGRESS NOTES
[facility] Cardiology    Encounter Date: 2025    Patient ID: Abbi Mendez is a 89 y.o. female.  : 1935     PCP: Bettye Bryan DO       Chief Complaint: Paroxysmal atrial fibrillation      PROBLEM LIST:  Symptomatic bradycardia:  Syncope and collapse with evidence of advanced conduction system disease, high-grade AV block and multiple sinus pauses  Pacemaker placement, 2018: [employer] pacemaker.  Echo, 2018: EF 55%. Mild septal asymmetric LVH. Calcification of the aortic valve. Small (<1cm) pericardial effusion. No evidence of pericardial tamponade.  Echo, 2018: Small, less than 1 cm circumferential pericardial effusion. No evidence of pericardial tamponade. On comparison with the study of 2018, no significant change in the size of effusion is noted.  Echo, 2019: EF 60%. Concentric LVH. Grade I diastolic dysfunction. Mild AS with mean PG 10.9 mmHg and mild AI. Mild-to-mod MR and TR, RVSP 29 mmHg. No significant pericardial effusion.  Paroxysmal Atrial fibrillation, resolved, 2007.  Atenolol therapy.  Questionable recurrence of atrial fibrillation, 2015 via EMS, data deficit:  No confirmation via EMS strips or EKG.  Event recorder 2015-2015:  No atrial fibrillation or arrhythmias appreciated.   Event recorder (2015 - 2015): No atrial fibrillation; occasional isolated PACs; no ventricular ectopy.  Event recorder, 2016: No atrial fibrillation; 4 runs of repetitive PACs, the longest consisting of 6 beats at 156 BPM; dyspnea, tachypalpitations and lightheadedness were not associated with rhythm disturbance.  Device interrogation, 2020: 2 mode switches- max 18 minutes 25 seconds, <1% total.   Device interrogation 21: 1 mode switch- 1 minute- 4 years of battery life.  PPM generator change, 2025.  Coronary artery disease:  [facility], 2007: 30-50% mid/distal LAD in “worst  "views” within region of extreme tortuosity, EF 70% post PVC without segmental wall motion abnormality; trace MR associated with ectopy; no MVP.  Nuclear stress test, 02/04/2010: Negative for ischemia/scar. EF 87%.  Nuclear stress test, 04/10/2015: Negative for ischemia/scar. Normal EF.  History of left bundle branch block.   RADHA:   Diagnosed by sleep study.   Currently on CPAP.    Hypertension.  Dyslipidemia.  History of LLE DVT x2 treated with Coumadin; Coumadin discontinued in 2002.  History of gastric ulcer   Iron deficiency anemia.  Status post tubal ligation.    History of Present Illness  Patient presents today for a follow-up with a history of PAF, SSS, and cardiac risk factors. Since last visit, patient has been doing well from a cardiac standpoint.  She continues to live alone and she goes to the [facility] 3 days/week.  She denies any chest pain, shortness of air, palpitations orthopnea, edema, presyncope or syncope.  Is concerned that her pacemaker may come through her skin.    Allergies   Allergen Reactions    Darvon [Propoxyphene] Paresthesia    Dextroamphetamine Rash    Penicillins Rash     Daughter states, \"Patient has never taken PCN to their knowledge, but was told in the past that she was allergic\"         Current Outpatient Medications:     acetaminophen (TYLENOL) 325 MG tablet, Take 2 tablets by mouth Every 6 (Six) Hours As Needed for Mild Pain ., Disp: 30 tablet, Rfl: 0    albuterol sulfate  (90 Base) MCG/ACT inhaler, INHALE 2 PUFFS BY MOUTH EVERY 4 TO 6 HOURS AS NEEDED FOR SHORTNESS OF BREATH OR WHEEZING, Disp: , Rfl:     apixaban (Eliquis) 2.5 MG tablet tablet, Take 1 tablet by mouth 2 (Two) Times a Day., Disp: 180 tablet, Rfl: 3    atorvastatin (LIPITOR) 40 MG tablet, Take 1 tablet by mouth Every Night., Disp: , Rfl:     calcium carbonate, oyster shell, 500 MG tablet tablet, Take 1 tablet by mouth 2 (Two) Times a Day., Disp: , Rfl:     carvedilol (COREG) 25 MG tablet, Take 1 tablet " "by mouth 2 (Two) Times a Day With Meals., Disp: , Rfl:     donepezil (ARICEPT) 10 MG tablet, TAKE ONE Tablet BY MOUTH EACH NIGHT AT BEDTIME, Disp: , Rfl:     escitalopram (LEXAPRO) 10 MG tablet, Take 1 tablet by mouth Daily., Disp: , Rfl:     esomeprazole (nexIUM) 40 MG capsule, Take 1 capsule by mouth Every Morning Before Breakfast., Disp: , Rfl:     hydrOXYzine (ATARAX) 25 MG tablet, Daily., Disp: , Rfl:     levocetirizine (XYZAL) 5 MG tablet, Take 1 tablet by mouth Every Morning., Disp: , Rfl:     levothyroxine (SYNTHROID, LEVOTHROID) 50 MCG tablet, Take 1 tablet by mouth Daily., Disp: , Rfl:     losartan (COZAAR) 25 MG tablet, TAKE 1/2 (ONE-HALF) TABLET BY MOUTH ONCE DAILY FOR 90 DAYS AS DIRECTED, Disp: , Rfl:     PARoxetine (PAXIL) 20 MG tablet, Take 1 tablet by mouth Every Morning., Disp: , Rfl:     FeroSul 325 (65 Fe) MG tablet, Take 1 tablet by mouth Daily. (Patient not taking: Reported on 7/1/2025), Disp: , Rfl:     FLUoxetine (PROzac) 20 MG capsule, Take 1 capsule by mouth Daily. (Patient not taking: Reported on 7/1/2025), Disp: , Rfl:     traMADol (ULTRAM) 50 MG tablet, Take 1 tablet by mouth Every 6 (Six) Hours As Needed for Moderate Pain. (Patient not taking: Reported on 7/1/2025), Disp: , Rfl:     The following portions of the patient's history were reviewed and updated as appropriate: allergies, current medications, past family history, past medical history, past social history, past surgical history and problem list.    ROS  Review of Systems   14 point ROS negative except for that listed in the HPI.         Objective:     /68 (BP Location: Right arm, Patient Position: Sitting)   Pulse 60   Ht 162.6 cm (64\")   Wt 69.9 kg (154 lb)   SpO2 95%   BMI 26.43 kg/m²      Physical Exam  Constitutional: Patient appears well-developed and well-nourished.   HENT: HEENT exam unremarkable.   Neck: Neck supple. No JVD present. No carotid bruits.   Cardiovascular: Normal rate, regular rhythm and normal " heart sounds. No murmur heard.   2+ symmetric pulses.   Pulmonary/Chest: Breath sounds normal. Does not exhibit tenderness.  Pacemaker site normal.  No edema or erythema.  Abdominal: Abdomen benign.   Musculoskeletal: Does not exhibit edema.   Neurological: Neurological exam unremarkable.   Vitals reviewed.    Data Review:   Lab Results   Component Value Date    GLUCOSE 101 (H) 03/24/2025    BUN 19 03/24/2025    CREATININE 1.10 (H) 03/24/2025    EGFR 48.1 (L) 03/24/2025    BCR 17.3 03/24/2025     03/24/2025    K 4.2 03/24/2025    CO2 28.0 03/24/2025    CALCIUM 8.7 03/24/2025     Lab Results   Component Value Date    WBC 7.94 03/24/2025    RBC 4.05 03/24/2025    HGB 12.2 03/24/2025    HCT 37.0 03/24/2025    MCV 91.4 03/24/2025     03/24/2025        ECG 12 Lead    Date/Time: 7/1/2025 1:02 PM  Performed by: Harriett Downing PA-C    Authorized by: Harriett Downing PA-C  Comparison: compared with previous ECG from 6/5/2019  Rhythm: paced  Rate: normal  BPM: 60                    Assessment and plan:      Diagnosis Plan   1. Paroxysmal atrial fibrillation  Maintaining sinus rhythm.  She has a history of third-degree heart block and is status post pacemaker implantation.  Pacemaker is normally functioning on interrogation today.  She is 100% RV paced and has approximately 8 years left on her battery.  There were no changes made to device function..      2. Essential hypertension  Well-controlled.  Continue current hypertensive therapy.      3. Dyslipidemia  Continue Lipitor.          Continue current medications.   [place] 6 MO [place] [facility] [place] sooner as needed.  Thank you for allowing us to participate in the care of your patient.         Harriett Downing PA-C    Please note that portions of this note may have been completed with a voice recognition program. Efforts were made to edit the dictations, but occasionally words are mistranscribed.

## 2025-07-01 ENCOUNTER — OFFICE VISIT (OUTPATIENT)
Age: OVER 89
End: 2025-07-01
Payer: MEDICARE

## 2025-07-01 VITALS
SYSTOLIC BLOOD PRESSURE: 128 MMHG | DIASTOLIC BLOOD PRESSURE: 68 MMHG | WEIGHT: 154 LBS | OXYGEN SATURATION: 95 % | HEART RATE: 60 BPM | BODY MASS INDEX: 26.29 KG/M2 | HEIGHT: 64 IN

## 2025-07-01 DIAGNOSIS — I10 ESSENTIAL HYPERTENSION: ICD-10-CM

## 2025-07-01 DIAGNOSIS — I48.0 PAROXYSMAL ATRIAL FIBRILLATION: Primary | ICD-10-CM

## 2025-07-01 DIAGNOSIS — E78.5 DYSLIPIDEMIA: ICD-10-CM

## 2025-07-01 RX ORDER — HYDROXYZINE HYDROCHLORIDE 25 MG/1
TABLET, FILM COATED ORAL EVERY 24 HOURS
COMMUNITY
Start: 2025-05-20

## 2025-07-01 RX ORDER — PAROXETINE 20 MG/1
20 TABLET, FILM COATED ORAL EVERY MORNING
COMMUNITY

## 2025-07-01 RX ORDER — LOSARTAN POTASSIUM 25 MG/1
TABLET ORAL
COMMUNITY
Start: 2025-06-05

## 2025-07-01 RX ORDER — ESCITALOPRAM OXALATE 10 MG/1
10 TABLET ORAL DAILY
COMMUNITY
Start: 2025-06-24 | End: 2025-07-24

## 2025-07-01 RX ORDER — ALBUTEROL SULFATE 90 UG/1
INHALANT RESPIRATORY (INHALATION)
COMMUNITY
Start: 2025-03-09

## 2025-08-07 LAB
MC_CV_MDC_IDC_RATE_1: 160
MC_CV_MDC_IDC_ZONE_ID: 1
MDC_IDC_MSMT_BATTERY_REMAINING_LONGEVITY: 90 MO
MDC_IDC_MSMT_BATTERY_REMAINING_PERCENTAGE: 100 %
MDC_IDC_MSMT_BATTERY_STATUS: NORMAL
MDC_IDC_MSMT_LEADCHNL_RA_DTM: NORMAL
MDC_IDC_MSMT_LEADCHNL_RA_IMPEDANCE_VALUE: 562
MDC_IDC_MSMT_LEADCHNL_RA_PACING_THRESHOLD_AMPLITUDE: 0.5
MDC_IDC_MSMT_LEADCHNL_RA_PACING_THRESHOLD_POLARITY: NORMAL
MDC_IDC_MSMT_LEADCHNL_RA_PACING_THRESHOLD_PULSEWIDTH: 0.4
MDC_IDC_MSMT_LEADCHNL_RA_SENSING_INTR_AMPL: 2
MDC_IDC_MSMT_LEADCHNL_RV_DTM: NORMAL
MDC_IDC_MSMT_LEADCHNL_RV_IMPEDANCE_VALUE: 757
MDC_IDC_MSMT_LEADCHNL_RV_PACING_THRESHOLD_AMPLITUDE: 1
MDC_IDC_MSMT_LEADCHNL_RV_PACING_THRESHOLD_POLARITY: NORMAL
MDC_IDC_MSMT_LEADCHNL_RV_PACING_THRESHOLD_PULSEWIDTH: 0.4
MDC_IDC_PG_IMPLANT_DTM: NORMAL
MDC_IDC_PG_MFG: NORMAL
MDC_IDC_PG_MODEL: NORMAL
MDC_IDC_PG_SERIAL: NORMAL
MDC_IDC_PG_TYPE: NORMAL
MDC_IDC_SESS_DTM: NORMAL
MDC_IDC_SESS_TYPE: NORMAL
MDC_IDC_SET_BRADY_AT_MODE_SWITCH_RATE: 170
MDC_IDC_SET_BRADY_LOWRATE: 60
MDC_IDC_SET_BRADY_MAX_SENSOR_RATE: 130
MDC_IDC_SET_BRADY_MAX_TRACKING_RATE: 130
MDC_IDC_SET_BRADY_MODE: NORMAL
MDC_IDC_SET_BRADY_PAV_DELAY: 120
MDC_IDC_SET_BRADY_SAV_DELAY: 100
MDC_IDC_SET_LEADCHNL_RA_PACING_AMPLITUDE: 2
MDC_IDC_SET_LEADCHNL_RA_PACING_POLARITY: NORMAL
MDC_IDC_SET_LEADCHNL_RA_PACING_PULSEWIDTH: 0.4
MDC_IDC_SET_LEADCHNL_RA_SENSING_POLARITY: NORMAL
MDC_IDC_SET_LEADCHNL_RA_SENSING_SENSITIVITY: 0.25
MDC_IDC_SET_LEADCHNL_RV_PACING_AMPLITUDE: 2.5
MDC_IDC_SET_LEADCHNL_RV_PACING_POLARITY: NORMAL
MDC_IDC_SET_LEADCHNL_RV_PACING_PULSEWIDTH: 0.4
MDC_IDC_SET_LEADCHNL_RV_SENSING_POLARITY: NORMAL
MDC_IDC_SET_LEADCHNL_RV_SENSING_SENSITIVITY: 0.6
MDC_IDC_SET_ZONE_STATUS: NORMAL
MDC_IDC_SET_ZONE_TYPE: NORMAL
MDC_IDC_STAT_AT_BURDEN_PERCENT: 0
MDC_IDC_STAT_BRADY_RA_PERCENT_PACED: 94
MDC_IDC_STAT_BRADY_RV_PERCENT_PACED: 100

## (undated) DEVICE — MICRO HVTSA, 0.5G AND HVTSA SOURCEMARK PRODUCT CODE M1206 AND M1206-01: Brand: EXOFIN MICRO HVTSA, 0.5G

## (undated) DEVICE — SUT VIC 2/0 TIES 18IN J111T

## (undated) DEVICE — SUT MNCRYL PLS ANTIB UD 2/0 CP1 27IN

## (undated) DEVICE — HOLDER: Brand: DEROYAL

## (undated) DEVICE — ANTIBACTERIAL UNDYED BRAIDED (POLYGLACTIN 910), SYNTHETIC ABSORBABLE SUTURE: Brand: COATED VICRYL

## (undated) DEVICE — LEX CATH LAB MINOR: Brand: MEDLINE INDUSTRIES, INC.

## (undated) DEVICE — BNDG ELAS CO-FLEX SLF ADHR 4IN5YD LF STRL

## (undated) DEVICE — PK PRSTH HIP 70

## (undated) DEVICE — NDL MAYO CATGUT 1/2 CIR 18244D PK/2

## (undated) DEVICE — SPNG GZ STRL 2S 4X4 12PLY

## (undated) DEVICE — SUT VIC 2/0 CT1 27IN J259H

## (undated) DEVICE — DRSNG TELFA PAD NONADH STR 1S 3X8IN

## (undated) DEVICE — DRSNG SURESITE123 4X4.8IN

## (undated) DEVICE — DRSNG SURG AQUACEL AG/ADVNTGE 9X15CM 3.5X6IN

## (undated) DEVICE — AIRLIFE™ ADULT VENTURI STYLE MASK VINYL, UNDER-THE-CHIN STYLE ADULT MASK, 6 INCH (15 CM) FLEXTUBE, SIX DILUTER JETS, NEBULIZER HOOD, 7 FEET (2.1 M) OXYGEN TUBING AND U/CONNECT-IT ADAPTER: Brand: AIRLIFE™

## (undated) DEVICE — ARM SLING II: Brand: DEROYAL

## (undated) DEVICE — SYR LL TP 10ML STRL

## (undated) DEVICE — INTRO TEAR AWAY/LVD W/SD PRT 6F 13CM

## (undated) DEVICE — ST INF PRI SMRTSTE 20DRP 2VLV 24ML 117

## (undated) DEVICE — LIMB HOLDER, WRIST/ANKLE: Brand: DEROYAL

## (undated) DEVICE — GLV SURG SENSICARE W/ALOE PF LF 8.5 STRL

## (undated) DEVICE — PENCL SMOKE/EVAC MEGADYNE TELESCP 10FT

## (undated) DEVICE — ELECTRD RETRN/GRND MEGADYNE SGL/PLT W/CORD 9FT DISP

## (undated) DEVICE — APPL CHLORAPREP W/TINT 26ML ORNG

## (undated) DEVICE — DRSNG PAD ABD 8X10IN STRL

## (undated) DEVICE — SYS SKIN CLS DERMABOND PRINEO W/22CM MESH TP

## (undated) DEVICE — ST EXT IV SMARTSITE 2VLV SP M LL 5ML IV1

## (undated) DEVICE — TRAP FLD MINIVAC MEGADYNE 100ML

## (undated) DEVICE — HANDPIECE SET WITH COAXIAL HIGH FLOW TIP AND SUCTION TUBE: Brand: INTERPULSE

## (undated) DEVICE — 2108 SERIES SAGITTAL BLADE (18.6 X 0.8 X 73.8MM)

## (undated) DEVICE — PILLW ABD MD